# Patient Record
Sex: FEMALE | Race: ASIAN | NOT HISPANIC OR LATINO | Employment: UNEMPLOYED | URBAN - METROPOLITAN AREA
[De-identification: names, ages, dates, MRNs, and addresses within clinical notes are randomized per-mention and may not be internally consistent; named-entity substitution may affect disease eponyms.]

---

## 2017-02-08 ENCOUNTER — COMMUNICATION - HEALTHEAST (OUTPATIENT)
Dept: FAMILY MEDICINE | Facility: CLINIC | Age: 52
End: 2017-02-08

## 2017-02-08 DIAGNOSIS — R10.9 ABDOMINAL PAIN: ICD-10-CM

## 2017-02-08 DIAGNOSIS — J30.9 ALLERGIC RHINITIS: ICD-10-CM

## 2017-03-06 ENCOUNTER — COMMUNICATION - HEALTHEAST (OUTPATIENT)
Dept: FAMILY MEDICINE | Facility: CLINIC | Age: 52
End: 2017-03-06

## 2017-03-06 DIAGNOSIS — J30.9 ALLERGIC RHINITIS: ICD-10-CM

## 2017-04-17 ENCOUNTER — OFFICE VISIT - HEALTHEAST (OUTPATIENT)
Dept: FAMILY MEDICINE | Facility: CLINIC | Age: 52
End: 2017-04-17

## 2017-04-17 DIAGNOSIS — R10.9 ABDOMINAL PAIN: ICD-10-CM

## 2017-04-17 DIAGNOSIS — R10.13 EPIGASTRIC ABDOMINAL PAIN: ICD-10-CM

## 2017-04-17 DIAGNOSIS — J30.9 ALLERGIC RHINITIS: ICD-10-CM

## 2017-04-17 DIAGNOSIS — J45.909 ASTHMA: ICD-10-CM

## 2017-04-17 DIAGNOSIS — R21 MALAR RASH: ICD-10-CM

## 2017-04-17 DIAGNOSIS — L30.9 DERMATITIS: ICD-10-CM

## 2017-07-06 ENCOUNTER — AMBULATORY - HEALTHEAST (OUTPATIENT)
Dept: FAMILY MEDICINE | Facility: CLINIC | Age: 52
End: 2017-07-06

## 2017-07-06 DIAGNOSIS — M19.90 OSTEOARTHRITIS: ICD-10-CM

## 2017-07-06 DIAGNOSIS — M77.10 TENNIS ELBOW: ICD-10-CM

## 2017-07-21 ENCOUNTER — OFFICE VISIT - HEALTHEAST (OUTPATIENT)
Dept: FAMILY MEDICINE | Facility: CLINIC | Age: 52
End: 2017-07-21

## 2017-07-21 DIAGNOSIS — E66.9 OBESITY, UNSPECIFIED: ICD-10-CM

## 2017-07-21 DIAGNOSIS — M65.311 TRIGGER FINGER OF RIGHT THUMB: ICD-10-CM

## 2017-07-21 DIAGNOSIS — K29.70 HELICOBACTER PYLORI GASTRITIS: ICD-10-CM

## 2017-07-21 DIAGNOSIS — B96.81 HELICOBACTER PYLORI GASTRITIS: ICD-10-CM

## 2017-07-21 DIAGNOSIS — E78.00 PURE HYPERCHOLESTEROLEMIA: ICD-10-CM

## 2017-07-21 DIAGNOSIS — Z91.09 ENVIRONMENTAL ALLERGIES: ICD-10-CM

## 2017-08-14 ENCOUNTER — OFFICE VISIT - HEALTHEAST (OUTPATIENT)
Dept: FAMILY MEDICINE | Facility: CLINIC | Age: 52
End: 2017-08-14

## 2017-08-14 DIAGNOSIS — M79.672 FOOT PAIN, BILATERAL: ICD-10-CM

## 2017-08-14 DIAGNOSIS — Z91.013 SHRIMP ALLERGY: ICD-10-CM

## 2017-08-14 DIAGNOSIS — M54.41 CHRONIC BILATERAL LOW BACK PAIN WITH BILATERAL SCIATICA: ICD-10-CM

## 2017-08-14 DIAGNOSIS — K59.09 CHRONIC CONSTIPATION: ICD-10-CM

## 2017-08-14 DIAGNOSIS — E78.00 PURE HYPERCHOLESTEROLEMIA: ICD-10-CM

## 2017-08-14 DIAGNOSIS — E66.811 OBESITY (BMI 30.0-34.9): ICD-10-CM

## 2017-08-14 DIAGNOSIS — N92.0 EXCESSIVE OR FREQUENT MENSTRUATION: ICD-10-CM

## 2017-08-14 DIAGNOSIS — R10.13 EPIGASTRIC ABDOMINAL PAIN: ICD-10-CM

## 2017-08-14 DIAGNOSIS — K13.0 ANGULAR CHEILITIS: ICD-10-CM

## 2017-08-14 DIAGNOSIS — G89.29 CHRONIC BILATERAL LOW BACK PAIN WITH BILATERAL SCIATICA: ICD-10-CM

## 2017-08-14 DIAGNOSIS — L85.3 DRY SKIN DERMATITIS: ICD-10-CM

## 2017-08-14 DIAGNOSIS — R87.610 ATYPICAL SQUAMOUS CELLS OF UNDETERMINED SIGNIFICANCE ON CYTOLOGIC SMEAR OF CERVIX (ASC-US): ICD-10-CM

## 2017-08-14 DIAGNOSIS — M79.671 FOOT PAIN, BILATERAL: ICD-10-CM

## 2017-08-14 DIAGNOSIS — K76.0 FATTY LIVER: ICD-10-CM

## 2017-08-14 DIAGNOSIS — R73.09 OTHER ABNORMAL GLUCOSE: ICD-10-CM

## 2017-08-14 DIAGNOSIS — J30.9 ALLERGIC RHINITIS: ICD-10-CM

## 2017-08-14 DIAGNOSIS — J45.20 MILD INTERMITTENT ASTHMA WITHOUT COMPLICATION: ICD-10-CM

## 2017-08-14 DIAGNOSIS — R17 HIGH BILIRUBIN: ICD-10-CM

## 2017-08-14 DIAGNOSIS — L30.9 DERMATITIS: ICD-10-CM

## 2017-08-14 DIAGNOSIS — B96.81 HELICOBACTER PYLORI GASTRITIS: ICD-10-CM

## 2017-08-14 DIAGNOSIS — M65.311 TRIGGER FINGER OF RIGHT THUMB: ICD-10-CM

## 2017-08-14 DIAGNOSIS — Z31.9 INFERTILITY MANAGEMENT: ICD-10-CM

## 2017-08-14 DIAGNOSIS — Z00.00 ROUTINE GENERAL MEDICAL EXAMINATION AT A HEALTH CARE FACILITY: ICD-10-CM

## 2017-08-14 DIAGNOSIS — Z12.31 VISIT FOR SCREENING MAMMOGRAM: ICD-10-CM

## 2017-08-14 DIAGNOSIS — M54.42 CHRONIC BILATERAL LOW BACK PAIN WITH BILATERAL SCIATICA: ICD-10-CM

## 2017-08-14 DIAGNOSIS — F41.9 ANXIETY: ICD-10-CM

## 2017-08-14 DIAGNOSIS — Z91.09 ENVIRONMENTAL ALLERGIES: ICD-10-CM

## 2017-08-14 DIAGNOSIS — J45.909 ASTHMA: ICD-10-CM

## 2017-08-14 DIAGNOSIS — R73.03 PREDIABETES: ICD-10-CM

## 2017-08-14 DIAGNOSIS — K29.70 HELICOBACTER PYLORI GASTRITIS: ICD-10-CM

## 2017-08-14 DIAGNOSIS — M54.17 LUMBOSACRAL RADICULOPATHY AT S1: ICD-10-CM

## 2017-08-14 DIAGNOSIS — E66.9 OBESITY, UNSPECIFIED: ICD-10-CM

## 2017-08-14 LAB
CHOLEST SERPL-MCNC: 196 MG/DL
FASTING STATUS PATIENT QL REPORTED: YES
HBA1C MFR BLD: 6.1 % (ref 3.5–6)
HDLC SERPL-MCNC: 37 MG/DL
LDLC SERPL CALC-MCNC: 127 MG/DL
TRIGL SERPL-MCNC: 158 MG/DL

## 2017-08-14 ASSESSMENT — MIFFLIN-ST. JEOR: SCORE: 1131.34

## 2017-08-17 ENCOUNTER — AMBULATORY - HEALTHEAST (OUTPATIENT)
Dept: LAB | Facility: CLINIC | Age: 52
End: 2017-08-17

## 2017-08-17 ENCOUNTER — COMMUNICATION - HEALTHEAST (OUTPATIENT)
Dept: FAMILY MEDICINE | Facility: CLINIC | Age: 52
End: 2017-08-17

## 2017-08-17 DIAGNOSIS — Z00.00 ROUTINE GENERAL MEDICAL EXAMINATION AT HEALTH CARE FACILITY: ICD-10-CM

## 2017-08-17 DIAGNOSIS — E55.9 VITAMIN D DEFICIENCY: ICD-10-CM

## 2017-08-18 ENCOUNTER — COMMUNICATION - HEALTHEAST (OUTPATIENT)
Dept: FAMILY MEDICINE | Facility: CLINIC | Age: 52
End: 2017-08-18

## 2017-08-18 ENCOUNTER — AMBULATORY - HEALTHEAST (OUTPATIENT)
Dept: LAB | Facility: CLINIC | Age: 52
End: 2017-08-18

## 2017-08-18 DIAGNOSIS — R10.13 EPIGASTRIC ABDOMINAL PAIN: ICD-10-CM

## 2017-08-25 ENCOUNTER — OFFICE VISIT - HEALTHEAST (OUTPATIENT)
Dept: FAMILY MEDICINE | Facility: CLINIC | Age: 52
End: 2017-08-25

## 2017-08-25 DIAGNOSIS — Z12.31 ENCOUNTER FOR MAMMOGRAM TO ESTABLISH BASELINE MAMMOGRAM: ICD-10-CM

## 2017-08-25 DIAGNOSIS — R10.13 EPIGASTRIC ABDOMINAL PAIN: ICD-10-CM

## 2017-08-25 DIAGNOSIS — M65.311 TRIGGER FINGER OF RIGHT THUMB: ICD-10-CM

## 2017-08-25 ASSESSMENT — MIFFLIN-ST. JEOR: SCORE: 1135.87

## 2018-04-13 ENCOUNTER — COMMUNICATION - HEALTHEAST (OUTPATIENT)
Dept: FAMILY MEDICINE | Facility: CLINIC | Age: 53
End: 2018-04-13

## 2018-04-13 ENCOUNTER — OFFICE VISIT - HEALTHEAST (OUTPATIENT)
Dept: FAMILY MEDICINE | Facility: CLINIC | Age: 53
End: 2018-04-13

## 2018-04-13 DIAGNOSIS — Z71.84 TRAVEL ADVICE ENCOUNTER: ICD-10-CM

## 2018-04-13 DIAGNOSIS — M54.42 ACUTE BILATERAL LOW BACK PAIN WITH BILATERAL SCIATICA: ICD-10-CM

## 2018-04-13 DIAGNOSIS — M79.604 BILATERAL LEG PAIN: ICD-10-CM

## 2018-04-13 DIAGNOSIS — L30.9 DERMATITIS: ICD-10-CM

## 2018-04-13 DIAGNOSIS — M79.672 FOOT PAIN, BILATERAL: ICD-10-CM

## 2018-04-13 DIAGNOSIS — M54.41 ACUTE BILATERAL LOW BACK PAIN WITH BILATERAL SCIATICA: ICD-10-CM

## 2018-04-13 DIAGNOSIS — M79.671 FOOT PAIN, BILATERAL: ICD-10-CM

## 2018-04-13 DIAGNOSIS — M79.605 BILATERAL LEG PAIN: ICD-10-CM

## 2018-04-13 DIAGNOSIS — M54.16 LUMBAR RADICULAR PAIN: ICD-10-CM

## 2018-04-17 ENCOUNTER — OFFICE VISIT - HEALTHEAST (OUTPATIENT)
Dept: FAMILY MEDICINE | Facility: CLINIC | Age: 53
End: 2018-04-17

## 2018-04-17 DIAGNOSIS — Z91.09 ENVIRONMENTAL ALLERGIES: ICD-10-CM

## 2018-04-17 DIAGNOSIS — M54.17 LUMBOSACRAL RADICULOPATHY AT S1: ICD-10-CM

## 2018-04-17 DIAGNOSIS — R21 RASH: ICD-10-CM

## 2018-05-18 ENCOUNTER — COMMUNICATION - HEALTHEAST (OUTPATIENT)
Dept: FAMILY MEDICINE | Facility: CLINIC | Age: 53
End: 2018-05-18

## 2018-08-31 ENCOUNTER — COMMUNICATION - HEALTHEAST (OUTPATIENT)
Dept: FAMILY MEDICINE | Facility: CLINIC | Age: 53
End: 2018-08-31

## 2018-08-31 DIAGNOSIS — M54.17 LUMBOSACRAL RADICULOPATHY AT S1: ICD-10-CM

## 2018-08-31 DIAGNOSIS — R10.13 EPIGASTRIC ABDOMINAL PAIN: ICD-10-CM

## 2018-08-31 DIAGNOSIS — Z91.09 ENVIRONMENTAL ALLERGIES: ICD-10-CM

## 2018-09-26 ENCOUNTER — OFFICE VISIT - HEALTHEAST (OUTPATIENT)
Dept: FAMILY MEDICINE | Facility: CLINIC | Age: 53
End: 2018-09-26

## 2018-09-26 DIAGNOSIS — M79.621 PAIN OF RIGHT UPPER ARM: ICD-10-CM

## 2018-09-26 DIAGNOSIS — K29.70 HELICOBACTER PYLORI GASTRITIS: ICD-10-CM

## 2018-09-26 DIAGNOSIS — B96.81 HELICOBACTER PYLORI GASTRITIS: ICD-10-CM

## 2018-09-26 DIAGNOSIS — I10 HIGH BLOOD PRESSURE: ICD-10-CM

## 2018-09-26 DIAGNOSIS — R13.10 DYSPHAGIA: ICD-10-CM

## 2018-12-11 ENCOUNTER — RECORDS - HEALTHEAST (OUTPATIENT)
Dept: GENERAL RADIOLOGY | Facility: CLINIC | Age: 53
End: 2018-12-11

## 2018-12-11 ENCOUNTER — OFFICE VISIT - HEALTHEAST (OUTPATIENT)
Dept: FAMILY MEDICINE | Facility: CLINIC | Age: 53
End: 2018-12-11

## 2018-12-11 DIAGNOSIS — R07.81 RIB PAIN ON RIGHT SIDE: ICD-10-CM

## 2018-12-11 DIAGNOSIS — R07.81 PLEURODYNIA: ICD-10-CM

## 2018-12-11 ASSESSMENT — MIFFLIN-ST. JEOR: SCORE: 1126.8

## 2018-12-15 ENCOUNTER — COMMUNICATION - HEALTHEAST (OUTPATIENT)
Dept: FAMILY MEDICINE | Facility: CLINIC | Age: 53
End: 2018-12-15

## 2018-12-15 DIAGNOSIS — R10.13 EPIGASTRIC ABDOMINAL PAIN: ICD-10-CM

## 2019-01-17 ENCOUNTER — COMMUNICATION - HEALTHEAST (OUTPATIENT)
Dept: FAMILY MEDICINE | Facility: CLINIC | Age: 54
End: 2019-01-17

## 2019-02-26 ENCOUNTER — COMMUNICATION - HEALTHEAST (OUTPATIENT)
Dept: FAMILY MEDICINE | Facility: CLINIC | Age: 54
End: 2019-02-26

## 2019-02-26 ENCOUNTER — OFFICE VISIT - HEALTHEAST (OUTPATIENT)
Dept: FAMILY MEDICINE | Facility: CLINIC | Age: 54
End: 2019-02-26

## 2019-02-26 DIAGNOSIS — Z12.31 VISIT FOR SCREENING MAMMOGRAM: ICD-10-CM

## 2019-02-26 DIAGNOSIS — M54.17 LUMBOSACRAL RADICULOPATHY AT S1: ICD-10-CM

## 2019-02-26 DIAGNOSIS — R21 MALAR RASH: ICD-10-CM

## 2019-02-26 DIAGNOSIS — J45.20 MILD INTERMITTENT ASTHMA WITHOUT COMPLICATION: ICD-10-CM

## 2019-02-26 DIAGNOSIS — Z00.00 ROUTINE GENERAL MEDICAL EXAMINATION AT A HEALTH CARE FACILITY: ICD-10-CM

## 2019-02-26 DIAGNOSIS — K76.0 FATTY LIVER: ICD-10-CM

## 2019-02-26 DIAGNOSIS — N95.1 VAGINAL DRYNESS, MENOPAUSAL: ICD-10-CM

## 2019-02-26 DIAGNOSIS — E66.811 OBESITY (BMI 30.0-34.9): ICD-10-CM

## 2019-02-26 DIAGNOSIS — K59.09 CHRONIC CONSTIPATION: ICD-10-CM

## 2019-02-26 DIAGNOSIS — E55.9 VITAMIN D DEFICIENCY: ICD-10-CM

## 2019-02-26 DIAGNOSIS — R73.03 PREDIABETES: ICD-10-CM

## 2019-02-26 DIAGNOSIS — Z91.09 ENVIRONMENTAL ALLERGIES: ICD-10-CM

## 2019-02-26 DIAGNOSIS — G89.29 CHRONIC BILATERAL LOW BACK PAIN WITH BILATERAL SCIATICA: ICD-10-CM

## 2019-02-26 DIAGNOSIS — R87.610 ATYPICAL SQUAMOUS CELLS OF UNDETERMINED SIGNIFICANCE ON CYTOLOGIC SMEAR OF CERVIX (ASC-US): ICD-10-CM

## 2019-02-26 DIAGNOSIS — Z12.4 CERVICAL CANCER SCREENING: ICD-10-CM

## 2019-02-26 DIAGNOSIS — B96.81 HELICOBACTER PYLORI GASTRITIS: ICD-10-CM

## 2019-02-26 DIAGNOSIS — E78.00 PURE HYPERCHOLESTEROLEMIA: ICD-10-CM

## 2019-02-26 DIAGNOSIS — R10.13 EPIGASTRIC ABDOMINAL PAIN: ICD-10-CM

## 2019-02-26 DIAGNOSIS — M79.672 FOOT PAIN, BILATERAL: ICD-10-CM

## 2019-02-26 DIAGNOSIS — M54.42 CHRONIC BILATERAL LOW BACK PAIN WITH BILATERAL SCIATICA: ICD-10-CM

## 2019-02-26 DIAGNOSIS — H04.123 DRY EYES: ICD-10-CM

## 2019-02-26 DIAGNOSIS — M79.671 FOOT PAIN, BILATERAL: ICD-10-CM

## 2019-02-26 DIAGNOSIS — R21 RASH: ICD-10-CM

## 2019-02-26 DIAGNOSIS — Z12.11 SPECIAL SCREENING FOR MALIGNANT NEOPLASMS, COLON: ICD-10-CM

## 2019-02-26 DIAGNOSIS — K29.70 HELICOBACTER PYLORI GASTRITIS: ICD-10-CM

## 2019-02-26 DIAGNOSIS — R17 ELEVATED BILIRUBIN: ICD-10-CM

## 2019-02-26 DIAGNOSIS — M54.41 CHRONIC BILATERAL LOW BACK PAIN WITH BILATERAL SCIATICA: ICD-10-CM

## 2019-02-26 DIAGNOSIS — E11.9 TYPE 2 DIABETES MELLITUS WITHOUT COMPLICATION, WITHOUT LONG-TERM CURRENT USE OF INSULIN (H): ICD-10-CM

## 2019-02-26 LAB
ALBUMIN SERPL-MCNC: 3.6 G/DL (ref 3.5–5)
ALP SERPL-CCNC: 85 U/L (ref 45–120)
ALT SERPL W P-5'-P-CCNC: 30 U/L (ref 0–45)
ANION GAP SERPL CALCULATED.3IONS-SCNC: 13 MMOL/L (ref 5–18)
AST SERPL W P-5'-P-CCNC: 26 U/L (ref 0–40)
BILIRUB SERPL-MCNC: 0.5 MG/DL (ref 0–1)
BUN SERPL-MCNC: 13 MG/DL (ref 8–22)
CALCIUM SERPL-MCNC: 9.3 MG/DL (ref 8.5–10.5)
CHLORIDE BLD-SCNC: 101 MMOL/L (ref 98–107)
CHOLEST SERPL-MCNC: 266 MG/DL
CO2 SERPL-SCNC: 29 MMOL/L (ref 22–31)
CREAT SERPL-MCNC: 0.78 MG/DL (ref 0.6–1.1)
FASTING STATUS PATIENT QL REPORTED: YES
GFR SERPL CREATININE-BSD FRML MDRD: >60 ML/MIN/1.73M2
GLUCOSE BLD-MCNC: 84 MG/DL (ref 70–125)
HBA1C MFR BLD: 6.8 % (ref 3.5–6)
HDLC SERPL-MCNC: 61 MG/DL
LDLC SERPL CALC-MCNC: 175 MG/DL
POTASSIUM BLD-SCNC: 3.4 MMOL/L (ref 3.5–5)
PROT SERPL-MCNC: 7 G/DL (ref 6–8)
SODIUM SERPL-SCNC: 143 MMOL/L (ref 136–145)
TRIGL SERPL-MCNC: 152 MG/DL
TSH SERPL DL<=0.005 MIU/L-ACNC: 3.04 UIU/ML (ref 0.3–5)

## 2019-02-26 ASSESSMENT — MIFFLIN-ST. JEOR: SCORE: 1140.41

## 2019-02-27 LAB
25(OH)D3 SERPL-MCNC: 16.6 NG/ML (ref 30–80)
HPV SOURCE: NORMAL
HUMAN PAPILLOMA VIRUS 16 DNA: NEGATIVE
HUMAN PAPILLOMA VIRUS 18 DNA: NEGATIVE
HUMAN PAPILLOMA VIRUS FINAL DIAGNOSIS: NORMAL
HUMAN PAPILLOMA VIRUS OTHER HR: NEGATIVE
SPECIMEN DESCRIPTION: NORMAL

## 2019-03-04 ENCOUNTER — COMMUNICATION - HEALTHEAST (OUTPATIENT)
Dept: FAMILY MEDICINE | Facility: CLINIC | Age: 54
End: 2019-03-04

## 2019-03-04 DIAGNOSIS — E78.2 MIXED HYPERLIPIDEMIA: ICD-10-CM

## 2019-03-04 DIAGNOSIS — E11.9 TYPE 2 DIABETES MELLITUS WITHOUT COMPLICATION, WITHOUT LONG-TERM CURRENT USE OF INSULIN (H): ICD-10-CM

## 2019-03-07 ENCOUNTER — COMMUNICATION - HEALTHEAST (OUTPATIENT)
Dept: FAMILY MEDICINE | Facility: CLINIC | Age: 54
End: 2019-03-07

## 2019-03-07 ENCOUNTER — AMBULATORY - HEALTHEAST (OUTPATIENT)
Dept: LAB | Facility: CLINIC | Age: 54
End: 2019-03-07

## 2019-03-07 DIAGNOSIS — Z12.11 SPECIAL SCREENING FOR MALIGNANT NEOPLASMS, COLON: ICD-10-CM

## 2019-03-07 LAB
HEMOCCULT SP1 STL QL: NEGATIVE
HEMOCCULT SP2 STL QL: NEGATIVE
HEMOCCULT SP3 STL QL: NEGATIVE

## 2019-03-19 ENCOUNTER — COMMUNICATION - HEALTHEAST (OUTPATIENT)
Dept: FAMILY MEDICINE | Facility: CLINIC | Age: 54
End: 2019-03-19

## 2019-03-25 ENCOUNTER — OFFICE VISIT - HEALTHEAST (OUTPATIENT)
Dept: EDUCATION SERVICES | Facility: CLINIC | Age: 54
End: 2019-03-25

## 2019-03-25 ENCOUNTER — COMMUNICATION - HEALTHEAST (OUTPATIENT)
Dept: FAMILY MEDICINE | Facility: CLINIC | Age: 54
End: 2019-03-25

## 2019-03-25 DIAGNOSIS — M79.671 FOOT PAIN, BILATERAL: ICD-10-CM

## 2019-03-25 DIAGNOSIS — E11.9 DIABETES MELLITUS, TYPE 2 (H): ICD-10-CM

## 2019-03-25 DIAGNOSIS — G89.29 CHRONIC BILATERAL LOW BACK PAIN WITH BILATERAL SCIATICA: ICD-10-CM

## 2019-03-25 DIAGNOSIS — M54.41 CHRONIC BILATERAL LOW BACK PAIN WITH BILATERAL SCIATICA: ICD-10-CM

## 2019-03-25 DIAGNOSIS — M79.672 FOOT PAIN, BILATERAL: ICD-10-CM

## 2019-03-25 DIAGNOSIS — M54.42 CHRONIC BILATERAL LOW BACK PAIN WITH BILATERAL SCIATICA: ICD-10-CM

## 2019-03-26 ENCOUNTER — OFFICE VISIT - HEALTHEAST (OUTPATIENT)
Dept: FAMILY MEDICINE | Facility: CLINIC | Age: 54
End: 2019-03-26

## 2019-03-26 ENCOUNTER — OFFICE VISIT - HEALTHEAST (OUTPATIENT)
Dept: PHARMACY | Facility: CLINIC | Age: 54
End: 2019-03-26

## 2019-03-26 DIAGNOSIS — I10 ESSENTIAL HYPERTENSION: ICD-10-CM

## 2019-03-26 DIAGNOSIS — E66.811 OBESITY (BMI 30.0-34.9): ICD-10-CM

## 2019-03-26 DIAGNOSIS — Z91.09 ENVIRONMENTAL ALLERGIES: ICD-10-CM

## 2019-03-26 DIAGNOSIS — E11.9 TYPE 2 DIABETES MELLITUS WITHOUT COMPLICATION, WITHOUT LONG-TERM CURRENT USE OF INSULIN (H): ICD-10-CM

## 2019-03-26 DIAGNOSIS — J45.20 MILD INTERMITTENT ASTHMA WITHOUT COMPLICATION: ICD-10-CM

## 2019-03-26 DIAGNOSIS — G89.29 CHRONIC BILATERAL LOW BACK PAIN WITH BILATERAL SCIATICA: ICD-10-CM

## 2019-03-26 DIAGNOSIS — G47.00 INSOMNIA, UNSPECIFIED TYPE: ICD-10-CM

## 2019-03-26 DIAGNOSIS — R87.610 ATYPICAL SQUAMOUS CELLS OF UNDETERMINED SIGNIFICANCE ON CYTOLOGIC SMEAR OF CERVIX (ASC-US): ICD-10-CM

## 2019-03-26 DIAGNOSIS — E78.00 PURE HYPERCHOLESTEROLEMIA: ICD-10-CM

## 2019-03-26 DIAGNOSIS — L85.3 DRY SKIN DERMATITIS: ICD-10-CM

## 2019-03-26 DIAGNOSIS — E55.9 VITAMIN D DEFICIENCY: ICD-10-CM

## 2019-03-26 DIAGNOSIS — R10.13 EPIGASTRIC PAIN: ICD-10-CM

## 2019-03-26 DIAGNOSIS — M54.42 CHRONIC BILATERAL LOW BACK PAIN WITH BILATERAL SCIATICA: ICD-10-CM

## 2019-03-26 DIAGNOSIS — Z12.31 VISIT FOR SCREENING MAMMOGRAM: ICD-10-CM

## 2019-03-26 DIAGNOSIS — M54.41 CHRONIC BILATERAL LOW BACK PAIN WITH BILATERAL SCIATICA: ICD-10-CM

## 2019-05-09 ENCOUNTER — COMMUNICATION - HEALTHEAST (OUTPATIENT)
Dept: FAMILY MEDICINE | Facility: CLINIC | Age: 54
End: 2019-05-09

## 2019-05-10 ENCOUNTER — COMMUNICATION - HEALTHEAST (OUTPATIENT)
Dept: FAMILY MEDICINE | Facility: CLINIC | Age: 54
End: 2019-05-10

## 2019-06-03 ENCOUNTER — COMMUNICATION - HEALTHEAST (OUTPATIENT)
Dept: FAMILY MEDICINE | Facility: CLINIC | Age: 54
End: 2019-06-03

## 2019-06-03 DIAGNOSIS — J45.20 MILD INTERMITTENT ASTHMA WITHOUT COMPLICATION: ICD-10-CM

## 2019-06-07 ENCOUNTER — COMMUNICATION - HEALTHEAST (OUTPATIENT)
Dept: FAMILY MEDICINE | Facility: CLINIC | Age: 54
End: 2019-06-07

## 2019-07-19 ENCOUNTER — COMMUNICATION - HEALTHEAST (OUTPATIENT)
Dept: FAMILY MEDICINE | Facility: CLINIC | Age: 54
End: 2019-07-19

## 2019-07-19 DIAGNOSIS — E11.9 TYPE 2 DIABETES MELLITUS WITHOUT COMPLICATION, WITHOUT LONG-TERM CURRENT USE OF INSULIN (H): ICD-10-CM

## 2019-08-02 ENCOUNTER — COMMUNICATION - HEALTHEAST (OUTPATIENT)
Dept: OBGYN | Facility: CLINIC | Age: 54
End: 2019-08-02

## 2019-08-12 ENCOUNTER — COMMUNICATION - HEALTHEAST (OUTPATIENT)
Dept: FAMILY MEDICINE | Facility: CLINIC | Age: 54
End: 2019-08-12

## 2019-08-12 DIAGNOSIS — R10.13 EPIGASTRIC PAIN: ICD-10-CM

## 2019-08-12 DIAGNOSIS — Z91.09 ENVIRONMENTAL ALLERGIES: ICD-10-CM

## 2019-08-12 DIAGNOSIS — J45.20 MILD INTERMITTENT ASTHMA WITHOUT COMPLICATION: ICD-10-CM

## 2019-08-26 ENCOUNTER — AMBULATORY - HEALTHEAST (OUTPATIENT)
Dept: FAMILY MEDICINE | Facility: CLINIC | Age: 54
End: 2019-08-26

## 2019-08-26 DIAGNOSIS — E11.9 TYPE 2 DIABETES MELLITUS WITHOUT COMPLICATION, WITHOUT LONG-TERM CURRENT USE OF INSULIN (H): ICD-10-CM

## 2019-09-12 ENCOUNTER — COMMUNICATION - HEALTHEAST (OUTPATIENT)
Dept: FAMILY MEDICINE | Facility: CLINIC | Age: 54
End: 2019-09-12

## 2019-09-12 DIAGNOSIS — J45.20 MILD INTERMITTENT ASTHMA WITHOUT COMPLICATION: ICD-10-CM

## 2019-09-12 DIAGNOSIS — Z91.09 ENVIRONMENTAL ALLERGIES: ICD-10-CM

## 2019-09-14 ENCOUNTER — COMMUNICATION - HEALTHEAST (OUTPATIENT)
Dept: FAMILY MEDICINE | Facility: CLINIC | Age: 54
End: 2019-09-14

## 2019-09-14 DIAGNOSIS — M54.41 CHRONIC BILATERAL LOW BACK PAIN WITH BILATERAL SCIATICA: ICD-10-CM

## 2019-09-14 DIAGNOSIS — G89.29 CHRONIC BILATERAL LOW BACK PAIN WITH BILATERAL SCIATICA: ICD-10-CM

## 2019-09-14 DIAGNOSIS — M54.42 CHRONIC BILATERAL LOW BACK PAIN WITH BILATERAL SCIATICA: ICD-10-CM

## 2019-09-16 ENCOUNTER — COMMUNICATION - HEALTHEAST (OUTPATIENT)
Dept: FAMILY MEDICINE | Facility: CLINIC | Age: 54
End: 2019-09-16

## 2019-09-16 DIAGNOSIS — G89.29 CHRONIC BILATERAL LOW BACK PAIN WITH BILATERAL SCIATICA: ICD-10-CM

## 2019-09-16 DIAGNOSIS — M54.41 CHRONIC BILATERAL LOW BACK PAIN WITH BILATERAL SCIATICA: ICD-10-CM

## 2019-09-16 DIAGNOSIS — M54.42 CHRONIC BILATERAL LOW BACK PAIN WITH BILATERAL SCIATICA: ICD-10-CM

## 2019-09-17 ENCOUNTER — COMMUNICATION - HEALTHEAST (OUTPATIENT)
Dept: FAMILY MEDICINE | Facility: CLINIC | Age: 54
End: 2019-09-17

## 2019-09-18 ENCOUNTER — OFFICE VISIT - HEALTHEAST (OUTPATIENT)
Dept: FAMILY MEDICINE | Facility: CLINIC | Age: 54
End: 2019-09-18

## 2019-09-18 DIAGNOSIS — I10 ESSENTIAL HYPERTENSION: ICD-10-CM

## 2019-09-18 DIAGNOSIS — E11.9 TYPE 2 DIABETES MELLITUS WITHOUT COMPLICATION, WITHOUT LONG-TERM CURRENT USE OF INSULIN (H): ICD-10-CM

## 2019-09-18 DIAGNOSIS — J45.20 MILD INTERMITTENT ASTHMA WITHOUT COMPLICATION: ICD-10-CM

## 2019-09-18 DIAGNOSIS — M79.621 PAIN OF RIGHT UPPER ARM: ICD-10-CM

## 2019-09-18 DIAGNOSIS — Z00.00 HEALTH CARE MAINTENANCE: ICD-10-CM

## 2019-09-18 LAB
ANION GAP SERPL CALCULATED.3IONS-SCNC: 12 MMOL/L (ref 5–18)
BUN SERPL-MCNC: 15 MG/DL (ref 8–22)
CALCIUM SERPL-MCNC: 9.4 MG/DL (ref 8.5–10.5)
CHLORIDE BLD-SCNC: 103 MMOL/L (ref 98–107)
CO2 SERPL-SCNC: 25 MMOL/L (ref 22–31)
CREAT SERPL-MCNC: 0.73 MG/DL (ref 0.6–1.1)
CREAT UR-MCNC: 39.2 MG/DL
GFR SERPL CREATININE-BSD FRML MDRD: >60 ML/MIN/1.73M2
GLUCOSE BLD-MCNC: 144 MG/DL (ref 70–125)
HBA1C MFR BLD: 6.1 % (ref 3.5–6)
HIV 1+2 AB+HIV1 P24 AG SERPL QL IA: NEGATIVE
MICROALBUMIN UR-MCNC: <0.5 MG/DL (ref 0–1.99)
MICROALBUMIN/CREAT UR: NORMAL MG/G{CREAT}
POTASSIUM BLD-SCNC: 3.7 MMOL/L (ref 3.5–5)
SODIUM SERPL-SCNC: 140 MMOL/L (ref 136–145)

## 2019-09-19 ENCOUNTER — COMMUNICATION - HEALTHEAST (OUTPATIENT)
Dept: FAMILY MEDICINE | Facility: CLINIC | Age: 54
End: 2019-09-19

## 2019-09-19 LAB — HCV AB SERPL QL IA: NEGATIVE

## 2019-09-23 ENCOUNTER — COMMUNICATION - HEALTHEAST (OUTPATIENT)
Dept: FAMILY MEDICINE | Facility: CLINIC | Age: 54
End: 2019-09-23

## 2020-02-06 ENCOUNTER — HOSPITAL ENCOUNTER (INPATIENT)
Facility: CLINIC | Age: 55
LOS: 11 days | Discharge: HOME | End: 2020-02-17
Attending: PHYSICAL MEDICINE & REHABILITATION | Admitting: PHYSICAL MEDICINE & REHABILITATION
Payer: COMMERCIAL

## 2020-02-06 DIAGNOSIS — K21.00 GASTROESOPHAGEAL REFLUX DISEASE WITH ESOPHAGITIS: ICD-10-CM

## 2020-02-06 DIAGNOSIS — I63.89 CEREBROVASCULAR ACCIDENT (CVA) DUE TO OTHER MECHANISM (H): Primary | ICD-10-CM

## 2020-02-06 DIAGNOSIS — E55.9 VITAMIN D DEFICIENCY: ICD-10-CM

## 2020-02-06 DIAGNOSIS — K59.00 CONSTIPATION, UNSPECIFIED CONSTIPATION TYPE: ICD-10-CM

## 2020-02-06 DIAGNOSIS — J30.89 ALLERGIC RHINITIS DUE TO OTHER ALLERGIC TRIGGER, UNSPECIFIED SEASONALITY: ICD-10-CM

## 2020-02-06 DIAGNOSIS — F32.A DEPRESSION, UNSPECIFIED DEPRESSION TYPE: ICD-10-CM

## 2020-02-06 PROBLEM — I63.9 STROKE (H): Status: ACTIVE | Noted: 2020-02-06

## 2020-02-06 LAB
GLUCOSE BLDC GLUCOMTR-MCNC: 107 MG/DL (ref 70–99)
GLUCOSE BLDC GLUCOMTR-MCNC: 110 MG/DL (ref 70–99)
HBA1C MFR BLD: 6.1 % (ref 0–5.6)

## 2020-02-06 PROCEDURE — 83036 HEMOGLOBIN GLYCOSYLATED A1C: CPT | Performed by: NURSE PRACTITIONER

## 2020-02-06 PROCEDURE — 25000132 ZZH RX MED GY IP 250 OP 250 PS 637: Performed by: NURSE PRACTITIONER

## 2020-02-06 PROCEDURE — 97165 OT EVAL LOW COMPLEX 30 MIN: CPT | Mod: GO | Performed by: OCCUPATIONAL THERAPIST

## 2020-02-06 PROCEDURE — 00000146 ZZHCL STATISTIC GLUCOSE BY METER IP

## 2020-02-06 PROCEDURE — 36415 COLL VENOUS BLD VENIPUNCTURE: CPT | Performed by: NURSE PRACTITIONER

## 2020-02-06 PROCEDURE — 12800006 ZZH R&B REHAB

## 2020-02-06 RX ORDER — VENLAFAXINE HYDROCHLORIDE 75 MG/1
75 TABLET, EXTENDED RELEASE ORAL
Status: DISCONTINUED | OUTPATIENT
Start: 2020-02-07 | End: 2020-02-17 | Stop reason: HOSPADM

## 2020-02-06 RX ORDER — FAMOTIDINE 10 MG
20 TABLET ORAL DAILY
Status: DISCONTINUED | OUTPATIENT
Start: 2020-02-07 | End: 2020-02-17 | Stop reason: HOSPADM

## 2020-02-06 RX ORDER — ALBUTEROL SULFATE 90 UG/1
2 AEROSOL, METERED RESPIRATORY (INHALATION) EVERY 4 HOURS PRN
Status: DISCONTINUED | OUTPATIENT
Start: 2020-02-06 | End: 2020-02-17 | Stop reason: HOSPADM

## 2020-02-06 RX ORDER — NICOTINE POLACRILEX 4 MG
15-30 LOZENGE BUCCAL
Status: DISCONTINUED | OUTPATIENT
Start: 2020-02-06 | End: 2020-02-17 | Stop reason: HOSPADM

## 2020-02-06 RX ORDER — ASPIRIN 81 MG/1
81 TABLET, CHEWABLE ORAL DAILY
Status: DISCONTINUED | OUTPATIENT
Start: 2020-02-07 | End: 2020-02-17 | Stop reason: HOSPADM

## 2020-02-06 RX ORDER — POLYETHYLENE GLYCOL 3350 17 G/17G
17 POWDER, FOR SOLUTION ORAL DAILY
Status: DISCONTINUED | OUTPATIENT
Start: 2020-02-07 | End: 2020-02-17 | Stop reason: HOSPADM

## 2020-02-06 RX ORDER — MAGNESIUM OXIDE 400 MG/1
400 TABLET ORAL 3 TIMES DAILY
Status: COMPLETED | OUTPATIENT
Start: 2020-02-06 | End: 2020-02-06

## 2020-02-06 RX ORDER — MONTELUKAST SODIUM 10 MG/1
10 TABLET ORAL AT BEDTIME
Status: DISCONTINUED | OUTPATIENT
Start: 2020-02-06 | End: 2020-02-17 | Stop reason: HOSPADM

## 2020-02-06 RX ORDER — CLOPIDOGREL BISULFATE 75 MG/1
75 TABLET ORAL DAILY
Status: DISCONTINUED | OUTPATIENT
Start: 2020-02-07 | End: 2020-02-17 | Stop reason: HOSPADM

## 2020-02-06 RX ORDER — AMOXICILLIN 250 MG
1 CAPSULE ORAL 2 TIMES DAILY
Status: DISCONTINUED | OUTPATIENT
Start: 2020-02-06 | End: 2020-02-17 | Stop reason: HOSPADM

## 2020-02-06 RX ORDER — ATORVASTATIN CALCIUM 40 MG/1
40 TABLET, FILM COATED ORAL DAILY
Status: DISCONTINUED | OUTPATIENT
Start: 2020-02-07 | End: 2020-02-17 | Stop reason: HOSPADM

## 2020-02-06 RX ORDER — DEXTROSE MONOHYDRATE 25 G/50ML
25-50 INJECTION, SOLUTION INTRAVENOUS
Status: DISCONTINUED | OUTPATIENT
Start: 2020-02-06 | End: 2020-02-17 | Stop reason: HOSPADM

## 2020-02-06 RX ORDER — VITAMIN B COMPLEX
1000 TABLET ORAL DAILY
Status: DISCONTINUED | OUTPATIENT
Start: 2020-02-07 | End: 2020-02-10

## 2020-02-06 RX ORDER — VENLAFAXINE HYDROCHLORIDE 37.5 MG/1
37.5 TABLET, EXTENDED RELEASE ORAL
Status: DISCONTINUED | OUTPATIENT
Start: 2020-02-07 | End: 2020-02-06

## 2020-02-06 RX ORDER — LISINOPRIL 10 MG/1
10 TABLET ORAL DAILY
Status: DISCONTINUED | OUTPATIENT
Start: 2020-02-07 | End: 2020-02-07

## 2020-02-06 RX ADMIN — Medication 400 MG: at 16:13

## 2020-02-06 RX ADMIN — Medication 400 MG: at 21:53

## 2020-02-06 RX ADMIN — SENNOSIDES AND DOCUSATE SODIUM 1 TABLET: 8.6; 5 TABLET ORAL at 21:53

## 2020-02-06 RX ADMIN — MONTELUKAST 10 MG: 10 TABLET, FILM COATED ORAL at 21:53

## 2020-02-06 ASSESSMENT — ACTIVITIES OF DAILY LIVING (ADL): PREVIOUS_RESPONSIBILITIES: MEAL PREP;HOUSEKEEPING;LAUNDRY;SHOPPING;MEDICATION MANAGEMENT;FINANCES

## 2020-02-06 ASSESSMENT — MIFFLIN-ST. JEOR: SCORE: 1094.37

## 2020-02-06 NOTE — H&P
[facility]   Acute Rehabilitation Unit  Admission History and Physical    CHIEF COMPLAINT   Left sided-weakness     HISTORY OF PRESENT ILLNESS  Kevin Thrasher is a 54 year old, Hmong speaking,  right hand dominant female who presented to [facility] ED  for L-sided numbness/weakness 1-2 days duraton.  CTA of the head and neck was negative for acute infarct, vessel occlusion, significant stenosis, aneurysm or high flow vascular malformation on 2/1/20, discharged home but her symptoms worsened overnight so Pt return to the ED for further treatment then was transferred to [facility]  at which ,  MRI showed an acute stroke of the right lazara. not TPA candidatebecause of time of onset contraindication. Dual antiplatelet therapy was initiated with plan to continue indefinitely. LDL was 155, increased Lipitor with goal below 70. Echo showed normal EF, no cardiac mass, thrombus or right to left shunt.    PMHx; Angular cheilitis (9/19/2016), Depression, Head Injury, Helicobacter pylori ,gastritis, HTN (hypertension) (3/26/2019), Hyperlipidemia, Infertility, Menorrhagia, Obesity (BMI 30.0-34.9), Pterygium, Shingles (2/9/2015), and Type 2 diabetes mellitus without complication, without long-term current use of insulin (H) (2/26/2019).     During his acute hospitalization, patient was seen and evaluated by  PT,  OT, and SLP  service.  All specialties collectively recommended that patient would benefit from ongoing therapies in the acute inpatient rehabilitation setting.      In review of the therapy notes  Per PT,  Pt  Ambulating 60 ft with min A using FWW.  Mod. verbal cues, unsteady/wobbly and decreased foot clearance.    Per OT: SBA and CGA for Sitting;Cues for technique;Cues for safety;Increased effort;Increased time to complete;Decreased hand coordination. ADL training;Functional transfer training;Neuromuscular re-ed recommended.    Currently, the patient is medically  appropriate and is assessed to have needs and will benefit from an inpatient acute rehabilitation comprehensive program working with PT, OT, and will also benefit from supervision and management of Rehab Nursing and Rehab MD.       PAST MEDICAL HISTORY    Angular cheilitis 9/19/2016     Depression     Head Injury     Helicobacter pylori gastritis     HTN (hypertension) 3/26/2019     Hyperlipidemia     Infertility, female     Menorrhagia     Obesity (BMI 30.0-34.9)     Pterygium     Shingles 2/9/2015     Type 2 diabetes mellitus without complication, without long-term current use of insulin (H) 2/26/2019    Please see care everywhere  for more detail    SURGICAL HISTORY  Pt has a past surgical history that includes Combined hysteroscopy diagnostic / D&C (07/15/2011).   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  Tobacco use: denied  Alcohol use: denied  Illicit drug use: denied  Social History     Socioeconomic History     Marital status: Not on file     Spouse name: Not on file     Number of children: Not on file     Years of education: Not on file     Highest education level: Not on file   Occupational History     Not on file   Social Needs     Financial resource strain: Not on file     Food insecurity:     Worry: Not on file     Inability: Not on file     Transportation needs:     Medical: Not on file     Non-medical: Not on file   Tobacco Use     Smoking status: Not on file   Substance and Sexual Activity     Alcohol use: Not on file     Drug use: Not on file     Sexual activity: Not on file   Lifestyle     Physical activity:     Days per week: Not on file     Minutes per session: Not on file     Stress: Not on file   Relationships      "Social connections:     Talks on phone: Not on file     Gets together: Not on file     Attends Hindu service: Not on file     Active member of club or organization: Not on file     Attends meetings of clubs or organizations: Not on file     Relationship status: Not on file     Intimate partner violence:     Fear of current or ex partner: Not on file     Emotionally abused: Not on file     Physically abused: Not on file     Forced sexual activity: Not on file   Other Topics Concern     Not on file   Social History Narrative     Not on file       FAMILY HISTORY  No family history on file.      PRIOR FUNCTIONAL HISTORY   Pt was independent with all ADLs/IADLs, transfers, mobility and gait.      MEDICATIONS  No medications prior to admission.       ALLERGIES   Allergies not on file   Hx of Environmental allergies and took Claritin 10 mg PRN      REVIEW OF SYSTEMS  A 10 point ROS was performed and negative unless otherwise noted in HPI.     Constitutional: denies any fevers, chills.   Eyes: denies changes in visual acuity   Ears, Nose, Throat: denies any difficulty swallowing   Cardiovascular: denies any exertional chest pain or palpitation   Respiratory: denies dyspnea   Gastrointestinal: denies any nausea, vomiting, abdominal pain, diarrhea or constipation   Genitourinary: denies any dysuria, hematuria, frequency or urgency   Musculoskeletal: Neck pain which started one day ago but relief with hot pack. denies any muscle pain, joint pain,  or back pain   Neurologic: denies any headache, changes in motor or sensory function, no loss of balance or vertigo   Psychiatric: denies mood changes; sleeps OK     PHYSICAL EXAM  VITAL SIGNS:  /70 (BP Location: Left arm)   Pulse 61   Temp 98  F (36.7  C) (Oral)   Resp 16   Ht 1.473 m (4' 10\")   Wt 61.5 kg (135 lb 8 oz)   SpO2 93%   BMI 28.32 kg/m    BMI:  There is no height or weight on file to calculate BMI.     General: NAD, pleasant and cooperative   HEENT: " ATNC, oropharynx clear with MMM, EOMI, PERRL    Pulmonary: clear breath sounds b/l, no rales/wheezing    Cardiovascular: RRR, no murmur   Abdominal: soft, non-tender, non-distended   Extremities: warm, well perfused, no edema in bilateral lower extremities, no tenderness in calves   MSK/neuro:   Mental Status:  alert and oriented x3    Cranial Nerves: grossly normal   1. 2nd CN: Pupils equal, round, reactive to light and accomodation. and visual fields intact to confrontation.   2. 3rd,4th,6th CN:  EOMI, appropriate pupillary responses  3. 5th CN: facial sensation intact   4. 7th CN: face symmetrical   5. 8th CN: functional hearing bilaterally  6. 9th, 10th CN: palate elevates symmetrically   7. 11th CN: sternocleidomastoids and trapezii strong   8. 12th CN: tongue midline and without fasciculations     Sensory: decrease to light touch in upper and lower right extremities    Strength:    SF  EF  EE  WE  G  I  HF  KE  DF  EHL  PF   R  5 5 5 5 5 5 5 5 5 5 5   L  4 4 4 4 4 4 4+ 4+ 4+ 4+ 4+    Reflexes: Present and symmetrical    Méndez's test: negative bilaterally    Babinski reflex: downgoing bilaterally    Abnormal movements: None    Coordination: No dysmetria on finger to nose   Speech: Hmong,  needed   Cognition: appears intact, used Hmong    Gait: therapy to eveluate    Skin:  At both AC hyperpigment small round half pea size  Appears to be a blood bruno site.      LABS  Please refer to care everywhere    Imaging    HEAD CT:  1.  No CT finding of a mass, hemorrhage or focal area suggestive of an acute infarct.     HEAD CTA:   1.  No discrete vessel occlusion, significant stenosis, aneurysm or high flow vascular malformation involving arteries of the arteries of the [place].     NECK CTA:  1.  Normal configuration of the great vessels off the aortic arch with no significant stenosis of their origins.  2.  No significant stenosis or irregularity involving the arteries of the neck by  NASCET criteria.  3.  No radiographic evidence of dissection.     MRI  HEAD MRI:   1.  15 x 10 mm in cross-sectional diameter acute/early subacute infarction right lazara.  2.  No associated hemorrhage or significant mass effect.     HEAD MRA:   1.  Short segment marked narrowing one of the right P3 segments in its distal aspect.  2.  Mild areas of narrowings proximal M2 segments bilaterally.  3.  Intracranial circulation appears otherwise normal.    Echocardiogram    Left Ventricle: Normal left ventricular size and systolic function.The calculated left ventricular ejection fraction is 62%. This represents a normal ejection fraction. Mild concentric hypertrophy noted. E/e' 8 to 15, which is equivocal for estimating LV filling pressures.Left ventricular diastolic function is indeterminate.    The left ventricular wall motion is normal.    Right Ventricle: Normal right ventricular size and systolic function. TAPSE is normal, which is consistent with normal right ventricular systolic function.    Left Atrium: Left atrial volume is moderately increased. No shunts as documented by negative color flow doppler and saline contrast injection. No thrombus present. No mass present. No spontaneous echo contrast.    Tricuspid Valve: Normal tricuspid valve structure. There is no evidence of tricuspid stenosis. Trace tricuspid valve regurgitation. The estimated systolic pulmonary artery pressure is 25+ right atrial pressure mmhg.    Thoracic Aorta: The ascending aorta is mildly dilated.    No intracardiac mass or thrombus noted. Echo bubble study did not show evidence of right to left shunting.    ASSESSMENT/PLAN:  Kevin Thrasher is a 54 year old right hand dominant female who presented with L-sided numbness/weakness 1-2 days duraton.  CTA of the head and neck was negative for acute infarct, vessel occlusion, significant stenosis, aneurysm or high flow vascular malformation on 2/1/20, discharged home but symptoms worsened overnight so Pt  return to the ED 2/2/20.  MRI showed an acute stroke of the right lazara. not TPA candidate because of time of onset contraindication. Dual antiplatelet therapy was initiated with plan to continue indefinitely.. Echo showed normal EF, no cardiac mass, thrombus or right to left shunt.  PMHx; Angular cheilitis, Depression, Head Injury, H. Pylori, gastritis, HTN, HLD, Infertility,  Menorrhagia, Obesity, Pterygium, Shingles , and DM II without complication, without long-term current use of insulin.       Admitted to acute inpatient rehab 2/6/2020.    Impairment group code: 01.1 Left sided weakness 2/2 to right lazara stroke      1. PT, OT and SLP 60 minutes of each on a daily basis, in addition to rehab nursing and close management of physiatrist.      2. Impairment of ADL's:  OT for 60 minutes daily to work on ADL re-training such as grooming, self cares and bathing.      3. Impairment of mobility:  PT for 60 minutes daily to work on neuromuscular re-education focusing on strength, balance, coordination, and endurance.      4. Impairment of cognition/language/swallow:  SLP for 60 minutes daily to work on cognitive and linguistic deficits.    5. Rehab RN for med administration, VS monitoring, bowel regimen, glucose monitoring and education.      6. 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 Dual antiplatelet therapy with aspirin and Plavix indefinitely. Patient has severe intracranial atherosclerotic disease  -continues to have left-sided weakness.   -Continue Plavix 75 mg daily and aspirin 81 mg daily, Lipitor 40 mg daily.  -OP/OT    #Hyperlipidemia, LDL-155 on 2/4, not well controlled LDL goal < 70. PTA on lipitor 10 mg at   Increase Lipitor to 40 mg daily    #TARIK, resolved  -Pt had slightly elevated creatinine 1.19. Resolved with IV hydration    #Hypertension, essential: goal <130.   -Resume PTA lisinopril 10 mg daily.    #Type 2 diabetes mellitus  without complication without long term insulin use, Hemoglobin A1c goal <7. A1c - 6.2 on 2/4  -Diabetic diet  -Glucose check AC/HS and 0200  -SSI    #Hypokalemia, last potassium-3.4 on 2/4  -monitor lab tomorrow    #Depression and  Anxiety 01/04/2016   -cont PTA venlafaxine (XR) 75 MG daily  -psychology consult to be considered  -PTA trazodone 50 MG  PRN    # H/O Vitamin D deficiency 08/18/2017,  - cont.  PTA on Vit D3 1,000 units /day   -Lab tomorrow    #H/O Mild intermittent asthma without complication,  -cont. PTA albuterol (PROAIR HFA;PROVENTIL HFA;VENTOLIN HFA)   90 mcg/actuation inhaler Inhale 2 puffs every 4 (four) hours as needed for wheezing or shortness of breath    #gastritis for over 5 years, mostly after eating  -cont famotidine 20 mg daily      #H/O Acute nonintractable headache, unspecified headache type 02/02/2020   #H/OLumbosacral radiculopathy at S1 09/19/2016   #H/O Fatty liver   # H/O Abnormal Pap smear of cervix    7. Adjustment to disability:  Clinical psychology to eval and treat  8. FEN: Diabetic with thin liquid  9. Bowel: miralax daily & senokot BID  10. Bladder: check PVR  11. DVT Prophylaxis: enoxaparin 40 mg  GI Prophylaxis: omeprazole (PRILOSEC) 20 MG  12. Code: full  13. Disposition: home  14. ELOS:  7-10 days.  15. Rehab prognosis:  good  Follow up Appointments on Discharge:   -neurology in 4-6 weeks  -PCP in 7 days        Wendy Busch, AMOL  Physical Medicine & Rehabilitation

## 2020-02-06 NOTE — PROGRESS NOTES
Patient is a 54 year old female  admitted to room 512 via wheelchair.  Patient is alert and oriented X 3. See Epic for VS and assessment.  Patient is able to transfer A1 using walker. Patient was settled into their room, shown call light, tv, mealtimes etc. Oriented to unit. Will continue monitoring pain level and VS. Notifying MD with any concerns.  Follow MD orders for cares and medications.    Level of Schooling:  Ethnicity: and   Marital Status:  Dentures: No  Hearing Aid: No  Smoker:  No  Glasses: Yes  Occupation:   Falls 0-1 mo: 0 2-6 mo: 0  Stairs prior function: Independent  Prior device use: Other none   Advanced Care Directive Referral to Social Work?No

## 2020-02-06 NOTE — PLAN OF CARE
OT: Evaluation initiated during session.  Pt with coordination deficits and slight decrease in strength noted in L UE, particularly in hand.  Pt ambulated ~20 ft with SBA to min A with 2WW.  Pt requiring min to mod A for bed mobility to return from supine to EOB.  Will continue with evaluation tomorrow.

## 2020-02-07 ENCOUNTER — APPOINTMENT (OUTPATIENT)
Dept: PHYSICAL THERAPY | Facility: CLINIC | Age: 55
End: 2020-02-07
Payer: COMMERCIAL

## 2020-02-07 ENCOUNTER — APPOINTMENT (OUTPATIENT)
Dept: OCCUPATIONAL THERAPY | Facility: CLINIC | Age: 55
End: 2020-02-07
Payer: COMMERCIAL

## 2020-02-07 LAB
ANION GAP SERPL CALCULATED.3IONS-SCNC: 9 MMOL/L (ref 3–14)
BUN SERPL-MCNC: 23 MG/DL (ref 7–30)
CALCIUM SERPL-MCNC: 8.7 MG/DL (ref 8.5–10.1)
CHLORIDE SERPL-SCNC: 97 MMOL/L (ref 94–109)
CO2 SERPL-SCNC: 26 MMOL/L (ref 20–32)
CREAT SERPL-MCNC: 0.67 MG/DL (ref 0.52–1.04)
DEPRECATED CALCIDIOL+CALCIFEROL SERPL-MC: 17 UG/L (ref 20–75)
ERYTHROCYTE [DISTWIDTH] IN BLOOD BY AUTOMATED COUNT: 12.4 % (ref 10–15)
GFR SERPL CREATININE-BSD FRML MDRD: >90 ML/MIN/{1.73_M2}
GLUCOSE BLDC GLUCOMTR-MCNC: 105 MG/DL (ref 70–99)
GLUCOSE BLDC GLUCOMTR-MCNC: 117 MG/DL (ref 70–99)
GLUCOSE BLDC GLUCOMTR-MCNC: 120 MG/DL (ref 70–99)
GLUCOSE BLDC GLUCOMTR-MCNC: 179 MG/DL (ref 70–99)
GLUCOSE BLDC GLUCOMTR-MCNC: 95 MG/DL (ref 70–99)
GLUCOSE SERPL-MCNC: 108 MG/DL (ref 70–99)
HCT VFR BLD AUTO: 44 % (ref 35–47)
HGB BLD-MCNC: 15.3 G/DL (ref 11.7–15.7)
MAGNESIUM SERPL-MCNC: 2.2 MG/DL (ref 1.6–2.3)
MCH RBC QN AUTO: 29.8 PG (ref 26.5–33)
MCHC RBC AUTO-ENTMCNC: 34.8 G/DL (ref 31.5–36.5)
MCV RBC AUTO: 86 FL (ref 78–100)
PLATELET # BLD AUTO: 219 10E9/L (ref 150–450)
POTASSIUM SERPL-SCNC: 3.6 MMOL/L (ref 3.4–5.3)
RBC # BLD AUTO: 5.13 10E12/L (ref 3.8–5.2)
SODIUM SERPL-SCNC: 132 MMOL/L (ref 133–144)
WBC # BLD AUTO: 11.9 10E9/L (ref 4–11)

## 2020-02-07 PROCEDURE — 85027 COMPLETE CBC AUTOMATED: CPT | Performed by: NURSE PRACTITIONER

## 2020-02-07 PROCEDURE — 12800006 ZZH R&B REHAB

## 2020-02-07 PROCEDURE — 97530 THERAPEUTIC ACTIVITIES: CPT | Mod: GP

## 2020-02-07 PROCEDURE — 25000132 ZZH RX MED GY IP 250 OP 250 PS 637: Performed by: NURSE PRACTITIONER

## 2020-02-07 PROCEDURE — 97163 PT EVAL HIGH COMPLEX 45 MIN: CPT | Mod: GP

## 2020-02-07 PROCEDURE — T1013 SIGN LANG/ORAL INTERPRETER: HCPCS | Mod: U3

## 2020-02-07 PROCEDURE — 25000128 H RX IP 250 OP 636: Performed by: NURSE PRACTITIONER

## 2020-02-07 PROCEDURE — 36415 COLL VENOUS BLD VENIPUNCTURE: CPT | Performed by: NURSE PRACTITIONER

## 2020-02-07 PROCEDURE — 97535 SELF CARE MNGMENT TRAINING: CPT | Mod: GO | Performed by: OCCUPATIONAL THERAPIST

## 2020-02-07 PROCEDURE — 82306 VITAMIN D 25 HYDROXY: CPT | Performed by: NURSE PRACTITIONER

## 2020-02-07 PROCEDURE — 83735 ASSAY OF MAGNESIUM: CPT | Performed by: NURSE PRACTITIONER

## 2020-02-07 PROCEDURE — 97166 OT EVAL MOD COMPLEX 45 MIN: CPT | Mod: GO | Performed by: OCCUPATIONAL THERAPIST

## 2020-02-07 PROCEDURE — 00000146 ZZHCL STATISTIC GLUCOSE BY METER IP

## 2020-02-07 PROCEDURE — 80048 BASIC METABOLIC PNL TOTAL CA: CPT | Performed by: NURSE PRACTITIONER

## 2020-02-07 PROCEDURE — 97112 NEUROMUSCULAR REEDUCATION: CPT | Mod: GP

## 2020-02-07 PROCEDURE — 25000132 ZZH RX MED GY IP 250 OP 250 PS 637: Performed by: PHYSICAL MEDICINE & REHABILITATION

## 2020-02-07 PROCEDURE — 97116 GAIT TRAINING THERAPY: CPT | Mod: GP

## 2020-02-07 PROCEDURE — 97112 NEUROMUSCULAR REEDUCATION: CPT | Mod: GO | Performed by: OCCUPATIONAL THERAPIST

## 2020-02-07 RX ORDER — ALBUTEROL SULFATE 90 UG/1
2 AEROSOL, METERED RESPIRATORY (INHALATION) EVERY 4 HOURS PRN
Status: ON HOLD | COMMUNITY
End: 2020-02-17

## 2020-02-07 RX ORDER — CLOPIDOGREL BISULFATE 75 MG/1
75 TABLET ORAL DAILY
Status: ON HOLD | COMMUNITY
End: 2020-02-17

## 2020-02-07 RX ORDER — TRAZODONE HYDROCHLORIDE 50 MG/1
50 TABLET, FILM COATED ORAL AT BEDTIME
Status: ON HOLD | COMMUNITY
End: 2020-02-17

## 2020-02-07 RX ORDER — METRONIDAZOLE 7.5 MG/G
GEL TOPICAL 2 TIMES DAILY
Status: ON HOLD | COMMUNITY
End: 2020-02-17

## 2020-02-07 RX ORDER — FLUTICASONE PROPIONATE 110 UG/1
2 AEROSOL, METERED RESPIRATORY (INHALATION) DAILY
Status: ON HOLD | COMMUNITY
End: 2020-02-17

## 2020-02-07 RX ORDER — MONTELUKAST SODIUM 10 MG/1
10 TABLET ORAL AT BEDTIME
Status: ON HOLD | COMMUNITY
End: 2020-02-17

## 2020-02-07 RX ORDER — AMOXICILLIN 250 MG
1 CAPSULE ORAL 2 TIMES DAILY
Status: ON HOLD | COMMUNITY
End: 2020-02-17

## 2020-02-07 RX ORDER — LISINOPRIL 10 MG/1
10 TABLET ORAL DAILY
Status: ON HOLD | COMMUNITY
End: 2020-02-17

## 2020-02-07 RX ORDER — VENLAFAXINE HYDROCHLORIDE 75 MG/1
75 CAPSULE, EXTENDED RELEASE ORAL
Status: ON HOLD | COMMUNITY
End: 2020-02-17

## 2020-02-07 RX ORDER — ASPIRIN 81 MG/1
81 TABLET, CHEWABLE ORAL DAILY
Status: ON HOLD | COMMUNITY
End: 2020-02-17

## 2020-02-07 RX ORDER — POLYETHYLENE GLYCOL 3350 17 G/17G
1 POWDER, FOR SOLUTION ORAL DAILY PRN
Status: ON HOLD | COMMUNITY
End: 2020-02-17

## 2020-02-07 RX ORDER — ATORVASTATIN CALCIUM 40 MG/1
40 TABLET, FILM COATED ORAL DAILY
Status: ON HOLD | COMMUNITY
End: 2020-02-17

## 2020-02-07 RX ORDER — ACETAMINOPHEN 500 MG
500 TABLET ORAL EVERY 6 HOURS PRN
COMMUNITY
End: 2021-09-03

## 2020-02-07 RX ADMIN — ATORVASTATIN CALCIUM 40 MG: 40 TABLET, FILM COATED ORAL at 07:56

## 2020-02-07 RX ADMIN — LISINOPRIL 10 MG: 10 TABLET ORAL at 07:55

## 2020-02-07 RX ADMIN — VENLAFAXINE HYDROCHLORIDE 75 MG: 75 TABLET, EXTENDED RELEASE ORAL at 07:55

## 2020-02-07 RX ADMIN — CLOPIDOGREL BISULFATE 75 MG: 75 TABLET, FILM COATED ORAL at 07:55

## 2020-02-07 RX ADMIN — ENOXAPARIN SODIUM 40 MG: 40 INJECTION SUBCUTANEOUS at 07:56

## 2020-02-07 RX ADMIN — OMEPRAZOLE 20 MG: 20 CAPSULE, DELAYED RELEASE ORAL at 08:10

## 2020-02-07 RX ADMIN — MONTELUKAST 10 MG: 10 TABLET, FILM COATED ORAL at 21:56

## 2020-02-07 RX ADMIN — ASPIRIN 81 MG CHEWABLE TABLET 81 MG: 81 TABLET CHEWABLE at 07:54

## 2020-02-07 RX ADMIN — SENNOSIDES AND DOCUSATE SODIUM 1 TABLET: 8.6; 5 TABLET ORAL at 21:56

## 2020-02-07 RX ADMIN — FAMOTIDINE 20 MG: 10 TABLET, FILM COATED ORAL at 07:54

## 2020-02-07 RX ADMIN — MELATONIN 1000 UNITS: at 07:57

## 2020-02-07 RX ADMIN — SENNOSIDES AND DOCUSATE SODIUM 1 TABLET: 8.6; 5 TABLET ORAL at 09:40

## 2020-02-07 RX ADMIN — POLYETHYLENE GLYCOL 3350 17 G: 17 POWDER, FOR SOLUTION ORAL at 07:56

## 2020-02-07 NOTE — PROGRESS NOTES
"   02/07/20 1000   Quick Adds   Type of Visit Initial PT Evaluation       Present yes   Language Hmong   Living Environment   Lives With grandchild(valeri)  (grandson)   Living Arrangements house   Home Accessibility stairs to enter home   Number of Stairs, Main Entrance 3   Stair Railings, Main Entrance railing on right side (ascending)   Transportation Anticipated family or friend will provide   Living Environment Comment PT: lives w/ grandson, tub shower, standard toilet, standard couch and chairs in family room, sedan and SUV for cars   Self-Care   Usual Activity Tolerance good   Current Activity Tolerance fair   Regular Exercise Yes   Activity/Exercise Type biking;other (see comments)  ([facility])   Exercise Amount/Frequency other (see comments)  (2-3 x per week)   Equipment Currently Used at Home none   Activity/Exercise/Self-Care Comment pt independent and tries to stay active w/ going to [employer] and biking   Functional Level Prior   Ambulation 0-->independent   Transferring 0-->independent   Toileting 0-->independent   Bathing 0-->independent   Communication 0-->understands/communicates without difficulty   Swallowing 0-->swallows foods/liquids without difficulty   Cognition 0 - no cognition issues reported   Fall history within last six months no   Which of the above functional risks had a recent onset or change? ambulation;transferring;toileting;bathing;dressing   Prior Functional Level Comment pt independent w/ mobility and ADLs   General Information   Onset of Illness/Injury or Date of Surgery - Date 02/01/20   Referring Physician Dr. Lott   Patient/Family Goals Statement Would like to get back to home living w/ grandson   Pertinent History of Current Problem (include personal factors and/or comorbidities that impact the POC) Per Dr. Lott's note, \"Kevin Thrasher is a 54 year old, Hmong speaking,  right hand dominant female who presented to [facility] ED  for L-sided numbness/weakness 1-2 " "days duraton.  CTA of the head and neck was negative for acute infarct, vessel occlusion, significant stenosis, aneurysm or high flow vascular malformation on 2/1/20, discharged home but her symptoms worsened overnight so Pt return to the ED for further treatment then was transferred to [facility]  at which ,  MRI showed an acute stroke of the right lazara. not TPA candidatebecause of time of onset contraindication. Dual antiplatelet therapy was initiated with plan to continue indefinitely. LDL was 155, increased Lipitor with goal below 70. Echo showed normal EF, no cardiac mass, thrombus or right to left shunt.\"   Heart Disease Risk Factors High blood pressure;Diabetes;Dislipidemia   General Observations Pt displaying gross L sided weakness and sensory deficits   General Info Comments Pt is 4'10\" and needing 4\" step to help get back into bed, ordered for a hi/low bed to help w/ getting in and out of bed   Cognitive Status Examination   Orientation orientation to person, place and time   Level of Consciousness alert   Follows Commands and Answers Questions 100% of the time;able to follow single-step instructions   Personal Safety and Judgment at risk behaviors demonstrated   Memory intact   Cognitive Comment Pt appears to be cognitively intact   Pain Assessment   Patient Currently in Pain No   Posture    Posture Forward head position;Protracted shoulders   Range of Motion (ROM)   ROM Comment pt L ankle mild equinovarus   Strength   Strength Comments pt displaying gross L sided weakness; L UE>L LE   MMT: Shoulder   Shoulder Flexion - Left Side (3-/5) fair minus, left   Shoulder ABduction - Left Side (3-/5) fair minus, left   Shoulder Flexion - Right Side (5/5) normal,right   Shoulder ABduction - Right Side (5/5) normal,right   MMT: Elbow/Forearm, Rehab Eval   Elbow Flexion - Left Side (3+/5) fair plus, left   Elbow Extension - Left Side (3+/5) fair plus, left   Elbow Flexion - Right Side (5/5) normal,right "   Elbow Extension - Right Side (5/5) normal,right   MMT: Hand   Hand Muscle Testing Results R : 5/5, L  3/5   MMT: Wrist   Wrist Muscle Testing Results L: 2+ Flex/ext, R 5/5 flex /ext   MMT: Hip, Rehab Eval   Hip Flexion - Left Side (3-/5) fair minus, left   Hip ABduction - Left Side (4/5) good, left   Hip ADduction - Left Side (4/5) good, left   Hip Flexion - Right Side (5/5) normal,right   Hip ABduction - Right Side (5/5) normal,right   Hip ADduction - Right Side (5/5) normal,right   MMT: Knee, Rehab Eval   Knee Flexion - Left Side (4-/5) good minus, left   Knee Extension - Left Side (4/5) good, left   Knee Flexion - Right Side (5/5) normal,right   Knee Extension - Right Side (5/5) normal,right   MMT: Ankle, Rehab Eval   Ankle Dorsiflexion - Left Side (4/5) good, left  (difficult to test 2/2 equinovarus)   Ankle Plantarflexion - Left Side (4/5) good, left  (difficult to test 2/2 equinovarus)   Ankle Dorsiflexion - Right Side 5/5) normal,left   Ankle Plantarflexion - Right Side 5/5) normal,left   ARC Assessment Only   Acute Rehab Functional Assessment See IP Rehab Daily Documentation Flowsheet for Functional Mobility/ADL Assessment   Balance   Sitting Balance: Static good balance  (no lateral sway when sitting EOB)   Sitting Balance: Dynamic good balance  (good trunk/pelvis dissociation when reaching for FWW)   Sit-to-Stand Balance fair balance  (uses FWW to help stand)   Standing Balance: Static fair balance  (uses FWW for support)   Standing Balance: Dynamic fair balance  (needs FWW to help w/ balance during ambulation)   Systems Impairment Contributing to Balance Disturbance somatosensory;neuromuscular;musculoskeletal   Identified Impairments Contributing to Balance Disturbance impaired motor control;abnormal muscle tone;impaired postural control;decreased sensation;impaired sensory feedback;decreased strength;impaired coordination   Balance Comments pt displaying good sitting balance and fair overall  balance w/ mobility using FWW   Sensory Examination   Sensory Perception Comments Pt c/o of gross numbness/tingling on L side; no nystagmus noted w/ smooth pursuit   Sensation Light Touch   LUE mild impairment  (10/10; pt describes numbness/tingling)   LLE mild impairment  (10/10; pt describes numbness and tingling)   RUE w/i normal limits   RLE w/i normal limits   Proprioception    LUE w/i normal limits  (10/10 thumb)   LLE w/i normal limits  (10/10 big toe)   RUE w/i normal limits   RLE w/i normal limits   Coordination   Coordination other (see comments)   Coordination Comments w/i normal limits for R finger to nose and R LE PATRICIA; pt unable to perform L UE/L LE coordination tests 2/2 muscle strength   Muscle Tone   Muscle Tone other (describe)   Muscle Tone Comments Mild spasticity in L gastroc    Modality Interventions   Planned Modality Interventions TENS;Microcurrent Electrical Stimulation   General Therapy Interventions   Planned Therapy Interventions balance training;bed mobility training;gait training;groups;motor coordination training;neuromuscular re-education;orthotic fitting/training;strengthening;stretching;transfer training;wheelchair management/propulsion training;risk factor education;home program guidelines;progressive activity/exercise   Clinical Impression   Criteria for Skilled Therapeutic Intervention yes, treatment indicated   PT Diagnosis generalized L sided weakness and sensory deficits 2/2 acute R lazara stroke   Influenced by the following impairments strength, light touch sensation, coordination, balance    Functional limitations due to impairments stairs, transfers, bed mobility, gait   Clinical Presentation Evolving/Changing   Clinical Presentation Rationale HLD, DM II, HTN, hypomagnesemia, gastritis, depression, anxiety, TARIK, hypokalemia   Clinical Decision Making (Complexity) High complexity   Therapy Frequency Daily   Predicted Duration of Therapy Intervention (days/wks) 10 days    Anticipated Equipment Needs at Discharge wheelchair;quad cane;front wheeled walker   Anticipated Discharge Disposition Home with Home Therapy   Risk & Benefits of therapy have been explained Yes   Patient, Family & other staff in agreement with plan of care Yes   Clinical Impression Comments Pt is able to move w/ good stability using FWW, needing CGA for most mobility,  during session today explained that pt was stronger on L side 2 days ago, so we will monitor strength over next few days.   Total Evaluation Time   Total Evaluation Time (Minutes) 40

## 2020-02-07 NOTE — PLAN OF CARE
FOCUS/GOAL  Bowel management, Bladder management, Pain management, and Medical management    ASSESSMENT, INTERVENTIONS AND CONTINUING PLAN FOR GOAL:  A & Ox3, disoriented to time. Hmong speaking, able to speak some English, and able to make needs known via call light. Ax 1 with walker and GB to transfer. Family in room for translate and supportive for care. Good appetite and BG monitored which on sliding scale. Carb counting.  Continent of bladder in toilet, no bowel movement this shift, LBM 2/6 before coming to ARC.   Denied pain.   Low bed ordered this shift.  Call light within reach and bed alarm on for safety.   Per PT's report, pt c/o more weakness on BLE. Please keep assess on it.

## 2020-02-07 NOTE — PROGRESS NOTES
"  [facility]   Acute Rehabilitation Unit  Daily progress note    INTERVAL HISTORY  Kevin Thrasher was seen and examined at bedside, with [facility]  [place]. Stated continue to have numbness in both upper and lower extremities since stroke which has not changed but wishes to not have any numbness. Discussed event of stroke and recovering is a process, which takes time.     Functionally, Pt is CGA SPT and mobility using fww. Pt requiring Min A UB dressing, Mod A LB dressing, CGA toileting, and SBA standing g/h.        MEDICATIONS  Scheduled meds    aspirin  81 mg Oral Daily     atorvastatin  40 mg Oral Daily     clopidogrel  75 mg Oral Daily     enoxaparin ANTICOAGULANT  40 mg Subcutaneous Daily     famotidine  20 mg Oral Daily     [START ON 2/8/2020] influenza vaccine adult (product based on age)  0.5 mL Intramuscular Prior to discharge     insulin aspart  1-7 Units Subcutaneous TID AC     insulin aspart  1-5 Units Subcutaneous At Bedtime     lisinopril  10 mg Oral Daily     montelukast  10 mg Oral At Bedtime     omeprazole  20 mg Oral QAM AC     polyethylene glycol  17 g Oral Daily     senna-docusate  1 tablet Oral BID     venlafaxine  75 mg Oral Daily with breakfast     cholecalciferol  1,000 Units Oral Daily       PRN meds:  albuterol, glucose **OR** dextrose **OR** glucagon, - MEDICATION INSTRUCTIONS -      PHYSICAL EXAM  /68 (BP Location: Left arm)   Pulse 58   Temp 97.9  F (36.6  C) (Oral)   Resp 20   Ht 1.473 m (4' 10\")   Wt 61.5 kg (135 lb 8 oz)   SpO2 95%   BMI 28.32 kg/m    Gen: NAD, cooperative  HEENT: NC/AT  Cardio: RRR, S1 & S2 present  Pulm: clear breath sounds in all lobes, on room air,   Abd: soft, NT, ND, BS x4 active  Ext: no edema or calf tenderness B/I  Neuro/MSK: Alert, following commands  Skin:  At both AC hyperpigment small round half pea size    LABS  Recent Labs   Lab 02/07/20  1158 02/07/20  0752 02/07/20  0609 02/07/20  0210 02/06/20 2053 " 02/06/20  1711   GLC  --   --  108*  --   --   --    BGM 95 117*  --  179* 107* 110*         ASSESSMENT AND PLAN    Kevin Thrasher is a 54 year old right hand dominant female who presented with L-sided numbness/weakness 1-2 days duraton.  CTA of the head and neck was negative for acute infarct, vessel occlusion, significant stenosis, aneurysm or high flow vascular malformation on 2/1/20, discharged home but symptoms worsened overnight so Pt return to the ED 2/2/20.  MRI showed an acute stroke of the right lazara. not TPA candidate because of time of onset contraindication. Dual antiplatelet therapy was initiated with plan to continue indefinitely.. Echo showed normal EF, no cardiac mass, thrombus or right to left shunt.  PMHx; Angular cheilitis, Depression, Head Injury, H. Pylori, gastritis, HTN, HLD, Infertility,  Menorrhagia, Obesity, Pterygium, Shingles , and DM II without complication, without long-term current use of insulin.         Admitted to acute inpatient rehab 2/6/2020.    Impairment group code: 01.1 Left sided weakness 2/2 to right lazara stroke        1. PT, OT and SLP 60 minutes of each on a daily basis, in addition to rehab nursing and close management of physiatrist.       2. Impairment of ADL's:  OT for 60 minutes daily to work on ADL re-training such as grooming, self cares and bathing.       3. Impairment of mobility:  PT for 60 minutes daily to work on neuromuscular re-education focusing on strength, balance, coordination, and endurance.       4. Impairment of cognition/language/swallow:  SLP for 60 minutes daily to work on cognitive and linguistic deficits.     5. Rehab RN for med administration, VS monitoring, bowel regimen, glucose monitoring and education.        6. 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 Dual antiplatelet therapy with aspirin and Plavix indefinitely due to significant intracranial atherosclerotic  disease.  -continues to have left-sided weakness.   -Continue Plavix 75 mg daily and aspirin 81 mg daily, Lipitor 40 mg daily.  -OP/OT     #Hyperlipidemia, LDL-155 on 2/4, not well controlled LDL goal < 70. PTA on lipitor 10 mg at HS  Increase Lipitor to 40 mg daily     #TARIK, resolved  -Pt had slightly elevated creatinine 1.19. Resolved with IV hydration    #Hypertension, essential: goal <130.   - lisinopril 10 mg daily.    #Type 2 diabetes mellitus without complication without long term insulin use, Hemoglobin A1c goal <7. A1c - 6.2 on 2/4  -Diabetic diet  -Glucose check AC/HS and 0200  -SSI    #Hypokalemia, last potassium-3.4 on 2/4  -monitor      #Depression and  Anxiety 01/04/2016   -cont PTA venlafaxine (XR) 75 MG daily  -psychology consult to be considered  -PTA trazodone 50 MG  PRN     # H/O Vitamin D deficiency 08/18/2017,  - cont.  PTA on Vit D3 1,000 units /day   -Lab tomorrow     #H/O Mild intermittent asthma without complication,  -cont. PTA albuterol (PROAIR HFA;PROVENTIL HFA;VENTOLIN HFA)   90 mcg/actuation inhaler Inhale 2 puffs every 4 (four) hours as needed for wheezing or shortness of breath     #gastritis for over 5 years, mostly after eating  -cont famotidine 20 mg daily      #H/O Acute nonintractable headache, unspecified headache type 02/02/2020   #H/OLumbosacral radiculopathy at S1 09/19/2016   #H/O Fatty liver   # H/O Abnormal Pap smear of cervix     7. Adjustment to disability:  Clinical psychology to eval and treat  8. FEN: Regular with thin liquid  9. Bowel: miralax daily & senokot BID  10. Bladder:  PVR -accepted, continent  11. DVT Prophylaxis: enoxaparin 40 mg  GI Prophylaxis: omeprazole (PRILOSEC) 20 MG  12. Code: full  13. Disposition: home  14. ELOS:  7-10 days.  15. Rehab prognosis:  good  Follow up Appointments on Discharge:   -neurology in 4-6 weeks  -PCP in 7 days        Wendy Busch, CNP  Physical Medicine & Rehabilitation

## 2020-02-07 NOTE — H&P
Post Admission Physician Evaluation:    I have compared Kevin Thrasher's condition on admission to acute rehabilitation to that outlined in the preadmission screen. History and physical exam performed by Wendy Busch, AMOL, and myself.  No significant differences are identified and the patient remains appropriate for an inpatient rehabilitation facility level of care to manage medical issues and address functional impairments due to CVA.    Comorbid medical conditions being managed:   HLD, Pre-diabetes, HTN, hypomagnesemia, gastritis, depression, anxiety, TARIK, hypokalemia    Prior functional level:   Independent with ambulation, mobility, and ADLs    Present function:   PT:  Pt  Ambulating 60 ft with min A using FWW.  Mod. verbal cues, unsteady/wobbly and decreased foot clearance.     OT: SBA and CGA for Sitting;Cues for technique;Cues for safety;Increased effort;Increased time to complete;Decreased hand coordination. ADL training;Functional transfer training;Neuromuscular re-ed recommended.    Anticipated rehabilitation course:   Anticipate discharge home at a modified independent level for ambulation, mobility, and ADLs    Will benefit from intensive rehabilitation includin minutes each of PT and OT  Rehabilitation nursing  Close management by physiatry    Prognosis: Good    Estimated length of stay: 7-10 days    Ranjit Lott MD  Department of Rehabilitation Medicine  Pager: [phone]

## 2020-02-07 NOTE — CONSULTS
Home address, phone number, PCP and emergency contacts corrected on face sheet by Kellen.      [phone]   Living Arrangements   Lives With grandchild(valeri)   Living Arrangements house   Able to Return to Prior Arrangements yes   Living Arrangement Comments 0 PROSPER and 0 STI (one-level)   Home Safety   Patient Feels Safe Living in Home? yes   Discharge Planning   Expected Discharge Date 20   Patient/Family Anticipates Transition to home with family;home with help/services   Disposition Comments HHC vs OP pending progress, family able to assist    Concerns to be Addressed no discharge needs identified   Discharge Needs Assessment   Transportation Anticipated family or friend will provide     Social Work: Initial Assessment with Discharge Plan    Patient Name: Kevin Thrasher  : 1965  Age: 54 year old  MRN: [id number]  Completed assessment with: Pt, pt daughter and two other family members (one adult and one child)  Admitted to ARU: 2020    Presenting Information   Date of SW assessment: 2020  Health Care Directive: Provided education and Health Care Directive Agent (if patient not able to make decisions)  Primary Health Care Agent: Self  Secondary Health Care Agent: Pt ex- (not legally  and still supportive) NOK   Living Situation: Pt lives with 30 y/o grandson in a one-level home and no PROSPER. Grandson works full-time but has a flexible schedule and able to assist as needed.   Previous Functional Status: Dtr assisted with bathing (shower), cooking, cleaning and laundry. Pt was able to manage her own finances, medications and drive PTA. Pt denied falls. No community or hired in help.   DME available: None. Pt dtr inquired about walker or cane at discharge, notified that therapy will follow up.   Patient and family understanding of hospitalization: Appropriate. Family translate for pt. Family denied concerns with cognition.   Cultural/Language/Spiritual Considerations:  Hmong-speaking.       Physical Health  Reason for admission: Stroke     Provider Information   Primary Care Physician:No primary care provider on file.--Dr. Peace Rutledge at  [facility] in [facility]   : Clinic RN CC and Kellen.     Mental Health/Chemical Dependency:   Diagnosis: Reported depression since stroke. Pt reported feeling tearful. Reported difficulty with sleeping PTA and since admission. Has medication at home for sleep and encouraged to discuss any concerns with MD. Spiritual care consult following. Pt denied any immediate concerns and reported good support from her family. Denied resources.   Alcohol/Tobacco/Narcotis: Denied   Support/Services in Place: No community support PTA, denied resources   Services Needed/Recommended: Spiritual care following.   Sexuality/Intimacy: Not discussed     Support System  Marital Status:  but . Did not go into detail but pt stated that her  is an emergency contact and that he is supportive. Do not live together.   Family support: 2 dtrs. One about 15 minutes away (at bedside during assessment). Dtr works full-time M-F but is able to assist on her time off and supportive. Other dtr in [place]. 8 grandchildren who are all supportive. No siblings in the [place].   Other support available: See above.     Community Resources  Current in home services: None   Previous services: Denied     Financial/Employment/Education  Employment Status: Disability, not working.   Income Source: SSDI   Education: Not discussed   Financial Concerns:  Denied   Insurance: [employer] LEAL       Discharge Plan   Patient and family discharge goal: Home with family support and assistance and HHC vs OP, pending progress   Provided Education on discharge plan: YES  Patient agreeable to discharge plan:  YES  Provided education and attained signature for Medicare IM and IRF Patient Rights and Privacy Information provided to patient : N/A  Provided patient with [facility]  Resources: YES  Barriers to discharge: None identified at this time     Discharge Recommendations   Disposition: See above   Transportation Needs: Family can assist   Name of Transportation Company and Phone: N/A     Additional comments   Assessment completed at bedside with pt and pt family. Pt dtr interpreting. Pt appeared A&O and pleasant. Pt stated that she hopes to 'feel better' when ready for discharge. Pt reported and dtr confirmed good support and assist at home when D/C'd. Pt denied and immediate needs or concerns.     Pt family asked about PCA services and waiver. Educated on [facility] Assessment. Denied SW offer to contact [facility] to coordinate. SW offered to put contact information in pt AVS so family can follow up, pt family expressed appreciation.     SW will continue to follow.     Please invite to Care Conference:  Pt dtr and any other family caregivers      NARESH Almaraz, [facility] Acute Rehab Unit   Phone: [phone]  I   Pager: [phone]

## 2020-02-07 NOTE — PLAN OF CARE
FOCUS/GOAL  Medication management, Mobility, Skin integrity, and Safety management    ASSESSMENT, INTERVENTIONS AND CONTINUING PLAN FOR GOAL:  Orientation: Alert and oriented x4.  Bowel: continent of bowel, LBM 2/6 per report  Bladder: Continent of bladder,   Pain: Denies pain  Ambulation/Transfers: Assist of 1/walker  Blood sugars: BG before meals 110 and 107 at HS, no sliding scale given.  Diet/ Liquids: Regular/thin, takes pills whole  Tubes/ Lines/ Drains: No lines  Vitals were reviewed  Temp: 98.3  F (36.8  C) Temp src: Oral BP: 103/69 Pulse: 63   Resp: 16 SpO2: 95 % O2 Device: None (Room air)    Skin: intact  Bed alarm on for safety, call light within reach, Ok to continue with care plan.

## 2020-02-07 NOTE — PROGRESS NOTES
02/06/20 1600   Quick Adds   Type of Visit Initial Occupational Therapy Evaluation   Living Environment   Lives With grandchild(valeri)   Living Arrangements house   Home Accessibility stairs to enter home   Number of Stairs, Main Entrance 3   Stair Railings, Main Entrance railing on right side (ascending)   Transportation Anticipated family or friend will provide   Living Environment Comment OT: Pt lives with grandson. Bathroom set up: tubshower, standard toilet. Bedroom set up: standard bed, no rails. Family very supportive and able to assist at discharge.   Self-Care   Usual Activity Tolerance good   Current Activity Tolerance fair   Regular Exercise Yes   Activity/Exercise Type biking   Exercise Amount/Frequency other (see comments)   Equipment Currently Used at Home none   Activity/Exercise/Self-Care Comment OT: Pt active with IADL. Pt biking and attending [employer] on regular basis.   Functional Level   Ambulation 0-->independent   Transferring 0-->independent   Toileting 0-->independent   Bathing 0-->independent   Dressing 0-->independent   Eating 0-->independent   Communication 0-->understands/communicates without difficulty   Swallowing 0-->swallows foods/liquids without difficulty   Cognition 0 - no cognition issues reported   Fall history within last six months no   Which of the above functional risks had a recent onset or change? ambulation;transferring;toileting;bathing;dressing   Prior Functional Level Comment OT: Pt IND ADLs/IADLs and funcitonal mobility.       Present yes   Language Hmong   General Information   Onset of Illness/Injury or Date of Surgery - Date 02/02/20   Referring Physician Michelle Lott MD   Patient/Family Goals Statement Pt goal to improve strength in LUE and improve walking   Additional Occupational Profile Info/Pertinent History of Current Problem Kevin Thrasher is a 54 year old right hand dominant female who presented with L-sided numbness/weakness 1-2 days  cr.  CTA of the head and neck was negative for acute infarct, vessel occlusion, significant stenosis, aneurysm or high flow vascular malformation on 2/1/20, discharged home but symptoms worsened overnight so Pt return to the ED 2/2/20.  MRI showed an acute stroke of the right lazara. not TPA candidate because of time of onset contraindication. Dual antiplatelet therapy was initiated with plan to continue indefinitely.. Echo showed normal EF, no cardiac mass, thrombus or right to left shunt.  PMHx; Angular cheilitis, Depression, Head Injury, H. Pylori, gastritis, HTN, HLD, Infertility,  Menorrhagia, Obesity, Pterygium, Shingles , and DM II without complication, without long-term current use of insulin.    Precautions/Limitations fall precautions   Weight-Bearing Status - LUE full weight-bearing   Weight-Bearing Status - RUE full weight-bearing   Weight-Bearing Status - LLE full weight-bearing   Weight-Bearing Status - RLE full weight-bearing   Heart Disease Risk Factors Medical history   Cognitive Status Examination   Orientation orientation to person, place and time   Level of Consciousness alert   Follows Commands (Cognition) follows two step commands;repetition of directions required   Attention No deficits were identified   Cognitive Comment  present, pt able to follow directions with ADLs and mobility. Plan to continue to assess cognition from functional standpoint.   Visual Perception   Visual Perception Comments Pt able to visually track in both directions   Sensation Light Touch, Rehab Eval   LUE mild impairment   RUE within normal limits   Sensation Sharp Dull Discrimination, Rehab Eval   LUE mild impairment   RUE within normal limits   Proprioception, OT Eval   LUE mild impairment   RUE within normal limits   Pain Assessment   Patient Currently in Pain No   Integumentary/Edema   Integumentary/Edema no deficits were identifed   Posture   Posture not impaired   Range of Motion (ROM)   ROM  Comment LUE hemiparesis   Strength   Strength Comments L hemiparesis L elbow flex/ext 2/5. R elbow flex/ext 5/5   MMT: Shoulder   Left Flexion (2/5) poor, left   Left Extension (2/5) poor, left   Left ABduction (2/5) poor, left   Left ADduction (2/5) poor, left   Right Flexion (5/5) normal, right   Right Extension (5/5) normal, right   Right ABduction (5/5) normal, right   Right ADduction (5/5) normal, right   MMT: Wrist   Left Flexion (2-/5) poor minus, left   Right Flexion (5/5) normal, right   Left Extension (2-/5) poor minus, left   Right Extension (5/5) normal, right   Hand Strength   Left hand  (pounds) 4 pounds   Right hand  (pounds) 55 pounds   Muscle Tone Assessment   Muscle Tone Quick Adds LUE;LLE   Muscle Tone Assessment - LUE moderately decreased tone   Muscle Tone Assessment - LLE moderately decreased tone   Coordination   Upper Extremity Coordination Left UE impaired   Gross Motor Coordination L UE impaired   Coordination Comments Unable to oppose digits on L hand   ARC Assessment Only   Acute Rehab Functional Assessment See IP Rehab Daily Documentation Flowsheet for Functional Mobility/ADL Assessment   Transfer Skill: Sit to Stand   Level of [place]: Sit/Stand minimum assist (75% patients effort)   Physical Assist/Nonphysical Assist: Sit/Stand supervision;1 person assist   Transfer Skill: Sit to Stand full weight-bearing   Assistive Device for Transfer: Sit/Stand standard walker   Instrumental Activities of Daily Living (IADL)   Previous Responsibilities meal prep;housekeeping;laundry;shopping;medication management;finances   Activities of Daily Living Analysis   Impairments Contributing to Impaired Activities of Daily Living balance impaired;coordination impaired;muscle tone abnormal;motor control impaired;postural control impaired;sensation decreased;sensory feedback impaired;strength decreased;ROM decreased   General Therapy Interventions   Planned Therapy Interventions ADL  retraining;IADL retraining;balance training;bed mobility training;fine motor coordination training;groups;motor coordination training;neuromuscular re-education;ROM;strengthening;stretching;transfer training;risk factor education;home program guidelines;progressive activity/exercise   Clinical Impression   Criteria for Skilled Therapeutic Interventions Met yes, treatment indicated   OT Diagnosis Decreased IND ADLs/IADLs   Influenced by the following impairments L hemiparesis, impaired balance, impaired sensation, decreased ROM   Assessment of Occupational Performance 5 or more Performance Deficits   Identified Performance Deficits Performance deficits include mobility, transfers, dressing, toileting, bathing, meal prep, household management, community transportation.   Clinical Decision Making (Complexity) Moderate complexity   Therapy Frequency Daily   Predicted Duration of Therapy Intervention (days/wks) 10 days   Anticipated Equipment Needs at Discharge bathing equipment;dressing equipment;other (see comments);shower chair  (fww, grab bar)   Anticipated Discharge Disposition Home with Outpatient Therapy;Home with Home Therapy   Risks and Benefits of Treatment have been explained. Yes   Patient, Family & other staff in agreement with plan of care Yes   Clinical Impression Comments The pt will benefit from skilled OT intervention to address goals in POC and maximize functional IND and safety in d/c environment.   Total Evaluation Time   Total Evaluation Time (Minutes) 30

## 2020-02-07 NOTE — PLAN OF CARE
Discharge Planner OT   Patient plan for discharge: Home with HC vs OP pending progress  Current status: CGA SPT and mobility using fww. Pt requiring Min A UB dressing, Mod A LB dressing, CGA toileting, and SBA standing g/h. Performance impacted by L hemiparesis.  Barriers to return to prior living situation: Current level of assistance  Recommendations for discharge: Anticipate 10 days to address goals in POC.       Entered by: Debora Cruz 02/07/2020 3:40 PM

## 2020-02-07 NOTE — PLAN OF CARE
FOCUS/GOAL  Bowel management, Bladder management, and Pain management    ASSESSMENT, INTERVENTIONS AND CONTINUING PLAN FOR GOAL:  Pt alert and oriented except for time. VSS. Ax 1 with walker and GB to transfer. Continent of bladder in toilet. LBM 2/6 before coming to ARC. Denied pain.    at 0200.   Hmong speaking,  needed.   Family sleeping bed side and supportive for care.   Pt sleeping well with PCD on overnight and using call light appropriately.

## 2020-02-07 NOTE — PROGRESS NOTES
"   02/07/20 1000   Quick Adds   Type of Visit Initial PT Evaluation       Present yes   Language Hmong   Living Environment   Lives With grandchild(valeri)  (grandson)   Living Arrangements house   Home Accessibility stairs to enter home   Number of Stairs, Main Entrance 3   Stair Railings, Main Entrance railing on right side (ascending)   Transportation Anticipated family or friend will provide   Living Environment Comment PT: lives w/ grandson, tub shower, standard toilet, standard couch and chairs in family room, sedan and SUV for cars   Self-Care   Usual Activity Tolerance good   Current Activity Tolerance fair   Regular Exercise Yes   Activity/Exercise Type biking;other (see comments)  ([facility])   Exercise Amount/Frequency other (see comments)  (2-3 x per week)   Equipment Currently Used at Home none   Activity/Exercise/Self-Care Comment pt independent and tries to stay active w/ going to [employer] and biking   Functional Level Prior   Ambulation 0-->independent   Transferring 0-->independent   Toileting 0-->independent   Bathing 0-->independent   Communication 0-->understands/communicates without difficulty   Swallowing 0-->swallows foods/liquids without difficulty   Cognition 0 - no cognition issues reported   Fall history within last six months no   Which of the above functional risks had a recent onset or change? ambulation;transferring;toileting;bathing;dressing   Prior Functional Level Comment pt independent w/ mobility and ADLs   General Information   Onset of Illness/Injury or Date of Surgery - Date 02/01/20   Referring Physician Dr. Lott   Patient/Family Goals Statement Would like to get back to home living w/ grandson   Pertinent History of Current Problem (include personal factors and/or comorbidities that impact the POC) Per Dr. Lott's note, \"Kevin Thrasher is a 54 year old, Hmong speaking,  right hand dominant female who presented to [facility] ED  for L-sided numbness/weakness 1-2 " "days duraton.  CTA of the head and neck was negative for acute infarct, vessel occlusion, significant stenosis, aneurysm or high flow vascular malformation on 2/1/20, discharged home but her symptoms worsened overnight so Pt return to the ED for further treatment then was transferred to [facility]  at which ,  MRI showed an acute stroke of the right lazara. not TPA candidatebecause of time of onset contraindication. Dual antiplatelet therapy was initiated with plan to continue indefinitely. LDL was 155, increased Lipitor with goal below 70. Echo showed normal EF, no cardiac mass, thrombus or right to left shunt.\"   Heart Disease Risk Factors High blood pressure;Diabetes;Dislipidemia   General Observations Pt displaying gross L sided weakness and sensory deficits   General Info Comments Pt is 4'10\" and needing 4\" step to help get back into bed, ordered for a hi/low bed to help w/ getting in and out of bed   Cognitive Status Examination   Orientation orientation to person, place and time   Level of Consciousness alert   Follows Commands and Answers Questions 100% of the time;able to follow single-step instructions   Personal Safety and Judgment at risk behaviors demonstrated   Memory intact   Cognitive Comment Pt appears to be cognitively intact   Pain Assessment   Patient Currently in Pain No   Posture    Posture Forward head position;Protracted shoulders   Range of Motion (ROM)   ROM Comment pt L ankle mild equinovarus   Strength   Strength Comments pt displaying gross L sided weakness; L UE>L LE   MMT: Shoulder   Shoulder Flexion - Left Side (3-/5) fair minus, left   Shoulder ABduction - Left Side (3-/5) fair minus, left   Shoulder Flexion - Right Side (5/5) normal,right   Shoulder ABduction - Right Side (5/5) normal,right   MMT: Elbow/Forearm, Rehab Eval   Elbow Flexion - Left Side (3+/5) fair plus, left   Elbow Extension - Left Side (3+/5) fair plus, left   Elbow Flexion - Right Side (5/5) normal,right "   Elbow Extension - Right Side (5/5) normal,right   MMT: Hand   Hand Muscle Testing Results R : 5/5, L  3/5   MMT: Wrist   Wrist Muscle Testing Results L: 2+ Flex/ext, R 5/5 flex /ext   MMT: Hip, Rehab Eval   Hip Flexion - Left Side (3-/5) fair minus, left   Hip ABduction - Left Side (4/5) good, left   Hip ADduction - Left Side (4/5) good, left   Hip Flexion - Right Side (5/5) normal,right   Hip ABduction - Right Side (5/5) normal,right   Hip ADduction - Right Side (5/5) normal,right   MMT: Knee, Rehab Eval   Knee Flexion - Left Side (4-/5) good minus, left   Knee Extension - Left Side (4/5) good, left   Knee Flexion - Right Side (5/5) normal,right   Knee Extension - Right Side (5/5) normal,right   MMT: Ankle, Rehab Eval   Ankle Dorsiflexion - Left Side (4/5) good, left  (difficult to test 2/2 equinovarus)   Ankle Plantarflexion - Left Side (4/5) good, left  (difficult to test 2/2 equinovarus)   Ankle Dorsiflexion - Right Side 5/5) normal,left   Ankle Plantarflexion - Right Side 5/5) normal,left   ARC Assessment Only   Acute Rehab Functional Assessment See IP Rehab Daily Documentation Flowsheet for Functional Mobility/ADL Assessment   Balance   Sitting Balance: Static good balance  (no lateral sway when sitting EOB)   Sitting Balance: Dynamic good balance  (good trunk/pelvis dissociation when reaching for FWW)   Sit-to-Stand Balance fair balance  (uses FWW to help stand)   Standing Balance: Static fair balance  (uses FWW for support)   Standing Balance: Dynamic fair balance  (needs FWW to help w/ balance during ambulation)   Systems Impairment Contributing to Balance Disturbance somatosensory;neuromuscular;musculoskeletal   Identified Impairments Contributing to Balance Disturbance impaired motor control;abnormal muscle tone;impaired postural control;decreased sensation;impaired sensory feedback;decreased strength;impaired coordination   Balance Comments pt displaying good sitting balance and fair overall  balance w/ mobility using FWW   Sensory Examination   Sensory Perception Comments Pt c/o of gross numbness/tingling on L side; no nystagmus noted w/ smooth pursuit   Sensation Light Touch   LUE mild impairment  (10/10; pt describes numbness/tingling)   LLE mild impairment  (10/10; pt describes numbness and tingling)   RUE w/i normal limits   RLE w/i normal limits   Proprioception    LUE w/i normal limits  (10/10 thumb)   LLE w/i normal limits  (10/10 big toe)   RUE w/i normal limits   RLE w/i normal limits   Coordination   Coordination other (see comments)   Coordination Comments w/i normal limits for R finger to nose and R LE PATRICIA; pt unable to perform L UE/L LE coordination tests 2/2 muscle strength   Muscle Tone   Muscle Tone other (describe)   Muscle Tone Comments Mild spasticity in L gastroc    Modality Interventions   Planned Modality Interventions TENS;Microcurrent Electrical Stimulation   General Therapy Interventions   Planned Therapy Interventions balance training;bed mobility training;gait training;groups;motor coordination training;neuromuscular re-education;orthotic fitting/training;strengthening;stretching;transfer training;wheelchair management/propulsion training;risk factor education;home program guidelines;progressive activity/exercise   Clinical Impression   Criteria for Skilled Therapeutic Intervention yes, treatment indicated   PT Diagnosis generalized L sided weakness and sensory deficits 2/2 acute R lazara stroke   Influenced by the following impairments strength, light touch, coordination,    Total Evaluation Time   Total Evaluation Time (Minutes) 40   Eval completed by: Joe Goodwin, PT    Note created for purposes of utilization review.

## 2020-02-07 NOTE — PROGRESS NOTES
02/07/20 1051   Visit Information   Visit Made By Staff    Type of Visit Initial;Staff consultation/triage   Visited Patient;Family   Interventions   Plan of Care Review   With patient/family/proxy;With interdisciplinary team   Basic Spiritual Interventions    introduction/orientation to Spiritual Health Services   SPIRITUAL HEALTH SERVICES  [facility] [place] 5R       REFERRAL SOURCE: Hospital  request.     Kevin was beginning her PT assessment.  Briefly introduced  and checked in with  about his schedule.     PLAN: Will see Kevin later today on ARU.       Michelle Garcia  Staff   Spiritual Health Services  Pgr: [phone]    * [facility] remains available 24/7 for emergent requests/referrals, either by having the switchboard page the on-call  or by entering an ASAP/STAT consult in Epic (this will also page the on-call ).*

## 2020-02-07 NOTE — PROGRESS NOTES
"CLINICAL NUTRITION SERVICES - ASSESSMENT NOTE     Nutrition Prescription    RECOMMENDATIONS FOR MDs/PROVIDERS TO ORDER:  None today - pt visit/edcuation will be planned for Monday(2/10) morning     Malnutrition Status:    Unable to determine at present time     Recommendations already ordered by Registered Dietitian (RD):  None today    Future/Additional Recommendations:  Follow up for pt visit and education with  services on Monday(2/10)     REASON FOR ASSESSMENT  Kevin Thrasher is a/an 54 year old female assessed by the dietitian for Provider Order - high BMI, Pt needs educaton on healthy diet    NUTRITION/MEDICAL HISTORY  Per chart review: Pt admits to ARU for rehabilitation in the setting of right lazara stroke. Past medical history of Angular cheilitis, Depression, Head Injury, HTN, Hyperlipidemia, Infertility, Menorrhagia, Obesity (BMI 30.0-34.9), Pterygium, Shingles, and Type 2 diabetes mellitus noted.     RD spoke with therapy scheduling department, RD will plan to to visit/education on Monday(2/7) between 8 AM and 9 AM    CURRENT NUTRITION ORDERS  Diet: Regular  Intake/Tolerance: 50% thus far per flow sheets     LABS  Reviewed     MEDICATIONS  Mag-ox, Correction scale insulin, Lipitor, Lisinopril, Miralax, Senokot, Pepcid, Prilosec, Vit D    ANTHROPOMETRICS  Height: 147.3 cm (4' 10\")  Most Recent Weight: 61.5 kg (135 lb 8 oz)    IBW: 45.4 kg  BMI: Overweight BMI 25-29.9    Dosing Weight: 61.5 kg    ASSESSED NUTRITION NEEDS  Estimated Energy Needs: 8197-8166 kcals/day (25 - 30 kcals/kg)  Justification: Maintenance  Estimated Protein Needs: 50-60 grams protein/day (0.8 - 1 grams of pro/kg)  Justification: Maintenance  Estimated Fluid Needs: 3820-9605 mL/day (25 - 30 mL/kg)   Justification: Maintenance    PHYSICAL FINDINGS  See malnutrition section below.    MALNUTRITION  % Intake: Unable to assess  % Weight Loss: Unable to assess  Subcutaneous Fat Loss: Unable to assess  Muscle Loss: Unable to assess  Fluid " Accumulation/Edema: None noted  Malnutrition Diagnosis: Unable to determine due to visit deferred to Monday(2/10)    NUTRITION DIAGNOSIS  Predicted food- and nutrition-related knowledge deficit related to therapeutic diet recommendations as evidenced by Provider orders to educate on healthy diet       INTERVENTIONS  Implementation  Nutrition Education planned for next week     Goals  Pt will verbalize understanding of diet recommendations.      Monitoring/Evaluation  Progress toward goals will be monitored and evaluated per protocol.

## 2020-02-07 NOTE — PLAN OF CARE
"Mao will need 10 days in order to get back home with 24/7 support and HT vs OP PT.    Mobility Status: Pt currently amb up to 60' w/ FWW and CGA w/ w/c follow. Pt needing help with left hand placement on walker. Pt amb 5-4\" steps w/ Jodie using R rail and step-to gait pattern. Pt CGA w/ FWW for SPT transfers.    Primary Barriers: L sided weakness and activity tolerance    DME Needs: W/c and FWW     "

## 2020-02-08 ENCOUNTER — APPOINTMENT (OUTPATIENT)
Dept: PHYSICAL THERAPY | Facility: CLINIC | Age: 55
End: 2020-02-08
Payer: COMMERCIAL

## 2020-02-08 ENCOUNTER — APPOINTMENT (OUTPATIENT)
Dept: OCCUPATIONAL THERAPY | Facility: CLINIC | Age: 55
End: 2020-02-08
Payer: COMMERCIAL

## 2020-02-08 LAB
ANION GAP SERPL CALCULATED.3IONS-SCNC: 6 MMOL/L (ref 3–14)
BASOPHILS # BLD AUTO: 0 10E9/L (ref 0–0.2)
BASOPHILS NFR BLD AUTO: 0.2 %
BUN SERPL-MCNC: 20 MG/DL (ref 7–30)
CALCIUM SERPL-MCNC: 9.1 MG/DL (ref 8.5–10.1)
CHLORIDE SERPL-SCNC: 97 MMOL/L (ref 94–109)
CO2 SERPL-SCNC: 30 MMOL/L (ref 20–32)
CREAT SERPL-MCNC: 0.82 MG/DL (ref 0.52–1.04)
DIFFERENTIAL METHOD BLD: ABNORMAL
EOSINOPHIL # BLD AUTO: 0.2 10E9/L (ref 0–0.7)
EOSINOPHIL NFR BLD AUTO: 2 %
ERYTHROCYTE [DISTWIDTH] IN BLOOD BY AUTOMATED COUNT: 12.5 % (ref 10–15)
GFR SERPL CREATININE-BSD FRML MDRD: 80 ML/MIN/{1.73_M2}
GLUCOSE BLDC GLUCOMTR-MCNC: 116 MG/DL (ref 70–99)
GLUCOSE BLDC GLUCOMTR-MCNC: 126 MG/DL (ref 70–99)
GLUCOSE BLDC GLUCOMTR-MCNC: 128 MG/DL (ref 70–99)
GLUCOSE BLDC GLUCOMTR-MCNC: 137 MG/DL (ref 70–99)
GLUCOSE BLDC GLUCOMTR-MCNC: 80 MG/DL (ref 70–99)
GLUCOSE SERPL-MCNC: 108 MG/DL (ref 70–99)
HCT VFR BLD AUTO: 45 % (ref 35–47)
HGB BLD-MCNC: 15.4 G/DL (ref 11.7–15.7)
IMM GRANULOCYTES # BLD: 0 10E9/L (ref 0–0.4)
IMM GRANULOCYTES NFR BLD: 0.2 %
LYMPHOCYTES # BLD AUTO: 3.2 10E9/L (ref 0.8–5.3)
LYMPHOCYTES NFR BLD AUTO: 31.9 %
MCH RBC QN AUTO: 28.7 PG (ref 26.5–33)
MCHC RBC AUTO-ENTMCNC: 34.2 G/DL (ref 31.5–36.5)
MCV RBC AUTO: 84 FL (ref 78–100)
MONOCYTES # BLD AUTO: 1 10E9/L (ref 0–1.3)
MONOCYTES NFR BLD AUTO: 9.7 %
NEUTROPHILS # BLD AUTO: 5.7 10E9/L (ref 1.6–8.3)
NEUTROPHILS NFR BLD AUTO: 56 %
NRBC # BLD AUTO: 0 10*3/UL
NRBC BLD AUTO-RTO: 0 /100
PLATELET # BLD AUTO: 241 10E9/L (ref 150–450)
POTASSIUM SERPL-SCNC: 4.2 MMOL/L (ref 3.4–5.3)
RBC # BLD AUTO: 5.36 10E12/L (ref 3.8–5.2)
SODIUM SERPL-SCNC: 133 MMOL/L (ref 133–144)
WBC # BLD AUTO: 10.2 10E9/L (ref 4–11)

## 2020-02-08 PROCEDURE — 00000146 ZZHCL STATISTIC GLUCOSE BY METER IP

## 2020-02-08 PROCEDURE — 97112 NEUROMUSCULAR REEDUCATION: CPT | Mod: GP | Performed by: PHYSICAL THERAPIST

## 2020-02-08 PROCEDURE — 12800006 ZZH R&B REHAB

## 2020-02-08 PROCEDURE — 25000132 ZZH RX MED GY IP 250 OP 250 PS 637: Performed by: NURSE PRACTITIONER

## 2020-02-08 PROCEDURE — 25000128 H RX IP 250 OP 636: Performed by: NURSE PRACTITIONER

## 2020-02-08 PROCEDURE — 85025 COMPLETE CBC W/AUTO DIFF WBC: CPT | Performed by: PHYSICAL MEDICINE & REHABILITATION

## 2020-02-08 PROCEDURE — 97112 NEUROMUSCULAR REEDUCATION: CPT | Mod: GO

## 2020-02-08 PROCEDURE — 97535 SELF CARE MNGMENT TRAINING: CPT | Mod: GO

## 2020-02-08 PROCEDURE — 36415 COLL VENOUS BLD VENIPUNCTURE: CPT | Performed by: PHYSICAL MEDICINE & REHABILITATION

## 2020-02-08 PROCEDURE — 80048 BASIC METABOLIC PNL TOTAL CA: CPT | Performed by: PHYSICAL MEDICINE & REHABILITATION

## 2020-02-08 PROCEDURE — 97116 GAIT TRAINING THERAPY: CPT | Mod: GP | Performed by: PHYSICAL THERAPIST

## 2020-02-08 PROCEDURE — 97530 THERAPEUTIC ACTIVITIES: CPT | Mod: GP | Performed by: PHYSICAL THERAPIST

## 2020-02-08 PROCEDURE — 25000132 ZZH RX MED GY IP 250 OP 250 PS 637: Performed by: PHYSICAL MEDICINE & REHABILITATION

## 2020-02-08 RX ADMIN — POLYETHYLENE GLYCOL 3350 17 G: 17 POWDER, FOR SOLUTION ORAL at 08:14

## 2020-02-08 RX ADMIN — ATORVASTATIN CALCIUM 40 MG: 40 TABLET, FILM COATED ORAL at 08:14

## 2020-02-08 RX ADMIN — CLOPIDOGREL BISULFATE 75 MG: 75 TABLET, FILM COATED ORAL at 08:14

## 2020-02-08 RX ADMIN — OMEPRAZOLE 20 MG: 20 CAPSULE, DELAYED RELEASE ORAL at 06:48

## 2020-02-08 RX ADMIN — SENNOSIDES AND DOCUSATE SODIUM 1 TABLET: 8.6; 5 TABLET ORAL at 21:19

## 2020-02-08 RX ADMIN — ENOXAPARIN SODIUM 40 MG: 40 INJECTION SUBCUTANEOUS at 08:14

## 2020-02-08 RX ADMIN — VENLAFAXINE HYDROCHLORIDE 75 MG: 75 TABLET, EXTENDED RELEASE ORAL at 08:13

## 2020-02-08 RX ADMIN — MONTELUKAST 10 MG: 10 TABLET, FILM COATED ORAL at 21:19

## 2020-02-08 RX ADMIN — MELATONIN 1000 UNITS: at 08:14

## 2020-02-08 RX ADMIN — FAMOTIDINE 20 MG: 10 TABLET, FILM COATED ORAL at 08:14

## 2020-02-08 RX ADMIN — ASPIRIN 81 MG CHEWABLE TABLET 81 MG: 81 TABLET CHEWABLE at 08:14

## 2020-02-08 NOTE — PLAN OF CARE
Pt AxOx3, unable to state date/time, knew correct month. Able to make needs known with  at bedside translating. Denies SOB, pain, new N/T, nausea. Reports no change in N/T and weakness to LUE (increased on previous shift), moonlighter and PCP assessed pt at bedside on previous shift. .  Continent of bladder this shift, need PVRs x2. Assist x1 walker gait belt. Continue POC.

## 2020-02-08 NOTE — PROGRESS NOTES
"PM&R PROGRESS NOTE     Patient Active Problem List   Diagnosis     Stroke (H)       ANNEMARIE Thrasher is a 54 year old admitted to the ARU on 2/6/2020  She is accompanied by her daughter today as well as grandchild.  She is a 54-year-old female left-handed who presents with a stroke of the right lazara resulting in left-sided weakness.  Last evening I was called by nursing for worsening left-sided weakness.  On further discussion today patient states that her left-sided weakness has been improving.  It is \"not as bad as before'.     She has a history of      From the functional perspective,     Medically  Secondary prevention of stroke with aspirin and Plavix  Continue Lovenox for DVT prophylaxis  Hypertension; her blood pressures have been somewhat soft and lisinopril was discontinued yesterday.  Could have contributed towards worsening weakness that the patient had noticed last evening.  Systolic blood pressures have been between 101 115, and diastolic between 58 and 70.  We will continue to monitor  Sodium; 133 today, mild increase from 132.  White count is within normal limits and at 10.2.  We will continue to monitor    Orders Placed This Encounter      Regular Diet Adult        Review Of Systems  Total of ten systems reviewed, pertinent positives and negatives as follows  Denies any new weakness  Feels that she is not constipated  Denies any pain  Slept well  Remainder of the review of the systems was negative.        /70 (BP Location: Left arm)   Pulse 69   Temp 99.3  F (37.4  C) (Oral)   Resp 20   Ht 1.473 m (4' 10\")   Wt 61.5 kg (135 lb 8 oz)   SpO2 96%   BMI 28.32 kg/m    Physical exam    Patient is lying in bed comfortable in no acute distress, accompanied by her daughter who granddaughter  HEENT NC AT PRTL EOM good   Neck supple  Heart S1S2  Lungs CTA  Abdomen  benign BS positive NT NR   LE no edema     She has left-sided weakness, finger flexion is 2 out of 5 extension is 0 out of 5 wrist flexion " and extension is 2 out of 5  Biceps and triceps are 3- out of 5  Shoulder abduction is 3 out of 5  Left lower extremity is antigravity except for the dorsiflexion which is 1 out of 5    Current Facility-Administered Medications   Medication     albuterol (PROAIR HFA/PROVENTIL HFA/VENTOLIN HFA) 108 (90 Base) MCG/ACT inhaler 2 puff     aspirin (ASA) chewable tablet 81 mg     atorvastatin (LIPITOR) tablet 40 mg     clopidogrel (PLAVIX) tablet 75 mg     glucose gel 15-30 g    Or     dextrose 50 % injection 25-50 mL    Or     glucagon injection 1 mg     enoxaparin ANTICOAGULANT (LOVENOX) injection 40 mg     famotidine (PEPCID) tablet 20 mg     influenza recomb quadrivalent PF (FLUBLOK) injection 0.5 mL     insulin aspart (NovoLOG) inj (RAPID ACTING)     insulin aspart (NovoLOG) inj (RAPID ACTING)     montelukast (SINGULAIR) tablet 10 mg     omeprazole (priLOSEC) CR capsule 20 mg     Patient is already receiving anticoagulation with heparin, enoxaparin (LOVENOX), warfarin (COUMADIN)  or other anticoagulant medication     polyethylene glycol (MIRALAX/GLYCOLAX) Packet 17 g     senna-docusate (SENOKOT-S/PERICOLACE) 8.6-50 MG per tablet 1 tablet     venlafaxine (EFFEXOR-ER) 24 hr tablet 75 mg     Vitamin D3 (CHOLECALCIFEROL) 25 mcg (1000 units) tablet 1,000 Units        Labs:  Lab Results   Component Value Date    WBC 10.2 02/08/2020    HGB 15.4 02/08/2020    HCT 45.0 02/08/2020     02/08/2020     02/08/2020    POTASSIUM 4.2 02/08/2020    CHLORIDE 97 02/08/2020    CO2 30 02/08/2020    BUN 20 02/08/2020    CR 0.82 02/08/2020     (H) 02/08/2020       Attestation:  This patient has been seen and evaluated by me, Whitney Kan MD.    Total time: 35 Minutes more than 50% in Care coordination / counseling time on neuro plasticity, importance of rehabitation, motivation by the patient in participating in the therapies.  Whitney Kan MD

## 2020-02-08 NOTE — PLAN OF CARE
Orientation    Pt is alert and oriented X3 and able to make needs known, call light within pt's reach.  at bedside     VS VSS.  and 120 mg/dL.  Pt Denies chills and fever  No shortness of breath and chest pain reported    Pain Denies pain     intake  Output Good appetite, drinking well  Voiding without difficulties in bathroom     Activity and      Equipment Assist of one     Gait belt and walker used      CMS and Neuro s Reports of increased left side numbness and weakness this shift. Dr Lott, Ronan and Dr Silva notified. Dr Lott and Dr Silva assessed pt. See Dr Silva's note.     Skin Intact      Plan    Continue to monitor

## 2020-02-08 NOTE — PLAN OF CARE
FOCUS/GOAL  Mobility    ASSESSMENT, INTERVENTIONS AND CONTINUING PLAN FOR GOAL:  CGA of one with transfer belt et walker for transfers et ambulation. Continent of bowel et bladder et uses toilet in bathroom.Taking food et fluids well. Denies pain. Family at bedside.

## 2020-02-08 NOTE — PROGRESS NOTES
Called by RN for questionable increase of numbness and weakness of left upper extremity  Dr. Michelle Lott (primary team) at bedside. Evaluated together  Patient has ongoing weakness and numbness in left upper extremity. History slightly vague, as patient at times reported that her weakness/numbness is persistent since surgery, but other time she mentioned worse since yesterday. She denies any vision changes, nausea, vomiting,chest pain, shortness of breath, lightheadedness, dizziness, fever, chills, burning urination,loose stools.   On exam, vital signs are stable  S1, S2 normal  B/l CTA  Strength 4/5 on left EF and EF, and 3/5 on hand (not different from yesterday)  Strength 4+/5 on the left leg.   No tongue deviation, facial droop    May have fluctuation/evolution of stroke but no significant change in the signs/symptoms  Plan was made to continue current antiplatelet therapy  Continue to monitor closely  CBC, BMP in AM    [facility]  Internal Medicine  Hospitalist  Pager no: [phone]

## 2020-02-08 NOTE — PLAN OF CARE
PT: PASS completed with score of 30/36. Pt able to tolerate 200 ft ambulation with FWW and CGA and minimal complaints of fatigue. Plan to progress standing LE strength/balance ex with UE support as needed. Continue gait training with FWW.

## 2020-02-08 NOTE — PLAN OF CARE
OT- completed bathing with mod A, UB dressing min A, LB dressing max A, toileting mod A, grooming mod  A for hand washing and hair grooming. Level of I limited by LUE weakness. Pt ambulating in room using FWW with CGA-min A. Occasional min LOB when turning or fatigued while using FWW.

## 2020-02-08 NOTE — PHARMACY-MEDICATION REGIMEN REVIEW
Pharmacy Medication Regimen Review  Kevin Thrasher is a 54 year old female who is currently in the Acute Rehab Unit.    Assessment: Upon review of the medications and patient chart the following irregularities were found: Medications that require additional monitoring include: enoxaparin - platelets ordered    Plan:   -continue current plan  Attending provider will be sent this note for review.  If there are any emergent issues noted above, pharmacist will contact provider directly by phone.      Pharmacy will periodically review the resident's medication regimen for any PRN medications not administered in > 72 hours and discontinue them. The pharmacist will discuss gradual dose reductions of psychopharmacologic medications with interdisciplinary team on a regular basis.    Please contact pharmacy if the above does not answer specific medication questions/concerns.    Background:  A pharmacist has reviewed all medications and pertinent medical history today.  Medications were reviewed for appropriate use and any irregularities found are listed with recommendations.      Current Facility-Administered Medications:      albuterol (PROAIR HFA/PROVENTIL HFA/VENTOLIN HFA) 108 (90 Base) MCG/ACT inhaler 2 puff, 2 puff, Inhalation, Q4H PRN, Gbarbea, Demenia B, CNP     aspirin (ASA) chewable tablet 81 mg, 81 mg, Oral, Daily, Gbarbea, Demenia B, CNP, 81 mg at 02/08/20 0814     atorvastatin (LIPITOR) tablet 40 mg, 40 mg, Oral, Daily, Gbarbea, Demenia B, CNP, 40 mg at 02/08/20 0814     clopidogrel (PLAVIX) tablet 75 mg, 75 mg, Oral, Daily, Gbarbea, Demenia B, CNP, 75 mg at 02/08/20 0814     glucose gel 15-30 g, 15-30 g, Oral, Q15 Min PRN **OR** dextrose 50 % injection 25-50 mL, 25-50 mL, Intravenous, Q15 Min PRN **OR** glucagon injection 1 mg, 1 mg, Subcutaneous, Q15 Min PRN, Gbarbea, Demenia B, CNP     enoxaparin ANTICOAGULANT (LOVENOX) injection 40 mg, 40 mg, Subcutaneous, Daily, Gbarbea, Demenia B, CNP, 40 mg at 02/08/20 0814      famotidine (PEPCID) tablet 20 mg, 20 mg, Oral, Daily, Gbarbea, Demenia B, CNP, 20 mg at 02/08/20 0814     influenza recomb quadrivalent PF (FLUBLOK) injection 0.5 mL, 0.5 mL, Intramuscular, Prior to discharge, Michelle Lott MD     insulin aspart (NovoLOG) inj (RAPID ACTING), 1-7 Units, Subcutaneous, TID AC, Gbarbea, Demenia B, CNP     insulin aspart (NovoLOG) inj (RAPID ACTING), 1-5 Units, Subcutaneous, At Bedtime, Gbarbea, Demenia B, CNP     montelukast (SINGULAIR) tablet 10 mg, 10 mg, Oral, At Bedtime, Gbarbea, Demenia B, CNP, 10 mg at 02/07/20 2156     omeprazole (priLOSEC) CR capsule 20 mg, 20 mg, Oral, QAM AC, Gbarbea, Demenia B, CNP, 20 mg at 02/08/20 0648     Patient is already receiving anticoagulation with heparin, enoxaparin (LOVENOX), warfarin (COUMADIN)  or other anticoagulant medication, , Does not apply, Continuous PRN, Gbarbea, Demenia B, CNP     polyethylene glycol (MIRALAX/GLYCOLAX) Packet 17 g, 17 g, Oral, Daily, Gbarbea, Demenia B, CNP, 17 g at 02/08/20 0814     senna-docusate (SENOKOT-S/PERICOLACE) 8.6-50 MG per tablet 1 tablet, 1 tablet, Oral, BID, Gbarbea, Demenia B, CNP, 1 tablet at 02/07/20 2156     venlafaxine (EFFEXOR-ER) 24 hr tablet 75 mg, 75 mg, Oral, Daily with breakfast, Michelle Lott MD, 75 mg at 02/08/20 0813     Vitamin D3 (CHOLECALCIFEROL) 25 mcg (1000 units) tablet 1,000 Units, 1,000 Units, Oral, Daily, Gbarbea, Demenia B, CNP, 1,000 Units at 02/08/20 0814  No current outpatient prescriptions on file.   PMH: Angular cheilitis, Depression, Head Injury, H. Pylori, gastritis, HTN, HLD, Infertility,  Menorrhagia, Obesity, Pterygium, Shingles , and DM II

## 2020-02-09 ENCOUNTER — APPOINTMENT (OUTPATIENT)
Dept: PHYSICAL THERAPY | Facility: CLINIC | Age: 55
End: 2020-02-09
Payer: COMMERCIAL

## 2020-02-09 ENCOUNTER — APPOINTMENT (OUTPATIENT)
Dept: OCCUPATIONAL THERAPY | Facility: CLINIC | Age: 55
End: 2020-02-09
Payer: COMMERCIAL

## 2020-02-09 LAB
GLUCOSE BLDC GLUCOMTR-MCNC: 107 MG/DL (ref 70–99)
GLUCOSE BLDC GLUCOMTR-MCNC: 111 MG/DL (ref 70–99)
GLUCOSE BLDC GLUCOMTR-MCNC: 119 MG/DL (ref 70–99)
GLUCOSE BLDC GLUCOMTR-MCNC: 124 MG/DL (ref 70–99)
GLUCOSE BLDC GLUCOMTR-MCNC: 126 MG/DL (ref 70–99)

## 2020-02-09 PROCEDURE — 12800006 ZZH R&B REHAB

## 2020-02-09 PROCEDURE — 97112 NEUROMUSCULAR REEDUCATION: CPT | Mod: GO

## 2020-02-09 PROCEDURE — 25000132 ZZH RX MED GY IP 250 OP 250 PS 637: Performed by: PHYSICAL MEDICINE & REHABILITATION

## 2020-02-09 PROCEDURE — 97535 SELF CARE MNGMENT TRAINING: CPT | Mod: GO

## 2020-02-09 PROCEDURE — 25000128 H RX IP 250 OP 636: Performed by: NURSE PRACTITIONER

## 2020-02-09 PROCEDURE — 97530 THERAPEUTIC ACTIVITIES: CPT | Mod: GO

## 2020-02-09 PROCEDURE — 00000146 ZZHCL STATISTIC GLUCOSE BY METER IP

## 2020-02-09 PROCEDURE — 97112 NEUROMUSCULAR REEDUCATION: CPT | Mod: GP

## 2020-02-09 PROCEDURE — 97116 GAIT TRAINING THERAPY: CPT | Mod: GP

## 2020-02-09 PROCEDURE — 97116 GAIT TRAINING THERAPY: CPT | Mod: GP | Performed by: PHYSICAL THERAPIST

## 2020-02-09 PROCEDURE — 25000132 ZZH RX MED GY IP 250 OP 250 PS 637: Performed by: NURSE PRACTITIONER

## 2020-02-09 PROCEDURE — 97112 NEUROMUSCULAR REEDUCATION: CPT | Mod: GP | Performed by: PHYSICAL THERAPIST

## 2020-02-09 PROCEDURE — 97110 THERAPEUTIC EXERCISES: CPT | Mod: GP

## 2020-02-09 RX ADMIN — ATORVASTATIN CALCIUM 40 MG: 40 TABLET, FILM COATED ORAL at 09:19

## 2020-02-09 RX ADMIN — MELATONIN 1000 UNITS: at 09:19

## 2020-02-09 RX ADMIN — MONTELUKAST 10 MG: 10 TABLET, FILM COATED ORAL at 20:22

## 2020-02-09 RX ADMIN — SENNOSIDES AND DOCUSATE SODIUM 1 TABLET: 8.6; 5 TABLET ORAL at 20:22

## 2020-02-09 RX ADMIN — OMEPRAZOLE 20 MG: 20 CAPSULE, DELAYED RELEASE ORAL at 06:24

## 2020-02-09 RX ADMIN — CLOPIDOGREL BISULFATE 75 MG: 75 TABLET, FILM COATED ORAL at 09:18

## 2020-02-09 RX ADMIN — VENLAFAXINE HYDROCHLORIDE 75 MG: 75 TABLET, EXTENDED RELEASE ORAL at 09:19

## 2020-02-09 RX ADMIN — FAMOTIDINE 20 MG: 10 TABLET, FILM COATED ORAL at 09:18

## 2020-02-09 RX ADMIN — ASPIRIN 81 MG CHEWABLE TABLET 81 MG: 81 TABLET CHEWABLE at 09:18

## 2020-02-09 RX ADMIN — ENOXAPARIN SODIUM 40 MG: 40 INJECTION SUBCUTANEOUS at 09:27

## 2020-02-09 NOTE — PLAN OF CARE
OT: Required min A for ADLs using maryann techniques, CGA-SBA for mobility and transfers. Focused on LUE neuro re-ed to facilitate neutral movement patterns, required min-mod A to isolate motions and decrease mm substitution. Pt had many questions about rehab potential and anxious regarding loss of function in LUE, will continue to educate pt and family in stroke recovery to ease anxiety

## 2020-02-09 NOTE — PLAN OF CARE
FOCUS/GOAL  Bladder management and Mobility    ASSESSMENT, INTERVENTIONS AND CONTINUING PLAN FOR GOAL:  Left sided weakness continues. Taking food et fluids well. Transfers with one staff with walker et gait belt. Voiding with out difficulty.

## 2020-02-09 NOTE — PLAN OF CARE
FOCUS/GOAL  Bladder management, Mobility, and Safety management    ASSESSMENT, INTERVENTIONS AND CONTINUING PLAN FOR GOAL:  Pt alert and oriented x4, VSS, denies pain or SOB. Using call light appropriately. Transfers to bathroom with CGA and walker. Cont of bladder.

## 2020-02-09 NOTE — PLAN OF CARE
FOCUS/GOAL  Bladder management, Pain management, Mobility, Cognition/Memory/Judgment/Problem solving, and Safety management    ASSESSMENT, INTERVENTIONS AND CONTINUING PLAN FOR GOAL:  Pt is alert, disoriented to time. Continent of bladder, voiding without difficulty using toilet. PVR was 0. Denied pain or discomfort overnight. Up with CGA with walker to the bathroom. Independent with bed mobility.  at the bedside. Appeared to be sleeping during rounds. Bed alarm on for safety. Will continue with POC.

## 2020-02-09 NOTE — PROGRESS NOTES
"PM&R PROGRESS NOTE     Patient Active Problem List   Diagnosis     Stroke (H)       ANNEMARIE Thrasher is a 54 year old female admitted to the ARU on 2/6/2020    She is a 54-year-old female left-handed who presents with a stroke of the right lazara resulting in left-sided weakness.     She has a history of hypertension, hyperlipidemia.    From the functional perspective, she is participating in therapies.  She is improving in her endurance as she participates in PT.  She was able to ambulate 200 feet with a walker requiring contact-guard assist.  She continues to require min to mod assist for upper body dressing and max assist for lower body dressing.    Medically  Secondary prevention of stroke with aspirin and Plavix; denies any worsening of her weakness.  Examination is consistent with no change.  Continue Lovenox for DVT prophylaxis  Hypertension; blood pressures continue to be soft, asymptomatic.  Lisinopril was discontinued.  At present she is without any antihypertensives.  Sodium; 133 on 2/8/20, mild increase from 132.  Will order for BMP tomorrow.  White count is within normal limits.  No signs or symptoms of infection.  We will continue to monitor    Orders Placed This Encounter      Regular Diet Adult        Review Of Systems  Total of ten systems reviewed, pertinent positives and negatives as follows  Denies any new weakness  Continent of bowel and bladder  Voiding without any difficulty  Fatigue  Denies any worsening of the weakness  Denies any cough fever or chills  Denies any chest pain  Denies any pain  Slept well  Remainder of the review of the systems was negative.        /72 (BP Location: Left arm)   Pulse 97   Temp 98  F (36.7  C) (Oral)   Resp 20   Ht 1.473 m (4' 10\")   Wt 61.5 kg (135 lb 8 oz)   SpO2 95%   BMI 28.32 kg/m    Physical exam    Patient is sitting in a chair   Comfortable in no acute distress  HEENT NC AT PRTL EOM good   Neck supple  Heart S1S2  Lungs CTA  Abdomen  benign BS " positive NT NR   LE no edema     She has left-sided weakness, finger flexion is 2 out of 5 extension is 0 out of 5 wrist flexion and extension is 2 out of 5  Biceps and triceps are 3- out of 5  Shoulder abduction is 3 out of 5  There is no change in her overall motor strength today compared to yesterday    Current Facility-Administered Medications   Medication     albuterol (PROAIR HFA/PROVENTIL HFA/VENTOLIN HFA) 108 (90 Base) MCG/ACT inhaler 2 puff     aspirin (ASA) chewable tablet 81 mg     atorvastatin (LIPITOR) tablet 40 mg     clopidogrel (PLAVIX) tablet 75 mg     glucose gel 15-30 g    Or     dextrose 50 % injection 25-50 mL    Or     glucagon injection 1 mg     enoxaparin ANTICOAGULANT (LOVENOX) injection 40 mg     famotidine (PEPCID) tablet 20 mg     influenza recomb quadrivalent PF (FLUBLOK) injection 0.5 mL     insulin aspart (NovoLOG) inj (RAPID ACTING)     insulin aspart (NovoLOG) inj (RAPID ACTING)     montelukast (SINGULAIR) tablet 10 mg     omeprazole (priLOSEC) CR capsule 20 mg     Patient is already receiving anticoagulation with heparin, enoxaparin (LOVENOX), warfarin (COUMADIN)  or other anticoagulant medication     polyethylene glycol (MIRALAX/GLYCOLAX) Packet 17 g     senna-docusate (SENOKOT-S/PERICOLACE) 8.6-50 MG per tablet 1 tablet     venlafaxine (EFFEXOR-ER) 24 hr tablet 75 mg     Vitamin D3 (CHOLECALCIFEROL) 25 mcg (1000 units) tablet 1,000 Units        Labs:  Lab Results   Component Value Date    WBC 10.2 02/08/2020    HGB 15.4 02/08/2020    HCT 45.0 02/08/2020     02/08/2020     02/08/2020    POTASSIUM 4.2 02/08/2020    CHLORIDE 97 02/08/2020    CO2 30 02/08/2020    BUN 20 02/08/2020    CR 0.82 02/08/2020     (H) 02/08/2020       Attestation:  This patient has been seen and evaluated by me, Whitney Kan MD.      Whitney Kan MD

## 2020-02-10 ENCOUNTER — OFFICE VISIT (OUTPATIENT)
Dept: INTERPRETER SERVICES | Facility: CLINIC | Age: 55
End: 2020-02-10
Payer: COMMERCIAL

## 2020-02-10 LAB
ANION GAP SERPL CALCULATED.3IONS-SCNC: 6 MMOL/L (ref 3–14)
BUN SERPL-MCNC: 32 MG/DL (ref 7–30)
CALCIUM SERPL-MCNC: 8.7 MG/DL (ref 8.5–10.1)
CHLORIDE SERPL-SCNC: 102 MMOL/L (ref 94–109)
CO2 SERPL-SCNC: 29 MMOL/L (ref 20–32)
CREAT SERPL-MCNC: 0.76 MG/DL (ref 0.52–1.04)
GFR SERPL CREATININE-BSD FRML MDRD: 87 ML/MIN/{1.73_M2}
GLUCOSE BLDC GLUCOMTR-MCNC: 100 MG/DL (ref 70–99)
GLUCOSE BLDC GLUCOMTR-MCNC: 116 MG/DL (ref 70–99)
GLUCOSE BLDC GLUCOMTR-MCNC: 118 MG/DL (ref 70–99)
GLUCOSE BLDC GLUCOMTR-MCNC: 88 MG/DL (ref 70–99)
GLUCOSE BLDC GLUCOMTR-MCNC: 99 MG/DL (ref 70–99)
GLUCOSE BLDC GLUCOMTR-MCNC: >600 MG/DL (ref 70–99)
GLUCOSE SERPL-MCNC: 128 MG/DL (ref 70–99)
PLATELET # BLD AUTO: 211 10E9/L (ref 150–450)
POTASSIUM SERPL-SCNC: 3.6 MMOL/L (ref 3.4–5.3)
SODIUM SERPL-SCNC: 137 MMOL/L (ref 133–144)

## 2020-02-10 PROCEDURE — 25000128 H RX IP 250 OP 636: Performed by: NURSE PRACTITIONER

## 2020-02-10 PROCEDURE — 12800006 ZZH R&B REHAB

## 2020-02-10 PROCEDURE — 25000132 ZZH RX MED GY IP 250 OP 250 PS 637: Performed by: NURSE PRACTITIONER

## 2020-02-10 PROCEDURE — 97116 GAIT TRAINING THERAPY: CPT | Mod: GP

## 2020-02-10 PROCEDURE — 97112 NEUROMUSCULAR REEDUCATION: CPT | Mod: GO

## 2020-02-10 PROCEDURE — 85049 AUTOMATED PLATELET COUNT: CPT | Performed by: PHYSICAL MEDICINE & REHABILITATION

## 2020-02-10 PROCEDURE — 80048 BASIC METABOLIC PNL TOTAL CA: CPT | Performed by: PHYSICAL MEDICINE & REHABILITATION

## 2020-02-10 PROCEDURE — 25000132 ZZH RX MED GY IP 250 OP 250 PS 637: Performed by: PHYSICAL MEDICINE & REHABILITATION

## 2020-02-10 PROCEDURE — 36415 COLL VENOUS BLD VENIPUNCTURE: CPT | Performed by: PHYSICAL MEDICINE & REHABILITATION

## 2020-02-10 PROCEDURE — T1013 SIGN LANG/ORAL INTERPRETER: HCPCS | Mod: U3

## 2020-02-10 PROCEDURE — 00000146 ZZHCL STATISTIC GLUCOSE BY METER IP

## 2020-02-10 PROCEDURE — 97535 SELF CARE MNGMENT TRAINING: CPT | Mod: GO

## 2020-02-10 PROCEDURE — 97530 THERAPEUTIC ACTIVITIES: CPT | Mod: GP | Performed by: REHABILITATION PRACTITIONER

## 2020-02-10 PROCEDURE — 97116 GAIT TRAINING THERAPY: CPT | Mod: GP | Performed by: REHABILITATION PRACTITIONER

## 2020-02-10 PROCEDURE — 97110 THERAPEUTIC EXERCISES: CPT | Mod: GP | Performed by: REHABILITATION PRACTITIONER

## 2020-02-10 RX ORDER — CHOLECALCIFEROL (VITAMIN D3) 1250 MCG
50000 CAPSULE ORAL
Status: DISCONTINUED | OUTPATIENT
Start: 2020-02-11 | End: 2020-02-17 | Stop reason: HOSPADM

## 2020-02-10 RX ADMIN — VENLAFAXINE HYDROCHLORIDE 75 MG: 75 TABLET, EXTENDED RELEASE ORAL at 08:51

## 2020-02-10 RX ADMIN — CLOPIDOGREL BISULFATE 75 MG: 75 TABLET, FILM COATED ORAL at 08:51

## 2020-02-10 RX ADMIN — MELATONIN 1000 UNITS: at 08:51

## 2020-02-10 RX ADMIN — ASPIRIN 81 MG CHEWABLE TABLET 81 MG: 81 TABLET CHEWABLE at 08:50

## 2020-02-10 RX ADMIN — FAMOTIDINE 20 MG: 10 TABLET, FILM COATED ORAL at 08:52

## 2020-02-10 RX ADMIN — ATORVASTATIN CALCIUM 40 MG: 40 TABLET, FILM COATED ORAL at 08:51

## 2020-02-10 RX ADMIN — MONTELUKAST 10 MG: 10 TABLET, FILM COATED ORAL at 21:33

## 2020-02-10 RX ADMIN — ENOXAPARIN SODIUM 40 MG: 40 INJECTION SUBCUTANEOUS at 08:52

## 2020-02-10 RX ADMIN — POLYETHYLENE GLYCOL 3350 17 G: 17 POWDER, FOR SOLUTION ORAL at 08:51

## 2020-02-10 RX ADMIN — SENNOSIDES AND DOCUSATE SODIUM 1 TABLET: 8.6; 5 TABLET ORAL at 21:32

## 2020-02-10 RX ADMIN — OMEPRAZOLE 20 MG: 20 CAPSULE, DELAYED RELEASE ORAL at 06:18

## 2020-02-10 NOTE — PROGRESS NOTES
"  [facility]   Acute Rehabilitation Unit  Daily progress note    INTERVAL HISTORY  Weekend notes reviewed.  No acute events and no further complaints of worsening L sided weakness, in fact today says she feels the arm is getting stronger.  Still with numbness of the left hand.  Has no other complaints and denies chest pain, shortness of breath, lightheadedness or dizziness.  BPs remain soft and she says she is drinking at least 2L of water per day.  Functionally she scored 30/36 on the PASS, ambulating 175 ft with CGA to Min A with a SEC, and Min A for ADLs.  sal interpretor present for visit.      MEDICATIONS  Scheduled meds    aspirin  81 mg Oral Daily     atorvastatin  40 mg Oral Daily     clopidogrel  75 mg Oral Daily     enoxaparin ANTICOAGULANT  40 mg Subcutaneous Daily     famotidine  20 mg Oral Daily     influenza vaccine adult (product based on age)  0.5 mL Intramuscular Prior to discharge     insulin aspart  1-7 Units Subcutaneous TID AC     insulin aspart  1-5 Units Subcutaneous At Bedtime     montelukast  10 mg Oral At Bedtime     omeprazole  20 mg Oral QAM AC     polyethylene glycol  17 g Oral Daily     senna-docusate  1 tablet Oral BID     venlafaxine  75 mg Oral Daily with breakfast     cholecalciferol  1,000 Units Oral Daily       PRN meds:  albuterol, glucose **OR** dextrose **OR** glucagon, - MEDICATION INSTRUCTIONS -      PHYSICAL EXAM  /70 (BP Location: Right arm)   Pulse 61   Temp 96.4  F (35.8  C) (Oral)   Resp 20   Ht 1.473 m (4' 10\")   Wt 61.5 kg (135 lb 8 oz)   SpO2 97%   BMI 28.32 kg/m    Gen: NAD, cooperative  HEENT: NC/AT  Cardio: RRR, S1 & S2, no m/r/g  Pulm:  CTA b/l, no w/r/r  Abd: soft, NT, ND, BS+  Ext: no edema or calf tenderness B/I  Neuro/MSK: Alert, following commands.  No change to strength exam (4/5 to LUE, 4- to L )    LABS  CBC RESULTS:   Recent Labs   Lab Test 02/10/20  0613 02/08/20  0643   WBC  --  10.2   RBC  --  " 5.36*   HGB  --  15.4   HCT  --  45.0   MCV  --  84   MCH  --  28.7   MCHC  --  34.2   RDW  --  12.5    241     Last Comprehensive Metabolic Panel:  Sodium   Date Value Ref Range Status   02/10/2020 137 133 - 144 mmol/L Final     Potassium   Date Value Ref Range Status   02/10/2020 3.6 3.4 - 5.3 mmol/L Final     Chloride   Date Value Ref Range Status   02/10/2020 102 94 - 109 mmol/L Final     Carbon Dioxide   Date Value Ref Range Status   02/10/2020 29 20 - 32 mmol/L Final     Anion Gap   Date Value Ref Range Status   02/10/2020 6 3 - 14 mmol/L Final     Glucose   Date Value Ref Range Status   02/10/2020 128 (H) 70 - 99 mg/dL Final     Urea Nitrogen   Date Value Ref Range Status   02/10/2020 32 (H) 7 - 30 mg/dL Final     Creatinine   Date Value Ref Range Status   02/10/2020 0.76 0.52 - 1.04 mg/dL Final     GFR Estimate   Date Value Ref Range Status   02/10/2020 87 >60 mL/min/[1.73_m2] Final     Comment:     Non  GFR Calc  Starting 12/18/2018, serum creatinine based estimated GFR (eGFR) will be   calculated using the Chronic Kidney Disease Epidemiology Collaboration   (CKD-EPI) equation.       Calcium   Date Value Ref Range Status   02/10/2020 8.7 8.5 - 10.1 mg/dL Final         Recent Labs   Lab 02/10/20  1146 02/10/20  0817 02/10/20  0613 02/10/20  0153 02/09/20  2100 02/09/20  1700 02/09/20  1127  02/08/20  0643  02/07/20  0609   GLC  --   --  128*  --   --   --   --   --  108*  --  108*   BGM 88 116*  --  99 107* 126* 111*   < >  --    < >  --     < > = values in this interval not displayed.         ASSESSMENT AND PLAN    Kevin Thrasher is a 54 year old right hand dominant female who presented with L-sided numbness/weakness 1-2 days duraton.  CTA of the head and neck was negative for acute infarct, vessel occlusion, significant stenosis, aneurysm or high flow vascular malformation on 2/1/20, discharged home but symptoms worsened overnight so Pt return to the ED 2/2/20.  MRI showed an acute stroke  of the right lazara. not TPA candidate because of time of onset contraindication. Dual antiplatelet therapy was initiated with plan to continue indefinitely.. Echo showed normal EF, no cardiac mass, thrombus or right to left shunt.  PMHx; Angular cheilitis, Depression, Head Injury, H. Pylori, gastritis, HTN, HLD, Infertility,  Menorrhagia, Obesity, Pterygium, Shingles , and DM II without complication, without long-term current use of insulin.         Admitted to acute inpatient rehab 2/6/2020.    Impairment group code: 01.1 Left sided weakness 2/2 to right lazara stroke        1. PT, OT and SLP 60 minutes of each on a daily basis, in addition to rehab nursing and close management of physiatrist.       2. Impairment of ADL's:  OT for 60 minutes daily to work on ADL re-training such as grooming, self cares and bathing.       3. Impairment of mobility:  PT for 60 minutes daily to work on neuromuscular re-education focusing on strength, balance, coordination, and endurance.       4. Impairment of cognition/language/swallow:  SLP for 60 minutes daily to work on cognitive and linguistic deficits.     5. Rehab RN for med administration, VS monitoring, bowel regimen, glucose monitoring and education.        6. 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 Dual antiplatelet therapy with aspirin and Plavix indefinitely due to significant intracranial atherosclerotic disease.  -continues to have left-sided weakness.   -Continue Plavix 75 mg daily and aspirin 81 mg daily, Lipitor 40 mg daily.  -OP/OT     #Hyperlipidemia, LDL-155 on 2/4, not well controlled LDL goal < 70. PTA on lipitor 10 mg at HS  Lipitor 40 mg daily     #TARIK, resolved  -Pt had slightly elevated creatinine 1.19. Resolved with IV hydration  -Cr 0.76 on last check 2/10    #Hypertension, essential: goal <130.   -Discontinued Lisinopril due to soft BPs    #Type 2 diabetes mellitus without  complication without long term insulin use, Hemoglobin A1c goal <7. A1c - 6.2 on 2/4  -Diabetic diet  -Glucose check AC/HS and 0200  -SSI    #Hypokalemia, last potassium-3.6 on 2/10  -monitor      #Depression and  Anxiety   -cont PTA venlafaxine (XR) 75 MG daily  -psychology consult to be considered  -PTA trazodone 50 MG  PRN     # H/O Vitamin D deficiency   -Vit. D level low at 17.  Will load with 50k units weekly x8 weeks     #H/O Mild intermittent asthma without complication,  -cont. PTA albuterol (PROAIR HFA;PROVENTIL HFA;VENTOLIN HFA)   90 mcg/actuation inhaler Inhale 2 puffs every 4 (four) hours as needed for wheezing or shortness of breath     #gastritis for over 5 years, mostly after eating  -cont famotidine 20 mg daily      #H/O Acute nonintractable headache, unspecified headache type 02/02/2020   #H/OLumbosacral radiculopathy at S1 09/19/2016   #H/O Fatty liver   # H/O Abnormal Pap smear of cervix     7. Adjustment to disability:  Clinical psychology to eval and treat as needed  8. FEN: Regular with thin liquid  9. Bowel: miralax daily & senokot BID  10. Bladder: Continent  11. DVT Prophylaxis: Discontinue Lovenox today given ambulation distances  GI Prophylaxis: omeprazole (PRILOSEC) 20 MG and Pepcid 20 mg daily per home regimen, has Hx of gastritis  12. Code: full  13. Disposition: home  14. ELOS:  Tentative discharge date 2/17  15. Rehab prognosis:  good  Follow up Appointments on Discharge:   -neurology in 4-6 weeks  -PCP in 7 days      Ranjit Lott MD  Department of Rehabilitation Medicine  Pager: [phone]    Time Spent on this Encounter   I, Ranjit Lott, spent a total of 25 minutes face-to-face or managing the care of Kevin Thrasher. Over 50% of my time on the unit was spent counseling the patient and coordinating care. See note for details.

## 2020-02-10 NOTE — PLAN OF CARE
PT: pt needing CGA to slight min A for all bed mobility. Pt needing CGA to min A for sit to stand with WW. Pt demo amb up to 200'x 2 with WW with mild knee buckling but no LOB. Pt demo supine and side lying there x program. Pt demo up to 10 min on floor bike. Pt back in bed with alarm on at end of session.

## 2020-02-10 NOTE — PLAN OF CARE
FOCUS/GOAL  Bladder management, Medical management, and Mobility    ASSESSMENT, INTERVENTIONS AND CONTINUING PLAN FOR GOAL:  Pt alert and oriented x4, VSS, denies pain or SOB. Pt asking RN about the numbness in her left had and what can be done for it. Writer educated pt on the progression of sensation with stroke and how numbness/tingling sensation can wax and wane. Pt satisfied with answer. Transfers CGA and walker, cont of bladder using toilet.

## 2020-02-10 NOTE — PLAN OF CARE
Discharge Planner OT   Patient plan for discharge: Return home with spouse and adult children, family can assist with IADLs and self cares   Current status: Min A with ADLs d/t hemiparesis, CGA with FWW mobility and transfers. Proximal strength LUE improving, unable to functionally grasp with L hand.   Barriers to return to prior living situation: L hemiparesis, decreased balance  Recommendations for discharge: OP OT for LUE neuro re-ed, FWW, shower chair       Entered by: Antonella Humphreys 02/10/2020 5:52 PM

## 2020-02-10 NOTE — PROGRESS NOTES
"Nutrition Services Progress Note    Pt seen per provider consult:  \"high BMI, Pt needs educaton on healthy diet.\"    Per pt diagnosed with DM about 4-5  months ago.  She states did receive diet education at that time.  She reports trying to follow a healthy diet at home, eating mostly chicken, brown rice and vegetables with fruit for snacks.  Occasional chips or donuts.   She has been drinking water instead of sweetened tea.  Per pt does not add salt at the table, but does use some high sodium items such as soy sauce.  She reports intentionally lost 15 lbs after DM dx.      Reviewed with  assistance heart healthy, DM diet guidelines. Provided with DM nutrition therapy handout in [facility].  Discussed healthy meal choices and recommended following a low fat, low sodium diet.     Pt verbalized understanding.  Anticipate compliance will be good.      Available to answer any diet related questions before discharge.  Pt may also request an outpatient dietitian appointment if further education desired.    Will continue to follow per nutrition plan of care.    Salima Sheets RD, LD      "

## 2020-02-10 NOTE — PLAN OF CARE
FOCUS/GOAL  Bladder management, Pain management, Mobility, Cognition/Memory/Judgment/Problem solving, and Safety management    ASSESSMENT, INTERVENTIONS AND CONTINUING PLAN FOR GOAL:  Pt is alert and oriented. Continent of bladder, voiding without difficulty using toilet. No reports of pain or discomfort overnight. Blood glucose at 0200 was 99. Using call light appropriately, able to make needs known.  at the bedside until 0400. Bed alarm on for safety. Will continue with POC.

## 2020-02-10 NOTE — PROGRESS NOTES
Patient is A&O x4. Denied CP, lightheadedness, dizziness and SOB. Interpretor utilized for assessment. Report of numbness on LUE and LLE per baseline after stroke. Complain of left shoulder pain but refused intervention. Pt is AS1 with walker and gait belt. Continent of bowel and bladder, LBM 2/10. Pt is able to use call light appropriately and make needs known, will continue to monitor patient as POC.

## 2020-02-11 ENCOUNTER — OFFICE VISIT (OUTPATIENT)
Dept: INTERPRETER SERVICES | Facility: CLINIC | Age: 55
End: 2020-02-11
Payer: COMMERCIAL

## 2020-02-11 LAB
GLUCOSE BLDC GLUCOMTR-MCNC: 100 MG/DL (ref 70–99)
GLUCOSE BLDC GLUCOMTR-MCNC: 107 MG/DL (ref 70–99)
GLUCOSE BLDC GLUCOMTR-MCNC: 126 MG/DL (ref 70–99)
GLUCOSE BLDC GLUCOMTR-MCNC: 143 MG/DL (ref 70–99)
GLUCOSE BLDC GLUCOMTR-MCNC: 89 MG/DL (ref 70–99)

## 2020-02-11 PROCEDURE — 25000132 ZZH RX MED GY IP 250 OP 250 PS 637: Performed by: NURSE PRACTITIONER

## 2020-02-11 PROCEDURE — 97112 NEUROMUSCULAR REEDUCATION: CPT | Mod: GP

## 2020-02-11 PROCEDURE — 12800006 ZZH R&B REHAB

## 2020-02-11 PROCEDURE — 97116 GAIT TRAINING THERAPY: CPT | Mod: GP

## 2020-02-11 PROCEDURE — 97110 THERAPEUTIC EXERCISES: CPT | Mod: GO

## 2020-02-11 PROCEDURE — 97535 SELF CARE MNGMENT TRAINING: CPT | Mod: GO

## 2020-02-11 PROCEDURE — T1013 SIGN LANG/ORAL INTERPRETER: HCPCS | Mod: U3

## 2020-02-11 PROCEDURE — 25000132 ZZH RX MED GY IP 250 OP 250 PS 637: Performed by: PHYSICAL MEDICINE & REHABILITATION

## 2020-02-11 PROCEDURE — 00000146 ZZHCL STATISTIC GLUCOSE BY METER IP

## 2020-02-11 PROCEDURE — 97530 THERAPEUTIC ACTIVITIES: CPT | Mod: GO

## 2020-02-11 RX ADMIN — MONTELUKAST 10 MG: 10 TABLET, FILM COATED ORAL at 21:39

## 2020-02-11 RX ADMIN — OMEPRAZOLE 20 MG: 20 CAPSULE, DELAYED RELEASE ORAL at 06:26

## 2020-02-11 RX ADMIN — ATORVASTATIN CALCIUM 40 MG: 40 TABLET, FILM COATED ORAL at 08:34

## 2020-02-11 RX ADMIN — CHOLECALCIFEROL CAP 1.25 MG (50000 UNIT) 50000 UNITS: 1.25 CAP at 08:38

## 2020-02-11 RX ADMIN — SENNOSIDES AND DOCUSATE SODIUM 1 TABLET: 8.6; 5 TABLET ORAL at 08:34

## 2020-02-11 RX ADMIN — POLYETHYLENE GLYCOL 3350 17 G: 17 POWDER, FOR SOLUTION ORAL at 08:39

## 2020-02-11 RX ADMIN — CLOPIDOGREL BISULFATE 75 MG: 75 TABLET, FILM COATED ORAL at 08:34

## 2020-02-11 RX ADMIN — VENLAFAXINE HYDROCHLORIDE 75 MG: 75 TABLET, EXTENDED RELEASE ORAL at 08:34

## 2020-02-11 RX ADMIN — FAMOTIDINE 20 MG: 10 TABLET, FILM COATED ORAL at 08:34

## 2020-02-11 RX ADMIN — ASPIRIN 81 MG CHEWABLE TABLET 81 MG: 81 TABLET CHEWABLE at 08:34

## 2020-02-11 RX ADMIN — SENNOSIDES AND DOCUSATE SODIUM 1 TABLET: 8.6; 5 TABLET ORAL at 21:39

## 2020-02-11 NOTE — CONSULTS
02/11/20 1612 Sherron Bingham, [place]       Stroke Education Note     The following information has been reviewed with the patient and family:     1. Warning signs of stroke     2. Calling 911 if having warning signs of stroke     3. All modifiable risk factors: hypertension, CAD, atrial fib, diabetes, hypercholesterolemia, smoking, substance abuse, diet, physical inactivity, obesity, sleep apnea.     4. Patient's risk factors for stroke which include: HTN, HLD, DM2, Obesity,      5. Follow-up plan for after discharge     6. Discharge medications which include: HTN, HLD, ASA, PLAVIX     In addition, the [facility] Stroke Class Handout has been given to the patient and family.     Learner's response to risk factors / lifestyle modification education: Desire to change Ability to change Reasons to change Need to change Committment to change Pt. States is would like to speak to a dietician and she stopped taking her meds. She states she now understands the importance of taking meds, diet and exercise to help reduce he chances of having another stroke     Sherron Bingham, RN, RN      No education note entered.

## 2020-02-11 NOTE — PROGRESS NOTES
"  [facility]   Acute Rehabilitation Unit  Daily progress note    INTERVAL HISTORY  Nurses notes reviewed. No acute events overnight. She reports doing good. She is working with therapies. Report tolerating therapies well. She continues to have numbness at the left side. She is walking to therapies with FWW. She denies any chest pain, shortness of breath, or acute pain. Tolerating oral intake well.         MEDICATIONS  Scheduled meds    aspirin  81 mg Oral Daily     atorvastatin  40 mg Oral Daily     cholecalciferol  50,000 Units Oral Q7 Days     clopidogrel  75 mg Oral Daily     famotidine  20 mg Oral Daily     influenza vaccine adult (product based on age)  0.5 mL Intramuscular Prior to discharge     insulin aspart  1-7 Units Subcutaneous TID AC     insulin aspart  1-5 Units Subcutaneous At Bedtime     montelukast  10 mg Oral At Bedtime     omeprazole  20 mg Oral QAM AC     polyethylene glycol  17 g Oral Daily     senna-docusate  1 tablet Oral BID     venlafaxine  75 mg Oral Daily with breakfast       PRN meds:  albuterol, glucose **OR** dextrose **OR** glucagon, - MEDICATION INSTRUCTIONS -      PHYSICAL EXAM  /71 (BP Location: Right arm)   Pulse 54   Temp 98  F (36.7  C) (Oral)   Resp 16   Ht 1.473 m (4' 10\")   Wt 61.5 kg (135 lb 8 oz)   SpO2 95%   BMI 28.32 kg/m       Gen: sitting upright in wheelchair with no acute distress   Lung: clear to auscultation bilaterally   CV: regular rate rhythm   Abd: soft, non tender, non distended, normal BS   Neuro: alert and awake.  Continues to have left hemiplegia and hemiparesis.     LABS  No labs today         ASSESSMENT AND PLAN    Kevin Thrasher is a 54 year old right hand dominant female who presented with L-sided numbness/weakness 1-2 days duraton.  CTA of the head and neck was negative for acute infarct, vessel occlusion, significant stenosis, aneurysm or high flow vascular malformation on 2/1/20, discharged home but symptoms " worsened overnight so Pt return to the ED 2/2/20.  MRI showed an acute stroke of the right lazara. not TPA candidate because of time of onset contraindication. Dual antiplatelet therapy was initiated with plan to continue indefinitely.. Echo showed normal EF, no cardiac mass, thrombus or right to left shunt.  PMHx; Angular cheilitis, Depression, Head Injury, H. Pylori, gastritis, HTN, HLD, Infertility,  Menorrhagia, Obesity, Pterygium, Shingles , and DM II without complication, without long-term current use of insulin.         Admitted to acute inpatient rehab 2/6/2020.    Impairment group code: 01.1 Left sided weakness 2/2 to right lazara stroke        1. PT, OT and SLP 60 minutes of each on a daily basis, in addition to rehab nursing and close management of physiatrist.       2. Impairment of ADL's:  OT for 60 minutes daily to work on ADL re-training such as grooming, self cares and bathing.       3. Impairment of mobility:  PT for 60 minutes daily to work on neuromuscular re-education focusing on strength, balance, coordination, and endurance.       4. Impairment of cognition/language/swallow:  SLP for 60 minutes daily to work on cognitive and linguistic deficits.     5. Rehab RN for med administration, VS monitoring, bowel regimen, glucose monitoring and education.        6. 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 Dual antiplatelet therapy with aspirin and Plavix indefinitely due to significant intracranial atherosclerotic disease.  - 2/11- continues to make gains with therapies    -continues to have left-sided weakness.   -Continue Plavix 75 mg daily and aspirin 81 mg daily, Lipitor 40 mg daily.  -OP/OT     #Hyperlipidemia, LDL-155 on 2/4, not well controlled LDL goal < 70. PTA on lipitor 10 mg at HS  Lipitor 40 mg daily     #TARIK, resolved  -Pt had slightly elevated creatinine 1.19. Resolved with IV hydration  -Cr 0.76 on last check  2/10    #Hypertension, essential: goal <130.   -Discontinued Lisinopril due to soft BPs  - 2/11-BP well controlled with no medication at his time    #Type 2 diabetes mellitus without complication without long term insulin use, Hemoglobin A1c goal <7. A1c - 6.2 on 2/4  -Diabetic diet  -Glucose check AC/HS and 0200  -SSI    #Hypokalemia, last potassium-3.6 on 2/10  -monitor      #Depression and  Anxiety   -cont PTA venlafaxine (XR) 75 MG daily  -psychology consult to be considered  -PTA trazodone 50 MG  PRN     # H/O Vitamin D deficiency   -Vit. D level low at 17.  Will load with 50k units weekly x8 weeks     #H/O Mild intermittent asthma without complication,  -cont. PTA albuterol (PROAIR HFA;PROVENTIL HFA;VENTOLIN HFA)   90 mcg/actuation inhaler Inhale 2 puffs every 4 (four) hours as needed for wheezing or shortness of breath     #gastritis for over 5 years, mostly after eating  -cont famotidine 20 mg daily      #H/O Acute nonintractable headache, unspecified headache type 02/02/2020   #H/OLumbosacral radiculopathy at S1 09/19/2016   #H/O Fatty liver   # H/O Abnormal Pap smear of cervix     7. Adjustment to disability:  Clinical psychology to eval and treat as needed  8. FEN: Regular with thin liquid  9. Bowel: miralax daily & senokot BID  10. Bladder: Continent  11. DVT Prophylaxis: Discontinue Lovenox today given ambulation distances  GI Prophylaxis: omeprazole (PRILOSEC) 20 MG and Pepcid 20 mg daily per home regimen, has Hx of gastritis  12. Code: full  13. Disposition: home  14. ELOS:  Tentative discharge date 2/17  15. Rehab prognosis:  good  Follow up Appointments on Discharge:   -neurology in 4-6 weeks  -PCP in 7 days    Macy Calero  02/11/2020  8:16 AM

## 2020-02-11 NOTE — PLAN OF CARE
"FOCUS/GOAL  Pain management, Mobility, and Skin integrity    ASSESSMENT, INTERVENTIONS AND CONTINUING PLAN FOR GOAL:  Alert and oriented x4, denies SOB. Numbness in right hand and nowhere else noted. Pt had a shower this shift. Transfers with CGA and walker. Pt requested and given hot pack at HS for discomfort in left shoulder/neck, pt thinks she might have just been laying on this area wrong yesterday, declined further intervention. Obtained what appeared to be an erroneous BG reading of \"HI >600\", recheck with other glucometer was 118     "

## 2020-02-11 NOTE — PLAN OF CARE
"PT: Focus today on higher intensity functional NMR to promote strength and balance without R UE support. L UE incorporated into activities for active support. No c/o pain in L UE; pt does mention that her L arm starts to feel warm with functional use, \"like it has increased blood flow.\"  "

## 2020-02-11 NOTE — PLAN OF CARE
& 89.  Declined flu shot today but would like on day of discharged. Rescheduled to 2/15 to reassess. Denies pain.

## 2020-02-12 ENCOUNTER — OFFICE VISIT (OUTPATIENT)
Dept: INTERPRETER SERVICES | Facility: CLINIC | Age: 55
End: 2020-02-12
Payer: COMMERCIAL

## 2020-02-12 LAB
GLUCOSE BLDC GLUCOMTR-MCNC: 104 MG/DL (ref 70–99)
GLUCOSE BLDC GLUCOMTR-MCNC: 107 MG/DL (ref 70–99)
GLUCOSE BLDC GLUCOMTR-MCNC: 98 MG/DL (ref 70–99)

## 2020-02-12 PROCEDURE — 25000132 ZZH RX MED GY IP 250 OP 250 PS 637: Performed by: NURSE PRACTITIONER

## 2020-02-12 PROCEDURE — 97116 GAIT TRAINING THERAPY: CPT | Mod: GP

## 2020-02-12 PROCEDURE — 97750 PHYSICAL PERFORMANCE TEST: CPT | Mod: GP

## 2020-02-12 PROCEDURE — 97535 SELF CARE MNGMENT TRAINING: CPT | Mod: GO

## 2020-02-12 PROCEDURE — 97112 NEUROMUSCULAR REEDUCATION: CPT | Mod: GP

## 2020-02-12 PROCEDURE — 00000146 ZZHCL STATISTIC GLUCOSE BY METER IP

## 2020-02-12 PROCEDURE — 97110 THERAPEUTIC EXERCISES: CPT | Mod: GP

## 2020-02-12 PROCEDURE — 97530 THERAPEUTIC ACTIVITIES: CPT | Mod: GP

## 2020-02-12 PROCEDURE — 12800006 ZZH R&B REHAB

## 2020-02-12 PROCEDURE — T1013 SIGN LANG/ORAL INTERPRETER: HCPCS | Mod: U3

## 2020-02-12 PROCEDURE — 97112 NEUROMUSCULAR REEDUCATION: CPT | Mod: GO

## 2020-02-12 PROCEDURE — 25000132 ZZH RX MED GY IP 250 OP 250 PS 637: Performed by: PHYSICAL MEDICINE & REHABILITATION

## 2020-02-12 PROCEDURE — 97530 THERAPEUTIC ACTIVITIES: CPT | Mod: GO

## 2020-02-12 RX ADMIN — SENNOSIDES AND DOCUSATE SODIUM 1 TABLET: 8.6; 5 TABLET ORAL at 20:44

## 2020-02-12 RX ADMIN — MONTELUKAST 10 MG: 10 TABLET, FILM COATED ORAL at 20:44

## 2020-02-12 RX ADMIN — OMEPRAZOLE 20 MG: 20 CAPSULE, DELAYED RELEASE ORAL at 06:22

## 2020-02-12 RX ADMIN — VENLAFAXINE HYDROCHLORIDE 75 MG: 75 TABLET, EXTENDED RELEASE ORAL at 07:57

## 2020-02-12 RX ADMIN — FAMOTIDINE 20 MG: 10 TABLET, FILM COATED ORAL at 07:57

## 2020-02-12 RX ADMIN — SENNOSIDES AND DOCUSATE SODIUM 1 TABLET: 8.6; 5 TABLET ORAL at 08:00

## 2020-02-12 RX ADMIN — ATORVASTATIN CALCIUM 40 MG: 40 TABLET, FILM COATED ORAL at 07:57

## 2020-02-12 RX ADMIN — POLYETHYLENE GLYCOL 3350 17 G: 17 POWDER, FOR SOLUTION ORAL at 07:58

## 2020-02-12 RX ADMIN — ASPIRIN 81 MG CHEWABLE TABLET 81 MG: 81 TABLET CHEWABLE at 07:57

## 2020-02-12 RX ADMIN — CLOPIDOGREL BISULFATE 75 MG: 75 TABLET, FILM COATED ORAL at 07:57

## 2020-02-12 NOTE — PLAN OF CARE
PT: Pt made mod-I bed<>chair with FWW. Okay for spouse to assist with mobility and bathroom. Progress well with SEC, balance and LLE strength.

## 2020-02-12 NOTE — PROGRESS NOTES
02/12/20 1400   Signing Clinician's Name / Credentials   Signing clinician's name / credentials Luís Gaviria DPT   Brown Balance Scale (PRINCE DIA, NAVARRO S, MARIANA BAE, FRANK LOPEZ: MEASURING BALANCE IN THE ELDERLY: VALIDATION OF AN INSTRUMENT. CAN. J. PUB. HEALTH, JULY/AUGUST SUPPLEMENT 2:S7-11, 1992.)   Sit To Stand 4   Standing Unsupported 3   Sitting Unsupported 4   Stand to Sit 3   Transfers 3   Standing with Eyes Closed 3   Standing Unsupported, Feet Together 3   Reach Forward With Outstretched Arm 4   Retrieve Object From Floor 3   Turning to Look Behind 4   Turn 360 Degrees 1   Placing Alternate Foot on Stool (4-6 inches) 3   Unsupported Tandem Stand (Demonstrate to Subject) 1   One Leg Stand 1   Total Score (A score of 45 or less has been correlated with an increased risk of falls)   Total Score (out of 56) 40         Brown Balance Scale (BBS) Cutoff Scores: A score of < 45/56 indicates an increased risk for falls.   Patient Score: 40/56    The BBS is a measure of static and dynamic standing balance that has been validated in community dwelling elderly individuals and individuals who have Parkinson's Disease, MS, and those who are s/p CVA and TBI. The test is administered without an assistive device. Scores from the Brown are used to determine the probability of falling based on the patient's previous history of falls and their test performance.     Minimal Detectable Change = 6.5   & Minimal Detectable Change (Parkinson's Disease) = 5 according to Edvin & Nettie 2008

## 2020-02-12 NOTE — PLAN OF CARE
FOCUS/GOAL  Bowel management, Bladder management, Pain management, Mobility, and Cognition/Memory/Judgment/Problem solving    ASSESSMENT, INTERVENTIONS AND CONTINUING PLAN FOR GOAL:    A&Ox4, Ax1 WW. LBM 2/11/20. Dietician consult placed per patient request. Denies pain, aqua k pad on left shoulder.  at bed time.

## 2020-02-12 NOTE — PROGRESS NOTES
CLINICAL NUTRITION SERVICES - REASSESSMENT NOTE     Nutrition Prescription    RECOMMENDATIONS FOR MDs/PROVIDERS TO ORDER:  -None today    Malnutrition Status:    -Patient does not meet two of the established criteria necessary for diagnosing malnutrition    Recommendations already ordered by Registered Dietitian (RD):  -None today    Future/Additional Recommendations:  -Continue to monitor po intakes and weight trends.  If po declines, consider addition of scheduled snacks or supplements.  -Continue to reinforce diet education until discharge.     EVALUATION OF THE PROGRESS TOWARD GOALS   Diet: Regular  Room Service with Assist    Intake: PO intakes have been % of recorded meals.  [facility] have been ordering meals based on food preferences.  Discussed with pt's daughter today that meals can be ordered from home also.       NEW FINDINGS   Patient/Family request consult:  Patient is concerned about DM II and stroke diet. Pt seen and diet education reinforced and questions answered with  assistance earlier today.   Also met with pt on 2/10 and heart healthy, DM diet education was provided along with handout in Hmong.  Please see note for details.    Weekly weight check pending 2/13.  Wt Readings from Last 10 Encounters:   02/06/20 61.5 kg (135 lb 8 oz)   Per Care Everywhere:  (2/2) 61.2 kg/135 lbs  (3/26/19): 148 lbs (9/18/19): 63.2 kg/139 lbs  Per pt report intentionally losing weight since DM dx.      Select Labs:  (2/11):  BG   (2/10) BUN 32 (H)  (2/4)  Hgb A1C:  6.2  Chol 232 (H)  (H)  HDL 49 (L)  Per progress note:  Vitamin D 17 (L)     Select Medications:  Vitamin D3  50,000 Units every 7 days.  Novolog Sliding Scale now discontinued.    MALNUTRITION  % Intake: decreased intake does note meet criteria.  % Weight Loss:  Unable to assess (previous weight loss does not meet criteria)  Subcutaneous Fat Loss: None observed  Muscle Loss: None observed  Fluid Accumulation/Edema:  None noted  Malnutrition Diagnosis: Patient does not meet two of the established criteria necessary for diagnosing malnutrition    Previous Goals   Pt will verbalize understanding of diet recommendations  Evaluation: Partially met.      Previous Nutrition Diagnosis  Predicted food- and nutrition-related knowledge deficit related to therapeutic diet recommendations as evidenced by Provider orders to educate on healthy diet     Evaluation: Improving/updated below.    CURRENT NUTRITION DIAGNOSIS  Predicted food- and nutrition-related knowledge deficit related to therapeutic diet recommendations as evidenced by pt with further questions about a heart healthy diet.       INTERVENTIONS  Implementation  Nutrition Education:  Reinforced heart healthy DM diet today.  Education and handout previously provided on 2/10.      Goals  Pt will verbalize understanding of diet recommendations.    Monitoring/Evaluation  Progress toward goals will be monitored and evaluated per protocol.    Salima Sheets RD, LD

## 2020-02-12 NOTE — PLAN OF CARE
OT: Pt safe to be mod I for bed<>wc transfers with FWW and is safe to transfer with spouse to toilet. Has progressed to SBA-min A with all self cares. Continue to focus on LUE neuro re-ed, pt reports her LUE is feeling stronger with improved distal movement

## 2020-02-12 NOTE — PLAN OF CARE
FOCUS/GOAL  Mobility    ASSESSMENT, INTERVENTIONS AND CONTINUING PLAN FOR GOAL:  CGA with walker for transfers, continent using toilet in bathroom. Uses call light appropriately and able to verbalize her needs. New orders noted for nutrition services today, as well as discontinuation of BGs, carb count, and sliding scale. Pt aware. Will continue with POC.

## 2020-02-13 ENCOUNTER — APPOINTMENT (OUTPATIENT)
Dept: OCCUPATIONAL THERAPY | Facility: CLINIC | Age: 55
End: 2020-02-13
Payer: COMMERCIAL

## 2020-02-13 ENCOUNTER — APPOINTMENT (OUTPATIENT)
Dept: PHYSICAL THERAPY | Facility: CLINIC | Age: 55
End: 2020-02-13
Payer: COMMERCIAL

## 2020-02-13 PROCEDURE — 12800006 ZZH R&B REHAB

## 2020-02-13 PROCEDURE — 97110 THERAPEUTIC EXERCISES: CPT | Mod: GP

## 2020-02-13 PROCEDURE — 40000183 ZZH STATISTIC PT MED CONFERENCE < 30 MIN

## 2020-02-13 PROCEDURE — 25000132 ZZH RX MED GY IP 250 OP 250 PS 637: Performed by: NURSE PRACTITIONER

## 2020-02-13 PROCEDURE — 25000132 ZZH RX MED GY IP 250 OP 250 PS 637: Performed by: PHYSICAL MEDICINE & REHABILITATION

## 2020-02-13 PROCEDURE — 40000187 ZZH STATISTIC PATIENT MED CONFERENCE < 30 MIN

## 2020-02-13 PROCEDURE — 97112 NEUROMUSCULAR REEDUCATION: CPT | Mod: GO

## 2020-02-13 PROCEDURE — 97530 THERAPEUTIC ACTIVITIES: CPT | Mod: GO

## 2020-02-13 PROCEDURE — 97116 GAIT TRAINING THERAPY: CPT | Mod: GP

## 2020-02-13 PROCEDURE — 97535 SELF CARE MNGMENT TRAINING: CPT | Mod: GO

## 2020-02-13 PROCEDURE — 97112 NEUROMUSCULAR REEDUCATION: CPT | Mod: GP

## 2020-02-13 RX ADMIN — CLOPIDOGREL BISULFATE 75 MG: 75 TABLET, FILM COATED ORAL at 09:14

## 2020-02-13 RX ADMIN — SENNOSIDES AND DOCUSATE SODIUM 1 TABLET: 8.6; 5 TABLET ORAL at 09:14

## 2020-02-13 RX ADMIN — OMEPRAZOLE 20 MG: 20 CAPSULE, DELAYED RELEASE ORAL at 06:20

## 2020-02-13 RX ADMIN — ASPIRIN 81 MG CHEWABLE TABLET 81 MG: 81 TABLET CHEWABLE at 09:14

## 2020-02-13 RX ADMIN — VENLAFAXINE HYDROCHLORIDE 75 MG: 75 TABLET, EXTENDED RELEASE ORAL at 09:14

## 2020-02-13 RX ADMIN — FAMOTIDINE 20 MG: 10 TABLET, FILM COATED ORAL at 09:14

## 2020-02-13 RX ADMIN — MONTELUKAST 10 MG: 10 TABLET, FILM COATED ORAL at 20:02

## 2020-02-13 RX ADMIN — ATORVASTATIN CALCIUM 40 MG: 40 TABLET, FILM COATED ORAL at 09:14

## 2020-02-13 RX ADMIN — POLYETHYLENE GLYCOL 3350 17 G: 17 POWDER, FOR SOLUTION ORAL at 09:14

## 2020-02-13 RX ADMIN — SENNOSIDES AND DOCUSATE SODIUM 1 TABLET: 8.6; 5 TABLET ORAL at 20:02

## 2020-02-13 NOTE — PLAN OF CARE
PT: Pt improving with use of SEC for ambulation. Increased stability and good tolerance, inconsistent LLE clearance.

## 2020-02-13 NOTE — PLAN OF CARE
FOCUS/GOAL  Bladder management, Pain management, Mobility, Cognition/Memory/Judgment/Problem solving, and Safety management    ASSESSMENT, INTERVENTIONS AND CONTINUING PLAN FOR GOAL:  Pt is alert and oriented. Continent of bladder, voiding without difficulty using toilet. Pt up CGA with walker,  assisting. No reports of pain or discomfort overnight. Pt slept well between cares. Call light within reach. Will continue with POC.

## 2020-02-13 NOTE — CARE CONFERENCE
"Acute Rehab Care Conference/Team Rounds      Type: Team Rounds    Present:  Dr. Michelle Lott, Amelia RN, Amelia De Oliveira LICSW, Antonella Humphreys OT, Memo Barreto PT, Frances Hernandez SLP, Usha Vaughan , Michelle Ruiz, Lorna Sheets Dietician, Kevin Thrasher Patient      Discharge Barriers/Treatment/Education    Rehab Diagnosis: CVA    Active Medical Co-morbidities/Prognosis: HLD, Pre-diabetes, HTN, hypomagnesemia, gastritis, depression, anxiety, TARIK, hypokalemia    Safety: Alert and oriented, Hmong speaking. Uses call light appropriately. No alarms    Pain: Denies pain.    Medications, Skin, Tubes/Lines: Medications taken whole with water. Skin intact. No tubes or lines.     Swallowing/Nutrition:    Bowel/Bladder: Continent of bladder, voiding without difficulty using toilet. Continent of bowel, last BM 2/12.    Psychosocial: Pt ex- (not legally  and still supportive) NOK. Pt lives with 30 y/o grandson in a one-level home and no PROSPER. Grandson works full-time but has a flexible schedule and able to assist as needed. Dtr assisted with bathing (shower), cooking, cleaning and laundry. Pt was able to manage her own finances, medications and drive PTA. Pt denied falls. No community or hired in help. Reported depression since stroke. On SSDI and denied financial concerns.     ADLs/IADLs: Progressing well with ADLs using maryann techniques for L hemiparesis now requiring only SBA. Very motivated, LUE proximal strength improving well, L hand is only able to do simple grasp tasks. Is mod I with bed mobility and bed<>wc transfers using FWW, plan to progress to mod I with toileting and dressing in next few days. Will continue progressing LUE strength, balance, and overall strength to progress to mod I and SEC if possible. Recommend OP OT    Mobility: Pt progressing well with mobility, Brown 40/56 yesterday. Demo sup<>sit mod I, STS and SPT FWW mod I, amb 200 ft SEC SBA, and navigating 4x6\" steps CGA. Need " to improve independence with stairs prior to discharge home. Need to figure out if pt will need FWW or SEC for mobility at home. Recommend OP PT.    Cognition/Language:    Community Re-Entry: amb short community distances 200-300 ft SBA; w/c based for longer distances    Transportation: requires assist from friends/family.    Plan of Care and goals reviewed and updated.    Discharge Plan/Recommendations    Fall Precautions: continue    Overall plan for the patient:   Medically stable.  Making progress with strength and mobility.  Mod I for ADLs with adapted techniques.  Hopeful for Mod I in the room soon, and will need to further work on stairs.  On track for discharge 2/17 with outpatient PT and OT.       Utilization Review and Continued Stay Justification    Medical Necessity Criteria:    For any criteria that is not met, please document reason and plan for discharge, transfer, or modification of plan of care to address.    Requires intensive rehabilitation program to treat functional deficits?: Yes    Requires 3x per week or greater involvement of rehabilitation physician to oversee rehabilitation program?: Yes    Requires rehabilitation nursing interventions?: Yes    Patient is making functional progress?: Yes    There is a potential for additional functional progress? Yes    Patient is participating in therapy 3 hours per day a minimum of 5 days per week or 15 hours per week in 7 day period?:Yes    Has discharge needs that require coordinated discharge planning approach?:Yes      Barriers/Concerns related to meeting medical necessity criteria:  None    Team Plan to Address Concern:  N/A      Final Physician Sign off    Statement of Approval: I have reviewed and agree with the recommendations and documentation in this care conference note.       Patient Goals  Social Work Goals:Confirm discharge recommendations with therapy, coordinate safe discharge plan and remain available to support and assist as  needed.      OT Frequency:  min daily  OT goal: hygiene/grooming: independent  OT goal: upper body dressing: Independent  OT goal: lower body dressing: Modified independent  OT goal: upper body bathing: Modified independent  OT goal: lower body bathing: Supervision/stand-by assist  OT goal: toilet transfer/toileting: Modified independent  OT goal: meal preparation: Supervision/stand-by assist  OT goal: home management: Supervision/stand-by assist  OT goal 1: Pt will demo SBA shower transfer using DME/AE prn  OT goal 2: Pt will demo IND with LUE HEP with focus on strengthening and sensory retraining       PT Frequency: 90 min daily  PT goal: bed mobility: (met)  PT goal: transfers: Modified independent, Bed to/from chair, Sit to/from stand, Assistive device(SEC)  PT goal: gait: Modified independent, 100 feet, Assistive device(SEC)  PT goal: stairs: Modified independent, Assistive device, 3 stairs, Rail on right  PT goal: perform aerobic activity with stable cardiovascular response: intermittent activity, 15 minutes, NuStep  PT goal 1: independence in HEP - LE strengthening and balance ex  PT Goal 2: attend falls prevention class and/or stroke support group  PT Goal 3: SBA w/ car transfer  PT Goal 4: (goal discontinued)

## 2020-02-13 NOTE — PROGRESS NOTES
"  [facility]   Acute Rehabilitation Unit  Daily progress note    INTERVAL HISTORY  No acute events overnight.  Today Ms. Thrasher has no new concerns or complaints, and feels her strength and control are improving.  Denies chest pain or shortness of breath.  Functionally she is Mod I with bed to chair transfers, and cleared for spouse to help with mobility to bathroom.  She continues to work on progressing with SEC.     MEDICATIONS  Scheduled meds    aspirin  81 mg Oral Daily     atorvastatin  40 mg Oral Daily     cholecalciferol  50,000 Units Oral Q7 Days     clopidogrel  75 mg Oral Daily     famotidine  20 mg Oral Daily     influenza vaccine adult (product based on age)  0.5 mL Intramuscular Prior to discharge     montelukast  10 mg Oral At Bedtime     omeprazole  20 mg Oral QAM AC     polyethylene glycol  17 g Oral Daily     senna-docusate  1 tablet Oral BID     venlafaxine  75 mg Oral Daily with breakfast       PRN meds:  albuterol, glucose **OR** dextrose **OR** glucagon, - MEDICATION INSTRUCTIONS -      PHYSICAL EXAM  /67 (BP Location: Right arm)   Pulse 63   Temp 98.1  F (36.7  C) (Oral)   Resp 16   Ht 1.473 m (4' 10\")   Wt 61.5 kg (135 lb 8 oz)   SpO2 93%   BMI 28.32 kg/m       Gen: NAD, participating in OT   Lung: clear to auscultation bilaterally   CV: regular rate rhythm   Abd: soft, non tender, non distended, normal BS   Ext: No LE edema or calf tenderness  Neuro: alert and awake, answers appropriately (with ong interpretor)    LABS  CBC RESULTS:   Recent Labs   Lab Test 02/10/20  0613 02/08/20  0643   WBC  --  10.2   RBC  --  5.36*   HGB  --  15.4   HCT  --  45.0   MCV  --  84   MCH  --  28.7   MCHC  --  34.2   RDW  --  12.5    241     Last Comprehensive Metabolic Panel:  Sodium   Date Value Ref Range Status   02/10/2020 137 133 - 144 mmol/L Final     Potassium   Date Value Ref Range Status   02/10/2020 3.6 3.4 - 5.3 mmol/L Final     Chloride   Date " Value Ref Range Status   02/10/2020 102 94 - 109 mmol/L Final     Carbon Dioxide   Date Value Ref Range Status   02/10/2020 29 20 - 32 mmol/L Final     Anion Gap   Date Value Ref Range Status   02/10/2020 6 3 - 14 mmol/L Final     Glucose   Date Value Ref Range Status   02/10/2020 128 (H) 70 - 99 mg/dL Final     Urea Nitrogen   Date Value Ref Range Status   02/10/2020 32 (H) 7 - 30 mg/dL Final     Creatinine   Date Value Ref Range Status   02/10/2020 0.76 0.52 - 1.04 mg/dL Final     GFR Estimate   Date Value Ref Range Status   02/10/2020 87 >60 mL/min/[1.73_m2] Final     Comment:     Non  GFR Calc  Starting 12/18/2018, serum creatinine based estimated GFR (eGFR) will be   calculated using the Chronic Kidney Disease Epidemiology Collaboration   (CKD-EPI) equation.       Calcium   Date Value Ref Range Status   02/10/2020 8.7 8.5 - 10.1 mg/dL Final             ASSESSMENT AND PLAN    Kevin Thrasher is a 54 year old right hand dominant female who presented with L-sided numbness/weakness 1-2 days duraton.  CTA of the head and neck was negative for acute infarct, vessel occlusion, significant stenosis, aneurysm or high flow vascular malformation on 2/1/20, discharged home but symptoms worsened overnight so Pt return to the ED 2/2/20.  MRI showed an acute stroke of the right lazara. not TPA candidate because of time of onset contraindication. Dual antiplatelet therapy was initiated with plan to continue indefinitely.. Echo showed normal EF, no cardiac mass, thrombus or right to left shunt.  PMHx; Angular cheilitis, Depression, Head Injury, H. Pylori, gastritis, HTN, HLD, Infertility,  Menorrhagia, Obesity, Pterygium, Shingles , and DM II without complication, without long-term current use of insulin.         Admitted to acute inpatient rehab 2/6/2020.    Impairment group code: 01.1 Left sided weakness 2/2 to right lazara stroke        1. PT, OT and SLP 60 minutes of each on a daily basis, in addition to rehab nursing  and close management of physiatrist.       2. Impairment of ADL's:  OT for 60 minutes daily to work on ADL re-training such as grooming, self cares and bathing.       3. Impairment of mobility:  PT for 60 minutes daily to work on neuromuscular re-education focusing on strength, balance, coordination, and endurance.       4. Impairment of cognition/language/swallow:  SLP for 60 minutes daily to work on cognitive and linguistic deficits.     5. Rehab RN for med administration, VS monitoring, bowel regimen, glucose monitoring and education.        6. 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 Dual antiplatelet therapy with aspirin and Plavix indefinitely due to significant intracranial atherosclerotic disease.  - 2/11- continues to make gains with therapies    -continues to have left-sided weakness.   -Continue Plavix 75 mg daily and aspirin 81 mg daily, Lipitor 40 mg daily.  -OP/OT     #Hyperlipidemia, LDL-155 on 2/4, not well controlled LDL goal < 70. PTA on lipitor 10 mg at HS  Lipitor 40 mg daily     #TARIK, resolved  -Pt had slightly elevated creatinine 1.19. Resolved with IV hydration  -Cr 0.76 on last check 2/10    #Hypertension, essential: goal <130.   -Discontinued Lisinopril due to soft BPs  - 2/11-BP well controlled with no medication at his time    #Type 2 diabetes mellitus without complication without long term insulin use, Hemoglobin A1c goal <7. A1c - 6.2 on 2/4  -Glucose checks have been overall well controlled without the need for sliding scale insulin.  Will discontinue checks, continue to encourage diet and lifestyle modifications and follow up with PCP.     #Hypokalemia, last potassium-3.6 on 2/10  -monitor      #Depression and  Anxiety   -cont PTA venlafaxine (XR) 75 MG daily  -psychology consult to be considered  -PTA trazodone 50 MG  PRN     # H/O Vitamin D deficiency   -Vit. D level low at 17.  Will load with 50k units weekly  x8 weeks     #H/O Mild intermittent asthma without complication,  -cont. PTA albuterol (PROAIR HFA;PROVENTIL HFA;VENTOLIN HFA)   90 mcg/actuation inhaler Inhale 2 puffs every 4 (four) hours as needed for wheezing or shortness of breath     #gastritis for over 5 years, mostly after eating  -cont famotidine 20 mg daily      #H/O Acute nonintractable headache, unspecified headache type 02/02/2020   #H/OLumbosacral radiculopathy at S1 09/19/2016   #H/O Fatty liver   # H/O Abnormal Pap smear of cervix     7. Adjustment to disability:  Clinical psychology to eval and treat as needed  8. FEN: Regular with thin liquid  9. Bowel: miralax daily & senokot BID  10. Bladder: Continent  11. DVT Prophylaxis: Discontinue Lovenox today given ambulation distances  GI Prophylaxis: omeprazole (PRILOSEC) 20 MG and Pepcid 20 mg daily per home regimen, has Hx of gastritis  12. Code: full  13. Disposition: home  14. ELOS:  Tentative discharge date 2/17  15. Rehab prognosis:  good  Follow up Appointments on Discharge:   -neurology in 4-6 weeks  -PCP in 7 days    Ranjit Lott MD  Department of Rehabilitation Medicine  Pager: [phone]    Time Spent on this Encounter   I, Ranjit Lott, spent a total of 25 minutes face-to-face or managing the care of Kevin Thrasher. Over 50% of my time on the unit was spent counseling the patient and coordinating care. See note for details.

## 2020-02-13 NOTE — PLAN OF CARE
Patient is alert and oriented and denies pain this shift. CGA with walker for ambulation. Mod I for transfers. Continent of BB using toilet in bathroom. Since family is ok to help with toileting, patient reports LBM was this evening. Tolerating diet well with good appetite, ate 100% of meal. Family at bedside. Call light is within reach. Ok to continue with care plan.

## 2020-02-13 NOTE — PLAN OF CARE
"Blood pressure 131/69, pulse 56, temperature 97.6  F (36.4  C), temperature source Oral, resp. rate 16, height 1.473 m (4' 10\"), weight 61.5 kg (135 lb 8 oz), SpO2 96 %.  Pt is A&O x3, no C/O pain I. Family at bedside. LBM 2/13 Transfers with CGA with a walker to  but MOD I in room.Continent of B/B. Will continue to monitor  "

## 2020-02-13 NOTE — PROGRESS NOTES
"  [facility]   Acute Rehabilitation Unit  Daily progress note    INTERVAL HISTORY  Pt seen in team rounds today.  No acute events overnight and this morning has no concerns or complaints.  Feels ongoing improvements in the LUE and LLE, and denies chest pain or shortness of breath.  Progressing towards Mod I in the room, and will be working on stairs today and in the upcoming days.  She remains on track for discharge on Monday.    MEDICATIONS  Scheduled meds    aspirin  81 mg Oral Daily     atorvastatin  40 mg Oral Daily     cholecalciferol  50,000 Units Oral Q7 Days     clopidogrel  75 mg Oral Daily     famotidine  20 mg Oral Daily     influenza vaccine adult (product based on age)  0.5 mL Intramuscular Prior to discharge     montelukast  10 mg Oral At Bedtime     omeprazole  20 mg Oral QAM AC     polyethylene glycol  17 g Oral Daily     senna-docusate  1 tablet Oral BID     venlafaxine  75 mg Oral Daily with breakfast       PRN meds:  albuterol, glucose **OR** dextrose **OR** glucagon, - MEDICATION INSTRUCTIONS -      PHYSICAL EXAM  /60 (BP Location: Right arm)   Pulse 65   Temp 99.4  F (37.4  C) (Oral)   Resp 20   Ht 1.473 m (4' 10\")   Wt 61.5 kg (135 lb 8 oz)   SpO2 98%   BMI 28.32 kg/m       Gen: NAD  Lung: Non-labored breathing  Abd: Non-distended  Ext: No LE edema  Neuro: alert and awake, answers appropriately (with Must See [place] interpretor)    LABS  CBC RESULTS:   Recent Labs   Lab Test 02/10/20  0613 02/08/20  0643   WBC  --  10.2   RBC  --  5.36*   HGB  --  15.4   HCT  --  45.0   MCV  --  84   MCH  --  28.7   MCHC  --  34.2   RDW  --  12.5    241       Last Comprehensive Metabolic Panel:  Sodium   Date Value Ref Range Status   02/10/2020 137 133 - 144 mmol/L Final     Potassium   Date Value Ref Range Status   02/10/2020 3.6 3.4 - 5.3 mmol/L Final     Chloride   Date Value Ref Range Status   02/10/2020 102 94 - 109 mmol/L Final     Carbon Dioxide   Date Value " Ref Range Status   02/10/2020 29 20 - 32 mmol/L Final     Anion Gap   Date Value Ref Range Status   02/10/2020 6 3 - 14 mmol/L Final     Glucose   Date Value Ref Range Status   02/10/2020 128 (H) 70 - 99 mg/dL Final     Urea Nitrogen   Date Value Ref Range Status   02/10/2020 32 (H) 7 - 30 mg/dL Final     Creatinine   Date Value Ref Range Status   02/10/2020 0.76 0.52 - 1.04 mg/dL Final     GFR Estimate   Date Value Ref Range Status   02/10/2020 87 >60 mL/min/[1.73_m2] Final     Comment:     Non  GFR Calc  Starting 12/18/2018, serum creatinine based estimated GFR (eGFR) will be   calculated using the Chronic Kidney Disease Epidemiology Collaboration   (CKD-EPI) equation.       Calcium   Date Value Ref Range Status   02/10/2020 8.7 8.5 - 10.1 mg/dL Final             ASSESSMENT AND PLAN    Kevin Thrasher is a 54 year old right hand dominant female who presented with L-sided numbness/weakness 1-2 days duraton.  CTA of the head and neck was negative for acute infarct, vessel occlusion, significant stenosis, aneurysm or high flow vascular malformation on 2/1/20, discharged home but symptoms worsened overnight so Pt return to the ED 2/2/20.  MRI showed an acute stroke of the right lazara. not TPA candidate because of time of onset contraindication. Dual antiplatelet therapy was initiated with plan to continue indefinitely.. Echo showed normal EF, no cardiac mass, thrombus or right to left shunt.  PMHx; Angular cheilitis, Depression, Head Injury, H. Pylori, gastritis, HTN, HLD, Infertility,  Menorrhagia, Obesity, Pterygium, Shingles , and DM II without complication, without long-term current use of insulin.         Admitted to acute inpatient rehab 2/6/2020.    Impairment group code: 01.1 Left sided weakness 2/2 to right lazara stroke        1. PT, OT and SLP 60 minutes of each on a daily basis, in addition to rehab nursing and close management of physiatrist.       2. Impairment of ADL's:  OT for 60 minutes daily to  work on ADL re-training such as grooming, self cares and bathing.       3. Impairment of mobility:  PT for 60 minutes daily to work on neuromuscular re-education focusing on strength, balance, coordination, and endurance.       4. Impairment of cognition/language/swallow:  SLP for 60 minutes daily to work on cognitive and linguistic deficits.     5. Rehab RN for med administration, VS monitoring, bowel regimen, glucose monitoring and education.        6. 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 Dual antiplatelet therapy with aspirin and Plavix indefinitely due to significant intracranial atherosclerotic disease.  - 2/11- continues to make gains with therapies    -continues to have left-sided weakness.   -Continue Plavix 75 mg daily and aspirin 81 mg daily, Lipitor 40 mg daily.  -OP/OT     #Hyperlipidemia, LDL-155 on 2/4, not well controlled LDL goal < 70. PTA on lipitor 10 mg at HS  Lipitor 40 mg daily     #TARIK, resolved  -Pt had slightly elevated creatinine 1.19. Resolved with IV hydration  -Cr 0.76 on last check 2/10    #Hypertension, essential: goal <130.   -Discontinued Lisinopril due to soft BPs  - 2/11-BP well controlled with no medication at his time    #Type 2 diabetes mellitus without complication without long term insulin use, Hemoglobin A1c goal <7. A1c - 6.2 on 2/4  -Glucose checks have been overall well controlled without the need for sliding scale insulin.  Have discontinued checks, continue to encourage diet and lifestyle modifications and follow up with PCP.     #Hypokalemia, last potassium-3.6 on 2/10  -monitor.  Re-check tomorrow.      #Depression and  Anxiety   -cont PTA venlafaxine (XR) 75 MG daily  -psychology consult to be considered  -PTA trazodone 50 MG  PRN     # H/O Vitamin D deficiency   -Vit. D level low at 17.  Will load with 50k units weekly x8 weeks     #H/O Mild intermittent asthma without complication,  -cont.  PTA albuterol (PROAIR HFA;PROVENTIL HFA;VENTOLIN HFA)   90 mcg/actuation inhaler Inhale 2 puffs every 4 (four) hours as needed for wheezing or shortness of breath     #gastritis for over 5 years, mostly after eating  -cont famotidine 20 mg daily      #H/O Acute nonintractable headache, unspecified headache type 02/02/2020   #H/OLumbosacral radiculopathy at S1 09/19/2016   #H/O Fatty liver   # H/O Abnormal Pap smear of cervix     7. Adjustment to disability:  Clinical psychology to eval and treat as needed  8. FEN: Regular with thin liquid  9. Bowel: miralax daily & senokot BID  10. Bladder: Continent  11. DVT Prophylaxis: Discontinued Lovenox given ambulation distances  GI Prophylaxis: omeprazole (PRILOSEC) 20 MG and Pepcid 20 mg daily per home regimen, has Hx of gastritis  12. Code: full  13. Disposition: home  14. ELOS:  Tentative discharge date 2/17  15. Rehab prognosis:  good  Follow up Appointments on Discharge:   -neurology in 4-6 weeks  -PCP in 7 days    Ranjit Lott MD  Department of Rehabilitation Medicine  Pager: [phone]    Time Spent on this Encounter   I, Ranjit Lott, spent a total of 25 minutes face-to-face or managing the care of Kevin Thrasher. Over 50% of my time on the unit was spent counseling the patient and coordinating care. See note for details.

## 2020-02-14 ENCOUNTER — APPOINTMENT (OUTPATIENT)
Dept: OCCUPATIONAL THERAPY | Facility: CLINIC | Age: 55
End: 2020-02-14
Payer: COMMERCIAL

## 2020-02-14 ENCOUNTER — APPOINTMENT (OUTPATIENT)
Dept: PHYSICAL THERAPY | Facility: CLINIC | Age: 55
End: 2020-02-14
Payer: COMMERCIAL

## 2020-02-14 ENCOUNTER — APPOINTMENT (OUTPATIENT)
Dept: INTERPRETER SERVICES | Facility: CLINIC | Age: 55
End: 2020-02-14
Payer: COMMERCIAL

## 2020-02-14 LAB
ANION GAP SERPL CALCULATED.3IONS-SCNC: 4 MMOL/L (ref 3–14)
BUN SERPL-MCNC: 17 MG/DL (ref 7–30)
CALCIUM SERPL-MCNC: 8.7 MG/DL (ref 8.5–10.1)
CHLORIDE SERPL-SCNC: 105 MMOL/L (ref 94–109)
CO2 SERPL-SCNC: 31 MMOL/L (ref 20–32)
CREAT SERPL-MCNC: 0.66 MG/DL (ref 0.52–1.04)
ERYTHROCYTE [DISTWIDTH] IN BLOOD BY AUTOMATED COUNT: 12.3 % (ref 10–15)
GFR SERPL CREATININE-BSD FRML MDRD: >90 ML/MIN/{1.73_M2}
GLUCOSE SERPL-MCNC: 102 MG/DL (ref 70–99)
HCT VFR BLD AUTO: 38.3 % (ref 35–47)
HGB BLD-MCNC: 13.1 G/DL (ref 11.7–15.7)
MCH RBC QN AUTO: 29.2 PG (ref 26.5–33)
MCHC RBC AUTO-ENTMCNC: 34.2 G/DL (ref 31.5–36.5)
MCV RBC AUTO: 85 FL (ref 78–100)
PLATELET # BLD AUTO: 213 10E9/L (ref 150–450)
POTASSIUM SERPL-SCNC: 3.5 MMOL/L (ref 3.4–5.3)
RBC # BLD AUTO: 4.49 10E12/L (ref 3.8–5.2)
SODIUM SERPL-SCNC: 140 MMOL/L (ref 133–144)
WBC # BLD AUTO: 9.1 10E9/L (ref 4–11)

## 2020-02-14 PROCEDURE — 12800006 ZZH R&B REHAB

## 2020-02-14 PROCEDURE — 36415 COLL VENOUS BLD VENIPUNCTURE: CPT | Performed by: PHYSICAL MEDICINE & REHABILITATION

## 2020-02-14 PROCEDURE — 80048 BASIC METABOLIC PNL TOTAL CA: CPT | Performed by: PHYSICAL MEDICINE & REHABILITATION

## 2020-02-14 PROCEDURE — 25000132 ZZH RX MED GY IP 250 OP 250 PS 637: Performed by: PHYSICAL MEDICINE & REHABILITATION

## 2020-02-14 PROCEDURE — 25000132 ZZH RX MED GY IP 250 OP 250 PS 637: Performed by: NURSE PRACTITIONER

## 2020-02-14 PROCEDURE — 97110 THERAPEUTIC EXERCISES: CPT | Mod: GO

## 2020-02-14 PROCEDURE — 97530 THERAPEUTIC ACTIVITIES: CPT | Mod: GP

## 2020-02-14 PROCEDURE — 97112 NEUROMUSCULAR REEDUCATION: CPT | Mod: GP

## 2020-02-14 PROCEDURE — 85027 COMPLETE CBC AUTOMATED: CPT | Performed by: PHYSICAL MEDICINE & REHABILITATION

## 2020-02-14 PROCEDURE — 97530 THERAPEUTIC ACTIVITIES: CPT | Mod: GO

## 2020-02-14 PROCEDURE — 97116 GAIT TRAINING THERAPY: CPT | Mod: GP

## 2020-02-14 PROCEDURE — 97112 NEUROMUSCULAR REEDUCATION: CPT | Mod: GO

## 2020-02-14 PROCEDURE — 97535 SELF CARE MNGMENT TRAINING: CPT | Mod: GO

## 2020-02-14 PROCEDURE — T1013 SIGN LANG/ORAL INTERPRETER: HCPCS | Mod: U3

## 2020-02-14 RX ADMIN — OMEPRAZOLE 20 MG: 20 CAPSULE, DELAYED RELEASE ORAL at 06:40

## 2020-02-14 RX ADMIN — FAMOTIDINE 20 MG: 10 TABLET, FILM COATED ORAL at 09:22

## 2020-02-14 RX ADMIN — POLYETHYLENE GLYCOL 3350 17 G: 17 POWDER, FOR SOLUTION ORAL at 09:25

## 2020-02-14 RX ADMIN — VENLAFAXINE HYDROCHLORIDE 75 MG: 75 TABLET, EXTENDED RELEASE ORAL at 09:22

## 2020-02-14 RX ADMIN — CLOPIDOGREL BISULFATE 75 MG: 75 TABLET, FILM COATED ORAL at 09:23

## 2020-02-14 RX ADMIN — SENNOSIDES AND DOCUSATE SODIUM 1 TABLET: 8.6; 5 TABLET ORAL at 09:22

## 2020-02-14 RX ADMIN — ATORVASTATIN CALCIUM 40 MG: 40 TABLET, FILM COATED ORAL at 09:23

## 2020-02-14 RX ADMIN — ASPIRIN 81 MG CHEWABLE TABLET 81 MG: 81 TABLET CHEWABLE at 09:22

## 2020-02-14 RX ADMIN — SENNOSIDES AND DOCUSATE SODIUM 1 TABLET: 8.6; 5 TABLET ORAL at 20:55

## 2020-02-14 RX ADMIN — MONTELUKAST 10 MG: 10 TABLET, FILM COATED ORAL at 20:55

## 2020-02-14 NOTE — PLAN OF CARE
FOCUS/GOAL  Bladder management, Pain management, Mobility, Cognition/Memory/Judgment/Problem solving, and Safety management    ASSESSMENT, INTERVENTIONS AND CONTINUING PLAN FOR GOAL:  Pt is alert and oriented. Continent of bladder, voiding without difficulty using toilet. No report of pain or discomfort this shift. Pt up CGA with walker to the bathroom. Pt slept between cares. Call light within reach, able to make needs known. Will continue with POC.

## 2020-02-14 NOTE — PROGRESS NOTES
"  [facility]   Acute Rehabilitation Unit  Daily progress note    INTERVAL HISTORY  Patient was evaluated at the bedside today. Nurses notes reviewed. No acute events overnight. She reports doing well. She feels she is getting stronger on the left arm and leg. She is tolerating therapies with no concerns. She denies any chest pain or shortness of breath, or calf pain.  was present for the encounter.       MEDICATIONS  Scheduled meds    aspirin  81 mg Oral Daily     atorvastatin  40 mg Oral Daily     cholecalciferol  50,000 Units Oral Q7 Days     clopidogrel  75 mg Oral Daily     famotidine  20 mg Oral Daily     influenza vaccine adult (product based on age)  0.5 mL Intramuscular Prior to discharge     montelukast  10 mg Oral At Bedtime     omeprazole  20 mg Oral QAM AC     polyethylene glycol  17 g Oral Daily     senna-docusate  1 tablet Oral BID     venlafaxine  75 mg Oral Daily with breakfast       PRN meds:  albuterol, glucose **OR** dextrose **OR** glucagon, - MEDICATION INSTRUCTIONS -      PHYSICAL EXAM  /63 (BP Location: Right arm)   Pulse 58   Temp 97.4  F (36.3  C) (Oral)   Resp 20   Ht 1.473 m (4' 10\")   Wt 61.5 kg (135 lb 8 oz)   SpO2 97%   BMI 28.32 kg/m         GEN: NAD, pleasant and cooperative  HEENT: NC/AT  CVS: Regular rate and rhythm, normal S1-S2  PULM: Clear to auscultation bilaterally, no wheezing or crackles appreciated  ABD: Soft, nontender, nondistended, bowel sounds are present  EXT:  Free of edema or tenderness   Neuro: Answers appropriately, follows commands. Continues to have left hemiplegia- improving       LABS  CBC RESULTS:   Recent Labs   Lab Test 02/10/20  0613 02/08/20  0643   WBC  --  10.2   RBC  --  5.36*   HGB  --  15.4   HCT  --  45.0   MCV  --  84   MCH  --  28.7   MCHC  --  34.2   RDW  --  12.5    241       Last Comprehensive Metabolic Panel:  Sodium   Date Value Ref Range Status   02/14/2020 140 133 - 144 " mmol/L Final     Potassium   Date Value Ref Range Status   02/14/2020 3.5 3.4 - 5.3 mmol/L Final     Chloride   Date Value Ref Range Status   02/14/2020 105 94 - 109 mmol/L Final     Carbon Dioxide   Date Value Ref Range Status   02/14/2020 31 20 - 32 mmol/L Final     Anion Gap   Date Value Ref Range Status   02/14/2020 4 3 - 14 mmol/L Final     Glucose   Date Value Ref Range Status   02/14/2020 102 (H) 70 - 99 mg/dL Final     Urea Nitrogen   Date Value Ref Range Status   02/14/2020 17 7 - 30 mg/dL Final     Creatinine   Date Value Ref Range Status   02/14/2020 0.66 0.52 - 1.04 mg/dL Final     GFR Estimate   Date Value Ref Range Status   02/14/2020 >90 >60 mL/min/[1.73_m2] Final     Comment:     Non  GFR Calc  Starting 12/18/2018, serum creatinine based estimated GFR (eGFR) will be   calculated using the Chronic Kidney Disease Epidemiology Collaboration   (CKD-EPI) equation.       Calcium   Date Value Ref Range Status   02/14/2020 8.7 8.5 - 10.1 mg/dL Final             ASSESSMENT AND PLAN    Kevin Thrasher is a 54 year old right hand dominant female who presented with L-sided numbness/weakness 1-2 days duraton.  CTA of the head and neck was negative for acute infarct, vessel occlusion, significant stenosis, aneurysm or high flow vascular malformation on 2/1/20, discharged home but symptoms worsened overnight so Pt return to the ED 2/2/20.  MRI showed an acute stroke of the right lazara. not TPA candidate because of time of onset contraindication. Dual antiplatelet therapy was initiated with plan to continue indefinitely.. Echo showed normal EF, no cardiac mass, thrombus or right to left shunt.  PMHx; Angular cheilitis, Depression, Head Injury, H. Pylori, gastritis, HTN, HLD, Infertility,  Menorrhagia, Obesity, Pterygium, Shingles , and DM II without complication, without long-term current use of insulin.         Admitted to acute inpatient rehab 2/6/2020.    Impairment group code: 01.1 Left sided weakness 2/2  to right lazara stroke        1. PT, OT and SLP 60 minutes of each on a daily basis, in addition to rehab nursing and close management of physiatrist.       2. Impairment of ADL's:  OT for 60 minutes daily to work on ADL re-training such as grooming, self cares and bathing.       3. Impairment of mobility:  PT for 60 minutes daily to work on neuromuscular re-education focusing on strength, balance, coordination, and endurance.       4. Impairment of cognition/language/swallow:  SLP for 60 minutes daily to work on cognitive and linguistic deficits.     5. Rehab RN for med administration, VS monitoring, bowel regimen, glucose monitoring and education.        6. 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 Dual antiplatelet therapy with aspirin and Plavix indefinitely due to significant intracranial atherosclerotic disease.  - 2/14-continues to have left-sided weakness. but she is making nice gains   -Continue Plavix 75 mg daily and aspirin 81 mg daily, Lipitor 40 mg daily.  -OP/OT     #Hyperlipidemia, LDL-155 on 2/4, not well controlled LDL goal < 70. PTA on lipitor 10 mg at HS  Lipitor 40 mg daily     #TARIK, resolved  -Pt had slightly elevated creatinine 1.19. Resolved with IV hydration  -Cr 0.76 on last check 2/10    #Hypertension, essential: goal <130.   -Discontinued Lisinopril due to soft BPs  - 2/14- blood pressure continues to well controlled with no medications     #Type 2 diabetes mellitus without complication without long term insulin use, Hemoglobin A1c goal <7. A1c - 6.2 on 2/4  -Glucose checks have been overall well controlled without the need for sliding scale insulin.  Have discontinued checks, continue to encourage diet and lifestyle modifications and follow up with PCP.     #Hypokalemia, last potassium-3.6 on 2/10  -monitor.  Re-check tomorrow.      #Depression and  Anxiety   -cont PTA venlafaxine (XR) 75 MG daily  -psychology consult  to be considered  -PTA trazodone 50 MG  PRN     # H/O Vitamin D deficiency   -Vit. D level low at 17.  Will load with 50k units weekly x8 weeks     #H/O Mild intermittent asthma without complication,  -cont. PTA albuterol (PROAIR HFA;PROVENTIL HFA;VENTOLIN HFA)   90 mcg/actuation inhaler Inhale 2 puffs every 4 (four) hours as needed for wheezing or shortness of breath     #gastritis for over 5 years, mostly after eating  -cont famotidine 20 mg daily      #H/O Acute nonintractable headache, unspecified headache type 02/02/2020   #H/OLumbosacral radiculopathy at S1 09/19/2016   #H/O Fatty liver   # H/O Abnormal Pap smear of cervix     7. Adjustment to disability:  Clinical psychology to eval and treat as needed  8. FEN: Regular with thin liquid  9. Bowel: miralax daily & senokot BID  10. Bladder: Continent  11. DVT Prophylaxis: Discontinued Lovenox given ambulation distances  GI Prophylaxis: omeprazole (PRILOSEC) 20 MG and Pepcid 20 mg daily per home regimen, has Hx of gastritis  12. Code: full  13. Disposition: home  14. ELOS:  Tentative discharge date 2/17  15. Rehab prognosis:  good  Follow up Appointments on Discharge:   -neurology in 4-6 weeks  -PCP in 7 days    Patient was staffed with Dr. Oren Calero  PM&R PGY-2   02/14/2020 4:54 PM

## 2020-02-14 NOTE — PLAN OF CARE
Patient is alert and oriented and denies pain this shift. CGA with walker for ambulation. Mod I for transfers. Family is ok to transfer. Continent of BB using toilet in bathroom. Patient reports LBM was this evening. Tolerating diet well with good appetite, ate 100% of meal. Family at bedside. Call light is within reach. Ok to continue with care plan.

## 2020-02-14 NOTE — PLAN OF CARE
OT: Progressed to mod I in room with FWW, pt is able to manage maryann techniques and walker splint independently. Continues to be limited by LUE weakness especially distally but improving with neuro re-ed. Plan for weekend to continue CVA education with pt and spouse, LUE strengthening, ordering shower chair and grab bar, and incorporate IADL retraining if possible.

## 2020-02-14 NOTE — PLAN OF CARE
"Blood pressure 105/65, pulse 68, temperature 100  F (37.8  C), temperature source Oral, resp. rate 16, height 1.473 m (4' 10\"), weight 61.5 kg (135 lb 8 oz), SpO2 96 %.  Patient is A&O x 3, denies pain. Able to make needs known, calls appropriately. Transfers with CGA with a walker to  bur MOD I in room.Regular diet, takes med's whole.   LBM 2/14.Will continue to monitor.  FYI :Family request. Pt like hot water, please don't give her water from the BR sink, she prefers HOT water from the kitchen.  "

## 2020-02-15 ENCOUNTER — APPOINTMENT (OUTPATIENT)
Dept: OCCUPATIONAL THERAPY | Facility: CLINIC | Age: 55
End: 2020-02-15
Payer: COMMERCIAL

## 2020-02-15 ENCOUNTER — APPOINTMENT (OUTPATIENT)
Dept: PHYSICAL THERAPY | Facility: CLINIC | Age: 55
End: 2020-02-15
Payer: COMMERCIAL

## 2020-02-15 PROCEDURE — 97110 THERAPEUTIC EXERCISES: CPT | Mod: GP

## 2020-02-15 PROCEDURE — 97116 GAIT TRAINING THERAPY: CPT | Mod: GP

## 2020-02-15 PROCEDURE — 97535 SELF CARE MNGMENT TRAINING: CPT | Mod: GO | Performed by: OCCUPATIONAL THERAPIST

## 2020-02-15 PROCEDURE — 25000132 ZZH RX MED GY IP 250 OP 250 PS 637: Performed by: NURSE PRACTITIONER

## 2020-02-15 PROCEDURE — 25000132 ZZH RX MED GY IP 250 OP 250 PS 637: Performed by: PHYSICAL MEDICINE & REHABILITATION

## 2020-02-15 PROCEDURE — 97112 NEUROMUSCULAR REEDUCATION: CPT | Mod: GP

## 2020-02-15 PROCEDURE — 25000128 H RX IP 250 OP 636: Performed by: PHYSICAL MEDICINE & REHABILITATION

## 2020-02-15 PROCEDURE — 12800006 ZZH R&B REHAB

## 2020-02-15 PROCEDURE — 90682 RIV4 VACC RECOMBINANT DNA IM: CPT | Performed by: PHYSICAL MEDICINE & REHABILITATION

## 2020-02-15 PROCEDURE — 97530 THERAPEUTIC ACTIVITIES: CPT | Mod: GP

## 2020-02-15 RX ADMIN — OMEPRAZOLE 20 MG: 20 CAPSULE, DELAYED RELEASE ORAL at 06:25

## 2020-02-15 RX ADMIN — SENNOSIDES AND DOCUSATE SODIUM 1 TABLET: 8.6; 5 TABLET ORAL at 09:59

## 2020-02-15 RX ADMIN — ATORVASTATIN CALCIUM 40 MG: 40 TABLET, FILM COATED ORAL at 10:00

## 2020-02-15 RX ADMIN — ASPIRIN 81 MG CHEWABLE TABLET 81 MG: 81 TABLET CHEWABLE at 09:59

## 2020-02-15 RX ADMIN — MONTELUKAST 10 MG: 10 TABLET, FILM COATED ORAL at 22:26

## 2020-02-15 RX ADMIN — SENNOSIDES AND DOCUSATE SODIUM 1 TABLET: 8.6; 5 TABLET ORAL at 22:26

## 2020-02-15 RX ADMIN — VENLAFAXINE HYDROCHLORIDE 75 MG: 75 TABLET, EXTENDED RELEASE ORAL at 09:59

## 2020-02-15 RX ADMIN — POLYETHYLENE GLYCOL 3350 17 G: 17 POWDER, FOR SOLUTION ORAL at 09:59

## 2020-02-15 RX ADMIN — FAMOTIDINE 20 MG: 10 TABLET, FILM COATED ORAL at 09:59

## 2020-02-15 RX ADMIN — INFLUENZA A VIRUS A/BRISBANE/02/2018 (H1N1) RECOMBINANT HEMAGGLUTININ ANTIGEN, INFLUENZA A VIRUS A/KANSAS/14/2017 (H3N2) RECOMBINANT HEMAGGLUTININ ANTIGEN, INFLUENZA B VIRUS B/PHUKET/3073/2013 RECOMBINANT HEMAGGLUTININ ANTIGEN, AND INFLUENZA B VIRUS B/MARYLAND/15/2016 RECOMBINANT HEMAGGLUTININ ANTIGEN 0.5 ML: 45; 45; 45; 45 INJECTION INTRAMUSCULAR at 11:26

## 2020-02-15 RX ADMIN — CLOPIDOGREL BISULFATE 75 MG: 75 TABLET, FILM COATED ORAL at 09:59

## 2020-02-15 NOTE — PLAN OF CARE
"OT: Pt participated in the established \"Transitions Group\" for stroke pts, family also present. Completed 1:1 with patient with consideration of  services. Highlighting topics such as return to neuro plasticity, follow up therapies, meaningful activities, health/wellness, fatigue, depression/anxiety, bowel/bladder considerations w/ community re-integration and caregiver wellness/support. Pt and family very receptive to class. Pt reports personal goals after discharge are to return to driving.    Provided resources for purchasing shower chair. Pt family opting to not purchase through [facility] and purchase out of pocket in the community ([employer], [employer], [employer] etc).  "

## 2020-02-15 NOTE — PLAN OF CARE
Discharge Planner PT   Patient plan for discharge: home with intermittent family assist, HH PT.  Current status: mod I in room with FWW  Barriers to return to prior living situation: independence with stairs (SEC + R rail)  Recommendations for discharge: continue to progress LLE strength, standing dynamic balance, and postural control.  Rationale for recommendations: to improve safety amb with SEC and improve balance correction strategies.       Entered by: Michael Barreto 02/14/2020 10:45 PM

## 2020-02-15 NOTE — PLAN OF CARE
Patient alert and oriented x 4.  in the room and stays overnight, very involve with patient's care. No complaint of pain and discomfort  all shift. Placed meal order to dietary for today and tomorrow per patient's request. Mod I in the room. Patient on schedule to discharge on Monday. Stroke PLC class need to be reschedule before discharge. No respiratory distress noted. Will continue with current plan of care.

## 2020-02-15 NOTE — PLAN OF CARE
FOCUS/GOAL  Medication management, Mobility, and Safety management    ASSESSMENT, INTERVENTIONS AND CONTINUING PLAN FOR GOAL:  Pt is A&Ox4, VSS and denies any pain. Pt is a Hmong speaker but can express her basic needs in English. Pt has L side weakness but is MOD I w/ FWW and continent using toilet. Pt is prepared to discharge on Monday, stroke class was rescheduled to Mon at 1330 and Pt is aware.

## 2020-02-15 NOTE — PROGRESS NOTES
FOCUS/GOAL  Discharge planning and Mobility    ASSESSMENT, INTERVENTIONS AND CONTINUING PLAN FOR GOAL:  MOD I with transfer and mobility in the room , ambulated in the hallway with    Denies pain   Continent of bowel and bladder in the toilet and was able to take care all aspects of toleting task ( clothing management ) and pericare after elimination she said her LBM was this AM   Plan for discharge to home is on MON 2/17   Stroke class with PLC at bedside has to be done before discharge date PLC scheduled pt for PLC class on 2/18 will have to contact PLC on 2/15/20 to reschedule the class .  Will communicate on the report .  Continue to promote independence with functional mobility ..

## 2020-02-16 ENCOUNTER — APPOINTMENT (OUTPATIENT)
Dept: PHYSICAL THERAPY | Facility: CLINIC | Age: 55
End: 2020-02-16
Payer: COMMERCIAL

## 2020-02-16 ENCOUNTER — APPOINTMENT (OUTPATIENT)
Dept: OCCUPATIONAL THERAPY | Facility: CLINIC | Age: 55
End: 2020-02-16
Payer: COMMERCIAL

## 2020-02-16 PROCEDURE — 12800006 ZZH R&B REHAB

## 2020-02-16 PROCEDURE — 25000132 ZZH RX MED GY IP 250 OP 250 PS 637: Performed by: NURSE PRACTITIONER

## 2020-02-16 PROCEDURE — 97112 NEUROMUSCULAR REEDUCATION: CPT | Mod: GP

## 2020-02-16 PROCEDURE — 97116 GAIT TRAINING THERAPY: CPT | Mod: GP

## 2020-02-16 PROCEDURE — 97110 THERAPEUTIC EXERCISES: CPT | Mod: GP

## 2020-02-16 PROCEDURE — 97530 THERAPEUTIC ACTIVITIES: CPT | Mod: GP

## 2020-02-16 PROCEDURE — 97110 THERAPEUTIC EXERCISES: CPT | Mod: GO | Performed by: OCCUPATIONAL THERAPIST

## 2020-02-16 PROCEDURE — 25000132 ZZH RX MED GY IP 250 OP 250 PS 637: Performed by: PHYSICAL MEDICINE & REHABILITATION

## 2020-02-16 PROCEDURE — 97535 SELF CARE MNGMENT TRAINING: CPT | Mod: GO | Performed by: OCCUPATIONAL THERAPIST

## 2020-02-16 RX ADMIN — ATORVASTATIN CALCIUM 40 MG: 40 TABLET, FILM COATED ORAL at 09:04

## 2020-02-16 RX ADMIN — OMEPRAZOLE 20 MG: 20 CAPSULE, DELAYED RELEASE ORAL at 06:40

## 2020-02-16 RX ADMIN — CLOPIDOGREL BISULFATE 75 MG: 75 TABLET, FILM COATED ORAL at 09:04

## 2020-02-16 RX ADMIN — POLYETHYLENE GLYCOL 3350 17 G: 17 POWDER, FOR SOLUTION ORAL at 09:04

## 2020-02-16 RX ADMIN — ASPIRIN 81 MG CHEWABLE TABLET 81 MG: 81 TABLET CHEWABLE at 09:04

## 2020-02-16 RX ADMIN — FAMOTIDINE 20 MG: 10 TABLET, FILM COATED ORAL at 09:04

## 2020-02-16 RX ADMIN — MONTELUKAST 10 MG: 10 TABLET, FILM COATED ORAL at 21:38

## 2020-02-16 RX ADMIN — VENLAFAXINE HYDROCHLORIDE 75 MG: 75 TABLET, EXTENDED RELEASE ORAL at 09:04

## 2020-02-16 NOTE — PLAN OF CARE
FOCUS/GOAL  Mobility and Safety management    ASSESSMENT, INTERVENTIONS AND CONTINUING PLAN FOR GOAL:  Pt is A&Ox4, VSS and denied any pain tis shift. Pt is MOD I in room d/t L side weakness. Pt is continent using toilet, BM today. Pt reports L sided weakness and numbness that is slowly improving. Pt speaks Hmong but has minimal ability to express self in English.

## 2020-02-16 NOTE — PLAN OF CARE
Pt alert and oriented x4. VSS. No numbness or tingling. No complaints of shortness of breath or chest pain. Denies pain. Mod I in room with walker.  Hmong speaking but understands and can speak some basic english. Family at bedside. Regular diet, thin liquids. Takes pills whole. Continent of bowel and bladder. LBM: 2/14. Stroke class on Monday for pt and family, before discharge. Uses call light appropriately. Will continue with plan of care.

## 2020-02-16 NOTE — PLAN OF CARE
PT- pt to discharge home.  RW issued and fitted to pt.  Discussed with pt's  where to locate walker splint for L hand  on RW.  Also discussed alternatives like using a carpal tunnel splint to help with wrist stability.

## 2020-02-16 NOTE — PLAN OF CARE
Occupational Therapy Discharge Summary    Reason for therapy discharge:    All goals and outcomes met, no further needs identified.    Progress towards therapy goal(s). See goals on Care Plan in [facility] electronic health record for goal details.  Pt. CGA/SBA with BADLs and functional mobility, ambulating with FWW. Pt. has all OT equipment needs met for home. Family ordering shower chair.    Therapy recommendation(s):    Continue home exercise program.  Continue with OP OT for cont. LUE neuro re-ed, to max. Safety/indep. With ADLs/mobility in home/community setting.

## 2020-02-16 NOTE — PLAN OF CARE
Patient sleeping throughout this shift, family at bedside and is very involve in patient cares. Denies pain, eating meal at 06:20 this morning. Continue to monitor

## 2020-02-17 ENCOUNTER — OFFICE VISIT (OUTPATIENT)
Dept: INTERPRETER SERVICES | Facility: CLINIC | Age: 55
End: 2020-02-17
Payer: COMMERCIAL

## 2020-02-17 ENCOUNTER — AMBULATORY - HEALTHEAST (OUTPATIENT)
Dept: ADMINISTRATIVE | Facility: REHABILITATION | Age: 55
End: 2020-02-17

## 2020-02-17 ENCOUNTER — COMMUNICATION - HEALTHEAST (OUTPATIENT)
Dept: FAMILY MEDICINE | Facility: CLINIC | Age: 55
End: 2020-02-17

## 2020-02-17 VITALS
BODY MASS INDEX: 28.44 KG/M2 | DIASTOLIC BLOOD PRESSURE: 50 MMHG | RESPIRATION RATE: 20 BRPM | TEMPERATURE: 97.9 F | WEIGHT: 135.5 LBS | SYSTOLIC BLOOD PRESSURE: 103 MMHG | HEART RATE: 60 BPM | OXYGEN SATURATION: 97 % | HEIGHT: 58 IN

## 2020-02-17 DIAGNOSIS — Z12.31 VISIT FOR SCREENING MAMMOGRAM: ICD-10-CM

## 2020-02-17 DIAGNOSIS — I63.89 CEREBROVASCULAR ACCIDENT (CVA) DUE TO OTHER MECHANISM (H): ICD-10-CM

## 2020-02-17 PROCEDURE — 25000132 ZZH RX MED GY IP 250 OP 250 PS 637: Performed by: NURSE PRACTITIONER

## 2020-02-17 PROCEDURE — T1013 SIGN LANG/ORAL INTERPRETER: HCPCS | Mod: U3

## 2020-02-17 PROCEDURE — 25000132 ZZH RX MED GY IP 250 OP 250 PS 637: Performed by: PHYSICAL MEDICINE & REHABILITATION

## 2020-02-17 RX ORDER — VENLAFAXINE HYDROCHLORIDE 75 MG/1
75 CAPSULE, EXTENDED RELEASE ORAL
Qty: 30 CAPSULE | Refills: 0 | Status: SHIPPED | OUTPATIENT
Start: 2020-02-17 | End: 2020-02-17

## 2020-02-17 RX ORDER — POLYETHYLENE GLYCOL 3350 17 G/17G
1 POWDER, FOR SOLUTION ORAL DAILY PRN
Qty: 30 PACKET | Refills: 0 | Status: SHIPPED | OUTPATIENT
Start: 2020-02-17 | End: 2020-03-18

## 2020-02-17 RX ORDER — FLUTICASONE PROPIONATE 110 UG/1
2 AEROSOL, METERED RESPIRATORY (INHALATION) DAILY
Qty: 1 INHALER | Refills: 0 | Status: SHIPPED | OUTPATIENT
Start: 2020-02-17 | End: 2022-01-24

## 2020-02-17 RX ORDER — VENLAFAXINE HYDROCHLORIDE 75 MG/1
75 TABLET, EXTENDED RELEASE ORAL
Qty: 30 TABLET | Refills: 0 | Status: SHIPPED | OUTPATIENT
Start: 2020-02-18 | End: 2022-01-24

## 2020-02-17 RX ORDER — ALBUTEROL SULFATE 90 UG/1
2 AEROSOL, METERED RESPIRATORY (INHALATION) EVERY 4 HOURS PRN
Qty: 1 INHALER | Refills: 0 | Status: SHIPPED | OUTPATIENT
Start: 2020-02-17 | End: 2022-01-24

## 2020-02-17 RX ORDER — CLOPIDOGREL BISULFATE 75 MG/1
75 TABLET ORAL DAILY
Qty: 30 TABLET | Refills: 0 | Status: SHIPPED | OUTPATIENT
Start: 2020-02-17 | End: 2021-09-03

## 2020-02-17 RX ORDER — ATORVASTATIN CALCIUM 40 MG/1
40 TABLET, FILM COATED ORAL DAILY
Qty: 30 TABLET | Refills: 0 | Status: SHIPPED | OUTPATIENT
Start: 2020-02-17 | End: 2021-09-03 | Stop reason: ALTCHOICE

## 2020-02-17 RX ORDER — MONTELUKAST SODIUM 10 MG/1
10 TABLET ORAL AT BEDTIME
Qty: 30 TABLET | Refills: 0 | Status: SHIPPED | OUTPATIENT
Start: 2020-02-17 | End: 2022-01-24

## 2020-02-17 RX ORDER — AMOXICILLIN 250 MG
1 CAPSULE ORAL 2 TIMES DAILY
Qty: 60 TABLET | Refills: 0 | Status: SHIPPED | OUTPATIENT
Start: 2020-02-17 | End: 2020-03-18

## 2020-02-17 RX ORDER — ASPIRIN 81 MG/1
81 TABLET, CHEWABLE ORAL DAILY
Qty: 30 TABLET | Refills: 0 | Status: SHIPPED | OUTPATIENT
Start: 2020-02-17 | End: 2020-03-18

## 2020-02-17 RX ADMIN — VENLAFAXINE HYDROCHLORIDE 75 MG: 75 TABLET, EXTENDED RELEASE ORAL at 08:21

## 2020-02-17 RX ADMIN — FAMOTIDINE 20 MG: 10 TABLET, FILM COATED ORAL at 08:21

## 2020-02-17 RX ADMIN — ATORVASTATIN CALCIUM 40 MG: 40 TABLET, FILM COATED ORAL at 08:21

## 2020-02-17 RX ADMIN — POLYETHYLENE GLYCOL 3350 17 G: 17 POWDER, FOR SOLUTION ORAL at 08:21

## 2020-02-17 RX ADMIN — ASPIRIN 81 MG CHEWABLE TABLET 81 MG: 81 TABLET CHEWABLE at 08:21

## 2020-02-17 RX ADMIN — CLOPIDOGREL BISULFATE 75 MG: 75 TABLET, FILM COATED ORAL at 08:21

## 2020-02-17 RX ADMIN — OMEPRAZOLE 20 MG: 20 CAPSULE, DELAYED RELEASE ORAL at 06:04

## 2020-02-17 NOTE — DISCHARGE INSTRUCTIONS
Follow Up Appointments:     -- PCP 1-2 weeks (Ema Gentile)  The clinic will contact you to schedule this appointment. If you don't hear from them in 2 days, call [phone].    Address  [facility]                          [address] [place] [address]  Phone   [phone]    -- Neurology 4-6 weeks  You are scheduled to see Dr. Gamboa on Thursday, March 19th at 12:45 PM.    Address  [facility]                          [address], [address]                          [place] [address]  Phone   [phone]    -- PM&R as needed (Dr. Lott)    Address  [facility]                          [facility], Floor [address]                          [address] [place] [address]  Phone   [phone]      [facility] services contact information:  [phone]    To reduce the risk of subsequent stroke there are several important factors including optimal management of anticoagulants, blood pressure, cholesterol, diabetes and smoking abstinence.    Anticoagulation:  You are on Aspirin and Clopidogrel    Blood Pressure:  Keeping your blood pressures less than 130/80 has been shown to reduce risk of recurrent stroke. Recording your blood pressure and heart rate once daily in a log book can help you and your providers make decisions on optimal management. You are encouraged to bring your log book with you to your primary physician and/or cardiology doctor visits.    You are currently not on any medications to help control your blood pressure, as your blood pressures have been low.  Several lifestyle modifications have been associated with blood pressure reduction and are an important part of a comprehensive plan. These include: weight loss (if over-weight); a diet low in salt and cholesterol and rich in fruits and  "vegetables; regular aerobic physical activity and limited alcohol consumption.    Diabetes:  Your recent Hgb A1C was 6.1 which falls in the \"pre-diabetes\" range.  This should be followed up with your primary provider and/or endocrinologist.    Diet:  Diet and exercise are very important for diabetes regardless of being on medication or not. Be aware of and moderate your carbohydrate intake as instructed by doctor, dietitian or nurse.  Regular physical activity may be more difficult since your stroke though doing what you can on a daily basis is helpful.     Cholesterol:  Traditional target levels for LDL cholesterol or \"bad cholesterol\" is less than 130 however once you have had a stroke, your target LDL level is now less than 70. Additional recommendations such as increasing your HDL or \"good\" cholesterol and lowering your triglyceride level can also be important.    Your most recent lipid panel was   No results found for: CHOL  No results found for: HDL  No results found for: LDL  No results found for: TRIG  No results found for: CHOLHDLRATIO    This should be followed up in 2-3 months with your primary provider.    Smoking:  Finally one of the most important modifiable risk factors is to not smoke. This includes cigarettes, pipes, cigars, chewing tobacco and second hand smoke. Support through counseling, nicotine replacement, and oral smoking-cessation medications may all be helpful. Often people have been able to quit during their hospitalization but once returning to their familiar environment, the urges can be stronger. If this is the case, we encourage you to get support. There are numerous options, start by talking with your doctor.    "

## 2020-02-17 NOTE — DISCHARGE SUMMARY
"    [facility]   Acute Rehabilitation Unit  Discharge summary     Date of Admission: 2/6/2020  Date of Discharge: 2/17/2020  Disposition: home   Primary Care Physician: No primary care provider on file.  Attending physician: Michelle Lott MD  Other significant physician provider(s):       DISCHARGE DIAGNOSIS      01.1 Left sided weakness 2/2 to right lazara stroke    Left hemiplegia/ hemiparesis   1. Impaired functional mobility  2. Impaired ADLs  3. Impaired cognition       Hyperlipidemia     TARIK- resolved     Hypertension      DMII     Hypokalemia- resolved    Adjustment to disability      BRIEF SUMMARY  Obtained from note on 2/6/2020\" Kevin Thrasher is a 54 year old, Hmong speaking,  right hand dominant female who presented to [facility] ED  for L-sided numbness/weakness 1-2 days duraton.  CTA of the head and neck was negative for acute infarct, vessel occlusion, significant stenosis, aneurysm or high flow vascular malformation on 2/1/20, discharged home but her symptoms worsened overnight so Pt return to the ED for further treatment then was transferred to [facility]  at which ,  MRI showed an acute stroke of the right lazara. not TPA candidatebecause of time of onset contraindication. Dual antiplatelet therapy was initiated with plan to continue indefinitely. LDL was 155, increased Lipitor with goal below 70. Echo showed normal EF, no cardiac mass, thrombus or right to left shunt.\"      REHABILITAITON COURSE  Patient was making a nice progression with therapies.  Patient was modified independence with forward wheel walker.  Also she was able to independently perform stairs with right rail.  She was progressing in terms of her left lower extremity strength, balance, and mobility.    MEDICAL COURSE    #Acute right lazara infarction on 2/3:  Echocardiogram negative for cardiac source of an emboli. Dual antiplatelet therapy with aspirin and Plavix indefinitely due " to significant intracranial atherosclerotic disease.  -Continue Plavix 75 mg daily and aspirin 81 mg daily, Lipitor 40 mg daily.  -outpatient OT/PT      #Hyperlipidemia, LDL-155 on 2/4, not well controlled LDL goal < 70. PTA on lipitor 10 mg at HS  --Lipitor 40 mg daily    #Hypertension, essential: goal <130.   -Discontinued Lisinopril due to soft BPs.   -Follow-up with primary care provider    #Type 2 diabetes mellitus without complication without long term insulin use, Hemoglobin A1c goal <7. A1c - 6.2 on 2/4  -Glucose checks have been overall well controlled without the need for sliding scale insulin.  Have discontinued checks, continue to encourage diet and lifestyle modifications and follow up with PCP.       #Depression and  Anxiety   -cont PTA venlafaxine (XR) 75 MG daily  -PTA trazodone 50 MG  PRN        DISCHARGE MEDICATIONS  Current Discharge Medication List      CONTINUE these medications which have CHANGED    Details   atorvastatin (LIPITOR) 40 MG tablet Take 1 tablet (40 mg) by mouth daily  Qty: 30 tablet, Refills: 0    Associated Diagnoses: Cerebrovascular accident (CVA) due to other mechanism (H)      clopidogrel (PLAVIX) 75 MG tablet Take 1 tablet (75 mg) by mouth daily  Qty: 30 tablet, Refills: 0    Associated Diagnoses: Cerebrovascular accident (CVA) due to other mechanism (H)      venlafaxine (EFFEXOR-XR) 75 MG 24 hr capsule Take 1 capsule (75 mg) by mouth daily (with breakfast)  Qty: 30 capsule, Refills: 0    Associated Diagnoses: Cerebrovascular accident (CVA) due to other mechanism (H); Depression, unspecified depression type         CONTINUE these medications which have NOT CHANGED    Details   acetaminophen (TYLENOL) 500 MG tablet Take 500 mg by mouth every 6 hours as needed for mild pain      aspirin (ASA) 81 MG chewable tablet Take 81 mg by mouth daily      montelukast (SINGULAIR) 10 MG tablet Take 10 mg by mouth At Bedtime      omeprazole (PRILOSEC) 20 MG DR capsule Take 20 mg by mouth  daily before breakfast      senna-docusate (SENOKOT-S/PERICOLACE) 8.6-50 MG tablet Take 1 tablet by mouth 2 times daily      albuterol (PROAIR HFA/PROVENTIL HFA/VENTOLIN HFA) 108 (90 Base) MCG/ACT inhaler Inhale 2 puffs into the lungs every 4 hours as needed for shortness of breath / dyspnea or wheezing    Comments: Pharmacy may dispense brand covered by insurance (Proair, or proventil or ventolin or generic albuterol inhaler)      cholecalciferol (VITAMIN D3) 1000 units (25 mcg) capsule Take 1 capsule by mouth daily      fluticasone (FLOVENT HFA) 110 MCG/ACT inhaler Inhale 2 puffs into the lungs daily      ketotifen (ZADITOR/REFRESH ANTI-ITCH) 0.025 % ophthalmic solution Place 1 drop into both eyes 2 times daily as needed for itching      polyethylene glycol (MIRALAX/GLYCOLAX) packet Take 1 packet by mouth daily as needed for constipation      ranitidine (ZANTAC) 150 MG tablet Take 150 mg by mouth 2 times daily         STOP taking these medications       lisinopril (PRINIVIL/ZESTRIL) 10 MG tablet Comments:   Reason for Stopping:         metroNIDAZOLE (METROGEL) 0.75 % external gel Comments:   Reason for Stopping:         traZODone (DESYREL) 50 MG tablet Comments:   Reason for Stopping:                 DISCHARGE INSTRUCTIONS AND FOLLOW UP  Discharge Procedure Orders   Physical Therapy Referral   Standing Status: Future   Referral Priority: Routine Referral Type: Rehab Therapy Physical Therapy   Number of Visits Requested: 1     Occupational Therapy Referral   Standing Status: Future   Referral Priority: Routine Referral Type: Occupational Therapy   Number of Visits Requested: 1     Reason for your hospital stay   Order Comments: Ischemic stroke     Follow Up and recommended labs and tests   Order Comments: Follow up with primary care provider, No primary care provider on file., within 7 days for hospital follow- up.  No follow up labs or test are needed.    Follow up with Dr. Lott, within 4-6 weeks . for hospital  follow- up.  No follow up labs or test are needed.  Follow up with neurology , at [facility] , within 4-6 weeks  for hospital follow- up. No follow up labs or test are needed.     Activity   Order Comments: Your activity upon discharge: activity as tolerated and no driving for today     Order Specific Question Answer Comments   Is discharge order? Yes      Diet   Order Comments: Follow this diet upon discharge:       Regular Diet Adult     Order Specific Question Answer Comments   Is discharge order? Yes           PHYSICAL EXAMINATION    Most recent Vital Signs:   Vitals:    02/16/20 0859 02/16/20 0916 02/16/20 1542 02/17/20 0718   BP: 105/61 101/65 116/57 103/50   BP Location:   Right arm Right arm   Pulse: 52 58 55 60   Resp:  20 16 20   Temp: 97.9  F (36.6  C) 95.4  F (35.2  C) 97.7  F (36.5  C) 97.9  F (36.6  C)   TempSrc: Oral Oral Oral Oral   SpO2: 96% 97% 96% 97%   Weight:       Height:           GEN: NAD, pleasant and cooperative  HEENT: NC/AT  CVS: Regular rate and rhythm, normal S1-S2  PULM: Clear to auscultation bilaterally, no wheezing or crackles appreciated  ABD: Soft, nontender, nondistended, bowel sounds are present  EXT:  Free of edema   Neuro: Answers appropriately, follows commands. Left hemiplegia- improving       Discharge summary was forwarded to No primary care provider on file. (PCP) at the time of discharge, so as to bridge from hospital to outpatient care.     It was our pleasure to care for Kevin Thrasher during this hospitalization. Please do not hesitate to contact me should there be questions regarding the hospital course or discharge plan.      Patient was staffed with Dr. Oren Calero  PM&R PGY-2   02/17/2020 8:42 AM

## 2020-02-17 NOTE — PLAN OF CARE
FOCUS/GOAL  Bowel management, Bladder management, and Pain management    ASSESSMENT, INTERVENTIONS AND CONTINUING PLAN FOR GOAL:    Pt is alert and oriented x4, Mod I in the room with walker. No c/o pain or SOB reported during this shift. Slept well , pt spouse at bedside. Pt to be discharge home today after PLC stroke at 1:30 pm. Continue with plan of care.

## 2020-02-17 NOTE — CONSULTS
Stroke Education Note    The following information has been reviewed with the patient and family:    1. Warning signs of stroke    2. Calling 911 if having warning signs of stroke    3. All modifiable risk factors: hypertension, CAD, atrial fib, diabetes, hypercholesterolemia, smoking, substance abuse, diet, physical inactivity, obesity, sleep apnea.    4. Patient's risk factors for stroke which include: HTN, HLD, DM, obesity, unhealthy diet, Physical inactivity    5. Follow-up plan for after discharge    6. Discharge medications which include: Lipitor, ASA, Plavix    In addition, the PLC Stroke Class Handout has been given to the patient and family.    Learner's response to risk factors / lifestyle modification education: Committment to change. Pt. Expressed concern about another stroke and appreciated the information given for prevention. We talked about setting up a pill box for her medications so that she can remember to take them daily as she expressed the fact that she was not taking them as prescribed.      Kala Pastrana RN

## 2020-02-17 NOTE — PLAN OF CARE
FOCUS/GOAL  Bowel management, Bladder management, Mobility and Skin integrity    ASSESSMENT, INTERVENTIONS AND CONTINUING PLAN FOR GOAL:  Pt is alert and oriented x4. VSS. denied pain, denied sob, denied difficulty breathing, denied chest pain. No neuro changes noted or reported. Good appetite and oral intake this shift.  in room at all times. Urinating without difficulty and had BMx1 this shift per pt report. Mod I with walker. skin is intact. Using call light to communicate needs. Plan to discharge home tomorrow after post stroke class.[id number]. Call light with in reach. Continue with monitoring and POC.

## 2020-02-17 NOTE — PLAN OF CARE
Problem: Goal/Outcome  Goal: Goal Outcome Summary  Outcome: No Change  Note:   FOCUS/GOAL  Medical management, Discharge planning, and Mobility    ASSESSMENT, INTERVENTIONS AND CONTINUING PLAN FOR GOAL:  Pt's vitals stable. Pt able to use call light and ask for assist. Mod I in room with walker. Managed toileting and dressing independently. Pt's  also here for discharge. Reviewed discharge instructions with pt and pt's  with . Pt and  discharged from unit at 1458 per wheelchair with assistance from NA after PLC Stroke Class and discharge instructions given.

## 2020-02-19 ENCOUNTER — COMMUNICATION - HEALTHEAST (OUTPATIENT)
Dept: FAMILY MEDICINE | Facility: CLINIC | Age: 55
End: 2020-02-19

## 2020-02-20 ENCOUNTER — COMMUNICATION - HEALTHEAST (OUTPATIENT)
Dept: FAMILY MEDICINE | Facility: CLINIC | Age: 55
End: 2020-02-20

## 2020-02-24 ENCOUNTER — OFFICE VISIT - HEALTHEAST (OUTPATIENT)
Dept: FAMILY MEDICINE | Facility: CLINIC | Age: 55
End: 2020-02-24

## 2020-02-24 DIAGNOSIS — K21.9 GASTROESOPHAGEAL REFLUX DISEASE WITHOUT ESOPHAGITIS: ICD-10-CM

## 2020-02-24 DIAGNOSIS — J45.20 MILD INTERMITTENT ASTHMA WITHOUT COMPLICATION: ICD-10-CM

## 2020-02-24 DIAGNOSIS — I63.211 ACUTE ISCHEMIC VBA BRAINSTEM STROKE, RIGHT (H): ICD-10-CM

## 2020-02-24 DIAGNOSIS — I63.9 WEAKNESS DUE TO ACUTE STROKE (H): ICD-10-CM

## 2020-02-24 DIAGNOSIS — K59.09 CHRONIC CONSTIPATION: ICD-10-CM

## 2020-02-24 DIAGNOSIS — M54.42 CHRONIC BILATERAL LOW BACK PAIN WITH BILATERAL SCIATICA: ICD-10-CM

## 2020-02-24 DIAGNOSIS — E78.00 PURE HYPERCHOLESTEROLEMIA: ICD-10-CM

## 2020-02-24 DIAGNOSIS — E66.811 OBESITY (BMI 30.0-34.9): ICD-10-CM

## 2020-02-24 DIAGNOSIS — M54.41 CHRONIC BILATERAL LOW BACK PAIN WITH BILATERAL SCIATICA: ICD-10-CM

## 2020-02-24 DIAGNOSIS — Z12.11 COLON CANCER SCREENING: ICD-10-CM

## 2020-02-24 DIAGNOSIS — E11.59 TYPE 2 DIABETES MELLITUS WITH OTHER CIRCULATORY COMPLICATION, WITHOUT LONG-TERM CURRENT USE OF INSULIN (H): ICD-10-CM

## 2020-02-24 DIAGNOSIS — R53.1 WEAKNESS DUE TO ACUTE STROKE (H): ICD-10-CM

## 2020-02-24 DIAGNOSIS — Z00.00 ROUTINE GENERAL MEDICAL EXAMINATION AT A HEALTH CARE FACILITY: ICD-10-CM

## 2020-02-24 DIAGNOSIS — R21 MALAR RASH: ICD-10-CM

## 2020-02-24 DIAGNOSIS — G89.29 CHRONIC BILATERAL LOW BACK PAIN WITH BILATERAL SCIATICA: ICD-10-CM

## 2020-02-24 DIAGNOSIS — F41.9 ANXIETY: ICD-10-CM

## 2020-02-24 DIAGNOSIS — E11.9 TYPE 2 DIABETES MELLITUS WITHOUT COMPLICATION, WITHOUT LONG-TERM CURRENT USE OF INSULIN (H): ICD-10-CM

## 2020-02-24 DIAGNOSIS — H04.123 DRY EYES: ICD-10-CM

## 2020-02-24 DIAGNOSIS — Z91.09 ENVIRONMENTAL ALLERGIES: ICD-10-CM

## 2020-02-24 DIAGNOSIS — Z12.31 VISIT FOR SCREENING MAMMOGRAM: ICD-10-CM

## 2020-02-24 DIAGNOSIS — Z23 IMMUNIZATION DUE: ICD-10-CM

## 2020-02-24 DIAGNOSIS — I10 ESSENTIAL HYPERTENSION: ICD-10-CM

## 2020-02-24 DIAGNOSIS — I63.22 ACUTE ISCHEMIC VBA BRAINSTEM STROKE, RIGHT (H): ICD-10-CM

## 2020-02-24 LAB
ALBUMIN SERPL-MCNC: 3.7 G/DL (ref 3.5–5)
ALP SERPL-CCNC: 80 U/L (ref 45–120)
ALT SERPL W P-5'-P-CCNC: 39 U/L (ref 0–45)
ANION GAP SERPL CALCULATED.3IONS-SCNC: 9 MMOL/L (ref 5–18)
AST SERPL W P-5'-P-CCNC: 26 U/L (ref 0–40)
BILIRUB SERPL-MCNC: 0.8 MG/DL (ref 0–1)
BUN SERPL-MCNC: 19 MG/DL (ref 8–22)
CALCIUM SERPL-MCNC: 9.4 MG/DL (ref 8.5–10.5)
CHLORIDE BLD-SCNC: 102 MMOL/L (ref 98–107)
CHOLEST SERPL-MCNC: 153 MG/DL
CO2 SERPL-SCNC: 30 MMOL/L (ref 22–31)
CREAT SERPL-MCNC: 0.68 MG/DL (ref 0.6–1.1)
CREAT UR-MCNC: 28.5 MG/DL
FASTING STATUS PATIENT QL REPORTED: NO
GFR SERPL CREATININE-BSD FRML MDRD: >60 ML/MIN/1.73M2
GLUCOSE BLD-MCNC: 88 MG/DL (ref 70–125)
HDLC SERPL-MCNC: 45 MG/DL
LDLC SERPL CALC-MCNC: 87 MG/DL
MICROALBUMIN UR-MCNC: <0.5 MG/DL (ref 0–1.99)
MICROALBUMIN/CREAT UR: NORMAL MG/G{CREAT}
POTASSIUM BLD-SCNC: 4.1 MMOL/L (ref 3.5–5)
PROT SERPL-MCNC: 6.8 G/DL (ref 6–8)
SODIUM SERPL-SCNC: 141 MMOL/L (ref 136–145)
TRIGL SERPL-MCNC: 106 MG/DL

## 2020-02-25 ENCOUNTER — COMMUNICATION - HEALTHEAST (OUTPATIENT)
Dept: FAMILY MEDICINE | Facility: CLINIC | Age: 55
End: 2020-02-25

## 2020-02-27 DIAGNOSIS — I63.50 RIGHT PONTINE CVA (H): Primary | ICD-10-CM

## 2020-02-28 ENCOUNTER — RECORDS - HEALTHEAST (OUTPATIENT)
Dept: ADMINISTRATIVE | Facility: OTHER | Age: 55
End: 2020-02-28

## 2020-02-28 ENCOUNTER — APPOINTMENT (OUTPATIENT)
Dept: INTERPRETER SERVICES | Facility: CLINIC | Age: 55
End: 2020-02-28
Payer: COMMERCIAL

## 2020-02-28 LAB — COLOGUARD-ABSTRACT: NEGATIVE

## 2020-03-05 ENCOUNTER — RECORDS - HEALTHEAST (OUTPATIENT)
Dept: ADMINISTRATIVE | Facility: OTHER | Age: 55
End: 2020-03-05

## 2020-03-05 LAB — RETINOPATHY: NEGATIVE

## 2020-03-09 ENCOUNTER — OFFICE VISIT - HEALTHEAST (OUTPATIENT)
Dept: PHYSICAL THERAPY | Facility: REHABILITATION | Age: 55
End: 2020-03-09

## 2020-03-09 ENCOUNTER — RECORDS - HEALTHEAST (OUTPATIENT)
Dept: HEALTH INFORMATION MANAGEMENT | Facility: CLINIC | Age: 55
End: 2020-03-09

## 2020-03-09 DIAGNOSIS — R26.81 GAIT INSTABILITY: ICD-10-CM

## 2020-03-09 DIAGNOSIS — M62.81 MUSCLE WEAKNESS OF LEFT UPPER EXTREMITY: ICD-10-CM

## 2020-03-09 DIAGNOSIS — I63.9 WEAKNESS DUE TO ACUTE STROKE (H): ICD-10-CM

## 2020-03-09 DIAGNOSIS — R53.1 WEAKNESS DUE TO ACUTE STROKE (H): ICD-10-CM

## 2020-03-13 ENCOUNTER — RECORDS - HEALTHEAST (OUTPATIENT)
Dept: HEALTH INFORMATION MANAGEMENT | Facility: CLINIC | Age: 55
End: 2020-03-13

## 2020-03-17 ENCOUNTER — OFFICE VISIT - HEALTHEAST (OUTPATIENT)
Dept: PHYSICAL THERAPY | Facility: REHABILITATION | Age: 55
End: 2020-03-17

## 2020-03-17 DIAGNOSIS — M62.81 MUSCLE WEAKNESS OF LEFT UPPER EXTREMITY: ICD-10-CM

## 2020-03-17 DIAGNOSIS — I63.9 WEAKNESS DUE TO ACUTE STROKE (H): ICD-10-CM

## 2020-03-17 DIAGNOSIS — R53.1 WEAKNESS DUE TO ACUTE STROKE (H): ICD-10-CM

## 2020-03-17 DIAGNOSIS — R26.81 GAIT INSTABILITY: ICD-10-CM

## 2020-04-14 ENCOUNTER — COMMUNICATION - HEALTHEAST (OUTPATIENT)
Dept: FAMILY MEDICINE | Facility: CLINIC | Age: 55
End: 2020-04-14

## 2020-04-14 DIAGNOSIS — J45.20 MILD INTERMITTENT ASTHMA WITHOUT COMPLICATION: ICD-10-CM

## 2020-05-14 ENCOUNTER — COMMUNICATION - HEALTHEAST (OUTPATIENT)
Dept: FAMILY MEDICINE | Facility: CLINIC | Age: 55
End: 2020-05-14

## 2020-05-14 DIAGNOSIS — Z91.09 ENVIRONMENTAL ALLERGIES: ICD-10-CM

## 2020-05-27 ENCOUNTER — COMMUNICATION - HEALTHEAST (OUTPATIENT)
Dept: FAMILY MEDICINE | Facility: CLINIC | Age: 55
End: 2020-05-27

## 2020-05-27 DIAGNOSIS — Z12.31 VISIT FOR SCREENING MAMMOGRAM: ICD-10-CM

## 2020-08-05 ENCOUNTER — OFFICE VISIT - HEALTHEAST (OUTPATIENT)
Dept: FAMILY MEDICINE | Facility: CLINIC | Age: 55
End: 2020-08-05

## 2020-08-05 DIAGNOSIS — I63.22 ACUTE ISCHEMIC VBA BRAINSTEM STROKE, RIGHT (H): ICD-10-CM

## 2020-08-05 DIAGNOSIS — I10 ESSENTIAL HYPERTENSION: ICD-10-CM

## 2020-08-05 DIAGNOSIS — I63.211 ACUTE ISCHEMIC VBA BRAINSTEM STROKE, RIGHT (H): ICD-10-CM

## 2020-08-05 DIAGNOSIS — E78.00 PURE HYPERCHOLESTEROLEMIA: ICD-10-CM

## 2020-08-05 DIAGNOSIS — E11.9 TYPE 2 DIABETES MELLITUS WITHOUT COMPLICATION, WITHOUT LONG-TERM CURRENT USE OF INSULIN (H): ICD-10-CM

## 2020-08-05 LAB
ALT SERPL W P-5'-P-CCNC: 38 U/L (ref 0–45)
CHOLEST SERPL-MCNC: 187 MG/DL
FASTING STATUS PATIENT QL REPORTED: YES
HBA1C MFR BLD: 6 %
HDLC SERPL-MCNC: 60 MG/DL
LDLC SERPL CALC-MCNC: 112 MG/DL
TRIGL SERPL-MCNC: 73 MG/DL

## 2020-08-05 ASSESSMENT — MIFFLIN-ST. JEOR: SCORE: 1078.04

## 2020-08-07 ENCOUNTER — COMMUNICATION - HEALTHEAST (OUTPATIENT)
Dept: FAMILY MEDICINE | Facility: CLINIC | Age: 55
End: 2020-08-07

## 2020-09-16 ENCOUNTER — COMMUNICATION - HEALTHEAST (OUTPATIENT)
Dept: FAMILY MEDICINE | Facility: CLINIC | Age: 55
End: 2020-09-16

## 2020-09-16 DIAGNOSIS — M54.17 LUMBOSACRAL RADICULOPATHY AT S1: ICD-10-CM

## 2020-10-20 ENCOUNTER — COMMUNICATION - HEALTHEAST (OUTPATIENT)
Dept: FAMILY MEDICINE | Facility: CLINIC | Age: 55
End: 2020-10-20

## 2020-10-20 DIAGNOSIS — M54.42 CHRONIC BILATERAL LOW BACK PAIN WITH BILATERAL SCIATICA: ICD-10-CM

## 2020-10-20 DIAGNOSIS — M54.41 CHRONIC BILATERAL LOW BACK PAIN WITH BILATERAL SCIATICA: ICD-10-CM

## 2020-10-20 DIAGNOSIS — G89.29 CHRONIC BILATERAL LOW BACK PAIN WITH BILATERAL SCIATICA: ICD-10-CM

## 2020-11-06 ENCOUNTER — OFFICE VISIT - HEALTHEAST (OUTPATIENT)
Dept: FAMILY MEDICINE | Facility: CLINIC | Age: 55
End: 2020-11-06

## 2020-11-06 DIAGNOSIS — M54.17 LUMBOSACRAL RADICULOPATHY AT S1: ICD-10-CM

## 2020-11-06 DIAGNOSIS — R42 LIGHTHEADEDNESS: ICD-10-CM

## 2020-11-06 DIAGNOSIS — K76.0 FATTY LIVER: ICD-10-CM

## 2020-11-06 DIAGNOSIS — R87.610 ATYPICAL SQUAMOUS CELLS OF UNDETERMINED SIGNIFICANCE ON CYTOLOGIC SMEAR OF CERVIX (ASC-US): ICD-10-CM

## 2020-11-06 DIAGNOSIS — J45.20 MILD INTERMITTENT ASTHMA WITHOUT COMPLICATION: ICD-10-CM

## 2020-11-06 DIAGNOSIS — R53.1 WEAKNESS DUE TO ACUTE STROKE (H): ICD-10-CM

## 2020-11-06 DIAGNOSIS — I63.9 WEAKNESS DUE TO ACUTE STROKE (H): ICD-10-CM

## 2020-11-06 DIAGNOSIS — E55.9 VITAMIN D DEFICIENCY: ICD-10-CM

## 2020-11-06 DIAGNOSIS — Z00.00 ROUTINE GENERAL MEDICAL EXAMINATION AT A HEALTH CARE FACILITY: ICD-10-CM

## 2020-11-06 DIAGNOSIS — E11.59 TYPE 2 DIABETES MELLITUS WITH OTHER CIRCULATORY COMPLICATION, WITHOUT LONG-TERM CURRENT USE OF INSULIN (H): ICD-10-CM

## 2020-11-06 DIAGNOSIS — I10 ESSENTIAL HYPERTENSION: ICD-10-CM

## 2020-11-06 DIAGNOSIS — E78.00 PURE HYPERCHOLESTEROLEMIA: ICD-10-CM

## 2020-11-06 LAB
ALBUMIN SERPL-MCNC: 3.7 G/DL (ref 3.5–5)
ALP SERPL-CCNC: 79 U/L (ref 45–120)
ALT SERPL W P-5'-P-CCNC: 33 U/L (ref 0–45)
ANION GAP SERPL CALCULATED.3IONS-SCNC: 11 MMOL/L (ref 5–18)
AST SERPL W P-5'-P-CCNC: 18 U/L (ref 0–40)
BASOPHILS # BLD AUTO: 0.1 THOU/UL (ref 0–0.2)
BASOPHILS NFR BLD AUTO: 1 % (ref 0–2)
BILIRUB SERPL-MCNC: 0.7 MG/DL (ref 0–1)
BUN SERPL-MCNC: 15 MG/DL (ref 8–22)
CALCIUM SERPL-MCNC: 9.2 MG/DL (ref 8.5–10.5)
CHLORIDE BLD-SCNC: 102 MMOL/L (ref 98–107)
CHOLEST SERPL-MCNC: 185 MG/DL
CO2 SERPL-SCNC: 30 MMOL/L (ref 22–31)
CREAT SERPL-MCNC: 0.74 MG/DL (ref 0.6–1.1)
EOSINOPHIL # BLD AUTO: 0.1 THOU/UL (ref 0–0.4)
EOSINOPHIL NFR BLD AUTO: 1 % (ref 0–6)
ERYTHROCYTE [DISTWIDTH] IN BLOOD BY AUTOMATED COUNT: 11.1 % (ref 11–14.5)
FASTING STATUS PATIENT QL REPORTED: YES
GFR SERPL CREATININE-BSD FRML MDRD: >60 ML/MIN/1.73M2
GLUCOSE BLD-MCNC: 87 MG/DL (ref 70–125)
HBA1C MFR BLD: 6.1 %
HCT VFR BLD AUTO: 45.8 % (ref 35–47)
HDLC SERPL-MCNC: 65 MG/DL
HGB BLD-MCNC: 15.2 G/DL (ref 12–16)
LDLC SERPL CALC-MCNC: 102 MG/DL
LYMPHOCYTES # BLD AUTO: 3.1 THOU/UL (ref 0.8–4.4)
LYMPHOCYTES NFR BLD AUTO: 37 % (ref 20–40)
MCH RBC QN AUTO: 29.7 PG (ref 27–34)
MCHC RBC AUTO-ENTMCNC: 33.2 G/DL (ref 32–36)
MCV RBC AUTO: 89 FL (ref 80–100)
MONOCYTES # BLD AUTO: 0.5 THOU/UL (ref 0–0.9)
MONOCYTES NFR BLD AUTO: 6 % (ref 2–10)
NEUTROPHILS # BLD AUTO: 4.5 THOU/UL (ref 2–7.7)
NEUTROPHILS NFR BLD AUTO: 55 % (ref 50–70)
PLATELET # BLD AUTO: 198 THOU/UL (ref 140–440)
PMV BLD AUTO: 7.7 FL (ref 7–10)
POTASSIUM BLD-SCNC: 4.1 MMOL/L (ref 3.5–5)
PROT SERPL-MCNC: 6.7 G/DL (ref 6–8)
RBC # BLD AUTO: 5.12 MILL/UL (ref 3.8–5.4)
SODIUM SERPL-SCNC: 143 MMOL/L (ref 136–145)
TRIGL SERPL-MCNC: 90 MG/DL
WBC: 8.2 THOU/UL (ref 4–11)

## 2020-11-06 ASSESSMENT — MIFFLIN-ST. JEOR: SCORE: 1080.31

## 2020-11-09 LAB — 25(OH)D3 SERPL-MCNC: 27 NG/ML (ref 30–80)

## 2020-12-04 ENCOUNTER — RECORDS - HEALTHEAST (OUTPATIENT)
Dept: GENERAL RADIOLOGY | Facility: CLINIC | Age: 55
End: 2020-12-04

## 2020-12-04 ENCOUNTER — OFFICE VISIT - HEALTHEAST (OUTPATIENT)
Dept: FAMILY MEDICINE | Facility: CLINIC | Age: 55
End: 2020-12-04

## 2020-12-04 DIAGNOSIS — R21 MALAR RASH: ICD-10-CM

## 2020-12-04 DIAGNOSIS — I63.211 ACUTE ISCHEMIC VBA BRAINSTEM STROKE, RIGHT (H): ICD-10-CM

## 2020-12-04 DIAGNOSIS — Z91.09 ENVIRONMENTAL ALLERGIES: ICD-10-CM

## 2020-12-04 DIAGNOSIS — E78.00 PURE HYPERCHOLESTEROLEMIA: ICD-10-CM

## 2020-12-04 DIAGNOSIS — K59.09 CHRONIC CONSTIPATION: ICD-10-CM

## 2020-12-04 DIAGNOSIS — M25.511 CHRONIC RIGHT SHOULDER PAIN: ICD-10-CM

## 2020-12-04 DIAGNOSIS — K21.9 GASTROESOPHAGEAL REFLUX DISEASE WITHOUT ESOPHAGITIS: ICD-10-CM

## 2020-12-04 DIAGNOSIS — F41.9 ANXIETY: ICD-10-CM

## 2020-12-04 DIAGNOSIS — Z12.31 VISIT FOR SCREENING MAMMOGRAM: ICD-10-CM

## 2020-12-04 DIAGNOSIS — Z23 IMMUNIZATION DUE: ICD-10-CM

## 2020-12-04 DIAGNOSIS — I63.22 ACUTE ISCHEMIC VBA BRAINSTEM STROKE, RIGHT (H): ICD-10-CM

## 2020-12-04 DIAGNOSIS — M54.41 CHRONIC BILATERAL LOW BACK PAIN WITH BILATERAL SCIATICA: ICD-10-CM

## 2020-12-04 DIAGNOSIS — M77.11 LATERAL EPICONDYLITIS OF RIGHT ELBOW: ICD-10-CM

## 2020-12-04 DIAGNOSIS — G89.29 CHRONIC BILATERAL LOW BACK PAIN WITH BILATERAL SCIATICA: ICD-10-CM

## 2020-12-04 DIAGNOSIS — G89.29 OTHER CHRONIC PAIN: ICD-10-CM

## 2020-12-04 DIAGNOSIS — J45.20 MILD INTERMITTENT ASTHMA WITHOUT COMPLICATION: ICD-10-CM

## 2020-12-04 DIAGNOSIS — G89.29 CHRONIC RIGHT SHOULDER PAIN: ICD-10-CM

## 2020-12-04 DIAGNOSIS — M54.42 CHRONIC BILATERAL LOW BACK PAIN WITH BILATERAL SCIATICA: ICD-10-CM

## 2020-12-04 DIAGNOSIS — H04.123 DRY EYES: ICD-10-CM

## 2020-12-04 DIAGNOSIS — E11.59 TYPE 2 DIABETES MELLITUS WITH OTHER CIRCULATORY COMPLICATION, WITHOUT LONG-TERM CURRENT USE OF INSULIN (H): ICD-10-CM

## 2020-12-04 DIAGNOSIS — M54.17 LUMBOSACRAL RADICULOPATHY AT S1: ICD-10-CM

## 2020-12-04 DIAGNOSIS — R87.610 ATYPICAL SQUAMOUS CELLS OF UNDETERMINED SIGNIFICANCE ON CYTOLOGIC SMEAR OF CERVIX (ASC-US): ICD-10-CM

## 2020-12-04 DIAGNOSIS — E11.21 TYPE 2 DIABETES MELLITUS WITH DIABETIC NEPHROPATHY, WITHOUT LONG-TERM CURRENT USE OF INSULIN (H): ICD-10-CM

## 2020-12-04 DIAGNOSIS — I10 ESSENTIAL HYPERTENSION: ICD-10-CM

## 2020-12-04 DIAGNOSIS — M25.511 PAIN IN RIGHT SHOULDER: ICD-10-CM

## 2020-12-04 LAB
CREAT UR-MCNC: 112.3 MG/DL
MICROALBUMIN UR-MCNC: 3.87 MG/DL (ref 0–1.99)
MICROALBUMIN/CREAT UR: 34.5 MG/G

## 2020-12-07 ENCOUNTER — COMMUNICATION - HEALTHEAST (OUTPATIENT)
Dept: FAMILY MEDICINE | Facility: CLINIC | Age: 55
End: 2020-12-07

## 2020-12-11 ENCOUNTER — COMMUNICATION - HEALTHEAST (OUTPATIENT)
Dept: FAMILY MEDICINE | Facility: CLINIC | Age: 55
End: 2020-12-11

## 2021-02-08 ENCOUNTER — OFFICE VISIT - HEALTHEAST (OUTPATIENT)
Dept: FAMILY MEDICINE | Facility: CLINIC | Age: 56
End: 2021-02-08

## 2021-02-08 DIAGNOSIS — I63.22 ACUTE ISCHEMIC VBA BRAINSTEM STROKE, RIGHT (H): ICD-10-CM

## 2021-02-08 DIAGNOSIS — I63.211 ACUTE ISCHEMIC VBA BRAINSTEM STROKE, RIGHT (H): ICD-10-CM

## 2021-02-08 DIAGNOSIS — I10 ESSENTIAL HYPERTENSION: ICD-10-CM

## 2021-02-08 DIAGNOSIS — E11.21 TYPE 2 DIABETES MELLITUS WITH DIABETIC NEPHROPATHY, WITHOUT LONG-TERM CURRENT USE OF INSULIN (H): ICD-10-CM

## 2021-02-08 DIAGNOSIS — M25.511 RIGHT SHOULDER PAIN, UNSPECIFIED CHRONICITY: ICD-10-CM

## 2021-02-08 DIAGNOSIS — R80.9 PROTEINURIA, UNSPECIFIED TYPE: ICD-10-CM

## 2021-02-08 LAB
CREAT UR-MCNC: 18.9 MG/DL
MICROALBUMIN UR-MCNC: <0.5 MG/DL (ref 0–1.99)
MICROALBUMIN/CREAT UR: NORMAL MG/G{CREAT}

## 2021-02-26 ENCOUNTER — COMMUNICATION - HEALTHEAST (OUTPATIENT)
Dept: SCHEDULING | Facility: CLINIC | Age: 56
End: 2021-02-26

## 2021-03-01 ENCOUNTER — COMMUNICATION - HEALTHEAST (OUTPATIENT)
Dept: FAMILY MEDICINE | Facility: CLINIC | Age: 56
End: 2021-03-01

## 2021-03-01 DIAGNOSIS — I63.22 ACUTE ISCHEMIC VBA BRAINSTEM STROKE, RIGHT (H): ICD-10-CM

## 2021-03-01 DIAGNOSIS — I63.211 ACUTE ISCHEMIC VBA BRAINSTEM STROKE, RIGHT (H): ICD-10-CM

## 2021-03-01 DIAGNOSIS — E11.9 TYPE 2 DIABETES MELLITUS WITHOUT COMPLICATION, WITHOUT LONG-TERM CURRENT USE OF INSULIN (H): ICD-10-CM

## 2021-03-01 DIAGNOSIS — E78.00 PURE HYPERCHOLESTEROLEMIA: ICD-10-CM

## 2021-04-04 ENCOUNTER — COMMUNICATION - HEALTHEAST (OUTPATIENT)
Dept: FAMILY MEDICINE | Facility: CLINIC | Age: 56
End: 2021-04-04

## 2021-04-04 DIAGNOSIS — F41.9 ANXIETY: ICD-10-CM

## 2021-04-04 DIAGNOSIS — Z91.09 ENVIRONMENTAL ALLERGIES: ICD-10-CM

## 2021-04-04 DIAGNOSIS — K21.9 GASTROESOPHAGEAL REFLUX DISEASE WITHOUT ESOPHAGITIS: ICD-10-CM

## 2021-04-04 DIAGNOSIS — E78.00 PURE HYPERCHOLESTEROLEMIA: ICD-10-CM

## 2021-04-04 DIAGNOSIS — J45.20 MILD INTERMITTENT ASTHMA WITHOUT COMPLICATION: ICD-10-CM

## 2021-04-27 ENCOUNTER — OFFICE VISIT - HEALTHEAST (OUTPATIENT)
Dept: FAMILY MEDICINE | Facility: CLINIC | Age: 56
End: 2021-04-27

## 2021-04-27 DIAGNOSIS — E11.21 TYPE 2 DIABETES MELLITUS WITH DIABETIC NEPHROPATHY, WITHOUT LONG-TERM CURRENT USE OF INSULIN (H): ICD-10-CM

## 2021-04-27 DIAGNOSIS — M25.511 RIGHT SHOULDER PAIN, UNSPECIFIED CHRONICITY: ICD-10-CM

## 2021-04-27 DIAGNOSIS — M54.2 NECK PAIN: ICD-10-CM

## 2021-04-27 ASSESSMENT — MIFFLIN-ST. JEOR: SCORE: 1073.85

## 2021-05-26 ENCOUNTER — RECORDS - HEALTHEAST (OUTPATIENT)
Dept: ADMINISTRATIVE | Facility: CLINIC | Age: 56
End: 2021-05-26

## 2021-05-27 ENCOUNTER — OFFICE VISIT - HEALTHEAST (OUTPATIENT)
Dept: FAMILY MEDICINE | Facility: CLINIC | Age: 56
End: 2021-05-27

## 2021-05-27 DIAGNOSIS — E78.00 PURE HYPERCHOLESTEROLEMIA: ICD-10-CM

## 2021-05-27 DIAGNOSIS — I63.211 ACUTE ISCHEMIC VBA BRAINSTEM STROKE, RIGHT (H): ICD-10-CM

## 2021-05-27 DIAGNOSIS — I63.22 ACUTE ISCHEMIC VBA BRAINSTEM STROKE, RIGHT (H): ICD-10-CM

## 2021-05-27 NOTE — PATIENT INSTRUCTIONS - HE
High Blood Pressure = Hypertension  Blood pressure goal is less than 140/90  BP Readings from Last 6 Encounters:   03/26/19 140/90   02/26/19 (!) 140/100   12/11/18 118/60   09/26/18 156/78   04/17/18 120/80   04/13/18 138/74      To lower blood pressure:  1. Eat 5 servings of fruit + veggies every day.  2. Eat low salt diet. Salt is called sodium.  3. Exercise 30 minutes, most days. Goal 150 minutes per week.  4. Lose 5-10% of your weight. Goal weight: less than 130 pounds.  5. Blood pressure cuff prescription sent.    High Cholesterol  Eat less animal fat (meat, egg yolk, butter, cream)  Good plant fats: olive oil, canola oil, avocado oil, grapeseed oil    Recheck one month.

## 2021-05-27 NOTE — PROGRESS NOTES
MTM Initial Encounter  Assessment & Plan                                                     Medication Adherence/Access:  No concerns noted      Mild Intermittent Asthma/Allergies: Partially improved - pt reports asthma and allergy symptoms are well controlled, but not using maintenance inhaler daily. Given that most of pt's concerns are related to allergies, may benefit from switching from Flovent to Montelukast.   -Stop Flovent and Switch to Montelukast 10 mg daily     Diabetes:  Stable -A1C at goal <7%. Just started checking SMBG yesterday. Fasting sugar today was at goal of .   -Continue with lifestyle measures for BG control     Vaginal Dryness/Menopause: Needs improvement - pt continues to have symptoms but wasn't using medication due to concerns about side effects. Reviewed safety of topical estrogen for daily use.   -Reviewed medication safety. Pt encouraged to use daily PRN     Pain: Partially improved - pain well controlled. Pt would like refills of Acetaminophen as she finds this more effective. Likely a safer option than ibuprofen given epigastric pain.   -Refilled Acetaminophen     Rash: Improved - continue current therapy.      Epigastric Pain: Given that patient is not using PPI daily, may benefit from stepdown to H2RA.   -Stop Omeprazole   -Switch to Ranitidine 150 mg two times a day PRN     Constipation: Stable - continue current therapy     Vitamin D Deficiency: Stable - Continue current therapy       Mood/Sleep: Partially improved - pt reports mood is improved but sleep is still poor. May benefit from trial of duloxetine for mood and sleep benefit instead of venlafaxine.   -Pt to discuss with psych provider (noted in AVS for pt to bring to visit).       Follow Up  No Follow-up on file.      Subjective & Objective                                                     Kevin Thrasher is a 53 y.o. female coming in for an initial visit for Medication Therapy Management. She was referred to me from Krupa  Ema LEPE MD    Chief Complaint: Medication Management     Medication Adherence/Access: Does not have all medications at visit. Children give her medications right from the bottle. Would be interested in a medication set up box. Taking meds two times a day.      Mild Intermittent Asthma/Allergies: Using Flovent 110 mcg 2 puffs daily, Albuterol HFA 90 mcg inhaler 2 puffs Q4H PRN, Claritin 10 mg daily (feels brand works better than generic), ketotifin 0.025% drops -1 drop both eyes two times a day PRN. Denies shortness of breath, wheezing. Just allergies. Feels like with medication they're okay right now. Denies runny/stuff nose. Does have itchy eyes - uses eye drops two times a day if itching, otherwise just once daily. This is working well for her.     Does not have inhalers at visit. Using both inhalers just as needed. Needing albuterol once per week or less.        Diabetes:  Pt currently taking no medications.  SMBG: once daily    Ranges (patient reported) : 106 yesterday at clinic. Today was 98 before breakfast.   Patient is not experiencing hypoglycemia   Recent symptoms of high blood sugar? Denies   ACEi/ARB:  Not indicated   Statin: Atorvastatin 10 mg daily   Aspirin: Not taking due to risk score <10%   Diet/Exercise: Working out at [employer]  - currently 1-2 times per week.     Lab Results   Component Value Date    HGBA1C 6.8 (H) 02/26/2019    HGBA1C 6.1 (H) 08/14/2017    HGBA1C 5.9 02/28/2013     Lab Results   Component Value Date    LDLCALC 175 (H) 02/26/2019    CREATININE 0.78 02/26/2019     The 10-year ASCVD risk score (Kaveh SMILEY JrJonatan, et al., 2013) is: 5.5%    Values used to calculate the score:      Age: 53 years      Sex: Female      Is Non- : No      Diabetic: Yes      Tobacco smoker: No      Systolic Blood Pressure: 140 mmHg      Is BP treated: No      HDL Cholesterol: 61 mg/dL      Total Cholesterol: 266 mg/dL      Vaginal Dryness/Menopause: Using Premarin vaginal cream 0.5 gm  daily PRN. Doesn't use often, unable to tell if it's making a difference given infrequent use. Not using due to concerns about long-term side effects or medication causing cancer.      Pain: Using Acetaminophen 500 mg Q6H PRN, ibuprofen 600 mg Q8H PRN - using up to two times a day, Icy Hot ointment two times a day PRN.  Not using Acetaminophen because she doesn't have any, but prefers that. Requesting refills.     Rash: Using Metronidazole 0.75% gel on face two times a day. Used a different cream prior to this which she finds more effective. Will bring to next follow-up to review if she can have refills.     Epigastric Pain: Using omeprazole 20 mg daily- using as needed when she eats food that doesn't agree with her and causes burning. Using 1-4 times per week.      Constipation: Currently using Miralax 17 gm daily two times a day. Stool pattern has been 1 formed stool(s) per day. Defecation has been easy      Vitamin D Deficiency: Using Vit D3 1000 units daily. Unsure if using, but thinks she is.     Vitamin D, Total (25-Hydroxy)   Date Value Ref Range Status   02/26/2019 16.6 (L) 30.0 - 80.0 ng/mL Final       Mood/Sleep: Trazodone 50 mg daily and Venlafaxine ER 75 mg daily.  Seeing a psychiatrist in [place] - every 3 months- overdue for follow-up. Her regular  is working on rescheduling. Feels like medications are helping with mood. Medications help with 3-4 hours of sleep. Tosses and turns in beds. Difficulty falling asleep.    PHQ-9 Total Score: 6 (2/26/2019 11:00 AM)      PMH: reviewed in EPIC   Allergies/ADRs: reviewed in EPIC   Alcohol: reviewed in EPIC   Tobacco:   Social History     Tobacco Use   Smoking Status Never Smoker   Smokeless Tobacco Never Used   Tobacco Comment    no second hand smoke exposure     Today's Vitals: There were no vitals filed for this visit.  ----------------    Much or all of the text in this note was generated through the use of Dragon Dictate voice-to-text  software. Errors in spelling or words which seem out of context are unintentional. Sound alike errors, in particular, may have escaped editing.    The patient was given a summary of these recommendations as an after visit summary    I spent 60 minutes with this patient today.   All changes were made via collaborative practice agreement with Eam Gentile MD. A copy of the visit note was provided to the patient's provider.     Kenton Jose, LupeD  Medication Therapy Management (MTM) Pharmacist  [facility]          Current Outpatient Medications   Medication Sig Dispense Refill     acetaminophen (PAIN AND FEVER) 500 MG tablet Take 1 tablet (500 mg total) by mouth every 6 (six) hours as needed for pain. 100 tablet 4     albuterol (PROAIR HFA;PROVENTIL HFA;VENTOLIN HFA) 90 mcg/actuation inhaler Inhale 2 puffs every 4 (four) hours as needed for wheezing or shortness of breath. 8.5 g 1     atorvastatin (LIPITOR) 10 MG tablet Take 1 tablet (10 mg total) by mouth at bedtime. 90 tablet 4     atovaquone-proguanil (MALARONE) 250-100 mg Tab Take 1 tablet by mouth daily. Start 2 days before you leave, take every day while you are on your trip, and for 7 days after you are home. 40 tablet 0     blood glucose test strips Use 1 each As Directed once daily. 100 strip 2     cholecalciferol, vitamin D3, (VITAMIN D3) 1,000 unit capsule Take 1 capsule (1,000 Units total) by mouth daily. 100 capsule 3     CLARITIN 10 mg tablet Take 1 tablet (10 mg total) by mouth daily. 90 tablet 3     conjugated estrogens (PREMARIN) vaginal cream Insert 0.5 g into the vagina daily as needed. 30 g 5     diclofenac sodium (VOLTAREN) 1 % Gel Apply to affected areas 1-3 times a day, as needed. 1 Tube 0     fluticasone (FLOVENT HFA) 110 mcg/actuation inhaler Inhale 2 puffs daily. 1 Inhaler 2     ibuprofen (ADVIL,MOTRIN) 600 MG tablet Take 1 tablet (600 mg total) by mouth every 8 (eight) hours as needed for pain (take with food). 50  tablet 0     inhalational spacing device Spcr Chamber style spacer. Use with ihalers 1 each 0     ketotifen (ZADITOR/ZYRTEC ITCHY EYES) 0.025 % (0.035 %) ophthalmic solution Administer 1 drop to both eyes 2 (two) times a day as needed. 10 mL 1     lancets (ONETOUCH DELICA LANCETS) 33 gauge Misc 1 each by In Vitro route once daily. 100 each 2     methyl salicylate-menthol 29-7.6 % Oint Apply to affected areas twice daily, as needed for pain. 99.2 g 3     metroNIDAZOLE (METROGEL) 0.75 % gel Apply to face and rub in twice daily. 45 g 0     omeprazole (PRILOSEC) 20 MG capsule Take 1 capsule (20 mg total) by mouth daily before breakfast. 90 capsule 3     polyethylene glycol 3350,bulk, Powd Take 17 g by mouth daily as needed. MIX 17 GRAMS IN 8 OUNCES OF WATER AND DRINK ONCE DAILY 500 mL 3     polyvinyl alcohol (ARTIFICIAL TEARS, POLYVIN ALC,) 1.4 % ophthalmic solution Administer 1 drop to both eyes as needed for dry eyes. Instill 1-2 drops each eye 15 mL 2     prednisoLONE acetate (PRED-FORTE) 1 % ophthalmic suspension 1 DROP INTO BOTH EYES TWICE A DAY  0     traZODone (DESYREL) 50 MG tablet Take 50 mg by mouth at bedtime.  5     venlafaxine (EFFEXOR-XR) 75 MG 24 hr capsule Take 75 mg by mouth daily.       No current facility-administered medications for this visit.

## 2021-05-27 NOTE — PROGRESS NOTES
Office Visit  [facility]  Date of Service: 03/26/2019      Subjective   Kevin Thrasher is a 53 y.o. female who presents for Follow-up    Newly diagnosed diabetes  A1c 6.8% 2/26/19.  Previously prediabetic  Saw diabetes educator for initial visit.  Has checked blood sugar twice now. Normal range.  Motivated to modify diet and lose weight.    High cholesterol  Recently started atorvastatin.    LDL was 175.  Diabetic.  Lots of questions today about dietary modification.    Low vitamin D  Has started supplements    Reviewed occult blood test which was negative.  Reviewed Pap smear which was normal.  She is okay with scheduling a mammogram and would like for me to call her to get that scheduled.    High blood pressure  Wants prescription for lood pressure cuff  Wants to know how to lower blood pressure with diet and exercise.      Objective   /82 (Patient Site: Right Arm, Patient Position: Sitting, Cuff Size: Adult Regular)   Pulse (!) 54   Resp 16   Wt 148 lb (67.1 kg)   BMI 32.60 kg/m      reports that  has never smoked. she has never used smokeless tobacco.  BP Readings from Last 6 Encounters:   03/26/19 136/82   02/26/19 (!) 140/100   12/11/18 118/60   09/26/18 156/78   04/17/18 120/80   04/13/18 138/74       Gen: Alert, no apparent distress.  Heart: Regular rate and rhythm, no murmurs.  Lungs: Clear to auscultation bilaterally, no increased work of breathing.  Abdomen: Soft, non-tender, non-distended, bowel sounds normal.  Extremities: No clubbing, cyanosis, edema.    Results for orders placed or performed in visit on 03/07/19   Occult Blood(ICT)   Result Value Ref Range    Fecal Occult Bld (ICT) 1 Negative Negative    Fecal Occult Blood (ICT) 2 Negative Negative    Fecal Occult Blood (ICT) 3 Negative Negative     No results found.    Assessment & Plan     1. Type 2 diabetes, diet controlled, well controlled.  New diagnosis.  Referral made for ophthalmology.  Will need  microalbumin to creatinine ratio at next visit.  Already saw diabetes educator and monitoring blood sugars.  2. Hyperlipidemia.  Discussed dietary modification, weight loss.  Continue atorvastatin.  3. Low vitamin D.  Continue supplement.  4. Health maintenance.  Okay with scheduling mammogram.  Would like a call.  5. Hypertension, uncontrolled.  Blood pressure still little bit high today, improving.  Discussed diet and lifestyle modification.  Prescription sent in for blood pressure cuff.    Order Summary                                                      1. Type 2 diabetes mellitus without complication, without long-term current use of insulin (H)  Ambulatory referral to Ophthalmology    Microalbumin, Random Urine   2. Visit for screening mammogram  Mammo Screening Bilateral   3. Essential hypertension  miscellaneous medical supply (BLOOD PRESSURE CUFF) Misc   4. Pure hypercholesterolemia     5. Obesity (BMI 30.0-34.9)     6. Atypical squamous cells of undetermined significance on cytologic smear of cervix (ASC-US)     7. Vitamin D deficiency        No future appointments.    Completed by: Ema Gentile M.D., [facility]. 3/26/2019 4:55 PM.  This transcription uses voice recognition software, which may contain typographical errors.

## 2021-05-27 NOTE — PROGRESS NOTES
Assessment: pt seen today for DM education.  is present. Pt is newly diagnosed. She does not know of any family members that have DM and notes most of them are back in [place]. Discussed DM diagnosis, risks of high BG, complications etc. Reviewed BG and A1c goals. Pt was given a one touch meter and was able to check BG w/o difficulty. BG was 106 mg/dl in clinic, 2 hours after lunch.   Pt typically eats 3 meals/day. Meals usually consist of rice, non starchy vegetables and a meat. She does eat potatoes and corn as well. Reviewed DM meal plan, cho foods and portions. Pt seemed to understand well. She usually only drinks water and some juice occasionally.   Discussed the importance of exercise. Pt states she typically goes to the [facility] about 1x/week however since diagnosis she has been going 2x/week. Encouraged pt to continue to work on this and increase as she can.     Plan: check BG 1x/day rotating before and 2 hours after meals. DM meal plan, keep rice or noodles to 1 cup. Fruit for snacks in between meals rather than right after meals. Work on increasing daily activity.     Subjective and Objective:      Kevin Thrasher is referred by Dr. Gentile for Diabetes Education.     Lab Results   Component Value Date    HGBA1C 6.8 (H) 02/26/2019       Current diabetes medications:  none      Follow up:   CDE (certified diabetic educator) pt states she would like to f/u PRN. Discussed calling if she has any concerns or if her BG are out of range more often than not.       Education:     Monitoring   Meter (per above goals): Assessed, Discussed, Literature provided and Patient returned demonstration  Monitoring: Assessed, Discussed and Literature provided  BG goals: Discussed and Literature provided    Nutrition Management  Nutrition Management: Assessed, Discussed and Literature provided  Weight: Discussed  Portions/Balance: Assessed, Discussed and Literature provided  Carb ID/Count: Assessed, Discussed and Literature  provided  Label Reading: Assessed and Discussed  Heart Healthy Fats: Assessed, Discussed and Literature provided  Menu Planning: Assessed and Discussed  Dining Out: Assessed and Discussed  Physical Activity: Assessed and Discussed  Medications: Discussed      Diabetes Disease Process: Discussed    Acute Complications: Prevent, Detect, Treat:  Hypoglycemia: Discussed  Hyperglycemia: Assessed and Discussed  Sick Days: Discussed  Driving: Not addressed    Chronic Complications  Foot Care:Discussed  Skin Care: Not addressed  Eye: Needs instruction/review at follow-up  ABC: Assessed and Discussed  Teeth:Discussed  Goal Setting and Problem Solving: Assessed and Discussed  Barriers: Assessed and Discussed  Psychosocial Adjustments: Assessed and Discussed      Time spent with the patient: 60 minutes for diabetes education and counseling.   Previous Education: no  Visit Type:JC Barlow  3/25/2019

## 2021-05-27 NOTE — TELEPHONE ENCOUNTER
RN cannot approve Refill Request    RN can NOT refill this medication med is not covered by policy/route to provider     . Last office visit: 9/19/2016 Ema Gentile MD Last Physical: 2/26/2019 Last MTM visit: Visit date not found Last visit same specialty: 12/11/2018 Dianna Casey PA-C.  Next visit within 3 mo: Visit date not found  Next physical within 3 mo: Visit date not found      Laverne Deluna, [facility] Triage/Med Refill 3/26/2019    Requested Prescriptions   Pending Prescriptions Disp Refills     ibuprofen (ADVIL,MOTRIN) 600 MG tablet [Pharmacy Med Name: IBUPROFEN 600 MG TABLET] 100 tablet 3     Sig: TAKE 1 TABLET BY MOUTH EVERY 8 HOURS AS NEEDED FOR PAIN **TAKE WITH FOOD**    There is no refill protocol information for this order        diclofenac sodium (VOLTAREN) 1 % Gel [Pharmacy Med Name: DICLOFENAC SODIUM 1% GEL] 100 g 0     Sig: APPLY TOPCAILLY TO THE AFFECTED AREA(S) 1-3 TIMES A DAY, AS NEEDED.    There is no refill protocol information for this order

## 2021-05-27 NOTE — PATIENT INSTRUCTIONS - HE
Recommendations from today's MTM visit:                                                    MTM (medication therapy management) is a service provided by a clinical pharmacist designed to help you get the most of out of your medicines. and Today we reviewed what your medicines are for, how to know if they are working, that your medicines are safe and how to make your medicine regimen as easy as possible.     1. Stop Omeprazole. Use Ranitidine 150 mg up to twice daily as needed     2. Stop Flovent Inhaler and use Montelukast 10 mg daily for asthma and allergies     3. I sent refills of Acetaminophen     4. Ask your psychiatrist if Duloxetine might be an appropriate alternative to Venlafaxine to help with mood and sleep.     Next MTM visit: as needed     To schedule another MTM appointment, please call the clinic directly or you may call the MTM scheduling line at [phone] or toll-free at [phone].     My Clinical Pharmacist's contact information:                                                      It was a pleasure seeing you today!  Please feel free to contact me with any questions or concerns you have.      Kenton Jose, PharmD  Medication Therapy Management (MTM) Pharmacist  [facility]       You may receive a survey about the MTM services you received by email and/or US Mail.  I would appreciate your feedback to help me serve you better in the future. Your comments will be anonymous.

## 2021-05-27 NOTE — PROGRESS NOTES
CALLED WITH THE ll SHE NEEDS TO TALK TO HER DAUGHTER PRIOR TO SCHEDULING SINCE SHE WATCHES HER KIDS

## 2021-05-28 ASSESSMENT — ASTHMA QUESTIONNAIRES
ACT_TOTALSCORE: 23
ACT_TOTALSCORE: 24
ACT_TOTALSCORE: 23

## 2021-05-28 NOTE — TELEPHONE ENCOUNTER
Unable to LM at [phone] due to no answer and vm not set up.  Postponing out 1 week and sending out letter and will try again if an appointment hasn't been made.

## 2021-05-28 NOTE — TELEPHONE ENCOUNTER
Please help Kevin make a diabetes check appt for Stephanie. Newly diagnosed diabetes, needs follow up.

## 2021-05-29 NOTE — TELEPHONE ENCOUNTER
ATTEMPTED 2ND CALL, PHONE IS UNREACHABLE. PT HAVE NOT RESPOND TO LETTER AND NO FUTURE APPT SCHEDULE.

## 2021-05-29 NOTE — TELEPHONE ENCOUNTER
RN cannot approve Refill Request    RN can NOT refill this medication med is not covered by policy/route to provider. Last office visit: 3/26/2019 Ema Gentile MD Last Physical: 2/26/2019 Last MTM visit: Visit date not found Last visit same specialty: 3/26/2019 Ema Gentile MD.  Next visit within 3 mo: Visit date not found  Next physical within 3 mo: Visit date not found      Carla Leyva, [facility] Triage/Med Refill 6/4/2019    Requested Prescriptions   Pending Prescriptions Disp Refills     predniSONE (DELTASONE) 20 MG tablet [Pharmacy Med Name: PREDNISONE 20 MG TABLET] 10 tablet 0     Sig: TAKE 2 TABLETS BY MOUTH DAILY FOR 5 DAYS       There is no refill protocol information for this order

## 2021-05-29 NOTE — TELEPHONE ENCOUNTER
Unable to LM at [phone] due to VM not set up yet . Sending letter out and will try again next week if appt has not been made.

## 2021-05-30 VITALS — BODY MASS INDEX: 35.11 KG/M2 | WEIGHT: 158 LBS

## 2021-05-30 NOTE — TELEPHONE ENCOUNTER
Medication Question or Clarification  Who is calling: Pharmacy: [facility]  What medication are you calling about? (include dose and sig)   lancets (ONETOUCH DELICA LANCETS) 33 gauge Misc 100 each 2 3/25/2019     Sig - Route: 1 each by In Vitro route once daily. - In Vitro    Sent to pharmacy as: lancets (ONETOUCH DELICA LANCETS) 33 gauge Misc    Cosign for Ordering: Accepted by Ema Gentile MD on 3/25/2019  4:18 PM    E-Prescribing Status: Receipt confirmed by pharmacy (3/25/2019  4:16 PM CDT)        Who prescribed the medication?: Ema Gentile MD    What is your question/concern?: Fax received from pharmacy, the above brand of diabetic supplies are not covered by the insurance plan.    Please send new RX for Meter, Test Strips, and Lancets for one of the covered brands    Covered Brands: Accu-chek      Pharmacy: [facility] [address]  Okay to leave a detailed message?: No  Site CMT - Please call the pharmacy to obtain any additional needed information.

## 2021-05-31 VITALS — HEIGHT: 57 IN | WEIGHT: 150 LBS | BODY MASS INDEX: 32.36 KG/M2

## 2021-05-31 VITALS — WEIGHT: 153 LBS | BODY MASS INDEX: 34 KG/M2

## 2021-05-31 VITALS — BODY MASS INDEX: 32.58 KG/M2 | WEIGHT: 151 LBS | HEIGHT: 57 IN

## 2021-05-31 NOTE — TELEPHONE ENCOUNTER
RN cannot approve Refill Request: Montelukast    RN can NOT refill this medication med is not covered by policy/route to provider. Last office visit: 3/26/2019 Ema Gentile MD Last Physical: 2/26/2019 Last MTM visit: Visit date not found Last visit same specialty: 3/26/2019 Ema Gentile MD.  Next visit within 3 mo: Visit date not found  Next physical within 3 mo: Visit date not found      Isabel RISHI Zambrano, [facility] Triage/Med Refill 8/13/2019    Requested Prescriptions   Pending Prescriptions Disp Refills     ranitidine (ZANTAC) 150 MG tablet [Pharmacy Med Name: RANITIDINE 150 MG TABLET] 60 tablet 3     Sig: TAKE 1 TABLET BY MOUTH TWICE A DAY       GI Medications Refill Protocol Passed - 8/12/2019  2:30 PM        Passed - PCP or prescribing provider visit in last 12 or next 3 months.     Last office visit with prescriber/PCP: 3/26/2019 Ema Gentile MD OR same dept: 3/26/2019 Ema Gentile MD OR same specialty: 3/26/2019 Ema Gentile MD  Last physical: 2/26/2019 Last MTM visit: Visit date not found   Next visit within 3 mo: Visit date not found  Next physical within 3 mo: Visit date not found  Prescriber OR PCP: Ema Gentile MD  Last diagnosis associated with med order: 1. Epigastric pain  - ranitidine (ZANTAC) 150 MG tablet [Pharmacy Med Name: RANITIDINE 150 MG TABLET]; TAKE 1 TABLET BY MOUTH TWICE A DAY  Dispense: 60 tablet; Refill: 3    2. Environmental allergies  - montelukast (SINGULAIR) 10 mg tablet [Pharmacy Med Name: MONTELUKAST SOD 10 MG TABLET]; TAKE 1 TABLET BY MOUTH EVERYDAY AT BEDTIME  Dispense: 30 tablet; Refill: 3    3. Mild intermittent asthma without complication  - montelukast (SINGULAIR) 10 mg tablet [Pharmacy Med Name: MONTELUKAST SOD 10 MG TABLET]; TAKE 1 TABLET BY MOUTH EVERYDAY AT BEDTIME  Dispense: 30 tablet; Refill: 3    If protocol passes may refill for 12 months if within 3 months of last provider visit (or a total of 15 months).             montelukast  (SINGULAIR) 10 mg tablet [Pharmacy Med Name: MONTELUKAST SOD 10 MG TABLET] 30 tablet 3     Sig: TAKE 1 TABLET BY MOUTH EVERYDAY AT BEDTIME       There is no refill protocol information for this order        Refill Approved: Ranitidine    Rx renewed per Medication Renewal Policy. Medication was last renewed on 3/26/2019 with 3 refills.#60 each  Last office visit: 3/26/2019 with PCP Dr RISHI Gentile.     Isabel Zambrano, [facility] Triage/Med Refill 8/13/2019     Requested Prescriptions   Pending Prescriptions Disp Refills     ranitidine (ZANTAC) 150 MG tablet [Pharmacy Med Name: RANITIDINE 150 MG TABLET] 60 tablet 3     Sig: TAKE 1 TABLET BY MOUTH TWICE A DAY       GI Medications Refill Protocol Passed - 8/12/2019  2:30 PM        Passed - PCP or prescribing provider visit in last 12 or next 3 months.     Last office visit with prescriber/PCP: 3/26/2019 Ema Gentile MD OR same dept: 3/26/2019 Ema Gentile MD OR same specialty: 3/26/2019 Ema Gentile MD  Last physical: 2/26/2019 Last MTM visit: Visit date not found   Next visit within 3 mo: Visit date not found  Next physical within 3 mo: Visit date not found  Prescriber OR PCP: Ema Gentile MD  Last diagnosis associated with med order: 1. Epigastric pain  - ranitidine (ZANTAC) 150 MG tablet [Pharmacy Med Name: RANITIDINE 150 MG TABLET]; TAKE 1 TABLET BY MOUTH TWICE A DAY  Dispense: 60 tablet; Refill: 3    2. Environmental allergies  - montelukast (SINGULAIR) 10 mg tablet [Pharmacy Med Name: MONTELUKAST SOD 10 MG TABLET]; TAKE 1 TABLET BY MOUTH EVERYDAY AT BEDTIME  Dispense: 30 tablet; Refill: 3    3. Mild intermittent asthma without complication  - montelukast (SINGULAIR) 10 mg tablet [Pharmacy Med Name: MONTELUKAST SOD 10 MG TABLET]; TAKE 1 TABLET BY MOUTH EVERYDAY AT BEDTIME  Dispense: 30 tablet; Refill: 3    If protocol passes may refill for 12 months if within 3 months of last provider visit (or a total of 15 months).             montelukast  (SINGULAIR) 10 mg tablet [Pharmacy Med Name: MONTELUKAST SOD 10 MG TABLET] 30 tablet 3     Sig: TAKE 1 TABLET BY MOUTH EVERYDAY AT BEDTIME       There is no refill protocol information for this order

## 2021-05-31 NOTE — TELEPHONE ENCOUNTER
Please schedule a physical with mammogram.  Also due for diabetes follow up.    No future appointments.  Health Maintenance Due   Topic Date Due     HEPATITIS C SCREENING  1965     PREVENTIVE CARE VISIT  1965     MAMMOGRAM  1965     DIABETES FOLLOW-UP  1965     DIABETES FOOT EXAM  06/15/1973     DIABETES OPHTHALMOLOGY EXAM  06/15/1973     DIABETES URINE MICROALBUMIN  06/15/1973     HIV SCREENING  06/15/1978     INFLUENZA VACCINE RULE BASED (1) 08/01/2019     DIABETES HEMOGLOBIN A1C  08/26/2019     BP Readings from Last 3 Encounters:   03/26/19 136/82   02/26/19 (!) 140/100   12/11/18 118/60

## 2021-06-01 VITALS — BODY MASS INDEX: 33.04 KG/M2 | WEIGHT: 150 LBS

## 2021-06-01 VITALS — BODY MASS INDEX: 33.3 KG/M2 | WEIGHT: 151.19 LBS

## 2021-06-01 NOTE — TELEPHONE ENCOUNTER
Central PA team  [phone]  Pool: HE PA MED ([id number])          PA has been initiated.       PA form completed and faxed insurance via Cover My Meds     Key:  [id number]       Medication:  diclofenac sodium (VOLTAREN) 1 % Gel    Insurance:  [employer]         Response will be received via fax and may take up to 5-10 business days depending on plan

## 2021-06-01 NOTE — TELEPHONE ENCOUNTER
RN cannot approve Refill Request    RN can NOT refill this medication med is not covered by policy/route to provider. Last office visit: 3/26/2019 Ema Gentile MD Last Physical: 2/26/2019 Last MTM visit: Visit date not found Last visit same specialty: 3/26/2019 Ema Gentile MD.  Next visit within 3 mo: Visit date not found  Next physical within 3 mo: Visit date not found      Fiona Chery, [facility] Triage/Med Refill 9/14/2019    Requested Prescriptions   Pending Prescriptions Disp Refills     diclofenac sodium (VOLTAREN) 1 % Gel 100 g 3     Sig: APPLY TOPICALLY TO THE AFFECTED AREA(S) 1-3 TIMES A DAY, AS NEEDED.       There is no refill protocol information for this order

## 2021-06-01 NOTE — PROGRESS NOTES
Subjective:    Kevin Thrasher is a 54 y.o. female who presents for evaluation of blood pressure check.    1.  Hypertension.  She is monitoring this with Dr. Gentile.  No medicines started yet.  She was requested to follow-up with us because her last blood pressure was not at goal.  She says when she checks her blood pressures at home systolic numbers usually in the 140s to 150s.  She is Postmenopausal.  BP Readings from Last 3 Encounters:   09/18/19 118/82   03/26/19 136/82   02/26/19 (!) 140/100     2.  Type 2 diabetes.  She is not taking any diabetes medicines presently.  She sometimes checks her sugars.  When she does the numbers are generally in the 80s to 100s.  She says occasionally she gets a sugar that is around the 70s and she feels tired.  We reviewed that when that happens she could have some juice or fruit and monitor her symptoms.    3.  Mild intermittent asthma.  Generally well controlled.  Using Flovent only periodically as needed when allergy season.  Has albuterol to use as needed.  The past, she has had a prescription for prednisone to use for emergencies.  She says that if she starts feeling wheezy, she usually takes half a pill of the prednisone tablet and that she feels better.  She estimates she only takes the prednisone (half a pill?)  about once every 2 to 3 months.      Patient Active Problem List   Diagnosis     Obesity (BMI 30.0-34.9)     Abnormal Pap smear of cervix     Hyperlipidemia     Dry skin dermatitis     Mild intermittent asthma without complication     Low Back Pain     Environmental allergies     Fatty liver     Shrimp allergy     Anxiety     Lumbosacral radiculopathy at S1     Infertility      Elevated bilirubin     Vitamin D deficiency     Type 2 diabetes mellitus without complication, without long-term current use of insulin (H)     HTN (hypertension)       Current Outpatient Medications:      acetaminophen (PAIN AND FEVER) 500 MG tablet, Take 1 tablet (500 mg total) by mouth every 6  (six) hours as needed for pain., Disp: 100 tablet, Rfl: 4     albuterol (PROAIR HFA;PROVENTIL HFA;VENTOLIN HFA) 90 mcg/actuation inhaler, Inhale 2 puffs every 4 (four) hours as needed for wheezing or shortness of breath., Disp: 8.5 g, Rfl: 1     atorvastatin (LIPITOR) 10 MG tablet, Take 1 tablet (10 mg total) by mouth at bedtime., Disp: 90 tablet, Rfl: 4     blood glucose test strips, TEST ONCE DAILY. Dispense brand per patient's insurance at pharmacy discretion., Disp: 100 strip, Rfl: 5     blood-glucose meter Misc, TEST ONCE DAILY., Disp: 1 each, Rfl: 0     cholecalciferol, vitamin D3, (VITAMIN D3) 1,000 unit capsule, Take 1 capsule (1,000 Units total) by mouth daily., Disp: 100 capsule, Rfl: 3     CLARITIN 10 mg tablet, Take 1 tablet (10 mg total) by mouth daily., Disp: 90 tablet, Rfl: 3     conjugated estrogens (PREMARIN) vaginal cream, Insert 0.5 g into the vagina daily as needed., Disp: 30 g, Rfl: 5     diclofenac sodium (VOLTAREN) 1 % Gel, APPLY TOPICALLY TO THE AFFECTED AREA(S) 1-3 TIMES A DAY, AS NEEDED., Disp: 100 g, Rfl: 3     FLOVENT  mcg/actuation inhaler, INHALE 2 PUFFS DAILY., Disp: 1 Inhaler, Rfl: 2     generic lancets (FINGERSTIX LANCETS), TEST ONCE DAILY. Dispense brand per patient's insurance at pharmacy discretion., Disp: 100 each, Rfl: 5     ibuprofen (ADVIL,MOTRIN) 600 MG tablet, TAKE 1 TABLET BY MOUTH EVERY 8 HOURS AS NEEDED FOR PAIN **TAKE WITH FOOD**, Disp: 100 tablet, Rfl: 3     inhalational spacing device Spcr, Chamber style spacer. Use with ihalers, Disp: 1 each, Rfl: 0     ketotifen (ZADITOR/ZYRTEC ITCHY EYES) 0.025 % (0.035 %) ophthalmic solution, PLACE 1 DROP TO BOTH EYES 2 TIMES A DAY AS NEEDED., Disp: 5 mL, Rfl: 6     methyl salicylate-menthol 29-7.6 % Oint, Apply to affected areas twice daily, as needed for pain., Disp: 99.2 g, Rfl: 3     metroNIDAZOLE (METROGEL) 0.75 % gel, Apply to face and rub in twice daily., Disp: 45 g, Rfl: 0     montelukast (SINGULAIR) 10 mg tablet,  TAKE 1 TABLET BY MOUTH EVERYDAY AT BEDTIME, Disp: 30 tablet, Rfl: 3     polyethylene glycol 3350,bulk, Powd, Take 17 g by mouth daily as needed. MIX 17 GRAMS IN 8 OUNCES OF WATER AND DRINK ONCE DAILY, Disp: 500 mL, Rfl: 3     polyvinyl alcohol (ARTIFICIAL TEARS, POLYVIN ALC,) 1.4 % ophthalmic solution, Administer 1 drop to both eyes as needed for dry eyes. Instill 1-2 drops each eye, Disp: 15 mL, Rfl: 2     prednisoLONE acetate (PRED-FORTE) 1 % ophthalmic suspension, 1 DROP INTO BOTH EYES TWICE A DAY, Disp: , Rfl: 0     ranitidine (ZANTAC) 150 MG tablet, Take 1 tablet (150 mg total) by mouth 2 (two) times a day., Disp: 180 tablet, Rfl: 1     traZODone (DESYREL) 50 MG tablet, Take 50 mg by mouth at bedtime., Disp: , Rfl: 5     venlafaxine (EFFEXOR-XR) 75 MG 24 hr capsule, Take 75 mg by mouth daily., Disp: , Rfl:      lisinopril (PRINIVIL,ZESTRIL) 10 MG tablet, Take 1 tablet (10 mg total) by mouth daily., Disp: 90 tablet, Rfl: 3     predniSONE (DELTASONE) 20 MG tablet, Take 40 mg by mouth daily for 5 days As needed for asthma flare.., Disp: 10 tablet, Rfl: 0     Objective:   Allergies:  Other drug allergy (see comments) and Shellfish derived    Vitals:  Vitals:    09/18/19 0839   BP: 118/82   Patient Site: Left Arm   Patient Position: Sitting   Cuff Size: Adult Regular   Pulse: 62   Resp: 16   Weight: 139 lb 4 oz (63.2 kg)     Body mass index is 30.67 kg/m .    Vital signs reviewed.  General: Patient is alert and oriented x 3, in no apparent distress  Ears: TMs are non-erythematous with good light reflex bilaterally  Throat: no erythema, edema or exudate noted  Lymphatic: no anterior cervical lymph node enlargement  Cardiac: regular rate and rhythm, no murmurs  Pulmonary: lungs clear to auscultation bilaterally, no crackles, rales, rhonchi, or wheezing noted  Musculoskeletal: No lower extremity edema noted bilaterally, right upper extremity is healthy and normal, mild pain with palpation of right humerus and upper arm  muscles, normal strength in right upper extremity  Diabetic Foot Exam:  Normal Exam.  Normal pedal pulses bilaterally.  Skin appears healthy and without significant injury.  Sensation is intact to monofilament bilaterally.      Results for orders placed or performed in visit on 09/18/19   Glycosylated Hemoglobin A1c   Result Value Ref Range    Hemoglobin A1c 6.1 (H) 3.5 - 6.0 %   Labs pending.    Assessment and Plan:   1. Essential hypertension  History of elevated blood pressures.  Blood pressure today is normal.  We had discussed monitoring and recheck in 1 month versus starting a low-dose of blood pressure medicine.  Patient would like to start medicine.  I reviewed with her that this medicine will also be beneficial for her kidney function diabetes.  Previous BMP grossly normal, I will follow-up with today's labs.  - lisinopril (PRINIVIL,ZESTRIL) 10 MG tablet; Take 1 tablet (10 mg total) by mouth daily.  Dispense: 90 tablet; Refill: 3  - Basic Metabolic Panel    2. Type 2 diabetes mellitus without complication, without long-term current use of insulin (H)  A1c: 6.1%, at goal, improved from 6.8% in February 2019  Eye Exam: overdue?  Can't find record in chart  Foot Exam: Done today, normal  Smoking: No  On a statin: Yes  Blood Pressure: Well-controlled today  Microalbumin: Ordered today, results pending  On ACE inhibitor: Yes, started today  She is not currently taking any medicines for diabetes.  I will follow-up with screening labs.  Plan on having her return to see Dr. Gentile for follow-up in 3 months, sooner if concerns.  - lisinopril (PRINIVIL,ZESTRIL) 10 MG tablet; Take 1 tablet (10 mg total) by mouth daily.  Dispense: 90 tablet; Refill: 3  - Glycosylated Hemoglobin A1c  - Basic Metabolic Panel  - Microalbumin, Random Urine    3. Mild intermittent asthma without complication  Generally well controlled.  Uses Flovent primarily during allergy season.  Has albuterol inhaler to use as needed.  She requested  prescription for prednisone to have in case of worsening symptoms.  She will only use this if needed.  She has done this before with her PCP.  - predniSONE (DELTASONE) 20 MG tablet; Take 40 mg by mouth daily for 5 days As needed for asthma flare..  Dispense: 10 tablet; Refill: 0    4. Pain of right upper arm  Off-and-on mild pain of the right upper arm.  She states that Dr. Rutledge has provided her with an Ace wrap in the past, which is helped her pain a lot.  Ace wrap provided for patient today.    5. Health care maintenance  Ordered today, as recommended per screening guidelines.  I will follow-up with results.  - HIV Antigen/Antibody Screening Cascade  - Hepatitis C Antibody (Anti-HCV)    This dictation uses voice recognition software, which may contain typographical errors.

## 2021-06-01 NOTE — TELEPHONE ENCOUNTER
Refill Approved    Rx renewed per Medication Renewal Policy. Medication was last renewed on 2/26/19.    Laverne Deluna, [facility] Triage/Med Refill 9/12/2019     Requested Prescriptions   Pending Prescriptions Disp Refills     ketotifen (ZADITOR/ZYRTEC ITCHY EYES) 0.025 % (0.035 %) ophthalmic solution [Pharmacy Med Name: KETOTIFEN FUM 0.025% EYE DROPS] 5 mL 3     Sig: PLACE 1 DROP TO BOTH EYES 2 TIMES A DAY AS NEEDED.       Opthalmic Allergy Drops Refill Protocol Passed - 9/12/2019  1:34 PM        Passed - Patient has had office visit/physical in last 12 months     Last office visit with prescriber/PCP: 3/26/2019 Ema Gentile MD OR same dept: 3/26/2019 Ema Gentile MD OR same specialty: 3/26/2019 Ema Gentile MD  Last physical: 2/26/2019 Last MTM visit: Visit date not found   Next visit within 3 mo: Visit date not found  Next physical within 3 mo: Visit date not found  Prescriber OR PCP: Ema Gentile MD  Last diagnosis associated with med order: 1. Environmental allergies  - ketotifen (ZADITOR/ZYRTEC ITCHY EYES) 0.025 % (0.035 %) ophthalmic solution [Pharmacy Med Name: KETOTIFEN FUM 0.025% EYE DROPS]; PLACE 1 DROP TO BOTH EYES 2 TIMES A DAY AS NEEDED.  Dispense: 5 mL; Refill: 3    2. Mild intermittent asthma without complication  - FLOVENT  mcg/actuation inhaler [Pharmacy Med Name: FLOVENT  MCG INHALER]; INHALE 2 PUFFS DAILY.  Dispense: 12 Inhaler; Refill: 2    If protocol passes may refill for 12 months if within 3 months of last provider visit (or a total of 15 months).             FLOVENT  mcg/actuation inhaler [Pharmacy Med Name: FLOVENT  MCG INHALER] 12 Inhaler 2     Sig: INHALE 2 PUFFS DAILY.       There is no refill protocol information for this order

## 2021-06-01 NOTE — TELEPHONE ENCOUNTER
Medication Question or Clarification  Who is calling: Pharmacy[facility]  What medication are you calling about? (include dose and sig)   Disp Refills Start End     diclofenac sodium (VOLTAREN) 1 % Gel 100 g 3 9/16/2019     Sig: APPLY TOPICALLY TO THE AFFECTED AREA(S) 1-3 TIMES A DAY, AS NEEDED.    Sent to pharmacy as: diclofenac sodium (VOLTAREN) 1 % Gel    E-Prescribing Status: Receipt confirmed by pharmacy (9/16/2019 12:48 PM CDT)      Who prescribed the medication?: Ema Gentile MD   What is your question/concern?: Fax stated lucy's insurance does not cover the gel.    Alternatives: diclofenac tabs, indomethacin, ketoprofen, or meloxicam    Please advise if an alternative is appropriate or a PA should be done.    Insurance ID: [id number]  Ph[facility]: [id number]  Okay to leave a detailed message?: No  Site CMT - Please call the pharmacy to obtain any additional needed information.

## 2021-06-01 NOTE — TELEPHONE ENCOUNTER
Called pt, she stated that she is going out of town due to daughter having child. Wont be back until 4 weeks from now. Will call back and set up appt for physical.

## 2021-06-01 NOTE — TELEPHONE ENCOUNTER
"Attempt to call pt, Voice mail not set up unable to leave message.#1  \" Okay to relay message\"  "

## 2021-06-01 NOTE — TELEPHONE ENCOUNTER
Patient has a future appointment on Dec. 27, 2019 @ 4:20pm with Dr. Gentile for diabetic and BP follow up. Completing task.

## 2021-06-01 NOTE — TELEPHONE ENCOUNTER
RN cannot approve Refill Request    RN can NOT refill this medication medication not on med list.       Laverne Deluna, [facility] Triage/Med Refill 9/12/2019    Requested Prescriptions   Pending Prescriptions Disp Refills     FLOVENT  mcg/actuation inhaler [Pharmacy Med Name: FLOVENT  MCG INHALER] 12 Inhaler 2     Sig: INHALE 2 PUFFS DAILY.       There is no refill protocol information for this order      Signed Prescriptions Disp Refills    ketotifen (ZADITOR/ZYRTEC ITCHY EYES) 0.025 % (0.035 %) ophthalmic solution 5 mL 6     Sig: PLACE 1 DROP TO BOTH EYES 2 TIMES A DAY AS NEEDED.       Opthalmic Allergy Drops Refill Protocol Passed - 9/12/2019  1:34 PM        Passed - Patient has had office visit/physical in last 12 months     Last office visit with prescriber/PCP: 3/26/2019 Ema Gentile MD OR same dept: 3/26/2019 Ema Gentile MD OR same specialty: 3/26/2019 Ema Gentile MD  Last physical: 2/26/2019 Last MTM visit: Visit date not found   Next visit within 3 mo: Visit date not found  Next physical within 3 mo: Visit date not found  Prescriber OR PCP: Ema Gentile MD  Last diagnosis associated with med order: 1. Environmental allergies  - ketotifen (ZADITOR/ZYRTEC ITCHY EYES) 0.025 % (0.035 %) ophthalmic solution; PLACE 1 DROP TO BOTH EYES 2 TIMES A DAY AS NEEDED.  Dispense: 5 mL; Refill: 6    2. Mild intermittent asthma without complication  - FLOVENT  mcg/actuation inhaler [Pharmacy Med Name: FLOVENT  MCG INHALER]; INHALE 2 PUFFS DAILY.  Dispense: 12 Inhaler; Refill: 2    If protocol passes may refill for 12 months if within 3 months of last provider visit (or a total of 15 months).

## 2021-06-01 NOTE — TELEPHONE ENCOUNTER
Please contact patient and schedule follow-up visit.  I have renewed the medication for a limited time.  Health Maintenance Due   Topic Date Due     HEPATITIS C SCREENING  1965     PREVENTIVE CARE VISIT  1965     MAMMOGRAM  1965     DIABETES FOLLOW-UP  1965     DIABETES FOOT EXAM  06/15/1973     DIABETES OPHTHALMOLOGY EXAM  06/15/1973     DIABETES URINE MICROALBUMIN  06/15/1973     HIV SCREENING  06/15/1978     INFLUENZA VACCINE RULE BASED (1) 08/01/2019     DIABETES HEMOGLOBIN A1C  08/26/2019

## 2021-06-01 NOTE — TELEPHONE ENCOUNTER
----- Message from Dianna Casey PA-C sent at 9/22/2019  3:03 PM CDT -----  Her lab tests are normal.  Schedule f/u visit with Dr. Gentile in about 3 months.

## 2021-06-02 ENCOUNTER — RECORDS - HEALTHEAST (OUTPATIENT)
Dept: ADMINISTRATIVE | Facility: CLINIC | Age: 56
End: 2021-06-02

## 2021-06-02 VITALS — WEIGHT: 149 LBS | BODY MASS INDEX: 32.15 KG/M2 | HEIGHT: 57 IN

## 2021-06-02 VITALS — HEIGHT: 57 IN | BODY MASS INDEX: 32.79 KG/M2 | WEIGHT: 152 LBS

## 2021-06-02 VITALS — BODY MASS INDEX: 32.6 KG/M2 | WEIGHT: 148 LBS

## 2021-06-02 VITALS — WEIGHT: 151 LBS | BODY MASS INDEX: 33.26 KG/M2

## 2021-06-03 VITALS
HEART RATE: 62 BPM | RESPIRATION RATE: 16 BRPM | WEIGHT: 139.25 LBS | DIASTOLIC BLOOD PRESSURE: 82 MMHG | BODY MASS INDEX: 30.67 KG/M2 | SYSTOLIC BLOOD PRESSURE: 118 MMHG

## 2021-06-04 VITALS
SYSTOLIC BLOOD PRESSURE: 96 MMHG | DIASTOLIC BLOOD PRESSURE: 56 MMHG | HEART RATE: 69 BPM | BODY MASS INDEX: 28.22 KG/M2 | WEIGHT: 135 LBS | RESPIRATION RATE: 20 BRPM

## 2021-06-04 VITALS
TEMPERATURE: 97 F | HEIGHT: 57 IN | DIASTOLIC BLOOD PRESSURE: 87 MMHG | BODY MASS INDEX: 29.45 KG/M2 | WEIGHT: 136.5 LBS | SYSTOLIC BLOOD PRESSURE: 125 MMHG | RESPIRATION RATE: 18 BRPM | HEART RATE: 66 BPM

## 2021-06-05 VITALS
HEART RATE: 72 BPM | DIASTOLIC BLOOD PRESSURE: 82 MMHG | SYSTOLIC BLOOD PRESSURE: 143 MMHG | TEMPERATURE: 99.1 F | BODY MASS INDEX: 29.65 KG/M2 | WEIGHT: 137 LBS

## 2021-06-05 VITALS
OXYGEN SATURATION: 99 % | DIASTOLIC BLOOD PRESSURE: 75 MMHG | RESPIRATION RATE: 16 BRPM | HEART RATE: 76 BPM | SYSTOLIC BLOOD PRESSURE: 134 MMHG | BODY MASS INDEX: 29.65 KG/M2 | WEIGHT: 137 LBS

## 2021-06-05 VITALS
DIASTOLIC BLOOD PRESSURE: 76 MMHG | SYSTOLIC BLOOD PRESSURE: 136 MMHG | HEIGHT: 57 IN | HEART RATE: 59 BPM | WEIGHT: 137 LBS | BODY MASS INDEX: 29.56 KG/M2 | TEMPERATURE: 98.4 F

## 2021-06-05 VITALS
SYSTOLIC BLOOD PRESSURE: 114 MMHG | HEART RATE: 79 BPM | DIASTOLIC BLOOD PRESSURE: 66 MMHG | RESPIRATION RATE: 20 BRPM | WEIGHT: 135 LBS | BODY MASS INDEX: 29.12 KG/M2 | OXYGEN SATURATION: 98 % | HEIGHT: 57 IN | TEMPERATURE: 98.6 F

## 2021-06-06 NOTE — TELEPHONE ENCOUNTER
"Wrong number in chart.  Call placed to daughter Lana Avila.  Left message to call back #2.  \"okay to relay message\"  "

## 2021-06-06 NOTE — TELEPHONE ENCOUNTER
Called and spoke with Kevin Thrasher's daughter, Lana (on CTC). Message was given, Kevin Thrasher's daughter understood, no further questions. Scheduled patient for EDF on 02/24/2020.

## 2021-06-06 NOTE — TELEPHONE ENCOUNTER
New Appointment Needed  What is the reason for the visit:    Inpatient/ED Follow Up Appt Request  At what hospital or facility were you seen?: [facility] from 02/4 to 02/06 then was admitted at [facility] from 02/06/20 and discharged 02/17/20  What is the reason you were seen?: STROKE  What date were you admitted?: date: 02/4/20  What date were you discharged?: date: 02/17/20  What was the recommended timeframe for your follow up appointment?: 1 to 2 weeks  Provider Preference: PCP only  How soon do you need to be seen?: 1 to 2 weeks from 2/17/20  Waitlist offered?: No  Okay to leave a detailed message:  No Nurse from Facility Called

## 2021-06-06 NOTE — PROGRESS NOTES
[facility] Discharge Summary  Patient Name: Kevin Thrasher  Date: 6/2/2020  Referral Diagnosis: CVA  Referring provider: Micehlle Lott MD  Visit Diagnosis:   1. Weakness due to acute stroke (H)     2. Gait instability     3. Muscle weakness of left upper extremity         Goals:  Pt. will demonstrate/verbalize independence in self-management of condition in : 12 weeks  Pt. will be independent with home exercise program in : 6 weeks  Pt. will show improved balance for safer : ambulation;for household ambulation;for chores;for community ambulation;in 6 weeks  Pt. will improve posture : and demonstrate posture with minimal to no cuing;in standing;in walking;in 6 weeks  Pt. will be able to walk : 20 minutes;on even surfaces;for community mobility;with less difficulty;in 6 weeks  Patient will ascend / descend: stairs;with recipricol gait;with less difficulty;in 6 weeks  Patient will transfer: sit/stand;supine/sit;floor/stand;for in/out of bed;for in/out of chair;with less difficulty;in 6 weeks    No data recorded    Patient was seen for 2 visits from 3/9/2020 to 3/17/2020 with 0 missed appointments.  Patient did not attend any more visits due to Covid-19.  If she would like to resume therapy a referral will be obtained.    Therapy will be discontinued at this time.  The patient will need a new referral to resume.    Thank you for your referral.  Sahara Allen  6/2/2020  9:18 AM    [facility] Daily Progress     Patient Name: Kevin Thrasher  Date: 3/17/2020  Visit #: 2/12  PTA visit #:    Dates of POC:  3/9/20-5/8/20  Referral Diagnosis: CVA  Referring provider: Michelle Lott MD  Visit Diagnosis:     ICD-10-CM    1. Weakness due to acute stroke (H)  I63.9     R53.1    2. Gait instability  R26.81    3. Muscle weakness of left upper extremity  M62.81          Assessment:     Patient complained of her left leg weakness throughout session but was able to complete all exercises today.  Edu pt that  the strength may take time to return which is why she is in therapy to learn different exercises to help with this.    Patient is appropriate to continue with skilled physical therapy intervention, as indicated by initial plan of care.    Goal Status:  Pt. will demonstrate/verbalize independence in self-management of condition in : 12 weeks  Pt. will be independent with home exercise program in : 6 weeks  Pt. will show improved balance for safer : ambulation;for household ambulation;for chores;for community ambulation;in 6 weeks  Pt. will improve posture : and demonstrate posture with minimal to no cuing;in standing;in walking;in 6 weeks  Pt. will be able to walk : 20 minutes;on even surfaces;for community mobility;with less difficulty;in 6 weeks  Patient will ascend / descend: stairs;with recipricol gait;with less difficulty;in 6 weeks  Patient will transfer: sit/stand;supine/sit;floor/stand;for in/out of bed;for in/out of chair;with less difficulty;in 6 weeks    No data recorded    Plan / Patient Education:     Continue with initial plan of care.    Subjective:     Pain Rating:NA    I have been doing the exercises and feel they are getting a little easier  I did not have any problems with any of the exercises from last visit    Objective:   Functional limitations are described as occurring with:   ascending and descending stairs or curbs  balance  standing    transitional movements getting in  bed, chair, tub and car, getting out of  bed, chair, tub and car, sit to stand and sit to supine  walking        Past Medical History:   Diagnosis Date     Angular cheilitis 9/19/2016     Depression      Head Injury      Helicobacter pylori gastritis      HTN (hypertension) 3/26/2019     Hyperlipidemia     3/19: New diabetes, 10 year risk 6%. Start moderate intensity statin.      Infertility, female     eval at infertility clinic was normal.  declined evaluation. h/o ovarian hyperstimulation - did not result in  "pregnancy.      Menorrhagia      Obesity (BMI 30.0-34.9)      Pterygium      Shingles 2/9/2015     Type 2 diabetes mellitus without complication, without long-term current use of insulin (H) 2/26/2019    Dx 2019     Exercises:  Exercise #1: seated heel/toe raises  Comment #1: 10 bilateral  Exercise #2: seated LAQ  Comment #2: 10 bilateral  Exercise #3: seated marching  Comment #3: 10 bilateral  Exercise #4: feet together eyes open   **  Comment #4: 60\" x 2 , trial of eyes closed held secondary to LOB too quickly.  Exercise #5: Nu-step  seat 3, workload 3    3'  *  Comment #5: standing on airex eyes open,  stand by A, mild sway, 30 seconds  **  Exercise #6: heel and toe raises, 1 hand for support   X 20   *  Comment #6: 1 leg stance   R=13 seconds X 1,  L=4 seconds X 3   both with stand by min A    **  Exercise #7: picking up 8 cones in semi-[place] 1X with each hand,  stand by to min A  **  Comment #7: 8\" step toe tapping with stand by to min A, no hands  X 15 each  *  Exercise #8: 2 hands for support:  standing hip abd X 10  *  Comment #8: 2 hands for support: standing hip ext  X 10  *  Exercise #9: 2 hands for suppport:  standing hip flex  X 10  *  Comment #9: 2 hands for suppport:  knee flexion  X 10  *      Treatment Today     TREATMENT MINUTES COMMENTS   Evaluation     Self-care/ Home management     Manual therapy     Neuromuscular Re-education 15 **  See above or flow sheet   Therapeutic Activity     Therapeutic Exercises 15 *  See flow sheet or above   Gait training     Modality__________________                Total 30    Blank areas are intentional and mean the treatment did not include these items.       Sahara Allen, PT  3/17/2020    "

## 2021-06-06 NOTE — TELEPHONE ENCOUNTER
New Appointment Needed  What is the reason for the visit:    Inpatient/ED Follow Up Appt Request  At what hospital or facility were you seen?: [facility]  What is the reason you were seen?: stroke   What date were you admitted?: date: unknown   What date were you discharged?: date: unknown daughter says she was in 1 week and than tcu discharged on 2/17  What was the recommended timeframe for your follow up appointment?: 7 days   Provider Preference: PCP only  How soon do you need to be seen?: asap  Waitlist offered?: No  Okay to leave a detailed message:  Yes

## 2021-06-06 NOTE — TELEPHONE ENCOUNTER
Called and spoke with Kevin Thrasher's daughter, Lana (on CTC), . Scheduled patient for EDF on 02/24/2020.

## 2021-06-06 NOTE — PROGRESS NOTES
Office Visit  [facility]  Date of Service: 02/24/2020      Subjective   Kevin Thrasher is a 54 y.o. female who presents for Hospital Visit Follow Up    Kevin is here today for follow-up of hospitalization 2/3 through 2/6/2020.  She was hospitalized for an acute stroke due to infarction of the right lazara.    She is pleased to report that she has been doing well since hospital discharge. She is working with physical therapy and feels like she is getting improved strength in her hand and leg. She is now able to grab her walker in order to walk. She has persistent significant weakness in the left side.  No problems with speech.    Prior to her stroke, she reports that she was not taking her medications as prescribed.  She did not think that this would happen to her.  She was taking her blood pressure medication on an as-needed basis.  Her lisinopril was discontinued during her hospitalization due to lower blood pressures.  The patient has lost 17 pounds in the last year.  She is trying to eat better and take better care of herself.  Blood sugar was 95 this morning.    The patient would like a handicap parking permit so that she does not have to ambulate as far to get into stores.    ACT Total Score: 23    Objective   BP 96/56 (Patient Site: Left Arm, Patient Position: Sitting, Cuff Size: Adult Regular)   Pulse 69   Resp 20   Wt 135 lb (61.2 kg)   LMP 04/15/2017   BMI 28.22 kg/m     She reports that she has never smoked. She has never used smokeless tobacco.  Wt Readings from Last 12 Encounters:   02/24/20 135 lb (61.2 kg)   02/06/20 137 lb 12.8 oz (62.5 kg)   02/03/20 135 lb (61.2 kg)   02/02/20 135 lb (61.2 kg)   09/18/19 139 lb 4 oz (63.2 kg)   03/26/19 148 lb (67.1 kg)   02/26/19 152 lb (68.9 kg)   12/11/18 149 lb (67.6 kg)   09/26/18 151 lb (68.5 kg)   04/17/18 151 lb 3 oz (68.6 kg)   04/13/18 150 lb (68 kg)   08/25/17 151 lb (68.5 kg)       Gen: Alert, no apparent  distress.  Heart: Regular rate and rhythm, no murmurs.  Lungs: Clear to auscultation bilaterally, no increased work of breathing.  Abdomen: Soft, non-tender, non-distended, bowel sounds normal.  Extremities: The patient has decreased strength on the left upper extremity and left lower extremity.  Strength is 5-/5 throughout.  She walks with a walker.     No results found for this or any previous visit (from the past 24 hour(s)).     Assessment & Plan     1. Acute infarct of right lazara with residual weakness of the left side of her body. Handicap parking permit given. Patient has walker. She understands the importance of NOT stopping her medications and risk of recurrent stroke. She will remain active in her therapies. Continue lifelong ASA + plavix.  2. Type 2 diabetes, well-controlled without insulin, with complication of stroke.  Check hemoglobin A1c.  Continue current treatment.  Referral made for diabetes eye exam.  3. Hyperlipidemia.  Check lipids.  Continue atorvastatin 40 mg.  4. Hypertension.  Patient has had substantial weight loss in the last year.  She is now normotensive off of lisinopril.  She does not have nephropathy.  Discontinue lisinopril.  Patient will monitor blood pressures at home and call if systolic blood pressure greater than 130 or diastolic blood pressure greater than 80.  5. Refilled all medications.  Medications reconciled.  New medication list given.    Order Summary                                                      1. Acute infarct lazara, right (H)  aspirin 81 mg chewable tablet    clopidogreL (PLAVIX) 75 mg tablet   2. Type 2 diabetes mellitus without complication, without long-term current use of insulin (H)  Ambulatory referral to Ophthalmology    blood glucose test strips    generic lancets (FINGERSTIX LANCETS)    Glycosylated Hemoglobin A1c   3. Weakness due to acute stroke (H)     4. Visit for screening mammogram  Mammo Screening Bilateral   [facility]    6. Immunization due  Varicella Zoster, Recombinant Vaccine IM   7. Obesity (BMI 30.0-34.9)     8. Pure hypercholesterolemia  Lipid Cascade    atorvastatin (LIPITOR) 40 MG tablet   9. Essential hypertension  Comprehensive Metabolic Panel   10. Mild intermittent asthma without complication  albuterol (PROAIR HFA;PROVENTIL HFA;VENTOLIN HFA) 90 mcg/actuation inhaler    fluticasone propionate (FLOVENT HFA) 110 mcg/actuation inhaler    montelukast (SINGULAIR) 10 mg tablet    DISCONTINUED: montelukast (SINGULAIR) 10 mg tablet   11. Chronic bilateral low back pain with bilateral sciatica  acetaminophen (PAIN AND FEVER) 500 MG tablet   12. Environmental allergies  ketotifen (ZADITOR/ZYRTEC ITCHY EYES) 0.025 % (0.035 %) ophthalmic solution    montelukast (SINGULAIR) 10 mg tablet    DISCONTINUED: montelukast (SINGULAIR) 10 mg tablet   13. Malar rash  metroNIDAZOLE (METROGEL) 0.75 % gel   14. Chronic constipation  polyethylene glycol 3350,bulk, Powd   15. Dry eyes  polyvinyl alcohol (ARTIFICIAL TEARS, POLYVIN ALC,) 1.4 % ophthalmic solution   16. Gastroesophageal reflux disease without esophagitis  omeprazole (PRILOSEC) 20 MG capsule   17. Anxiety  venlafaxine (EFFEXOR-XR) 75 MG 24 hr capsule    traZODone (DESYREL) 50 MG tablet    DISCONTINUED: traZODone (DESYREL) 50 MG tablet   18. Routine general medical examination at a [facility] care facility  ergocalciferol, vitamin D2, 2,000 unit Tab      Future Appointments   Date Time Provider Department Center   3/9/2020  4:15 PM Mike Harris, PT [facility]   5/5/2020  9:15 AM [facility]       Completed by: Ema Gentile M.D., [facility]. 2/24/2020 8:57 AM.  This transcription uses voice recognition software, which may contain typographical errors.

## 2021-06-06 NOTE — TELEPHONE ENCOUNTER
Call NURSE AT FACILITY to assist with scheduling follow up (see below).  She's also due for health maintenance when she comes in.  Please schedule mammo, too.    No future appointments.  Health Maintenance Due   Topic Date Due     ASTHMA ACTION PLAN  1965     PREVENTIVE CARE VISIT  1965     MAMMOGRAM  1965     DIABETIC EYE EXAM  1965     ZOSTER VACCINES (1 of 2) 06/15/2013     ASTHMA CONTROL TEST  02/26/2020     PAP SMEAR  02/26/2020     HPV TEST  02/26/2020     FECAL OCCULT BLOOD/FIT ANNUAL COLON CANCER SCREENING  03/07/2020     BP Readings from Last 3 Encounters:   02/06/20 111/76   02/03/20 127/66   02/02/20 145/81

## 2021-06-06 NOTE — PATIENT INSTRUCTIONS - HE
"High Blood Pressure = Hypertension  Blood pressure goal is less than 130/80 - if it is more than this call Dr. Gentile [phone]    To lower blood pressure:  1. Eat 5 servings of fruit + veggies every day.  2. Eat low salt diet.  3. Exercise 30 minutes, most days.  4. Lose weight.    Shingles shot due  Check to see if \"Shingrix\" is covered by insurance.    "

## 2021-06-06 NOTE — TELEPHONE ENCOUNTER
----- Message from Ema Gentile MD sent at 2/25/2020  2:50 PM CST -----  Please tell the patient that her cholesterol looks better.  But because of her recent stroke, I think she needs a stronger cholesterol medication.  I will send in the new medication.  OLD medicine: Atorvastatin 40 mg.  NEW medicine: Rosuvastatin 40 mg.    Her kidney and liver tests are good.

## 2021-06-06 NOTE — TELEPHONE ENCOUNTER
Called and spoke with Kevin Thrasher, Message was given, Kevin Thrasher understood, no further questions.

## 2021-06-08 NOTE — TELEPHONE ENCOUNTER
Please schedule mammogram.    No future appointments.  Health Maintenance Due   Topic Date Due     ASTHMA ACTION PLAN  1965     PREVENTIVE CARE VISIT  1965     MAMMOGRAM  1965     ZOSTER VACCINES (1 of 2) 06/15/2013     PAP SMEAR  02/26/2020     HPV TEST  02/26/2020     BP Readings from Last 3 Encounters:   02/24/20 96/56   02/06/20 111/76   02/03/20 127/66

## 2021-06-10 NOTE — PROGRESS NOTES
Subjective:      Kevin Thrasher is a 51 y.o. female who presents for evaluation of follow-up facial rash.  She has had chronic intermittent facial rash for some time.  I saw her on 2/11/2016 and she had a possible malar rash.  Workup for lupus was negative.  Trial started with Metro gel.  She reports gel did not seem to help that much.  She also has a prescription for Lotrisone cream.  I'm not sure where she got that from.  I reviewed multiple previous notes, and cannot find who prescribed it or when.  She says that cream maybe worked a little better than the MetroGel.  Rash generally seems to get better on its own, then gets worse again.  She cannot think of any triggers.  She does note that keeping her face well hydrated with moisturizing lotion is helpful.  Sometimes her face feels dry and itchy.  She is taking Claritin occasionally, but it does not really seem to help the rash.  It does not sound like she is taking Claritin on a consistent basis.  Rash has always been primarily on her face, she does sometimes have a little bit of it on her upper chest and upper back.  No one else at home has developed a rash like this.    Patient Active Problem List   Diagnosis     Obesity     Abnormal Pap Smear Of Cervix     Prediabetes     Hyperlipidemia     Dry skin dermatitis     Menorrhagia     Asthma     Lower Back Pain     Environmental allergies     Fatty liver     Shrimp allergy     Anxiety     Angular cheilitis     Lumbosacral radiculopathy at S1     Helicobacter pylori gastritis       Current Outpatient Prescriptions:      acetaminophen (PAIN AND FEVER) 500 MG tablet, TAKE 1 TABLET BY MOUTH EVERY 6 HOURS AS NEEDED FOR PAIN., Disp: 100 tablet, Rfl: 0     albuterol (PROAIR HFA;PROVENTIL HFA;VENTOLIN HFA) 90 mcg/actuation inhaler, 1-2 puffs every 4-6 hours as needed., Disp: 8.5 g, Rfl: 1     bismuth subsalicylate (PEPTO BISMOL) 262 mg/15 mL suspension, Take 30 mL by mouth 4 (four) times a day. X 10 days., Disp: , Rfl:       clotrimazole-betamethasone (LOTRISONE) cream, Apply to affected areas once daily, as needed., Disp: 30 g, Rfl: 0     cyclobenzaprine (FLEXERIL) 10 MG tablet, Take 1 tablet (10 mg total) by mouth bedtime as needed for muscle spasms., Disp: 30 tablet, Rfl: 0     ibuprofen (ADVIL,MOTRIN) 600 MG tablet, Take 1 tablet (600 mg total) by mouth every 8 (eight) hours as needed for pain (take with food)., Disp: 60 tablet, Rfl: 0     loratadine (CLARITIN) 10 mg tablet, Take 1 tablet (10 mg total) by mouth daily., Disp: 30 tablet, Rfl: 5     metroNIDAZOLE (METROGEL) 0.75 % gel, Apply to face and rub in twice daily., Disp: 45 g, Rfl: 0     omeprazole (PRILOSEC) 20 MG capsule, Take 1 capsule (20 mg total) by mouth daily., Disp: 28 capsule, Rfl: 6     omeprazole (PRILOSEC) 20 MG capsule, Take 1 capsule (20 mg total) by mouth Daily before breakfast. (30 minutes before first meal of the day)., Disp: 30 capsule, Rfl: 5     polyethylene glycol 3350 Powd, Take 17 g by mouth daily. MIX 17 GRAMS IN 8 OUNCES OF WATER AND DRINK ONCE DAILY, Disp: 500 mL, Rfl: 3     polyvinyl alcohol (ARTIFICIAL TEARS, POLYVIN ALC,) 1.4 % ophthalmic solution, Instill 1-2 drops each eye, Disp: 15 mL, Rfl: 2     traZODone (DESYREL) 50 MG tablet, Take 50 mg by mouth., Disp: , Rfl:      venlafaxine (EFFEXOR-XR) 75 MG 24 hr capsule, Take 75 mg by mouth daily., Disp: , Rfl:      cetirizine (ZYRTEC) 10 MG tablet, Take 1 tablet (10 mg total) by mouth daily., Disp: 30 tablet, Rfl: 2     Objective:     Allergies:  Other drug allergy (see comments) and Shellfish derived    Vitals:  Vitals:    04/17/17 1535   BP: 129/68   Pulse: 70   Resp: 12   Temp: 97.7  F (36.5  C)     Body mass index is 35.11 kg/(m^2).    Vital signs reviewed.  General: Patient is alert and oriented x 3, in no apparent distress  Cardiac: regular rate and rhythm, no murmurs  Pulmonary: lungs clear to auscultation bilaterally, no crackles, rales, rhonchi, or wheezing noted  Skin: Mild patchy areas of  minimal erythema present on the face, no significant raised areas, does not appear like urticaria, no papules, pustules, or vesicles.    Assessment and Plan:   1.  Chronic intermittent facial rash.  I had thought she may have had rosacea in the past.  She says metronidazole gel does not seem to help very much.  She also has Lotrisone cream, which helps minimally.  No other obvious causes.  I reviewed with her that Lotrisone cream could possibly irritate the skin on her face more.  I recommended she use this minimally or not all.  Trial of Zyrtec and monitor.  If no significant improvement in 4 weeks, consider referral to dermatology.  She has not yet seen Derm for this rash.    This dictation uses voice recognition software, which may contain typographical errors.

## 2021-06-10 NOTE — PROGRESS NOTES
Assessment/ Plan  1. Acute infarct lazara, right (H)  Secondary prevention.  Aspirin, blood pressure borderline control, statin, non-smoker  - aspirin 81 mg chewable tablet; Chew 1 tablet (81 mg total) daily.  Dispense: 100 tablet; Refill: 3    2. Type 2 diabetes mellitus without complication, without long-term current use of insulin (H)  Diet-controlled, blood pressure borderline, restart lisinopril, negative microalbumin in the last year, eye up-to-date,  - Glycosylated Hemoglobin A1c  - lisinopriL (PRINIVIL,ZESTRIL) 10 MG tablet; Take 1 tablet (10 mg total) by mouth daily.  Dispense: 90 tablet; Refill: 3    3. Essential hypertension  Borderline control, some elevated, previously tolerated lisinopril, restart  - lisinopriL (PRINIVIL,ZESTRIL) 10 MG tablet; Take 1 tablet (10 mg total) by mouth daily.  Dispense: 90 tablet; Refill: 3    4. Pure hypercholesterolemia  Atorvastatin changed to Crestor at time of hospitalization, needs follow-up lipid/ALT these were ordered.  - Lipid Cascade FASTING  - ALT (SGPT)  - rosuvastatin (CRESTOR) 40 MG tablet; Take 1 tablet (40 mg total) by mouth at bedtime.  Dispense: 90 tablet; Refill: 3    Body mass index is 29.54 kg/m .    Subjective  CC:  Chief Complaint   Patient presents with     Blood Pressure Check     Diabetes     HPI:  55-year-old with history of lazara infarct, hyperlipidemia, hypertension, type 2 diabetes, obesity, abnormal Pap, anxiety prescribed medications include Plavix, aspirin, Crestor 40, albuterol, Flovent 110, Singulair, omeprazole 20, MiraLAX, polyethylene glycol, Claritin who presents for follow-up on type 2 diabetes and cholesterol check seen by me 2017 for trigger finger injection.  Last visit Dr. Gentile 2/24/2020, follow-up hospitalization which was 2/3 through 2/6 for right lazara infarction.  Lipids were checked at that time, A1c was checked, medications renewed,.  Lisinopril had been discontinued in the hospital and was not restarted.  Last A1c was 2/4/2020  and was 6.2.  Microalbumin 2/24 was negative    Patient did have change in cholesterol medicine that has not been followed up on with labs from atorvastatin 40 to Crestor 40 at the time of the stroke    In terms of healthcare maintenance, negative Cologuard was completed at the end of February and mammograms [place] was ordered but not completed.  Normal Pap smear 2/26/2019      -------------------------------------------------------    Concerned about high blood pressures taken on home monitor  sbps 120-162    Blood sugars     On crestor- not atorvastatin- clarified    utd eye- per p  Patient Active Problem List   Diagnosis     Obesity (BMI 30.0-34.9)     Abnormal Pap smear of cervix     Hyperlipidemia     Mild intermittent asthma without complication     Fatty liver     Anxiety     Lumbosacral radiculopathy at S1     Vitamin D deficiency     Type 2 diabetes mellitus (H)     HTN (hypertension)     Acute infarct lazara, right (H)     Weakness due to acute stroke (H)     Current medications reviewed as follows:  Current Outpatient Medications on File Prior to Visit   Medication Sig     acetaminophen (PAIN AND FEVER) 500 MG tablet Take 1 tablet (500 mg total) by mouth every 6 (six) hours as needed for pain.     albuterol (PROAIR HFA;PROVENTIL HFA;VENTOLIN HFA) 90 mcg/actuation inhaler Inhale 2 puffs every 4 (four) hours as needed for wheezing or shortness of breath.     blood glucose test strips TEST ONCE DAILY. Dispense brand per patient's insurance at pharmacy discretion.     blood-glucose meter Misc TEST ONCE DAILY.     cholecalciferol, vitamin D3, 50 mcg (2,000 unit) Tab Take 1 tablet by mouth daily.     clopidogreL (PLAVIX) 75 mg tablet Take 1 tablet (75 mg total) by mouth daily.     diclofenac sodium (VOLTAREN) 1 % Gel APPLY TOPICALLY TO THE AFFECTED AREA(S) 1 3 TIMES A DAY, AS NEEDED     FLOVENT  mcg/actuation inhaler INHALE 2 PUFFS BY MOUTH DAILY     generic lancets (FINGERSTIX LANCETS) TEST ONCE  DAILY. Dispense brand per patient's insurance at pharmacy discretion.     inhalational spacing device Spcr Chamber style spacer. Use with ihalers     ketotifen (ZADITOR/ZYRTEC ITCHY EYES) 0.025 % (0.035 %) ophthalmic solution Administer 1 drop to both eyes 2 (two) times a day.     loratadine (CLARITIN) 10 mg tablet TAKE 1 TABLET BY MOUTH EVERY DAY AS NEEDED FOR ALLERGY     metroNIDAZOLE (METROGEL) 0.75 % gel Apply to face and rub in twice daily as needed for rosacea     montelukast (SINGULAIR) 10 mg tablet Take 1 tablet (10 mg total) by mouth daily.     omeprazole (PRILOSEC) 20 MG capsule Take 1 capsule (20 mg total) by mouth daily before breakfast.     polyethylene glycol 3350,bulk, Powd Take 17 g by mouth daily as needed. MIX 17 GRAMS IN 8 OUNCES OF WATER AND DRINK ONCE DAILY     polyvinyl alcohol (ARTIFICIAL TEARS, POLYVIN ALC,) 1.4 % ophthalmic solution Administer 1 drop to both eyes as needed for dry eyes. Instill 1-2 drops each eye     traZODone (DESYREL) 50 MG tablet Take 1 tablet (50 mg total) by mouth at bedtime as needed for sleep.     venlafaxine (EFFEXOR-XR) 75 MG 24 hr capsule Take 1 capsule (75 mg total) by mouth daily with breakfast.     [DISCONTINUED] aspirin 81 mg chewable tablet Chew 1 tablet (81 mg total) daily.     [DISCONTINUED] atorvastatin (LIPITOR) 40 MG tablet Take 40 mg by mouth daily.     [DISCONTINUED] rosuvastatin (CRESTOR) 40 MG tablet Take 1 tablet (40 mg total) by mouth at bedtime.     ergocalciferol, vitamin D2, 2,000 unit Tab Take 2,000 Units by mouth daily.     No current facility-administered medications on file prior to visit.      Social History     Tobacco Use   Smoking Status Never Smoker   Smokeless Tobacco Never Used   Tobacco Comment    no second hand smoke exposure     Social History     Social History Narrative     Not on file     Patient Care Team:  Ema Gentile MD as PCP - General (Family Medicine)  Ema Gentile MD as Assigned PCP  Kenton Jose, PharmD as  "Pharmacist (Pharmacist)  ROS  Full 10 system review including constitutional, respiratory, cardiac, gi, urinary, rheumatologic, neurologic, reproductive, dermatologic psychiatric is  performed  and is otherwise negative         Objective  Physical Exam  Vitals:    08/05/20 0822   BP: 125/87   Patient Site: Left Arm   Patient Position: Sitting   Cuff Size: Adult Regular   Pulse: 66   Resp: 18   Temp: 97  F (36.1  C)   TempSrc: Temporal   Weight: 136 lb 8 oz (61.9 kg)   Height: 4' 9\" (1.448 m)     Gen- alert, oriented/ appropriately responsive  HEENT- normal cephalic, atraumatic.   Chest- Normal inspiration and expiration.    Clear to ascultation.    No chest wall deformity or scar.  CV- Heart regular rate and rhythm  normal tones, no murmurs   No gallops or rubs.  Ext- appear well perfused, no edema  Skin- warm and dry,   no visualized rash    Diagnostics  Pending    Please note: Voice recognition software was used in this dictation.  It may therefore contain typographical errors.    "

## 2021-06-11 NOTE — PROGRESS NOTES
Patient was fitted for right wrist brace with thumb and tennis elbow in clinic today.  Ace bandage given today.

## 2021-06-12 NOTE — PROGRESS NOTES
Right thumb a year ago fell.  Got better then worse in last month.  Has no responsibilities that are absolute    Gastritis denies dysphagia, melena, hematochezia  Hx treated pylori.  Prn acid agents    Allergies controlled with prn antihistamine    Obesity denies polyuria  Hyperlipidemia with mild efforts at diet change  Admits to worrying recently  ROS: as noted above    OBJECTIVE:   Vitals:    07/21/17 1522   BP: 152/76   Pulse:    Resp:    Temp:       Eyes: non icteric, noninflamed  Lungs: no resp distress  Heart: regular  Ankles: no edema  Muscles: nontender  Mental status: euthymic  Neuro: nonfocal  Mild triggering right thumb  ASSESSMENT/PLAN:    1. Trigger finger of right thumb     2. Environmental allergies     3. Helicobacter pylori gastritis     4. Pure hypercholesterolemia     5. Obesity       Chronic issues stable/ same treatment.   bp up related to worry and pain/ follow up with primary when pain better.  Discussed options for thumb/ will use the splint she has and follow up if not better for injection  Work on Life style modification for wt and Hyperlipidemia and follow up one month

## 2021-06-12 NOTE — PROGRESS NOTES
ASAssessment:      Healthy female exam.    1. Routine general medical examination at a health care facility     2. Pure hypercholesterolemia  Lipid [facility] FASTING    Comprehensive Metabolic Panel   3. Atypical squamous cells of undetermined significance on cytologic smear of cervix (ASC-US)     4. Mild intermittent asthma without complication     5. Fatty liver  Comprehensive Metabolic Panel   6. Vitamin D deficiency  Vitamin D, Total (25-Hydroxy)   7. Type 2 diabetes mellitus with other circulatory complication, without long-term current use of insulin (H)  Glycosylated Hemoglobin A1c   8. Essential hypertension     9. Weakness due to acute stroke (H)     10. Lightheadedness  HM1(CBC and Differential)   11. Lumbosacral radiculopathy at S1  diclofenac sodium (VOLTAREN) 1 % Gel        Plan:      This is a 56 yo female here for physical exam:  1.  Hyperlipidemia - on crestor - will check lipids  2.  ASCUS pap smear - patient declines pap smear - discussed its importance  3.  Mild intermittent asthma - no new symptoms - stable  4.  Fatty liver - recheck labs  5.  Type II DM - check A1c - sugars have been erratic  6.  Vitamin D deficiency - check  Levels.   7.  Hypertension - blood pressure is stable/controlled - ok to check labs.   8.  Weakness due to stroke - improved - doing some physical therapy (on her own at home)  9.  Lightheadedness - may be related to her medications; no sign of significant hypotension  10.  Lumbosacral radiculopathy S1 - uses Voltaren gel as needed    Health Maintenance:  Shingles vaccine - needs to check on coverage  Pap smear/HPV - declines - review of chart suggests remote abnormal - no recent pap  Hepatitis B vaccine - will review for previous  ACT Total Score: 24 (11/6/2020  8:02 AM)  AAP - done today      Subjective:      Kevin Thrasher is a 55 y.o. female who presents for an annual exam.The patient reports that there is not domestic violence in her life.     Has been having some dizziness,  lightheadedness - just last week  When gets up or turns really fast -     Taking ASA, BP med (not every day), cholesterol medication  BP 130s, sometimes 110s -   Does check Blood sugars - 100-125 fasting  Post prandial 150s    Has sense of something that makes her feel startled  Feels hot burning of urine    Wants to drive again  Had mini stroke before - has been waiting for strength to get back -   Strength - better  Needs to drive when family isn't around   Still has license - no restrictions - discussed appropriate measures around driving       ACT Total Score: 24 (11/6/2020  8:02 AM)  not using anything right now    Right shoulder pain -   X 2 months -   Sometimes down to fingers  Does take Acetaminophen -     Healthy Habits:   Regular Exercise: No  Sunscreen Use: No  Healthy Diet: No  Dental Visits Regularly: No  Seat Belt: Yes  Sexually active: No  Self Breast Exam Monthly:No  Hemoccults: No  Flex Sig: No  Colonoscopy: No and did cologuard earlier 2020        Immunization History   Administered Date(s) Administered     Hep B, historic 03/20/1995     Hepatitis A: Immune 02/28/2013     Hepatitis B: Immune 02/28/2013     INFLUENZA,RECOMBINANT,INJ,PF QUADRIVALENT 18+YRS 12/11/2018, 09/18/2019, 02/15/2020, 11/06/2020     INFLUENZA,SEASONAL QUAD, PF, =/> 6months 01/20/2016     Influenza, inj, historic,unspecified 10/30/2006, 10/19/2007, 10/07/2008     Influenza, seasonal,quad inj 6-35 mos 09/28/2011, 12/21/2012     Influenza,seasonal,quad inj =/> 6months 09/19/2016     MMR 02/17/1995     Pneumo Polysac 23-V 07/14/2011     Td, adult adsorbed, PF 08/05/2020     Td,adult,historic,unspecified 05/27/1994, 07/27/1994, 02/17/1995     Tdap 07/14/2011     Typhoid, Inj, Inactive 04/13/2018     Immunization status: reviewed.    No exam data present    Gynecologic History  Patient's last menstrual period was 04/15/2017.  Contraception: post menopausal status  Last Pap:2/2019 Results were: normal  Last mammogram:none previously  noted Results were: na      OB History    Para Term  AB Living   3 2 2   1 2   SAB TAB Ectopic Multiple Live Births   1              # Outcome Date GA Lbr Jacques/2nd Weight Sex Delivery Anes PTL Lv   3 SAB            2 Term            1 Term                Current Outpatient Medications   Medication Sig Dispense Refill     acetaminophen (TYLENOL) 500 MG tablet TAKE 1 TABLET (500 MG TOTAL) BY MOUTH EVERY 6 (SIX) HOURS AS NEEDED FOR PAIN. 100 tablet 4     albuterol (PROAIR HFA;PROVENTIL HFA;VENTOLIN HFA) 90 mcg/actuation inhaler Inhale 2 puffs every 4 (four) hours as needed for wheezing or shortness of breath. 8.5 g 1     aspirin 81 mg chewable tablet Chew 1 tablet (81 mg total) daily. 100 tablet 3     blood glucose test strips TEST ONCE DAILY. Dispense brand per patient's insurance at pharmacy discretion. 100 strip 5     blood-glucose meter Misc TEST ONCE DAILY. 1 each 0     cholecalciferol, vitamin D3, 50 mcg (2,000 unit) Tab Take 1 tablet by mouth daily.       clopidogreL (PLAVIX) 75 mg tablet Take 1 tablet (75 mg total) by mouth daily. 100 tablet 3     diclofenac sodium (VOLTAREN) 1 % Gel APPLY TOPICALLY TO THE AFFECTED AREA(S) 1-3 TIMES A DAY, AS NEEDED 100 g 3     FLOVENT  mcg/actuation inhaler INHALE 2 PUFFS BY MOUTH DAILY 1 Inhaler 11     generic lancets (FINGERSTIX LANCETS) TEST ONCE DAILY. Dispense brand per patient's insurance at pharmacy discretion. 100 each 5     inhalational spacing device Spcr Chamber style spacer. Use with ihalers 1 each 0     ketotifen (ZADITOR/ZYRTEC ITCHY EYES) 0.025 % (0.035 %) ophthalmic solution Administer 1 drop to both eyes 2 (two) times a day. 5 mL 11     lisinopriL (PRINIVIL,ZESTRIL) 10 MG tablet Take 1 tablet (10 mg total) by mouth daily. 90 tablet 3     loratadine (CLARITIN) 10 mg tablet TAKE 1 TABLET BY MOUTH EVERY DAY AS NEEDED FOR ALLERGY 30 tablet 7     metroNIDAZOLE (METROGEL) 0.75 % gel Apply to face and rub in twice daily as needed for rosacea 45 g  3     montelukast (SINGULAIR) 10 mg tablet Take 1 tablet (10 mg total) by mouth daily. 100 tablet 3     omeprazole (PRILOSEC) 20 MG capsule Take 1 capsule (20 mg total) by mouth daily before breakfast. 100 capsule 4     polyethylene glycol 3350,bulk, Powd Take 17 g by mouth daily as needed. MIX 17 GRAMS IN 8 OUNCES OF WATER AND DRINK ONCE DAILY 500 mL 3     polyvinyl alcohol (ARTIFICIAL TEARS, POLYVIN ALC,) 1.4 % ophthalmic solution Administer 1 drop to both eyes as needed for dry eyes. Instill 1-2 drops each eye 15 mL 2     rosuvastatin (CRESTOR) 40 MG tablet Take 1 tablet (40 mg total) by mouth at bedtime. 90 tablet 3     traZODone (DESYREL) 50 MG tablet Take 1 tablet (50 mg total) by mouth at bedtime as needed for sleep. 100 tablet 3     venlafaxine (EFFEXOR-XR) 75 MG 24 hr capsule Take 1 capsule (75 mg total) by mouth daily with breakfast. 100 capsule 3     No current facility-administered medications for this visit.      Past Medical History:   Diagnosis Date     Angular cheilitis 9/19/2016     Depression      Head Injury      Helicobacter pylori gastritis      HTN (hypertension) 3/26/2019     Hyperlipidemia     3/19: New diabetes, 10 year risk 6%. Start moderate intensity statin.      Infertility, female     eval at infertility clinic was normal.  declined evaluation. h/o ovarian hyperstimulation - did not result in pregnancy.      Menorrhagia      Obesity (BMI 30.0-34.9)      Pterygium      Shingles 2/9/2015     Type 2 diabetes mellitus without complication, without long-term current use of insulin (H) 2/26/2019    Dx 2019     Past Surgical History:   Procedure Laterality Date     COMBINED HYSTEROSCOPY DIAGNOSTIC / D&C  07/15/2011    Planned hysteroscopy/D&C for evaluation/treatment of menorrhagia 7/15/11.      Other drug allergy (see comments) and Shellfish derived  Family History   Family history unknown: Yes     Social History     Socioeconomic History     Marital status:      Spouse name:  "Amee Avila     Number of children: 1     Years of education: Not on file     Highest education level: Not on file   Occupational History     Not on file   Social Needs     Financial resource strain: Not on file     Food insecurity     Worry: Not on file     Inability: Not on file     Transportation needs     Medical: Not on file     Non-medical: Not on file   Tobacco Use     Smoking status: Never Smoker     Smokeless tobacco: Never Used     Tobacco comment: no second hand smoke exposure   Substance and Sexual Activity     Alcohol use: No     Drug use: No     Sexual activity: Yes     Partners: Male     Birth control/protection: None     Comment: Wants to get pregnant 2017   Lifestyle     Physical activity     Days per week: Not on file     Minutes per session: Not on file     Stress: Not on file   Relationships     Social connections     Talks on phone: Not on file     Gets together: Not on file     Attends Islam service: Not on file     Active member of club or organization: Not on file     Attends meetings of clubs or organizations: Not on file     Relationship status: Not on file     Intimate partner violence     Fear of current or ex partner: Not on file     Emotionally abused: Not on file     Physically abused: Not on file     Forced sexual activity: Not on file   Other Topics Concern     Not on file   Social History Narrative     Not on file       Review of Systems  Review of Systems     Pertinent positives as noted in HPI; otherwise 12 point ROS negative.        Objective:         Vitals:    11/06/20 0803   BP: 136/76   Pulse: (!) 59   Temp: 98.4  F (36.9  C)   TempSrc: Tympanic   Weight: 137 lb (62.1 kg)   Height: 4' 9\" (1.448 m)     Body mass index is 29.65 kg/m .    Physical  Physical Exam    EXAM:  /76   Pulse (!) 59   Temp 98.4  F (36.9  C) (Tympanic)   Ht 4' 9\" (1.448 m)   Wt 137 lb (62.1 kg)   LMP 04/15/2017   BMI 29.65 kg/m     Gen:  NAD, appears well, well-hydrated  HEENT:  [facility] nl, " oropharynx benign, nasal mucosa nl, conjunctiva clear  Neck:  Supple, no adenopathy, no thyromegaly, no carotid bruits, no JVD  Lungs:  Clear to auscultation bilaterally  Breast exam:  No breast lumps, no skin changes, no nipple discharge, no axillary adenopathyCor:  RRR no murmur  Abd:  Soft, nontender, BS+, no masses, no guarding or rebound, no HSM  Extr:  Neg.  Diabetic foot exam: normal DP and PT pulses, no trophic changes or ulcerative lesions and normal sensory exam  Neuro:  Mild hemiparesis  Skin:  Warm/dry

## 2021-06-12 NOTE — PATIENT INSTRUCTIONS - HE
Call your insurance company (number on back of your card) and ask them:  1.  Does my insurance cover the Shingrix (the new shingles shot)? , and if so, then  2.  Do I need to get that at my doctor's office or at my pharmacy - does it matter?     oral

## 2021-06-12 NOTE — PROGRESS NOTES
"SUBJECTIVE:   Kevin Thrasher is a 52 y.o. female who presents for a routine physical exam.     Current concerns include the following:      Right thumb pain  The patient has been expressing pain in her right thumb for quite some time.  Is worst over the interphalangeal joint.  She has been wearing a splint a lot, but still has pain.  The main reason that she wears the splint is because otherwise, her thumb gets stuck in a flexed position and she has to use her other hand to straighten out the thumb.  She is interested in a corticosteroid injection.    History of abnormal Pap smear  Patient declines Pap smear today.  Discussed history of ASCUS and recommendation for annual Pap smears until 3 normal Pap smears.  She states \"maybe next time\".    Primary care provider  The patient would like her primary care physician to be Dr. Gentile.  When Dr. Gentile is unavailable, she prefers female providers.    Planning pregnancy  The patient states that her actual birth date is probably between [id number].  She is the fifth child in her family and she is currently legally older than the second child in her family, so she knows that the birth date is probably off by at least a few years.  In the past, she had menorrhagia.  Now she is getting a menstrual period about once per year.  She and her  have been trying to get pregnant for about 23 years, since the birth of her last daughter.  She would really like to have a boy.  Reports that she has done some infertility evaluation in the past, but declined doing the hysterosalpingogram.  The evaluation that was done was normal.  Discussed with her that it is difficult to become pregnant at this age, even with advanced care.  Discussed low probability of success and high cost.  Regardless, she is interested in pursuing this further and requests referral to fertility clinic.    Patient Active Problem List    Diagnosis Date Noted     Helicobacter pylori gastritis 09/26/2016     Priority: Medium " "    Overview Note:     Stool antigen positive 9/16. Treated.   8/14/2017 still having symptoms - stool antigen:______       Lumbosacral radiculopathy at S1 09/19/2016     Priority: Medium     Overview Note:     Since 2006, slipped and fell on ice.       Shrimp allergy 01/04/2016     Priority: Medium     Anxiety 01/04/2016     Priority: Medium     Obesity (BMI 30.0-34.9)      Priority: Medium     Abnormal Pap smear of cervix      Priority: Medium     Overview Note:     5/8/12: ASCUS.  4/11/13: NILM.  8/14/2017: Pap smear advised and declined.  The patient stated \"maybe next time\".         Prediabetes      Priority: Medium     Hyperlipidemia      Priority: Medium     Overview Note:     diet-controlled         Mild intermittent asthma without complication      Priority: Medium     Lower Back Pain      Priority: Medium     Infertility  08/14/2017     Priority: Low     Angular cheilitis 09/19/2016     Priority: Low     Fatty liver      Priority: Low     Dry skin dermatitis      Priority: Low     Environmental allergies      Priority: Low     Past Medical History:   Diagnosis Date     Depression      Head Injury      Helicobacter pylori gastritis      Infertility, female     eval at infertility clinic was normal.  declined evaluation. h/o ovarian hyperstimulation - did not result in pregnancy.      Menorrhagia      Pterygium      Shingles 2/9/2015      Past Surgical History:   Procedure Laterality Date     COMBINED HYSTEROSCOPY DIAGNOSTIC / D&C  07/15/2011    Planned hysteroscopy/D&C for evaluation/treatment of menorrhagia 7/15/11.       Current Outpatient Prescriptions   Medication Sig Dispense Refill     acetaminophen (PAIN AND FEVER) 500 MG tablet Take 1 tablet (500 mg total) by mouth every 6 (six) hours as needed for pain. 100 tablet 4     albuterol (PROAIR HFA;PROVENTIL HFA;VENTOLIN HFA) 90 mcg/actuation inhaler Inhale 2 puffs every 4 (four) hours as needed for wheezing or shortness of breath. 8.5 g 1     " ibuprofen (ADVIL,MOTRIN) 600 MG tablet Take 1 tablet (600 mg total) by mouth every 8 (eight) hours as needed for pain (take with food). 50 tablet 1     loratadine (CLARITIN) 10 mg tablet Take 1 tablet (10 mg total) by mouth daily as needed for allergies. 30 tablet 11     metroNIDAZOLE (METROGEL) 0.75 % gel Apply to face and rub in twice daily. 45 g 0     polyethylene glycol 3350,bulk, Powd Take 17 g by mouth daily as needed. MIX 17 GRAMS IN 8 OUNCES OF WATER AND DRINK ONCE DAILY 500 mL 3     polyvinyl alcohol (ARTIFICIAL TEARS, POLYVIN ALC,) 1.4 % ophthalmic solution Instill 1-2 drops each eye 15 mL 2     venlafaxine (EFFEXOR-XR) 75 MG 24 hr capsule Take 75 mg by mouth daily.       ketotifen (ZADITOR/ZYRTEC ITCHY EYES) 0.025 % (0.035 %) ophthalmic solution Administer 1 drop to both eyes 2 (two) times a day as needed. 10 mL 1     omeprazole (PRILOSEC) 20 MG capsule Take 1 capsule (20 mg total) by mouth daily before breakfast. (30 minutes before first meal of the day). 30 capsule 5     prenatal vit-iron fum-folic ac (PRENATAL VITAMIN) 27 mg iron- 0.8 mg Tab Take 1 tablet by mouth once daily. 100 tablet 3     No current facility-administered medications for this visit.       Allergies   Allergen Reactions     Other Drug Allergy (See Comments)      Tide detergent - hives     Shellfish Derived       Social History     Social History     Marital status:      Spouse name: Amee Avila     Number of children: 1     Years of education: N/A     Occupational History     Not on file.     Social History Main Topics     Smoking status: Never Smoker     Smokeless tobacco: Never Used      Comment: no second hand smoke exposure     Alcohol use No     Drug use: No     Sexual activity: Yes     Partners: Male     Birth control/ protection: None      Comment: Wants to get pregnant 2017     Other Topics Concern     Not on file     Social History Narrative      Family History   Problem Relation Age of Onset     Family history  "unknown: Yes      REVIEW OF SYSTEMS: negative, except as listed in subjective above.    PHYSICAL EXAM:   /76 (Patient Site: Left Arm, Patient Position: Sitting, Cuff Size: Adult Regular)  Pulse 66  Temp 97.5  F (36.4  C) (Oral)   Ht 4' 8.5\" (1.435 m)  Wt 150 lb (68 kg)  LMP 04/15/2017  BMI 33.04 kg/m2   History   Smoking Status     Never Smoker   Smokeless Tobacco     Never Used     Comment: no second hand smoke exposure     GENERAL: Alert, NAD.  HEAD: NC/AT.  EARS: Normal canal, pinnae and tympanic membrane.  EYES: PERRL, normal fundi.  NOSE: Normal mucosa.  MOUTH: Adequate dentition, normal throat.  NECK: normal thyroid, midline trachea.  BREASTS: no masses, skin changes, nipple discharge or tenderness.  LUNGS: CTA bilaterally, no increased work of breathing.  HEART: RRR, no m/r/g  ABDOMEN: soft, nt/nd, BS+  : Declined  MSK: No significant deformity.  SKIN: no atypical lesion or rash.  LYMPHATICS: no lymphadenopathy.   PSYCH: normal affect.    Recent Results (from the past 24 hour(s))   Glycosylated Hemoglobin A1c   Result Value Ref Range    Hemoglobin A1c 6.1 (H) 3.5 - 6.0 %   Comprehensive Metabolic Panel   Result Value Ref Range    Sodium 142 136 - 145 mmol/L    Potassium 3.7 3.5 - 5.0 mmol/L    Chloride 103 98 - 107 mmol/L    CO2 29 22 - 31 mmol/L    Anion Gap, Calculation 10 5 - 18 mmol/L    Glucose 101 70 - 125 mg/dL    BUN 13 8 - 22 mg/dL    Creatinine 0.73 0.60 - 1.10 mg/dL    GFR MDRD Af Amer >60 >60 mL/min/1.73m2    GFR MDRD Non Af Amer >60 >60 mL/min/1.73m2    Bilirubin, Total 1.4 (H) 0.0 - 1.0 mg/dL    Calcium 8.9 8.5 - 10.5 mg/dL    Protein, Total 7.0 6.0 - 8.0 g/dL    Albumin 3.6 3.5 - 5.0 g/dL    Alkaline Phosphatase 84 45 - 120 U/L    AST 23 0 - 40 U/L    ALT 26 0 - 45 U/L   Thyroid Cascade   Result Value Ref Range    TSH 1.17 0.30 - 5.00 uIU/mL   Lipid Cascade   Result Value Ref Range    Cholesterol 196 <=199 mg/dL    Triglycerides 158 (H) <=149 mg/dL    HDL Cholesterol 37 (L) >=50 " mg/dL    LDL Calculated 127 <=129 mg/dL    Patient Fasting > 8hrs? Yes    Bilirubin, Panel   Result Value Ref Range    Bilirubin, Direct 0.3 <=0.5 mg/dL    Bilirubin, Indirect 1.2 (H) 0.0 - 1.0 mg/dL    Bilirubin, Total 1.5 (H) 0.0 - 1.0 mg/dL     No results found.    ASSESSMENT/PLAN:   1. Cancer screening: recommended pap smear but patient declines, stool cards given, mammogram scheduled.  2. Discussed Calcium, vitamin D, and weight bearing exercise.  3. Discussed maintenance of healthy body weight. The following high BMI interventions were performed this visit: encouragement to exercise and lifestyle education regarding diet.  4. Contraception: discussed. She actually wants to have another baby. Start PNV. Wants referral to fertility specialist. Discussed low success rate, IVF, high cost.   5. Advance directive: form given I have had an Advance Directives discussion with the patient.     Kevin was seen today for annual exam.    Diagnoses and all orders for this visit:    Routine general medical examination at a health care facility    Asthma    Dermatitis    Chronic bilateral low back pain with bilateral sciatica    Foot pain, bilateral  -     ibuprofen (ADVIL,MOTRIN) 600 MG tablet; Take 1 tablet (600 mg total) by mouth every 8 (eight) hours as needed for pain (take with food).    Allergic rhinitis    Epigastric abdominal pain  -     omeprazole (PRILOSEC) 20 MG capsule; Take 1 capsule (20 mg total) by mouth daily before breakfast. (30 minutes before first meal of the day).  -     H. pylori Antigen, Stool; Future    Trigger finger of right thumb    Helicobacter pylori gastritis    Mild intermittent asthma without complication  -     albuterol (PROAIR HFA;PROVENTIL HFA;VENTOLIN HFA) 90 mcg/actuation inhaler; Inhale 2 puffs every 4 (four) hours as needed for wheezing or shortness of breath.    Environmental allergies  -     loratadine (CLARITIN) 10 mg tablet; Take 1 tablet (10 mg total) by mouth daily as needed for  allergies.  -     ketotifen (ZADITOR/ZYRTEC ITCHY EYES) 0.025 % (0.035 %) ophthalmic solution; Administer 1 drop to both eyes 2 (two) times a day as needed.    Chronic constipation  -     polyethylene glycol 3350,bulk, Powd; Take 17 g by mouth daily as needed. MIX 17 GRAMS IN 8 OUNCES OF WATER AND DRINK ONCE DAILY    Atypical squamous cells of undetermined significance on cytologic smear of cervix (ASC-US)    Anxiety    Pure hypercholesterolemia    Lumbosacral radiculopathy at S1    Menorrhagia    Obesity  -     Thyroid Cascade  -     Vitamin D, Total (25-Hydroxy)  -     Lipid Cascade    Prediabetes  -     Glycosylated Hemoglobin A1c    Shrimp allergy    Angular cheilitis    Dry skin dermatitis    Fatty liver  -     Comprehensive Metabolic Panel    Infertility management  -     Ambulatory referral to Infertility  -     prenatal vit-iron fum-folic ac (PRENATAL VITAMIN) 27 mg iron- 0.8 mg Tab; Take 1 tablet by mouth once daily.    Visit for screening mammogram    High bilirubin  -     Bilirubin, Panel    Infertility   -     Ambulatory referral to Infertility  -     prenatal vit-iron fum-folic ac (PRENATAL VITAMIN) 27 mg iron- 0.8 mg Tab; Take 1 tablet by mouth once daily.    Prediabetes    Obesity (BMI 30.0-34.9)    Other orders  -     acetaminophen (PAIN AND FEVER) 500 MG tablet; Take 1 tablet (500 mg total) by mouth every 6 (six) hours as needed for pain.  -     Cancel: cetirizine (ZYRTEC) 10 MG tablet; Take 1 tablet (10 mg total) by mouth daily.  -     Cancel: clotrimazole-betamethasone (LOTRISONE) cream; Apply to affected areas once daily, as needed.  -     Cancel: bismuth subsalicylate (PEPTO BISMOL) 262 mg/15 mL suspension; Take 30 mL by mouth 4 (four) times a day. X 10 days.  -     Cancel: cyclobenzaprine (FLEXERIL) 10 MG tablet; Take 1 tablet (10 mg total) by mouth at bedtime as needed for muscle spasms.

## 2021-06-12 NOTE — PROGRESS NOTES
1. Trigger finger of right thumb  52-year-old with fairly recent onset of pain, clicking and stiffness of right thumb.  Consistent with trigger finger.  Saw Dr. Gentile for physical 8/14 who referred her to me for injection.  Trigger finger injection thumb   right hand  Discussed risks and benefits of procedure with patient.  Discussed possibility of infection, damage to nerve, tendon, artery, infection.  Discussed alternative treatment-referral to hand surgeon for management, possible tendon release surgery.  Patient requested injection/agreed to risks.    Skin prepped with antiseptic, alcohol  Using a 27-gauge 1-1/4 needle, injected 1/2 cc of Depo-Medrol 40 and 1 cc of lidocaine without epinephrine into the region, care not to inject tendon directly.  Patient tolerated the procedure well.  There were no complications.    Omeprazole renewed.  Also, patient referred for mammogram.

## 2021-06-13 NOTE — PROGRESS NOTES
Office Visit  [facility]  Date of Service: 12/04/2020      Romel Thrasher is a 55 y.o. female who presents for Shoulder Pain and Medication Refill    Shoulder Pain  Right side  Fairly new  Muscles tight  Joint tight  Tingly in hand when raises her hand  Upper back, too  No injury    Has had injection for trigger thumb before  Wore brace on forearm and hand. The forearm brace no longer usable. It was helpful for pain. Wants a new one    Med Refill      No data recorded   Objective   /75 (Patient Site: Left Arm, Patient Position: Sitting, Cuff Size: Adult Regular)   Pulse 76   Resp 16   Wt 137 lb (62.1 kg)   LMP 04/15/2017   SpO2 99%   BMI 29.65 kg/m     She reports that she has never smoked. She has never used smokeless tobacco.    Gen: Alert, no apparent distress.  Heart: Regular rate and rhythm, no murmurs.  Lungs: Clear to auscultation bilaterally, no increased work of breathing.  Abdomen: Soft, non-tender, non-distended, bowel sounds normal.  Extremities: Inspection of right upper extremity is normal. Muscle spasm in upper back. Normal strength in rotator cuff and normal range of motion. No significant biceps tendon tenderness.    Results for orders placed or performed in visit on 12/04/20   Microalbumin, Random Urine   Result Value Ref Range    Microalbumin, Random Urine 3.87 (H) 0.00 - 1.99 mg/dL    Creatinine, Urine 112.3 mg/dL    Microalbumin/Creatinine Ratio Random Urine 34.5 (H) <=19.9 mg/g     Xr Shoulder Right 2 Or More Vws    Result Date: 12/4/2020  EXAM: XR SHOULDER RIGHT 2 OR MORE VWS LOCATION: [facility] DATE/TIME: 12/4/2020 12:15 PM INDICATION: Pain in right shoulder COMPARISON: None.     Normal joint spaces and alignment. No fracture. \    Hemoglobin A1c  Lab Results   Component Value Date    HGBA1C 6.1 (H) 11/06/2020    HGBA1C 6.0 (H) 08/05/2020    HGBA1C 6.2 (H) 02/04/2020     Creatinine  Lab Results   Component  "Value Date    CREATININE 0.74 11/06/2020     Microalbumin:Cr Ratio  Lab Results   Component Value Date    [facility] 34.5 (H) 12/04/2020    [facility]  02/24/2020      Comment:      \"Unable to calculate: Creatinine and/or Microalbumin value below detectable level\"    [facility]  09/18/2019      Comment:      \"Unable to calculate: Creatinine and/or Microalbumin value below detectable level\"     TSH  Lab Results   Component Value Date    TSH 3.04 02/26/2019      LDL  Lab Results   Component Value Date    LDLDIRECT 153 (H) 02/11/2016    LDLCALC 102 11/06/2020    LDLCALC 112 08/05/2020    LDLCALC 87 02/24/2020           Assessment & Plan     1. Right shoulder pain. Unremarkable shoulder exam and x-ray. Primarily due to muscle spasm. Recommend continued PT exercises (she has been doing them at home) and use of acetaminophen and T#3 when pain is bad.   2. Diabetes, type 2. Well controlled. History of stroke and diabetic nephropathy. On lisinopril.   3. Meds refilled. Mammogram ordered. Defer Shingles shot - patient will check insurance preference.      Order Summary                                                      1. Chronic right shoulder pain  acetaminophen-codeine (TYLENOL #3) 300-30 mg per tablet    XR Shoulder Right 2 or More VWS   2. Type 2 diabetes mellitus with other circulatory complication, without long-term current use of insulin (H)  Microalbumin, Random Urine    cholecalciferol, vitamin D3, (VITAMIN D3) 100 mcg (4,000 unit) cap    ascorbic acid, vitamin C, (VITAMIN C) 500 mg Chew chew tab    generic lancets (FINGERSTIX LANCETS)   3. Essential hypertension  DISCONTINUED: lisinopriL (PRINIVIL,ZESTRIL) 10 MG tablet   4. Pure hypercholesterolemia  rosuvastatin (CRESTOR) 40 MG tablet   5. Atypical squamous cells of undetermined significance on cytologic smear of cervix (ASC-US)     6. Immunization due  Varicella Zoster, Recombinant Vaccine IM    CANCELED: Hepatitis B Vaccine Age 20 years and above   7. Visit for " screening mammogram  Mammo Screening Bilateral   8. Chronic bilateral low back pain with bilateral sciatica  acetaminophen (TYLENOL) 500 MG tablet   9. Mild intermittent asthma without complication  albuterol (PROAIR HFA;PROVENTIL HFA;VENTOLIN HFA) 90 mcg/actuation inhaler    fluticasone propionate (FLOVENT HFA) 110 mcg/actuation inhaler    montelukast (SINGULAIR) 10 mg tablet   10. Acute infarct lazara, right (H)  aspirin 81 mg chewable tablet    clopidogreL (PLAVIX) 75 mg tablet   11. Lumbosacral radiculopathy at S1  diclofenac sodium (VOLTAREN) 1 % Gel   12. Environmental allergies  ketotifen (ZADITOR/ZYRTEC ITCHY EYES) 0.025 % (0.035 %) ophthalmic solution    loratadine (CLARITIN) 10 mg tablet    montelukast (SINGULAIR) 10 mg tablet   13. Malar rash  metroNIDAZOLE (METROGEL) 0.75 % gel   14. Gastroesophageal reflux disease without esophagitis  omeprazole (PRILOSEC) 20 MG capsule   15. Chronic constipation  polyethylene glycol 3350,bulk, Powd   16. Dry eyes  polyvinyl alcohol (ARTIFICIAL TEARS, POLYVIN ALC,) 1.4 % ophthalmic solution   17. Anxiety  traZODone (DESYREL) 50 MG tablet   18. Lateral epicondylitis of right elbow  Wrist/Arm DME: Tennis Elbow Arm Band; Right    acetaminophen-codeine (TYLENOL #3) 300-30 mg per tablet   19. Type 2 diabetes mellitus with diabetic nephropathy, without long-term current use of insulin (H)  lisinopriL (PRINIVIL,ZESTRIL) 20 MG tablet      No future appointments.    Completed by: Ema Gentile M.D., [facility]. 12/8/2020 11:29 AM.  This transcription uses voice recognition software, which may contain typographical errors.

## 2021-06-13 NOTE — TELEPHONE ENCOUNTER
----- Message from Ema Gentile MD sent at 12/4/2020  4:53 PM CST -----  Please let Mao know that her kidneys simran showing a little bit of protein in her urine.  This is one of the first signs of kidney damage from diabetes.   I would recommend increasing her lisinopril from 10 mg to 20 mg to protect the kidneys. This medication can help keep the kidneys healthy.

## 2021-06-15 NOTE — TELEPHONE ENCOUNTER
Pt daughter called in ask the result of 2/8/21 lab collection.  Inform the result of the lab, verbalized understand, no other concern at this time.      Manny Alexandra RN, [facility] Triage/Med Refill 2/26/2021 3:52 PM

## 2021-06-15 NOTE — PROGRESS NOTES
OFFICE VISIT - FAMILY MEDICINE     ASSESSMENT AND PLAN     1. Essential hypertension  Microalbumin, Random Urine   2. Type 2 diabetes mellitus with diabetic nephropathy, without long-term current use of insulin (H)     3. Proteinuria, unspecified type  Microalbumin, Random Urine   4. Right shoulder pain, unspecified chronicity     5. Acute infarct lazara, right (H)     Hypertension, not adequately controlled, management discussed with patient, continue healthy lifestyle changes, she was intolerant to lisinopril 20 mg, with dizziness and low blood pressure. I  Suggested she takes 15 mg daily and continue to monitor her blood pressure, urine microalbumin today.  Right shoulder pain with tendinitis type of symptoms, symptomatic care discussed, consider PT referral, pharmacological treatment discussed, takes Tylenol and sometimes Tylenol.  She will follow with Dr. Gentile for controlled substance agreement  History of stroke, discussed importance of risk factor modification including tight control of diabetes, blood pressure, weight and cholesterol.  CHIEF COMPLAINT   Follow-up, Pain, and Diabetes    HPI   Kevin Thrasher is a 55 y.o. female.  No Patient Care Coordination Note on file.  She is here to follow-up on chronic medical issues, she last saw Dr. Gentile in December, her urine did show mild proteinuria, she was asked to increase lisinopril to 20 mg daily, she is single medication did make her feel dizzy with low blood pressure, she  decreased to 10 mg.  She has chronic shoulder and right upper extremity pain, x-ray done on December 4 was reviewed, normal with no  osteoarthritis changes.  Patient will be soft tissue tendinitis, she also has a history of lumbosacral pain denies any recent exacerbation.  She uses topical diclofenac gel, Tylenol and sometimes Tylenol 3.   She does have a history of stroke, she is on aspirin and Plavix.  Diabetes has been stable, last A1c was 6.1%.  She has mild depression and insomnia, controlled  with Effexor and trazodone.  She takes Crestor for hyperlipidemia  She brought her medication boxes, we reviewed them today.    Review of Systems As per HPI, otherwise negative.    OBJECTIVE   /82 (Patient Site: Left Arm, Patient Position: Sitting, Cuff Size: Adult Regular)   Pulse 72   Temp 99.1  F (37.3  C) (Other)   Wt 137 lb (62.1 kg)   LMP 04/15/2017   BMI 29.65 kg/m    Physical Exam   Constitutional: She is oriented to person, place, and time. She appears well-developed and well-nourished.   HENT:   Head: Normocephalic and atraumatic.   Neck: Normal range of motion. Neck supple. No JVD present. No tracheal deviation present. No thyromegaly present.   Cardiovascular: Normal rate, regular rhythm, normal heart sounds and intact distal pulses. Exam reveals no gallop and no friction rub.   No murmur heard.  Pulmonary/Chest: Effort normal and breath sounds normal. No respiratory distress. She has no wheezes. She has no rales.   Musculoskeletal:         General: No tenderness or edema.   Lymphadenopathy:     She has no cervical adenopathy.   Neurological: She is alert and oriented to person, place, and time.   Psychiatric: She has a normal mood and affect. Judgment and thought content normal.       [facility]     Family History   Family history unknown: Yes     Social History     Socioeconomic History     Marital status:      Spouse name: Amee Avila     Number of children: 1     Years of education: Not on file     Highest education level: Not on file   Occupational History     Not on file   Social Needs     Financial resource strain: Not on file     Food insecurity     Worry: Not on file     Inability: Not on file     Transportation needs     Medical: Not on file     Non-medical: Not on file   Tobacco Use     Smoking status: Never Smoker     Smokeless tobacco: Never Used     Tobacco comment: no second hand smoke exposure   Substance and Sexual Activity     Alcohol use: No     Drug use: No     Sexual  "activity: Yes     Partners: Male     Birth control/protection: None     Comment: Wants to get pregnant 2017   Lifestyle     Physical activity     Days per week: Not on file     Minutes per session: Not on file     Stress: Not on file   Relationships     Social connections     Talks on phone: Not on file     Gets together: Not on file     Attends [employer] service: Not on file     Active member of club or organization: Not on file     Attends meetings of clubs or organizations: Not on file     Relationship status: Not on file     Intimate partner violence     Fear of current or ex partner: Not on file     Emotionally abused: Not on file     Physically abused: Not on file     Forced sexual activity: Not on file   Other Topics Concern     Not on file   Social History Narrative     Not on file     Relevant history was reviewed with the patient today, unless noted in HPI, nothing is pertinent for this visit.  [facility]     Patient Active Problem List    Diagnosis Date Noted     Weakness due to acute stroke (H) 02/24/2020     Overview Note:     Left sided weakness due to ischemic stroke of right lazara 2/20.       Acute infarct lazara, right (H) 02/02/2020     Overview Note:     2/20: residual left sided weakness.       HTN (hypertension) 03/26/2019     Type 2 diabetes mellitus (H) 02/26/2019     Overview Note:     Dx 2019  Stroke 2020       Vitamin D deficiency 08/18/2017     Lumbosacral radiculopathy at S1 09/19/2016     Overview Note:     Since 2006, slipped and fell on ice.       Anxiety 01/04/2016     Fatty liver      Obesity (BMI 30.0-34.9)      Abnormal Pap smear of cervix      Overview Note:     5/8/12: ASCUS.  4/11/13: NILM.  8/14/2017: Pap smear advised and declined.  The patient stated \"maybe next time\".         Hyperlipidemia      Overview Note:     3/19: New diabetes, 10 year risk 6%. Start moderate intensity statin.         Mild intermittent asthma without complication      Past Surgical History:   Procedure " Laterality Date     COMBINED HYSTEROSCOPY DIAGNOSTIC / D&C  07/15/2011    Planned hysteroscopy/D&C for evaluation/treatment of menorrhagia 7/15/11.        RESULTS/CONSULTS (Lab/Rad)     Recent Results (from the past 168 hour(s))   Microalbumin, Random Urine   Result Value Ref Range    Microalbumin, Random Urine <0.50 0.00 - 1.99 mg/dL    Creatinine, Urine 18.9 mg/dL    Microalbumin/Creatinine Ratio Random Urine       No results found.  MEDICATIONS     Current Outpatient Medications on File Prior to Visit   Medication Sig Dispense Refill     albuterol (PROAIR HFA;PROVENTIL HFA;VENTOLIN HFA) 90 mcg/actuation inhaler Inhale 2 puffs every 4 (four) hours as needed for wheezing or shortness of breath. 8.5 g 1     aspirin 81 mg chewable tablet Chew 1 tablet (81 mg total) daily. 100 tablet 3     blood glucose test strips TEST ONCE DAILY. Dispense brand per patient's insurance at pharmacy discretion. 100 strip 5     clopidogreL (PLAVIX) 75 mg tablet Take 1 tablet (75 mg total) by mouth daily. 100 tablet 3     diclofenac sodium (VOLTAREN) 1 % Gel APPLY TOPICALLY TO THE AFFECTED AREA(S) 1-3 TIMES A DAY, AS NEEDED 100 g 3     generic lancets (FINGERSTIX LANCETS) TEST ONCE DAILY. Dispense brand per patient's insurance at pharmacy discretion. 100 each 5     lisinopriL (PRINIVIL,ZESTRIL) 20 MG tablet Take 1 tablet (20 mg total) by mouth daily. 90 tablet 3     loratadine (CLARITIN) 10 mg tablet Take 1 tablet (10 mg total) by mouth daily. 90 tablet 4     omeprazole (PRILOSEC) 20 MG capsule Take 1 capsule (20 mg total) by mouth daily before breakfast. 100 capsule 4     rosuvastatin (CRESTOR) 40 MG tablet Take 1 tablet (40 mg total) by mouth at bedtime. 90 tablet 3     traZODone (DESYREL) 50 MG tablet Take 1 tablet (50 mg total) by mouth at bedtime as needed for sleep. 100 tablet 3     venlafaxine (EFFEXOR-XR) 75 MG 24 hr capsule Take 1 capsule (75 mg total) by mouth daily with breakfast. 100 capsule 3     acetaminophen (TYLENOL) 500  MG tablet Take 1 tablet (500 mg total) by mouth every 6 (six) hours as needed for pain. 200 tablet 4     acetaminophen-codeine (TYLENOL #3) 300-30 mg per tablet Take 1 tablet by mouth every 6 (six) hours as needed for pain. 30 tablet 0     fluticasone propionate (FLOVENT HFA) 110 mcg/actuation inhaler Inhale 2 puffs daily. 3 Inhaler 4     montelukast (SINGULAIR) 10 mg tablet Take 1 tablet (10 mg total) by mouth daily. 100 tablet 3     No current facility-administered medications on file prior to visit.        HEALTH MAINTENANCE / SCREENING   PHQ-2 Total Score: 0 (2/8/2021  1:19 PM)  , No data recorded,No data recorded  Immunization History   Administered Date(s) Administered     Hep B, historic 03/20/1995     Hepatitis A: Immune 02/28/2013     Hepatitis B: Immune 02/28/2013     INFLUENZA,RECOMBINANT,INJ,PF QUADRIVALENT 18+YRS 12/11/2018, 09/18/2019, 02/15/2020, 11/06/2020     INFLUENZA,SEASONAL QUAD, PF, =/> 6months 01/20/2016     Influenza, inj, historic,unspecified 10/30/2006, 10/19/2007, 10/07/2008     Influenza, seasonal,quad inj 6-35 mos 09/28/2011, 12/21/2012     Influenza,seasonal,quad inj =/> 6months 09/19/2016     MMR 02/17/1995     Pneumo Polysac 23-V 07/14/2011     Td, adult adsorbed, PF 08/05/2020     Td,adult,historic,unspecified 05/27/1994, 07/27/1994, 02/17/1995     Tdap 07/14/2011     Typhoid, Inj, Inactive 04/13/2018     Health Maintenance   Topic     MAMMOGRAM      ZOSTER VACCINES (1 of 2)     PAP SMEAR      HPV TEST      DIABETIC EYE EXAM      A1C      ASTHMA ACTION PLAN      Asthma Control Test      PREVENTIVE CARE VISIT      BMP      DIABETIC FOOT EXAM      LIPID      MICROALBUMIN      ADVANCE CARE PLANNING      COLORECTAL CANCER SCREENING      TD 18+ HE      HEPATITIS C SCREENING      HIV SCREENING      Pneumococcal Vaccine: Pediatrics (0 to 5 Years) and At-Risk Patients (6 to 64 Years)      INFLUENZA VACCINE RULE BASED      TDAP ADULT ONE TIME DOSE      HEPATITIS B VACCINES      Review of  external notes as documented above   Review of the result(s) of each unique test - Xray Shoulder    Diagnosis or treatment significantly limited by social determinants of health - language barrier    25 minutes spent on the date of the encounter doing chart review, review of test results, interpretation of tests, patient visit and documentation   The following high BMI interventions were performed this visit: encouragement to exercise and weight loss from baseline weight      Usha Mao MD  Family Medicine, [facility]     This note was dictated using a voice recognition software.  Any grammatical or context distortion are unintentional and inherent to the software.

## 2021-06-15 NOTE — TELEPHONE ENCOUNTER
Refill Approved    Rx renewed per Medication Renewal Policy. Medication was last renewed on 2/24/20.    Frank Jacobs, [facility] Triage/Med Refill 3/2/2021     Requested Prescriptions   Pending Prescriptions Disp Refills     clopidogreL (PLAVIX) 75 mg tablet [Pharmacy Med Name: CLOPIDOGREL 75 MG TABLET] 30 tablet 13     Sig: TAKE 1 TABLET BY MOUTH EVERY DAY       Clopidogrel/Prasugrel/Ticagrelor Refill Protocol Passed - 3/1/2021  1:47 PM        Passed - PCP or prescribing provider visit in past 6 months       Last office visit with prescriber/PCP: 12/4/2020 OR same dept: 2/8/2021 Usha Mao MD OR same specialty: 2/8/2021 Usha Mao MD Last physical: Visit date not found Last MTM visit: Visit date not found     Next appt within 3 mo: Visit date not found  Next physical within 3 mo: Visit date not found  Prescriber OR PCP: Ema Gentile MD  Last diagnosis associated with med order: 1. Acute infarct lazara, right (H)  - clopidogreL (PLAVIX) 75 mg tablet [Pharmacy Med Name: CLOPIDOGREL 75 MG TABLET]; TAKE 1 TABLET BY MOUTH EVERY DAY  Dispense: 30 tablet; Refill: 13    2. Type 2 diabetes mellitus without complication, without long-term current use of insulin (H)  - ACCU-CHEK GUIDE TEST STRIPS strips [Pharmacy Med Name: ACCU-CHEK GUIDE TEST STRIP]; TEST ONCE DAILY.  Dispense: 50 strip; Refill: 11    3. Pure hypercholesterolemia  - rosuvastatin (CRESTOR) 40 MG tablet [Pharmacy Med Name: ROSUVASTATIN CALCIUM 40 MG TAB]; TAKE 1 TABLET BY MOUTH EVERYDAY AT BEDTIME  Dispense: 90 tablet; Refill: 3    If protocol passes may refill for 6 months if within 3 months of last provider visit (or a total of 9 months).              Passed - Hemoglobin in past 12 months     Hemoglobin   Date Value Ref Range Status   11/06/2020 15.2 12.0 - 16.0 g/dL Final                ACCU-CHEK GUIDE TEST STRIPS strips [Pharmacy Med Name: ACCU-CHEK GUIDE TEST STRIP] 50 strip 11     Sig: TEST ONCE DAILY.       Diabetic  Supplies Refill Protocol Passed - 3/1/2021  1:47 PM        Passed - Visit with PCP or prescribing provider visit in last 6 months     Last office visit with prescriber/PCP: 12/4/2020 Ema Gentile MD OR same dept: 2/8/2021 Usha aMo MD OR same specialty: 2/8/2021 Usha Mao MD  Last physical: 2/26/2019 Last MTM visit: Visit date not found   Next visit within 3 mo: Visit date not found  Next physical within 3 mo: Visit date not found  Prescriber OR PCP: Ema Gentile MD  Last diagnosis associated with med order: 1. Acute infarct lazara, right (H)  - clopidogreL (PLAVIX) 75 mg tablet [Pharmacy Med Name: CLOPIDOGREL 75 MG TABLET]; TAKE 1 TABLET BY MOUTH EVERY DAY  Dispense: 30 tablet; Refill: 13    2. Type 2 diabetes mellitus without complication, without long-term current use of insulin (H)  - ACCU-CHEK GUIDE TEST STRIPS strips [Pharmacy Med Name: ACCU-CHEK GUIDE TEST STRIP]; TEST ONCE DAILY.  Dispense: 50 strip; Refill: 11    3. Pure hypercholesterolemia  - rosuvastatin (CRESTOR) 40 MG tablet [Pharmacy Med Name: ROSUVASTATIN CALCIUM 40 MG TAB]; TAKE 1 TABLET BY MOUTH EVERYDAY AT BEDTIME  Dispense: 90 tablet; Refill: 3    If protocol passes may refill for 12 months if within 3 months of last provider visit (or a total of 15 months).             Passed - A1C in last 6 months     Hemoglobin A1c   Date Value Ref Range Status   11/06/2020 6.1 (H) <=5.6 % Final     Comment:     Normal <5.7% Prediabete 5.7-6.4% Diabletes 6.5% or higher - adopted from ADA consensus guidelines                  rosuvastatin (CRESTOR) 40 MG tablet [Pharmacy Med Name: ROSUVASTATIN CALCIUM 40 MG TAB] 90 tablet 3     Sig: TAKE 1 TABLET BY MOUTH EVERYDAY AT BEDTIME       Statins Refill Protocol (Hmg CoA Reductase Inhibitors) Passed - 3/1/2021  1:47 PM        Passed - PCP or prescribing provider visit in past 12 months      Last office visit with prescriber/PCP: 12/4/2020 Ema Gentile MD OR same dept: 2/8/2021  Usha Mao MD OR same specialty: 2/8/2021 Usha Mao MD  Last physical: 2/26/2019 Last MTM visit: Visit date not found   Next visit within 3 mo: Visit date not found  Next physical within 3 mo: Visit date not found  Prescriber OR PCP: Ema Gentile MD  Last diagnosis associated with med order: 1. Acute infarct lazara, right (H)  - clopidogreL (PLAVIX) 75 mg tablet [Pharmacy Med Name: CLOPIDOGREL 75 MG TABLET]; TAKE 1 TABLET BY MOUTH EVERY DAY  Dispense: 30 tablet; Refill: 13    2. Type 2 diabetes mellitus without complication, without long-term current use of insulin (H)  - ACCU-CHEK GUIDE TEST STRIPS strips [Pharmacy Med Name: ACCU-CHEK GUIDE TEST STRIP]; TEST ONCE DAILY.  Dispense: 50 strip; Refill: 11    3. Pure hypercholesterolemia  - rosuvastatin (CRESTOR) 40 MG tablet [Pharmacy Med Name: ROSUVASTATIN CALCIUM 40 MG TAB]; TAKE 1 TABLET BY MOUTH EVERYDAY AT BEDTIME  Dispense: 90 tablet; Refill: 3    If protocol passes may refill for 12 months if within 3 months of last provider visit (or a total of 15 months).

## 2021-06-16 NOTE — TELEPHONE ENCOUNTER
Telephone Encounter by Sherron Salazar at 9/20/2019  3:22 PM     Author: Sherron Salazar Service: -- Author Type: --    Filed: 9/20/2019  3:22 PM Encounter Date: 9/17/2019 Status: Signed    : Sherron Salazar APPROVED:    Approval start date:8/20/19  Approval end date:9/19/2020    Pharmacy has been notified of approval and will contact patient when medication is ready for pickup.

## 2021-06-16 NOTE — TELEPHONE ENCOUNTER
Refilled 12/4/20    Outpatient Medication Detail     Disp Refills Start End MAHENDRA   atorvastatin (LIPITOR) 40 MG tablet (Discontinued)   7/14/2020 8/5/2020 --   Sig - Route: Take 40 mg by mouth daily. - Oral   Class: Historical Med   atorvastatin (LIPITOR) 40 MG tablet [[id number]]  Patient-reported historical medication  Ordering date: 08/05/20 0819 Authorized by: PROVIDER, HISTORICAL   Frequency: DAILY 07/14/20 - 08/05/20  Discontinued by: Stephen Mayfield MD 08/05/20 0839

## 2021-06-17 ENCOUNTER — COMMUNICATION - HEALTHEAST (OUTPATIENT)
Dept: FAMILY MEDICINE | Facility: CLINIC | Age: 56
End: 2021-06-17

## 2021-06-17 DIAGNOSIS — Z12.31 VISIT FOR SCREENING MAMMOGRAM: ICD-10-CM

## 2021-06-17 DIAGNOSIS — M54.2 NECK PAIN: ICD-10-CM

## 2021-06-17 NOTE — PROGRESS NOTES
ASSESSMENT/PLAN:  1. Environmental allergies  methylPREDNISolone (MEDROL DOSEPACK) 4 mg tablet   2. Lumbosacral radiculopathy at S1  TiZANidine (ZANAFLEX) 2 MG capsule   3. Rash  clotrimazole-betamethasone (LOTRISONE) cream       This is a 53 yo female with:  1.  Environmental allergies - has used Medrol dosepak when most symptomatic.  She usually keeps one on hand for transition seasons.  Not currently terribly symptomatic.  Will provide one dose edmond at this time.  Would encourage more regular use of her Claritin.   2.  Rash - uses Lotrisone cream as needed.  Discussed limiting use as able.  3.  Lumbar radiculopathy - uses Tizanidine low dose for muscle relaxer - again, would use this in limited quantities.    4.  Encourage regular follow up with her primary care provider.         There are no discontinued medications.  There are no Patient Instructions on file for this visit.    Chief Complaint:  Chief Complaint   Patient presents with     Medication Refill     prednisone and clotrimazole and betamethasone dipropionate cream,      medication     need muscle relax medication.       HPI:   Kevin Thrasher is a 52 y.o. female c/o  Wants refills on meds  Has used Prednisone - for allergies - uses Claritin more regularly   Also, has used muscle relaxers in past - thinks this could be helpful for pain syndrome    PMH:   Patient Active Problem List    Diagnosis Date Noted     Vitamin D deficiency 08/18/2017     Elevated bilirubin 08/15/2017     Infertility  08/14/2017     Helicobacter pylori gastritis 09/26/2016     Angular cheilitis 09/19/2016     Lumbosacral radiculopathy at S1 09/19/2016     Shrimp allergy 01/04/2016     Anxiety 01/04/2016     Fatty liver      Obesity (BMI 30.0-34.9)      Abnormal Pap smear of cervix      Prediabetes      Hyperlipidemia      Dry skin dermatitis      Mild intermittent asthma without complication      Lower Back Pain      Environmental allergies      Past Medical History:   Diagnosis Date      Depression      Head Injury      Helicobacter pylori gastritis      Infertility, female     eval at infertility clinic was normal.  declined evaluation. h/o ovarian hyperstimulation - did not result in pregnancy.      Menorrhagia      Pterygium      Shingles 2/9/2015     Past Surgical History:   Procedure Laterality Date     COMBINED HYSTEROSCOPY DIAGNOSTIC / D&C  07/15/2011    Planned hysteroscopy/D&C for evaluation/treatment of menorrhagia 7/15/11.      Social History     Social History     Marital status:      Spouse name: Amee Avila     Number of children: 1     Years of education: N/A     Occupational History     Not on file.     Social History Main Topics     Smoking status: Never Smoker     Smokeless tobacco: Never Used      Comment: no second hand smoke exposure     Alcohol use No     Drug use: No     Sexual activity: Yes     Partners: Male     Birth control/ protection: None      Comment: Wants to get pregnant 2017     Other Topics Concern     Not on file     Social History Narrative       Meds:    Current Outpatient Prescriptions:      acetaminophen (PAIN AND FEVER) 500 MG tablet, Take 1 tablet (500 mg total) by mouth every 6 (six) hours as needed for pain., Disp: 100 tablet, Rfl: 0     albuterol (PROAIR HFA;PROVENTIL HFA;VENTOLIN HFA) 90 mcg/actuation inhaler, Inhale 2 puffs every 4 (four) hours as needed for wheezing or shortness of breath., Disp: 8.5 g, Rfl: 1     atovaquone-proguanil (MALARONE) 250-100 mg Tab, Take 1 tablet by mouth daily. Start 2 days before you leave, take every day while you are on your trip, and for 7 days after you are home., Disp: 40 tablet, Rfl: 0     cholecalciferol, vitamin D3, (VITAMIN D3) 1,000 unit capsule, Take 1 capsule (1,000 Units total) by mouth daily., Disp: 100 capsule, Rfl: 3     ibuprofen (ADVIL,MOTRIN) 600 MG tablet, Take 1 tablet (600 mg total) by mouth every 8 (eight) hours as needed for pain (take with food)., Disp: 50 tablet, Rfl: 0     ketotifen  (ZADITOR/ZYRTEC ITCHY EYES) 0.025 % (0.035 %) ophthalmic solution, Administer 1 drop to both eyes 2 (two) times a day as needed., Disp: 10 mL, Rfl: 1     loratadine (CLARITIN) 10 mg tablet, Take 1 tablet (10 mg total) by mouth daily as needed for allergies., Disp: 30 tablet, Rfl: 11     methyl salicylate-menthol 29-7.6 % Oint, Apply to affected areas twice daily, as needed for pain., Disp: 99 g, Rfl: 0     metroNIDAZOLE (METROGEL) 0.75 % gel, Apply to face and rub in twice daily., Disp: 45 g, Rfl: 0     omeprazole (PRILOSEC) 20 MG capsule, Take 1 capsule (20 mg total) by mouth daily before breakfast. (30 minutes before first meal of the day)., Disp: 30 capsule, Rfl: 5     polyethylene glycol 3350,bulk, Powd, Take 17 g by mouth daily as needed. MIX 17 GRAMS IN 8 OUNCES OF WATER AND DRINK ONCE DAILY, Disp: 500 mL, Rfl: 3     polyvinyl alcohol (ARTIFICIAL TEARS, POLYVIN ALC,) 1.4 % ophthalmic solution, Instill 1-2 drops each eye, Disp: 15 mL, Rfl: 2     prednisoLONE acetate (PRED-FORTE) 1 % ophthalmic suspension, 1 DROP INTO BOTH EYES TWICE A DAY, Disp: , Rfl: 0     prenatal vit-iron fum-folic ac (PRENATAL VITAMIN) 27 mg iron- 0.8 mg Tab, Take 1 tablet by mouth once daily., Disp: 100 tablet, Rfl: 3     traZODone (DESYREL) 50 MG tablet, Take 50 mg by mouth at bedtime., Disp: , Rfl: 5     venlafaxine (EFFEXOR-XR) 75 MG 24 hr capsule, Take 75 mg by mouth daily., Disp: , Rfl:      clotrimazole-betamethasone (LOTRISONE) cream, Apply topically sparingly BID to affected areas only, Disp: 30 g, Rfl: 1     methylPREDNISolone (MEDROL DOSEPACK) 4 mg tablet, follow package directions, Disp: 21 tablet, Rfl: 0     TiZANidine (ZANAFLEX) 2 MG capsule, Take 1 capsule (2 mg total) by mouth at bedtime., Disp: 30 capsule, Rfl: 1    Allergies:  Allergies   Allergen Reactions     Other Drug Allergy (See Comments)      Tide detergent - hives     Shellfish Derived        ROS:  Pertinent positives as noted in HPI; otherwise 12 point ROS  negative.      Physical Exam:  EXAM:  /80 (Patient Site: Right Arm, Patient Position: Sitting, Cuff Size: Adult Large)  Pulse 64  Temp 98.1  F (36.7  C) (Oral)   Resp 20  Wt 151 lb 3 oz (68.6 kg)  Breastfeeding? No  BMI 33.3 kg/m2   Gen:  NAD, appears well, well-hydrated  HEENT:  TMs nl, oropharynx benign, nasal mucosa nl, conjunctiva clear  Neck:  Supple, no adenopathy, no thyromegaly, no carotid bruits, no JVD  Lungs:  Clear to auscultation bilaterally  Cor:  RRR no murmur  Abd:  Soft, nontender, BS+, no masses, no guarding or rebound, no HSM  Extr:  Neg.  Neuro:  No asymmetry  Skin:  Warm/dry        Results:  Results for orders placed or performed in visit on 08/18/17   H. pylori Antigen, Stool   Result Value Ref Range    Helicobacter Pylori Antigen Negative Negative

## 2021-06-17 NOTE — TELEPHONE ENCOUNTER
Telephone Encounter by Ligia Thrasher CMA at 12/7/2020  9:32 AM     Author: Ligia Thrasher CMA Service: -- Author Type: Medical Assistant    Filed: 12/7/2020  9:33 AM Encounter Date: 12/7/2020 Status: Signed    : Ligia Thrasher CMA (Medical Assistant)       Called and was unable to leave a message due to mail box being full. Attempt #1. Okay to relay the message below. There's two messages.    Ema Gentile MD [facility], [facility] Team Pool             Please let Mao know that her shoulder x-ray looks GOOD. No significant arthritis or problems with the bones.   She should use the Voltaren gel on her shoulder, take the tylenol and tylenol #3 pills when needed, and continue her exercises.

## 2021-06-17 NOTE — PROGRESS NOTES
Subjective:      Kevin Thrasher is a 52 y.o. female who presents for evaluation of travel consult and leg pain.  1.  Travel consult.  She will be traveling to [facility], leaving 4/30/18 and returning in mid May.  Current vaccinations reviewed.  No documented immunity to varicella.  No recent foreign travel.    2.  Bilateral leg pain.  She feels that the muscles in both of her legs are tight.  On the left side, muscles are painful and tight from the hip down to the foot.  On the right side, pain starts in the low back and extends down from there.  This has been a problem for some time.  She has been seen by physical therapy and feels that they have not helped her.  She has failed home exercises as well.  She feels like pain is worse at night.  She took the muscle relaxer provided, but that made her too sleepy.  She takes Tylenol or ibuprofen and they do help somewhat.  She is wondering if there is any medicine we can give her to help while she is gone on her trip.    Patient Active Problem List   Diagnosis     Obesity (BMI 30.0-34.9)     Abnormal Pap smear of cervix     Prediabetes     Hyperlipidemia     Dry skin dermatitis     Mild intermittent asthma without complication     Lower Back Pain     Environmental allergies     Fatty liver     Shrimp allergy     Anxiety     Angular cheilitis     Lumbosacral radiculopathy at S1     Helicobacter pylori gastritis     Infertility      Elevated bilirubin     Vitamin D deficiency       Current Outpatient Prescriptions:      acetaminophen (PAIN AND FEVER) 500 MG tablet, Take 1 tablet (500 mg total) by mouth every 6 (six) hours as needed for pain., Disp: 100 tablet, Rfl: 0     albuterol (PROAIR HFA;PROVENTIL HFA;VENTOLIN HFA) 90 mcg/actuation inhaler, Inhale 2 puffs every 4 (four) hours as needed for wheezing or shortness of breath., Disp: 8.5 g, Rfl: 1     cholecalciferol, vitamin D3, (VITAMIN D3) 1,000 unit capsule, Take 1 capsule (1,000 Units total) by mouth daily., Disp: 100  capsule, Rfl: 3     ibuprofen (ADVIL,MOTRIN) 600 MG tablet, Take 1 tablet (600 mg total) by mouth every 8 (eight) hours as needed for pain (take with food)., Disp: 50 tablet, Rfl: 0     ketotifen (ZADITOR/ZYRTEC ITCHY EYES) 0.025 % (0.035 %) ophthalmic solution, Administer 1 drop to both eyes 2 (two) times a day as needed., Disp: 10 mL, Rfl: 1     loratadine (CLARITIN) 10 mg tablet, Take 1 tablet (10 mg total) by mouth daily as needed for allergies., Disp: 30 tablet, Rfl: 11     metroNIDAZOLE (METROGEL) 0.75 % gel, Apply to face and rub in twice daily., Disp: 45 g, Rfl: 0     omeprazole (PRILOSEC) 20 MG capsule, Take 1 capsule (20 mg total) by mouth daily before breakfast. (30 minutes before first meal of the day)., Disp: 30 capsule, Rfl: 5     polyethylene glycol 3350,bulk, Powd, Take 17 g by mouth daily as needed. MIX 17 GRAMS IN 8 OUNCES OF WATER AND DRINK ONCE DAILY, Disp: 500 mL, Rfl: 3     polyvinyl alcohol (ARTIFICIAL TEARS, POLYVIN ALC,) 1.4 % ophthalmic solution, Instill 1-2 drops each eye, Disp: 15 mL, Rfl: 2     prednisoLONE acetate (PRED-FORTE) 1 % ophthalmic suspension, 1 DROP INTO BOTH EYES TWICE A DAY, Disp: , Rfl: 0     prenatal vit-iron fum-folic ac (PRENATAL VITAMIN) 27 mg iron- 0.8 mg Tab, Take 1 tablet by mouth once daily., Disp: 100 tablet, Rfl: 3     venlafaxine (EFFEXOR-XR) 75 MG 24 hr capsule, Take 75 mg by mouth daily., Disp: , Rfl:      atovaquone-proguanil (MALARONE) 250-100 mg Tab, Take 1 tablet by mouth daily. Start 2 days before you leave, take every day while you are on your trip, and for 7 days after you are home., Disp: 40 tablet, Rfl: 0     methyl salicylate-menthol 29-7.6 % Oint, Apply to affected areas twice daily, as needed for pain., Disp: 99 g, Rfl: 0     traZODone (DESYREL) 50 MG tablet, Take 50 mg by mouth at bedtime., Disp: , Rfl: 5     Objective:     Allergies   Allergen Reactions     Other Drug Allergy (See Comments)      Tide detergent - hives     Shellfish Derived       Vitals:    04/13/18 1051   BP: 138/74   Pulse: 72   Resp: 20     Body mass index is 33.04 kg/(m^2).    Vitals reviewed as above.  General: Patient is alert and oriented x 3, in no apparent distress  Cardiac: Regular rate and rhythm, no murmurs  Pulmonary: lungs clear to ausculation, no crackles, rales, rhonchi, or wheezing  Musculoskeletal: normal 4/5 strength in major muscle groups in lower extremities bilaterally, patient walks a normal tandem gait, negative straight leg raise test bilaterally, no pain with palpation of the bilateral lower back    Assessment and Plan:     1.  Travel consult.  Travelling to [facility], leaving 4/30/18, returning in mid May.  We discussed all recommended travel vaccinations and precautions.  She is immune to hepatitis A and hepatitis B.  No documented immunity to varicella.  She declines varicella vaccination today.  She thinks she had chickenpox when she was a child.  Typhoid vaccine was given today.  Malaria prophylaxis was prescribed.  She declines Hungarian encephalitis vaccination.    2.  Bilateral leg pain, chronic.  With further discussion, some of this is connected to her low back as well.  She has tried and failed physical therapy.  For now, continue using Tylenol and ibuprofen.  When she returns from her trip, plan on seeing [facility] spine care for further evaluation.  Referral placed today.    This dictation uses voice recognition software, which may contain typographical errors.

## 2021-06-17 NOTE — PROGRESS NOTES
Injection tendon or ligament    Date/Time: 4/30/2021 8:57 AM  Performed by: Usha Mao MD  Authorized by: Usha Mao MD   Consent: Verbal consent obtained.  Consent given by: patient        Shoulder     Timeout was taken.   Anatomic landmarks were  identified and marked   The  rightshoulder  was prepped in the usual sterile fashion and the overlying skin cleaned using povidone iodine x3  Local anesthesia achieved using Ethyl Chloride spray (Cooling spray).Pain Ease    A mixture of Sensorcaine 0.25% without epinephrine  total of 3 mL and 1ml of Kenalog 40 mg/1mL in a needle of appropriate length and gauge was injected into  the glenohumeral joint through the subacromial space using a posterior approach.1/2 dose injected at the bicep tendinitis anteriorly with a separate syringe and needle.  Gentle aspiration before injection didn t show any blood return.    Estimated blood loss was less than 0.1 cc.    A dressing was applied to the area. Anticipatory guidance, as well as standard post-procedure care, was explained. Return precautions were given. The patient tolerated the procedure well without complications. Follow-up visit as needed.

## 2021-06-18 NOTE — LETTER
Letter by Ema Gentile MD at      Author: Ema Gentile MD Service: -- Author Type: --    Filed:  Encounter Date: 3/4/2019 Status: (Other)       Kevin Thrasher  [address] [place] [address]          3/4/2019    Re: Kevin Thrasher  YOB: 1965      Dear Kevin;    These are the results of your recent labs. They show:  1. You have diabetes.   2. You need cholesterol medicine.  3. Your vitamin D level is low.  4. Your potassium level is low.    This is what you should do:  1. See diabetes educator to get machine to test your sugar and learn about how to eat and exercise.  2. Start cholesterol pill, atorvastatin 10 mg every night.  3. Take your vitamin D pill.  4. Eat fruits + vegetables.  5. See Dr. Gentile to talk about these problems.    Physical on 02/26/2019   Component Date Value Ref Range Status   ? Hemoglobin A1c 02/26/2019 6.8* 3.5 - 6.0 % Final   ? Sodium 02/26/2019 143  136 - 145 mmol/L Final   ? Potassium 02/26/2019 3.4* 3.5 - 5.0 mmol/L Final   ? Chloride 02/26/2019 101  98 - 107 mmol/L Final   ? CO2 02/26/2019 29  22 - 31 mmol/L Final   ? Anion Gap, Calculation 02/26/2019 13  5 - 18 mmol/L Final   ? Glucose 02/26/2019 84  70 - 125 mg/dL Final   ? BUN 02/26/2019 13  8 - 22 mg/dL Final   ? Creatinine 02/26/2019 0.78  0.60 - 1.10 mg/dL Final   ? GFR MDRD Af Amer 02/26/2019 >60  >60 mL/min/1.73m2 Final   ? GFR MDRD Non Af Amer 02/26/2019 >60  >60 mL/min/1.73m2 Final   ? Bilirubin, Total 02/26/2019 0.5  0.0 - 1.0 mg/dL Final   ? Calcium 02/26/2019 9.3  8.5 - 10.5 mg/dL Final   ? Protein, Total 02/26/2019 7.0  6.0 - 8.0 g/dL Final   ? Albumin 02/26/2019 3.6  3.5 - 5.0 g/dL Final   ? Alkaline Phosphatase 02/26/2019 85  45 - 120 U/L Final   ? AST 02/26/2019 26  0 - 40 U/L Final   ? ALT 02/26/2019 30  0 - 45 U/L Final   ? Cholesterol 02/26/2019 266* <=199 mg/dL Final   ? Triglycerides 02/26/2019 152* <=149 mg/dL Final   ? HDL Cholesterol 02/26/2019 61  >=50 mg/dL Final   ? LDL Calculated 02/26/2019 175* <=129  mg/dL Final   ? Patient Fasting > 8hrs? 02/26/2019 Yes   Final   ? TSH 02/26/2019 3.04  0.30 - 5.00 uIU/mL Final   ? Vitamin D, Total (25-Hydroxy) 02/26/2019 16.6* 30.0 - 80.0 ng/mL Final   ? Case Report 02/26/2019    Final                    Value:Gynecologic Cytology Report                       Case: [id number]                                   Authorizing Provider:  Ema Gentile MD        Collected:           02/26/2019 0932              Ordering Location:     [facility]  Received:            02/26/2019 0933                                     Medicine/OB                                                                  First Screen:          Renu Ramirez, CT                                                                            (ASCP)                                                                       Specimen:    SUREPATH PAP, SCREENING, Endocervical/cervical                                            ? Interpretation 02/26/2019 Negative for squamous intraepithelial lesion or malignancy  Negative for squamous intraepithelial lesion or malignancy.  Final   ? HPV16 DNA 02/26/2019 Negative  NEG Final   ? HPV18 DNA 02/26/2019 Negative  NEG Final   ? Other HR HPV 02/26/2019 Negative  NEG Final       If you have any questions, please call [phone].    Sincerely,    Ema Gentile MD  Family Physician  [facility]

## 2021-06-19 NOTE — LETTER
Letter by Ema Gentile MD at      Author: Ema Gentile MD Service: -- Author Type: --    Filed:  Encounter Date: 5/10/2019 Status: (Other)         Kevin Thrasher  [address] [place] [address]      May 10, 2019      Dear Kevin,    As a valued [facility] patient, your healthcare needs are our priority.  Your health care team has determined that you are due for an appointment regarding your Diabetes.    To help prevent delays in your care, please call the [facility] at [phone].    We look forward to partnering with you to achieve optimal health and wellbeing.    Sincerely,  Your care team at [facility] and [facility]

## 2021-06-19 NOTE — LETTER
Letter by Ema Gentile MD at      Author: Ema Gentile MD Service: -- Author Type: --    Filed:  Encounter Date: 6/7/2019 Status: (Other)         Kevin Thrasher  [address] [place] [address]      June 7, 2019      Dear Kevin,    As a valued [facility] patient, your healthcare needs are our priority.  Your health care team has determined that you are due for an appointment regarding your Asthma.    To help prevent delays in your care, please call the [facility] at [phone].    We look forward to partnering with you to achieve optimal health and wellbeing.    Sincerely,  Your care team at [facility] and [facility]

## 2021-06-20 NOTE — LETTER
Letter by Stephen Mayfield MD at      Author: Stephen Mayfield MD Service: -- Author Type: --    Filed:  Encounter Date: 8/7/2020 Status: (Other)         Kevin Thrasher  [address] [facility] [address]             August 7, 2020         Dear Ms. Thrasher,    Below are the results from your recent visit:    Resulted Orders   Glycosylated Hemoglobin A1c   Result Value Ref Range    Hemoglobin A1c 6.0 (H) <=5.6 %      Comment:      Normal <5.7% Prediabete 5.7-6.4% Diabletes 6.5% or higher - adopted from ADA consensus guidelines       Diabetes test looks good.    Please call with questions or contact us using [employer].    Sincerely,        Electronically signed by Stephen Mayfield MD

## 2021-06-20 NOTE — LETTER
Letter by Stephen Mayfield MD at      Author: Stephen Mayfield MD Service: -- Author Type: --    Filed:  Encounter Date: 8/7/2020 Status: (Other)         Kevin Thrasher  [address] [facility] [address]             August 7, 2020         Dear Ms. Thrasher,    Below are the results from your recent visit:    Resulted Orders   Lipid [place] FASTING   Result Value Ref Range    Cholesterol 187 <=199 mg/dL    Triglycerides 73 <=149 mg/dL    HDL Cholesterol 60 >=50 mg/dL    LDL Calculated 112 <=129 mg/dL    Patient Fasting > 8hrs? Yes    ALT (SGPT)   Result Value Ref Range    ALT 38 0 - 45 U/L       Cholesterol and liver tests look good    Please call with questions or contact us using [employer].    Sincerely,        Electronically signed by Stephen Mayfield MD

## 2021-06-20 NOTE — LETTER
Letter by Ema Gentile MD at      Author: Ema Gentile MD Service: -- Author Type: --    Filed:  Encounter Date: 2/25/2020 Status: (Other)       Kevin Thrasher  [address] [place] [address]          2/25/2020    Re: Kevin Thrasher  YOB: 1965      Dear Kevin;    Your cholesterol is getting better.  But because of your recent stroke, the LDL needs to be less than 70.  You need a stronger cholesterol medication.  I will send in the prescription for a new cholesterol pill called rosuvastatin.  It is the same number of milligrams, but it is stronger.  ---OLD medicine: Atorvastatin 40 mg (STOP this).  ---NEW medicine: Rosuvastatin 40 mg.    Your kidney and liver tests are good.    Office Visit on 02/24/2020   Component Date Value Ref Range Status   ? Cholesterol 02/24/2020 153  <=199 mg/dL Final   ? Triglycerides 02/24/2020 106  <=149 mg/dL Final   ? HDL Cholesterol 02/24/2020 45* >=50 mg/dL Final   ? LDL Calculated 02/24/2020 87  <=129 mg/dL Final   ? Patient Fasting > 8hrs? 02/24/2020 No   Final   ? Sodium 02/24/2020 141  136 - 145 mmol/L Final   ? Potassium 02/24/2020 4.1  3.5 - 5.0 mmol/L Final   ? Chloride 02/24/2020 102  98 - 107 mmol/L Final   ? CO2 02/24/2020 30  22 - 31 mmol/L Final   ? Anion Gap, Calculation 02/24/2020 9  5 - 18 mmol/L Final   ? Glucose 02/24/2020 88  70 - 125 mg/dL Final   ? BUN 02/24/2020 19  8 - 22 mg/dL Final   ? Creatinine 02/24/2020 0.68  0.60 - 1.10 mg/dL Final   ? GFR MDRD Af Amer 02/24/2020 >60  >60 mL/min/1.73m2 Final   ? GFR MDRD Non Af Amer 02/24/2020 >60  >60 mL/min/1.73m2 Final   ? Bilirubin, Total 02/24/2020 0.8  0.0 - 1.0 mg/dL Final   ? Calcium 02/24/2020 9.4  8.5 - 10.5 mg/dL Final   ? Protein, Total 02/24/2020 6.8  6.0 - 8.0 g/dL Final   ? Albumin 02/24/2020 3.7  3.5 - 5.0 g/dL Final   ? Alkaline Phosphatase 02/24/2020 80  45 - 120 U/L Final   ? AST 02/24/2020 26  0 - 40 U/L Final   ? ALT 02/24/2020 39  0 - 45 U/L Final       If you have any questions, please call  [phone].    Sincerely,    Ema Gentile MD  Family Physician  [facility]

## 2021-06-20 NOTE — PROGRESS NOTES
One year right arm pain middle third flexor aspect.  Sharp.  Cannot put finger on it.  Daily.  Felt when turning arm.  Six minutes.   Dull lasts longer.  Sometimes gone.      Has neck tension.  Denies neck or shoulder related rom sxs    At time had thumb trigger injection also.  Does not remember any unusual activity preceding.     Did garden and weeding.    Stomach sxs.   Feels like food does not go down.  Stuck.  Sometimes stuck while swallowing.  Need to flush with water.  No vomit.  Endorse nausea.  No melena or hematochezia.  No wt loss    Shows omeprazole.  In a week takes 4.   Four years.  Hx treated but still pain.  2016 positive stool  2017 neg pylori stool         OBJECTIVE:   Vitals:    09/26/18 1442   BP: 144/86   Pulse: 68      Wt is noted.  No diaphoresis  Eyes: nl eom, anicteric   External ears, nose: nl    Neck: nl nodes, supple, thyroid normal   Lungs: clear to ausc   Heart: regular rhythm  Abd:  Epigastric tender     No cva (renal) tenderness  Neuro: no weakness  Skin no rash  Joints: uninflamed   No ketotic breath odor noted  Mental: euthymic  Ext: nontender calves   Gait: normal    Body mass index is 33.26 kg/(m^2).  Neuro: Normal strength of interossei, normal , wrist extension and flexion.  Normal biceps strength.  Musculoskeletal: Normal shoulder range of motion.  Neck: Normal neck range of motion and negative head compression sign.  Skin: no rash  Adenopathy: normal    ASSESSMENT/PLAN:  Benign arm sx/ expect resolution with light use    Dysphagia/ to endoscopy    High bp related to anx/ reassured and to follow up pcp    1. Pain of right upper arm     2. Dysphagia  Ambulatory referral for Upper GI Endoscopy   3. Helicobacter pylori gastritis     4. High blood pressure         Chronic issues stable/ same treatment.

## 2021-06-20 NOTE — LETTER
Letter by Ema Gentile MD at      Author: Ema Gentile MD Service: -- Author Type: --    Filed:  Encounter Date: 2/24/2020 Status: (Other)       My Asthma Action Plan     Name: Kevin Thrasher   YOB: 1965  Date: 2/24/2020   My doctor: Ema Gentile MD   My clinic: [facility]        My Rescue Medicine:   Albuterol (Proair/Ventolin/Proventil HFA) 2-4 puffs EVERY 4 HOURS as needed. Use a spacer if recommended by your provider.   My Asthma Severity:   Intermittent/Exercise Induced               GREEN ZONE   Good Control    I feel good    No cough or wheeze    Can work, sleep and play without asthma symptoms     Take your asthma control medicine every day.     1. If exercise triggers your asthma, take your rescue medication    15 minutes before exercise or sports, and    During exercise if you have asthma symptoms  2. Spacer to use with inhaler: If you have a spacer, make sure to use it with your inhaler             YELLOW ZONE Getting Worse  I have ANY of these:    I do not feel good    Cough or wheeze    Chest feels tight    Wake up at night 1. Keep taking your Green Zone medications  2. Start taking your rescue medicine:    every 20 minutes for up to 1 hour. Then every 4 hours for 24-48 hours.  3. If you stay in the Yellow Zone for more than 12-24 hours, contact your doctor.  4. If you do not return to the Green Zone in 12-24 hours or you get worse, start taking your oral steroid medicine if prescribed by your provider.           RED ZONE Medical Alert - Get Help  I have ANY of these:    I feel awful    Medicine is not helping    Breathing getting harder    Trouble walking or talking    Nose opens wide to breathe     1. Take your rescue medicine NOW  2. If your provider has prescribed an oral steroid medicine, start taking it NOW  3. Call your doctor NOW  4. If you are still in the Red Zone after 20 minutes and you have not reached your doctor:    Take your rescue medicine  again and    Call 911 or go to the emergency room right away    See your regular doctor within 2 weeks of an Emergency Room or Urgent Care visit for follow-up treatment.          Annual Reminders:  Meet with Asthma Educator,  Flu Shot in the Fall, consider Pneumonia Vaccination for patients with asthma (aged 19 and older).    Pharmacy:   [facility]/pharm[facility] #[id number] - [place], [place] - [address]  [address]  [place] [address]  Phone: [phone] Fax: [phone]      Electronically signed by Ema Gentile MD   Date: 02/24/20                      Asthma Triggers  How To Control Things That Make Your Asthma Worse    Triggers are things that make your asthma worse.  Look at the list below to help you find your triggers and what you can do about them.  You can help prevent asthma flare-ups by staying away from your triggers.      Trigger                                                          What you can do   Cigarette Smoke  Tobacco smoke can make asthma worse. Do not allow smoking in your home, car or around you.  Be sure no one smokes at a claudia day care or school.  If you smoke, ask your health care provider for ways to help you quit.  Ask family members to quit too.  Ask your health care provider for a referral to Quit Plan to help you quit smoking, or call 9-404-259-PLAN.     Colds, Flu, Bronchitis  These are common triggers of asthma. Wash your hands often.  Dont touch your eyes, nose or mouth.  Get a flu shot every year.     Dust Mites  These are tiny bugs that live in cloth or carpet. They are too small to see. Wash sheets and blankets in hot water every week.   Encase pillows and mattress in dust mite proof covers.  Avoid having carpet if you can. If you have carpet, vacuum weekly.   Use a dust mask and HEPA vacuum.   Pollen and Outdoor Mold  Some people are allergic to trees, grass, or weed pollen, or molds. Try to keep your windows closed.  Limit time out doors when pollen count is high.    Ask you health care provider about taking medicine during allergy season.     Animal Dander  Some people are allergic to skin flakes, urine or saliva from pets with fur or feathers. Keep pets with fur or feathers out of your home.    If you cant keep the pet outdoors, then keep the pet out of your bedroom.  Keep the bedroom door closed.  Keep pets off cloth furniture and away from stuffed toys.     Mice, Rats, and Cockroaches  Some people are allergic to the waste from these pests.   Cover food and garbage.  Clean up spills and food crumbs.  Store grease in the refrigerator.   Keep food out of the bedroom.   Indoor Mold  This can be a trigger if your home has high moisture. Fix leaking faucets, pipes, or other sources of water.   Clean moldy surfaces.  Dehumidify basement if it is damp and smelly.   Smoke, Strong Odors, and Sprays  These can reduce air quality. Stay away from strong odors and sprays, such as perfume, powder, hair spray, paints, smoke incense, paint, cleaning products, candles and new carpet.   Exercise or Sports  Some people with asthma have this trigger. Be active!  Ask your doctor about taking medicine before sports or exercise to prevent symptoms.    Warm up for 5-10 minutes before and after sports or exercise.     Other Triggers of Asthma  Cold air:  Cover your nose and mouth with a scarf.  Sometimes laughing or crying can be a trigger.  Some medicines and food can trigger asthma.

## 2021-06-21 NOTE — LETTER
Letter by Dominique Odonnell MD at      Author: Dominique Odonnell MD Service: -- Author Type: --    Filed:  Encounter Date: 12/11/2020 Status: (Other)         Kevin Thrasher  [address] [facility] [address]             December 11, 2020         Dear Ms. Thrasher,    Below are the results from your recent visit:    Resulted Orders   Glycosylated Hemoglobin A1c   Result Value Ref Range    Hemoglobin A1c 6.1 (H) <=5.6 %      Comment:      Normal <5.7% Prediabete 5.7-6.4% Diabletes 6.5% or higher - adopted from ADA consensus guidelines   Lipid [place] FASTING   Result Value Ref Range    Cholesterol 185 <=199 mg/dL    Triglycerides 90 <=149 mg/dL    HDL Cholesterol 65 >=50 mg/dL    LDL Calculated 102 <=129 mg/dL    Patient Fasting > 8hrs? Yes    Comprehensive Metabolic Panel   Result Value Ref Range    Sodium 143 136 - 145 mmol/L    Potassium 4.1 3.5 - 5.0 mmol/L    Chloride 102 98 - 107 mmol/L    CO2 30 22 - 31 mmol/L    Anion Gap, Calculation 11 5 - 18 mmol/L    Glucose 87 70 - 125 mg/dL    BUN 15 8 - 22 mg/dL    Creatinine 0.74 0.60 - 1.10 mg/dL    GFR MDRD Af Amer >60 >60 mL/min/1.73m2    GFR MDRD Non Af Amer >60 >60 mL/min/1.73m2    Bilirubin, Total 0.7 0.0 - 1.0 mg/dL    Calcium 9.2 8.5 - 10.5 mg/dL    Protein, Total 6.7 6.0 - 8.0 g/dL    Albumin 3.7 3.5 - 5.0 g/dL    Alkaline Phosphatase 79 45 - 120 U/L    AST 18 0 - 40 U/L    ALT 33 0 - 45 U/L    Narrative    Fasting Glucose reference range is 70-99 mg/dL per  American Diabetes Association (ADA) guidelines.   Vitamin D, Total (25-Hydroxy)   Result Value Ref Range    Vitamin D, Total (25-Hydroxy) 27.0 (L) 30.0 - 80.0 ng/mL    Narrative    Deficiency <10.0 ng/mL  Insufficiency 10.0-29.9 ng/mL  Sufficiency 30.0-80.0 ng/mL  Toxicity (possible) >100.0 ng/mL   HM1 (CBC with Diff)   Result Value Ref Range    WBC 8.2 4.0 - 11.0 thou/uL    RBC 5.12 3.80 - 5.40 mill/uL    Hemoglobin 15.2 12.0 - 16.0 g/dL    Hematocrit 45.8 35.0 - 47.0 %    MCV 89 80 - 100 fL    MCH  29.7 27.0 - 34.0 pg    MCHC 33.2 32.0 - 36.0 g/dL    RDW 11.1 11.0 - 14.5 %    Platelets 198 140 - 440 thou/uL    MPV 7.7 7.0 - 10.0 fL    Neutrophils % 55 50 - 70 %    Lymphocytes % 37 20 - 40 %    Monocytes % 6 2 - 10 %    Eosinophils % 1 0 - 6 %    Basophils % 1 0 - 2 %    Neutrophils Absolute 4.5 2.0 - 7.7 thou/uL    Lymphocytes Absolute 3.1 0.8 - 4.4 thou/uL    Monocytes Absolute 0.5 0.0 - 0.9 thou/uL    Eosinophils Absolute 0.1 0.0 - 0.4 thou/uL    Basophils Absolute 0.1 0.0 - 0.2 thou/uL       Here's your labs from previous visit.  Generally look pretty good.  Blood sugar numbers are good.     Please call with questions or contact us using [employer].    Sincerely,        Electronically signed by Dominique Odonnell MD

## 2021-06-22 NOTE — PROGRESS NOTES
Subjective:      Kevin Thrasher is a 53 y.o. female who presents for evaluation of right side pain.  Patient has had some mild back and side pain for 2 weeks.  It has not been too bothersome.  However yesterday she is right sided lateral rib pain.  She woke up on Monday and the pain was there.  Falls or injuries that could have caused this pain.  The pain starts at the right sided posterior thoracic ribs and then stretches around to the right lateral thoracic ribs.  He has not noticed any rashes but she estimates her pain averages 7-8/10.  No history of kidney stones, no blood in her urine, no pain with urination.  She is never had pain in this area before.  IcyHot and ibuprofen help with pain short-term only.  She notes that pain is worse at night, if she is lying down, or if she changes positions.  She thinks she had chicken pox as a child.  She has never had shingles before.  No previous record of varicella vaccination or proof of immunity found in EHR.    Patient Active Problem List   Diagnosis     Obesity (BMI 30.0-34.9)     Abnormal Pap smear of cervix     Prediabetes     Hyperlipidemia     Dry skin dermatitis     Mild intermittent asthma without complication     Lower Back Pain     Environmental allergies     Fatty liver     Shrimp allergy     Anxiety     Angular cheilitis     Lumbosacral radiculopathy at S1     Helicobacter pylori gastritis     Infertility      Elevated bilirubin     Vitamin D deficiency       Current Outpatient Medications:      acetaminophen (PAIN AND FEVER) 500 MG tablet, Take 1 tablet (500 mg total) by mouth every 6 (six) hours as needed for pain., Disp: 100 tablet, Rfl: 0     albuterol (PROAIR HFA;PROVENTIL HFA;VENTOLIN HFA) 90 mcg/actuation inhaler, Inhale 2 puffs every 4 (four) hours as needed for wheezing or shortness of breath., Disp: 8.5 g, Rfl: 1     atovaquone-proguanil (MALARONE) 250-100 mg Tab, Take 1 tablet by mouth daily. Start 2 days before you leave, take every day while you are  on your trip, and for 7 days after you are home., Disp: 40 tablet, Rfl: 0     cholecalciferol, vitamin D3, (VITAMIN D3) 1,000 unit capsule, Take 1 capsule (1,000 Units total) by mouth daily., Disp: 100 capsule, Rfl: 3     clotrimazole-betamethasone (LOTRISONE) cream, Apply topically sparingly BID to affected areas only, Disp: 30 g, Rfl: 1     ibuprofen (ADVIL,MOTRIN) 600 MG tablet, Take 1 tablet (600 mg total) by mouth every 8 (eight) hours as needed for pain (take with food)., Disp: 50 tablet, Rfl: 0     ketotifen (ZADITOR/ZYRTEC ITCHY EYES) 0.025 % (0.035 %) ophthalmic solution, Administer 1 drop to both eyes 2 (two) times a day as needed., Disp: 10 mL, Rfl: 1     loratadine (CLARITIN) 10 mg tablet, Take 1 tablet (10 mg total) by mouth daily as needed for allergies., Disp: 30 tablet, Rfl: 7     methyl salicylate-menthol 29-7.6 % Oint, Apply to affected areas twice daily, as needed for pain., Disp: 99 g, Rfl: 0     methylPREDNISolone (MEDROL DOSEPACK) 4 mg tablet, follow package directions, Disp: 21 tablet, Rfl: 0     metroNIDAZOLE (METROGEL) 0.75 % gel, Apply to face and rub in twice daily., Disp: 45 g, Rfl: 0     omeprazole (PRILOSEC) 20 MG capsule, TAKE ONE CAPSULE BY MOUTH EVERY DAY 30 MINUTES BEFORE BREAKFAST OR THE 1ST MEAL OF THE DAY, Disp: 90 capsule, Rfl: 0     polyethylene glycol 3350,bulk, Powd, Take 17 g by mouth daily as needed. MIX 17 GRAMS IN 8 OUNCES OF WATER AND DRINK ONCE DAILY, Disp: 500 mL, Rfl: 3     polyvinyl alcohol (ARTIFICIAL TEARS, POLYVIN ALC,) 1.4 % ophthalmic solution, Instill 1-2 drops each eye, Disp: 15 mL, Rfl: 2     prednisoLONE acetate (PRED-FORTE) 1 % ophthalmic suspension, 1 DROP INTO BOTH EYES TWICE A DAY, Disp: , Rfl: 0     TiZANidine (ZANAFLEX) 2 MG capsule, Take 1 capsule (2 mg total) by mouth at bedtime., Disp: 30 capsule, Rfl: 1     traZODone (DESYREL) 50 MG tablet, Take 50 mg by mouth at bedtime., Disp: , Rfl: 5     venlafaxine (EFFEXOR-XR) 75 MG 24 hr capsule, Take 75 mg  by mouth daily., Disp: , Rfl:      Objective:     Allergies   Allergen Reactions     Other Drug Allergy (See Comments)      Tide detergent - hives     Shellfish Derived      Vitals:    12/11/18 1333   BP: 118/60   Pulse: 66   SpO2: 97%     Body mass index is 32.82 kg/m .    Vitals reviewed as above.  General: Patient is alert and oriented x 3, in no apparent distress  Cardiac: Regular rate and rhythm, no murmurs  Pulmonary: lungs clear to ausculation, no crackles, rales, rhonchi, or wheezing  Musculoskeletal: normal 4/5 strength in major muscle groups in upper extremities bilaterally, no pain with palpation of right posterior and lateral lower thoracic ribs, no pain triggered with movement of the arms  Skin: Skin in the affected areas healthy and normal, no rashes or other abnormalities noted    Personally reviewed normal chest and right sided rib x-rays today.  XR RIBS RIGHT W PA CHEST  12/11/2018 2:10 PM   INDICATION: Pleurodynia  COMPARISON: None.   FINDINGS: The visualized heart and lungs are negative. No rib fractures.    Assessment and Plan:     Right-sided chest pain, likely musculoskeletal in nature.  I reviewed continued symptomatic treatment with patient.  Prescription was sent for Voltaren gel.  It is unclear how much ibuprofen she is taking.  She reports she is taking 2 pills once a day.  She is unclear whether this is 200 mg pills or 600 mg pills.  I reviewed with her that if she is taking 206 mg to 600 mg pills that would be too much to take it once.  I wrote out specific directions in her care plan and she will take this home for her children to review and help her with.  I also reviewed the small possibility that this pain could be due to shingles.  If she notices a rash developing she will call us and would want to start her on antiviral medication.  We will follow-up with PCP in 1-2 weeks if no better, sooner if worsening or other concerns.    This dictation uses voice recognition software, which  may contain typographical errors.

## 2021-06-23 NOTE — TELEPHONE ENCOUNTER
SPOKE WITH PT @ [phone] PT STATED PT WILL HAVE HER SON CALL IN TO SCHEDULE PHY APPT WITH DR. CAMPBELL. POSTPONE OUT 1 WEEK.

## 2021-06-24 NOTE — PROGRESS NOTES
"SUBJECTIVE:   Kevin Thrasher is a 53 y.o. female who presents for a routine physical exam.     Current concerns include the following:     Vaginal dryness - postmenopausal now. Having discomfort with intercourse. Discussed risks, benefits, and alternatives.    Wants prednisone, albuterol neb. Nebulizer seems to be working better than inhaler. Has a 10 year old spacer, wonders if it is even helpful now. ACT 19. Allergies tend to be worse in winter.    Name brand Claritin works better than loratadine    Declines mammogram - \"next time\"    Wants skin tags removed from neck, chest.    Patient Active Problem List    Diagnosis Date Noted     Type 2 diabetes mellitus without complication, without long-term current use of insulin (H) 02/26/2019     Priority: High     Overview Note:     Dx 2019       Elevated bilirubin 08/15/2017     Priority: Medium     Overview Note:     Indirect. Needs recheck.       Lumbosacral radiculopathy at S1 09/19/2016     Priority: Medium     Overview Note:     Since 2006, slipped and fell on ice.       Shrimp allergy 01/04/2016     Priority: Medium     Anxiety 01/04/2016     Priority: Medium     Obesity (BMI 30.0-34.9)      Priority: Medium     Abnormal Pap smear of cervix      Priority: Medium     Overview Note:     5/8/12: ASCUS.  4/11/13: NILM.  8/14/2017: Pap smear advised and declined.  The patient stated \"maybe next time\".         Prediabetes      Priority: Medium     Hyperlipidemia      Priority: Medium     Overview Note:     diet-controlled         Mild intermittent asthma without complication      Priority: Medium     Low Back Pain      Priority: Medium     Infertility  08/14/2017     Priority: Low     Fatty liver      Priority: Low     Dry skin dermatitis      Priority: Low     Environmental allergies      Priority: Low     Vitamin D deficiency 08/18/2017     Past Medical History:   Diagnosis Date     Angular cheilitis 9/19/2016     Depression      Head Injury      Helicobacter pylori " gastritis      Infertility, female     eval at infertility clinic was normal.  declined evaluation. h/o ovarian hyperstimulation - did not result in pregnancy.      Menorrhagia      Pterygium      Shingles 2/9/2015      Past Surgical History:   Procedure Laterality Date     COMBINED HYSTEROSCOPY DIAGNOSTIC / D&C  07/15/2011    Planned hysteroscopy/D&C for evaluation/treatment of menorrhagia 7/15/11.       Current Outpatient Medications   Medication Sig Dispense Refill     acetaminophen (PAIN AND FEVER) 500 MG tablet Take 1 tablet (500 mg total) by mouth every 6 (six) hours as needed for pain. 100 tablet 4     albuterol (PROAIR HFA;PROVENTIL HFA;VENTOLIN HFA) 90 mcg/actuation inhaler Inhale 2 puffs every 4 (four) hours as needed for wheezing or shortness of breath. 8.5 g 1     cholecalciferol, vitamin D3, (VITAMIN D3) 1,000 unit capsule Take 1 capsule (1,000 Units total) by mouth daily. 100 capsule 3     diclofenac sodium (VOLTAREN) 1 % Gel Apply to affected areas 1-3 times a day, as needed. 1 Tube 0     ibuprofen (ADVIL,MOTRIN) 600 MG tablet Take 1 tablet (600 mg total) by mouth every 8 (eight) hours as needed for pain (take with food). 50 tablet 0     methyl salicylate-menthol 29-7.6 % Oint Apply to affected areas twice daily, as needed for pain. 99.2 g 3     metroNIDAZOLE (METROGEL) 0.75 % gel Apply to face and rub in twice daily. 45 g 0     omeprazole (PRILOSEC) 20 MG capsule Take 1 capsule (20 mg total) by mouth daily before breakfast. 90 capsule 3     polyethylene glycol 3350,bulk, Powd Take 17 g by mouth daily as needed. MIX 17 GRAMS IN 8 OUNCES OF WATER AND DRINK ONCE DAILY 500 mL 3     polyvinyl alcohol (ARTIFICIAL TEARS, POLYVIN ALC,) 1.4 % ophthalmic solution Administer 1 drop to both eyes as needed for dry eyes. Instill 1-2 drops each eye 15 mL 2     traZODone (DESYREL) 50 MG tablet Take 50 mg by mouth at bedtime.  5     venlafaxine (EFFEXOR-XR) 75 MG 24 hr capsule Take 75 mg by mouth daily.        atovaquone-proguanil (MALARONE) 250-100 mg Tab Take 1 tablet by mouth daily. Start 2 days before you leave, take every day while you are on your trip, and for 7 days after you are home. 40 tablet 0     CLARITIN 10 mg tablet Take 1 tablet (10 mg total) by mouth daily. 90 tablet 3     conjugated estrogens (PREMARIN) vaginal cream Insert 0.5 g into the vagina daily as needed. 30 g 5     fluticasone (FLOVENT HFA) 110 mcg/actuation inhaler Inhale 2 puffs daily. 1 Inhaler 2     inhalational spacing device Spcr Chamber style spacer. Use with ihalers 1 each 0     ketotifen (ZADITOR/ZYRTEC ITCHY EYES) 0.025 % (0.035 %) ophthalmic solution Administer 1 drop to both eyes 2 (two) times a day as needed. 10 mL 1     prednisoLONE acetate (PRED-FORTE) 1 % ophthalmic suspension 1 DROP INTO BOTH EYES TWICE A DAY  0     predniSONE (DELTASONE) 20 MG tablet Take 40 mg by mouth daily for 5 days. 10 tablet 0     No current facility-administered medications for this visit.       Allergies   Allergen Reactions     Other Drug Allergy (See Comments)      Tide detergent - hives     Shellfish Derived       Social History     Socioeconomic History     Marital status:      Spouse name: Amee Avila     Number of children: 1     Years of education: Not on file     Highest education level: Not on file   Occupational History     Not on file   Social Needs     Financial resource strain: Not on file     Food insecurity:     Worry: Not on file     Inability: Not on file     Transportation needs:     Medical: Not on file     Non-medical: Not on file   Tobacco Use     Smoking status: Never Smoker     Smokeless tobacco: Never Used     Tobacco comment: no second hand smoke exposure   Substance and Sexual Activity     Alcohol use: No     Drug use: No     Sexual activity: Yes     Partners: Male     Birth control/protection: None     Comment: Wants to get pregnant 2017   Lifestyle     Physical activity:     Days per week: Not on file     Minutes per  "session: Not on file     Stress: Not on file   Relationships     Social connections:     Talks on phone: Not on file     Gets together: Not on file     Attends [employer] service: Not on file     Active member of club or organization: Not on file     Attends meetings of clubs or organizations: Not on file     Relationship status: Not on file     Intimate partner violence:     Fear of current or ex partner: Not on file     Emotionally abused: Not on file     Physically abused: Not on file     Forced sexual activity: Not on file   Other Topics Concern     Not on file   Social History Narrative     Not on file      Family History   Family history unknown: Yes      REVIEW OF SYSTEMS: negative, except as listed in subjective above.    PHYSICAL EXAM:   BP (!) 140/100 (Patient Site: Right Arm, Patient Position: Sitting, Cuff Size: Adult Large)   Pulse 64   Resp 16   Ht 4' 8.5\" (1.435 m)   Wt 152 lb (68.9 kg)   BMI 33.48 kg/m     Social History     Tobacco Use   Smoking Status Never Smoker   Smokeless Tobacco Never Used   Tobacco Comment    no second hand smoke exposure     GENERAL: Alert, NAD. Obese.  HEAD: NC/AT.  EARS: Normal canal, pinnae and tympanic membrane.  EYES: PERRL, normal fundi.  NOSE: Normal mucosa.  MOUTH: Adequate dentition, normal throat.  NECK: normal thyroid, midline trachea.  BREASTS: no masses, skin changes, nipple discharge or tenderness.  LUNGS: CTA bilaterally, no increased work of breathing.  HEART: RRR, no m/r/g  ABDOMEN: soft, nt/nd, BS+  : Normal vulva, normal vaginal mucosa, cervix normal appearance. No cervical or adnexal tenderness. No adnexal fullness.  MSK: No significant deformity.  SKIN: Acanthosis nigricans, mild sun damage. Several small skin tags. Two tiny skin tags (one on left neck and one on right chest, below right breast) were cleaned with alcohol wipe and excised, antibiotic ointment applied, bandaid put on.  LYMPHATICS: no lymphadenopathy.   PSYCH: normal " affect.    Hemoglobin A1c  Lab Results   Component Value Date    HGBA1C 6.8 (H) 02/26/2019    HGBA1C 6.1 (H) 08/14/2017    HGBA1C 5.9 02/28/2013     Creatinine  Lab Results   Component Value Date    CREATININE 0.73 08/14/2017     Microalbumin:Cr Ratio  No results found for: [facility]  TSH  Lab Results   Component Value Date    TSH 1.17 08/14/2017      LDL  Lab Results   Component Value Date    LDLDIRECT 153 (H) 02/11/2016    LDLCALC 127 08/14/2017    LDLCALC 152 (H) 03/14/2016    LDLCALC 166 (H) 02/28/2013           Recent Results (from the past 24 hour(s))   Glycosylated Hemoglobin A1c   Result Value Ref Range    Hemoglobin A1c 6.8 (H) 3.5 - 6.0 %     No results found.    ASSESSMENT/PLAN:   1. Cancer screening: Pap smear done.  History of ASCUS Pap smear in 2012, NILM in 2013, no subsequent Pap smears.  Declines mammo today - maybe next time. Stool cards given. Declines colonoscopy and Cologuard after conversation.   2. Bone health: Discussed Calcium, vitamin D, and weight bearing exercise.  3. Obesity - BMI 33.5. Discussed maintenance of healthy body weight. The following high BMI interventions were performed this visit: encouragement to exercise and lifestyle education regarding diet.  4. Advance directive: not discussed   5. Mild intermittent asthma, with exacerbation, though possibly persistent now.  Trigger seems to be indoor allergens and winter.  Suspect inhaler technique could use improvement based on request for nebulizer.  Sent in prescription for fluticasone inhaler to use 2 puffs daily for the next month.  New spacer sent.  Refilled albuterol.  Prednisone burst prescribed.  When she comes back in a month, she will see clinic pharmacist for inhaler teaching, as well.  6. Allergies.  Indoor allergens.  Patient requests name brand Claritin, as she perceives that it works better than generic loratadine.  Sent as d.a.w.  7. Vaginal dryness, postmenopausal.  Prescription sent for Premarin cream.  8. Skin tags.   2 small skin tags were removed on the left neck and one on the right chest below the breast.  No complications.  Specimens were not sent due to entirely benign appearance.  9. Type 2 diabetes- new diagnosis.  Previously prediabetic.  Referral will be made to diabetes education.  Consider starting metformin at next visit.  10. Back pain.  Refilled Tylenol.    Problem List Items Addressed This Visit        ENT/CARD/PULM/ENDO Problems    Type 2 diabetes mellitus without complication, without long-term current use of insulin (H)    Hyperlipidemia    Relevant Orders    Lipid Cascade    Mild intermittent asthma without complication    Relevant Medications    CLARITIN 10 mg tablet    predniSONE (DELTASONE) 20 MG tablet    fluticasone (FLOVENT HFA) 110 mcg/actuation inhaler    albuterol (PROAIR HFA;PROVENTIL HFA;VENTOLIN HFA) 90 mcg/actuation inhaler    inhalational spacing device Spcr    ketotifen (ZADITOR/ZYRTEC ITCHY EYES) 0.025 % (0.035 %) ophthalmic solution    Other Relevant Orders    Amb Referral to Medication Management       Other    Obesity (BMI 30.0-34.9)    Relevant Orders    Thyroid Cascade    Abnormal Pap smear of cervix    Prediabetes    Relevant Orders    Glycosylated Hemoglobin A1c (Completed)    Low Back Pain    Relevant Medications    acetaminophen (PAIN AND FEVER) 500 MG tablet    diclofenac sodium (VOLTAREN) 1 % Gel    ibuprofen (ADVIL,MOTRIN) 600 MG tablet    methyl salicylate-menthol 29-7.6 % Oint    Lumbosacral radiculopathy at S1    Elevated bilirubin    Relevant Orders    Comprehensive Metabolic Panel    RESOLVED: Helicobacter pylori gastritis    Environmental allergies    Relevant Medications    CLARITIN 10 mg tablet    ketotifen (ZADITOR/ZYRTEC ITCHY EYES) 0.025 % (0.035 %) ophthalmic solution    Fatty liver    Relevant Orders    Comprehensive Metabolic Panel    Vitamin D deficiency    Relevant Medications    cholecalciferol, vitamin D3, (VITAMIN D3) 1,000 unit capsule    Other Relevant Orders     Vitamin D, Total (25-Hydroxy)      Other Visit Diagnoses     Routine general medical examination at a health care facility    -  Primary    Visit for screening mammogram        Relevant Orders    Mammo Screening Bilateral    Special screening for malignant neoplasms, colon        Relevant Orders    Occult Blood(ICT)    Vaginal dryness, menopausal        Relevant Medications    conjugated estrogens (PREMARIN) vaginal cream    Cervical cancer screening        Relevant Orders    Gynecologic Cytology (PAP Smear)    Rash        Relevant Medications    diclofenac sodium (VOLTAREN) 1 % Gel    methyl salicylate-menthol 29-7.6 % Oint    metroNIDAZOLE (METROGEL) 0.75 % gel    Foot pain, bilateral        Relevant Medications    ibuprofen (ADVIL,MOTRIN) 600 MG tablet    Malar rash        Relevant Medications    metroNIDAZOLE (METROGEL) 0.75 % gel    Epigastric abdominal pain        Relevant Medications    omeprazole (PRILOSEC) 20 MG capsule    Chronic constipation        Relevant Medications    polyethylene glycol 3350,bulk, Powd    Dry eyes        Relevant Medications    polyvinyl alcohol (ARTIFICIAL TEARS, POLYVIN ALC,) 1.4 % ophthalmic solution

## 2021-06-24 NOTE — TELEPHONE ENCOUNTER
Please tell the patient that her labs show:    1. She now has early diabetes [this is new for her]. She should see the diabetes educator. I see that's not scheduled yet. Please assist with scheduling.  2. Her cholesterol is too high. Due to now having diabetes, she should start a cholesterol pill. I'll send it in for her.  3. Her vitamin D level is low. She should take the vitamin D pill that's on her list.  4. Her potassium level is little low. She should eat more fruit + vegetables.  5. Her kidney + liver + thyroid tests are normal.  6. Her pap smear was normal. No sign of cervical cancer.    This is a lot of new information. We will see her back on 3/26/19 and discuss this more.    Future Appointments   Date Time Provider Department Center   3/26/2019  3:30 PM Kenton Jose, PharmD [facility]   3/26/2019  4:20 PM Ema Gentile MD [facility]      Physical on 02/26/2019   Component Date Value Ref Range Status     Hemoglobin A1c 02/26/2019 6.8* 3.5 - 6.0 % Final     Sodium 02/26/2019 143  136 - 145 mmol/L Final     Potassium 02/26/2019 3.4* 3.5 - 5.0 mmol/L Final     Chloride 02/26/2019 101  98 - 107 mmol/L Final     CO2 02/26/2019 29  22 - 31 mmol/L Final     Anion Gap, Calculation 02/26/2019 13  5 - 18 mmol/L Final     Glucose 02/26/2019 84  70 - 125 mg/dL Final     BUN 02/26/2019 13  8 - 22 mg/dL Final     Creatinine 02/26/2019 0.78  0.60 - 1.10 mg/dL Final     GFR MDRD Af Amer 02/26/2019 >60  >60 mL/min/1.73m2 Final     GFR MDRD Non Af Amer 02/26/2019 >60  >60 mL/min/1.73m2 Final     Bilirubin, Total 02/26/2019 0.5  0.0 - 1.0 mg/dL Final     Calcium 02/26/2019 9.3  8.5 - 10.5 mg/dL Final     Protein, Total 02/26/2019 7.0  6.0 - 8.0 g/dL Final     Albumin 02/26/2019 3.6  3.5 - 5.0 g/dL Final     Alkaline Phosphatase 02/26/2019 85  45 - 120 U/L Final     AST 02/26/2019 26  0 - 40 U/L Final     ALT 02/26/2019 30  0 - 45 U/L Final     Cholesterol 02/26/2019 266* <=199 mg/dL Final      Triglycerides 02/26/2019 152* <=149 mg/dL Final     HDL Cholesterol 02/26/2019 61  >=50 mg/dL Final     LDL Calculated 02/26/2019 175* <=129 mg/dL Final     Patient Fasting > 8hrs? 02/26/2019 Yes   Final     TSH 02/26/2019 3.04  0.30 - 5.00 uIU/mL Final     Vitamin D, Total (25-Hydroxy) 02/26/2019 16.6* 30.0 - 80.0 ng/mL Final                    Value:Gynecologic Cytology Report                       Case: [id number]                                   Authorizing Provider:  Ema Gentile MD        Collected:           02/26/2019 0932              Ordering Location:     [facility]  Received:            02/26/2019 0933                                     Medicine/OB                                                                  First Screen:          Renu Ramirez CT                                                                            (ASCP)                                                                       Specimen:    SUREPATH PAP, SCREENING, Endocervical/cervical                                              Interpretation 02/26/2019 Negative for squamous intraepithelial lesion or malignancy  Negative for squamous intraepithelial lesion or malignancy.  Final     HPV16 DNA 02/26/2019 Negative  NEG Final     HPV18 DNA 02/26/2019 Negative  NEG Final     Other HR HPV 02/26/2019 Negative  NEG Final

## 2021-06-24 NOTE — TELEPHONE ENCOUNTER
----- Message from Ema Gentile MD sent at 2/26/2019 12:36 PM CST -----  Please tell the patient that her diabetes test is now showing that she has early diabetes.  I would like her to see the diabetes educator so that she can get a machine to check her blood sugars and learn about how to care for herself.

## 2021-06-24 NOTE — TELEPHONE ENCOUNTER
----- Message from Ema Gentile MD sent at 3/7/2019  3:34 PM CST -----  Please let the patient know that her stool test did not show any blood.  Her next stool tests, to screen for colon cancer, will be due in 1 year.

## 2021-06-25 NOTE — PROGRESS NOTES
"Kevin Thrasher is a 55 y.o. female who is being evaluated via a billable telephone visit.      What phone number would you like to be contacted at? [phone]  How would you like to obtain your AVS? AVS Preference: Mail a copy.  ____________________________    Virtual Visit - Telephone Encounter  [facility]  Date of Service: 5/27/2021    Subjective:    : Ro [id number]    Covid shot  Had stroke, wants to know if she can get Covid shots safely    Cholesterol Medication  Feels tired from night medicine    Rosuvastatin - no problems  Atorvastatin - this is causing problems    Objective:  Last menstrual period 04/15/2017, not currently breastfeeding.   Speech is normal  Patient is calm  No visits with results within 1 Week(s) from this visit.   Latest known visit with results is:   Office Visit on 02/08/2021   Component Date Value Ref Range Status     Microalbumin, Random Urine 02/08/2021 <0.50  0.00 - 1.99 mg/dL Final     Creatinine, Urine 02/08/2021 18.9  mg/dL Final     Microalbumin/Creatinine Ratio Rand* 02/08/2021    Final    \"Unable to calculate: Creatinine and/or Microalbumin value below detectable level\"     No results found.   Assessment & Plan:  1. Hyperlipidemia - patient has been taking rosuvastatin and atorvastatin. Stop atorvastatin due to side effects and duplication of statin.   2. History of stroke - OK for Pfizer or Moderna Covid shots - prefer to avoid J&J shot.       Order Summary                                                      1. Pure hypercholesterolemia  rosuvastatin (CRESTOR) 40 MG tablet   2. Acute infarct lazara, right (H)        Future Appointments   Date Time Provider Department Center   9/3/2021 10:20 AM Ema Gentile MD [facility] OB [facility]       Completed by: Ema Gentile M.D., [facility]. 5/27/2021 11:21 AM.  This transcription uses voice recognition software, which may contain typographical " errors.  ____________________________  Start call: 11:21 AM   End call: 11:46 AM    Phone call duration: 25 minutes

## 2021-06-26 NOTE — TELEPHONE ENCOUNTER
RN cannot approve Refill Request    RN can NOT refill this medication med is not covered by policy/route to provider. Last office visit: 4/27/2021 Usha Mao MD Last Physical: Visit date not found Last MTM visit: Visit date not found Last visit same specialty: 4/27/2021 Usha Mao MD.  Next visit within 3 mo: Visit date not found  Next physical within 3 mo: Visit date not found      Paola Johnson, [facility] Triage/Med Refill 6/17/2021    Requested Prescriptions   Pending Prescriptions Disp Refills     cyclobenzaprine (FLEXERIL) 5 MG tablet [Pharmacy Med Name: CYCLOBENZAPRINE 5 MG TABLET] 15 tablet 0     Sig: TAKE 1 TABLET (5 MG TOTAL) BY MOUTH EVERY 8 (EIGHT) HOURS AS NEEDED FOR MUSCLE SPASMS.       There is no refill protocol information for this order

## 2021-06-26 NOTE — TELEPHONE ENCOUNTER
Rx refilled.  Also put in referral for mammogram, which is due.  Has upcoming appt.    Kevin was seen today for medication refill.    Diagnoses and all orders for this visit:    Visit for screening mammogram  -     Mammo Screening Bilateral; Future    Neck pain  -     cyclobenzaprine (FLEXERIL) 5 MG tablet; TAKE 1 TABLET (5 MG TOTAL) BY MOUTH EVERY 8 (EIGHT) HOURS AS NEEDED FOR MUSCLE SPASMS.          Future Appointments   Date Time Provider Department Center   9/3/2021 10:20 AM Ema Gentile MD [facility] OB [facility]     Health Maintenance Due   Topic Date Due     MAMMOGRAM  Never done     COVID-19 Vaccine (1) Never done     ZOSTER VACCINES (1 of 2) Never done     PAP SMEAR  02/26/2020     HPV TEST  02/26/2020     A1C  05/06/2021     BP Readings from Last 3 Encounters:   04/27/21 114/66   02/08/21 143/82   12/04/20 134/75

## 2021-06-28 NOTE — PROGRESS NOTES
Progress Notes by Mike Harris PT at 3/9/2020  4:15 PM     Author: Mike Harris PT Service: -- Author Type: Physical Therapist    Filed: 3/9/2020  6:24 PM Encounter Date: 3/9/2020 Status: Attested    : Mike Harris PT (Physical Therapist) Cosigner: Dominique Odonnell MD at 3/10/2020 12:59 PM    Attestation signed by Dominique Odonnell MD at 3/10/2020 12:59 PM    agree                    [facility] Certification Request    March 9, 2020      Patient: Kevin Thrasher  MR Number: [id number]  YOB: 1965  Date of Visit: 3/9/2020      Dear Dr. Lott, Michelle Church MD:    Thank you for this referral.   We are seeing Kevin Thrasher in Physical Therapy for Cerebrovascular accident (CVA) due to other mechanism (H).    Medicare and/or Medicaid requires physician review and approval of the treatment plan. Please review the plan of care and verify that you agree with the therapy plan of care by co-signing this note.      Plan of Care  Authorization / Certification Start Date: 03/09/20  Authorization / Certification End Date: 05/08/20  Authorization / Certification Number of Visits: 12  Communication with: Referral Source  Patient Related Instruction: Nature of Condition;Precautions;Treatment plan and rationale;Next steps;Expected outcome;Self Care instruction;Basis of treatment;Body mechanics;Posture  Times per Week: 2-1  Number of Weeks: 8-12  Number of Visits: 12  Discharge Planning: to include HEP and self management strategy  Precautions / Restrictions : obesity, hyperlipidemia, mild intermittent asthma, anxiety lumbosacral radiculopathy at S1 HTN h/o DMII, recent CVA right lazara.  Therapeutic Exercise: ROM;Stretching;Strengthening  Neuromuscular Reeducation: posture;balance/proprioception;core;stroke rehabilitation  Manual Therapy: soft tissue mobilization;myofascial release;joint mobilization;muscle energy;strain counterstrain  Gait Training: mechanics,  mobility, stairs training      Goals:  Pt. will demonstrate/verbalize independence in self-management of condition in : 12 weeks  Pt. will be independent with home exercise program in : 6 weeks  Pt. will show improved balance for safer : ambulation;for household ambulation;for chores;for community ambulation;in 6 weeks  Pt. will improve posture : and demonstrate posture with minimal to no cuing;in standing;in walking;in 6 weeks  Pt. will be able to walk : 20 minutes;on even surfaces;for community mobility;with less difficulty;in 6 weeks  Patient will ascend / descend: stairs;with recipricol gait;with less difficulty;in 6 weeks  Patient will transfer: sit/stand;supine/sit;floor/stand;for in/out of bed;for in/out of chair;with less difficulty;in 6 weeks    No data recorded      If you have any questions or concerns, please don't hesitate to call.    Sincerely,      Mike Harris, PT      Physician:    For Medicare/MA patients:      Physician recommendation:     ___ Follow therapist's recommendation        ___ Modify therapy      *Physician co-signature indicates they certify the need for these services furnished within this plan and while under their care.         [facility]   Balance Initial Evaluation    Patient Name: Kevin Thrasher  Date of evaluation: 3/9/2020  Referral Diagnosis: Cerebrovascular accident (CVA) due to other mechanism (H)  Referring provider: Michelle Lott MD  Visit Diagnosis:     ICD-10-CM    1. Muscle weakness of left upper extremity  M62.81 Ambulatory referral to PT/OT   2. Weakness due to acute stroke (H)  I63.9 Ambulatory referral to PT/OT    R53.1    3. Gait instability  R26.81        Precautions / Restrictions : obesity, hyperlipidemia, mild intermittent asthma, anxiety lumbosacral radiculopathy at S1 HTN h/o DMII, recent CVA right lazara.       Assessment:      Impairments in  pain, posture, ROM, joint mobility, strength, gait/locomotion and balance  Patient's signs and  symptoms are consistent with recovery post stroke.  Patient responded well to manual therapy and exercise.  Prognosis to achieve goals is  good   Pt. is appropriate for skilled PT intervention as outlined in the Plan of Care (POC).  Based on falls assessment, patient is at an increased risk for falling. Plan of care will address this risk with lower extremity strengthening and balance training.    Goals:  Pt. will demonstrate/verbalize independence in self-management of condition in : 12 weeks  Pt. will be independent with home exercise program in : 6 weeks  Pt. will show improved balance for safer : ambulation;for household ambulation;for chores;for community ambulation;in 6 weeks  Pt. will improve posture : and demonstrate posture with minimal to no cuing;in standing;in walking;in 6 weeks  Pt. will be able to walk : 20 minutes;on even surfaces;for community mobility;with less difficulty;in 6 weeks  Patient will ascend / descend: stairs;with recipricol gait;with less difficulty;in 6 weeks  Patient will transfer: sit/stand;supine/sit;floor/stand;for in/out of bed;for in/out of chair;with less difficulty;in 6 weeks    No data recorded    Patient's expectations/goals are realistic.    Barriers to Learning or Achieving Goals:  No Barriers.       Plan / Patient Instructions:        Plan of Care:   Authorization / Certification Start Date: 03/09/20  Authorization / Certification End Date: 05/08/20  Authorization / Certification Number of Visits: 12  Communication with: Referral Source  Patient Related Instruction: Nature of Condition;Precautions;Treatment plan and rationale;Next steps;Expected outcome;Self Care instruction;Basis of treatment;Body mechanics;Posture  Times per Week: 2-1  Number of Weeks: 8-12  Number of Visits: 12  Discharge Planning: to include HEP and self management strategy  Precautions / Restrictions : obesity, hyperlipidemia, mild intermittent asthma, anxiety lumbosacral radiculopathy at S1 HTN h/o  DMII, recent CVA right lazara.  Therapeutic Exercise: ROM;Stretching;Strengthening  Neuromuscular Reeducation: posture;balance/proprioception;core;stroke rehabilitation  Manual Therapy: soft tissue mobilization;myofascial release;joint mobilization;muscle energy;strain counterstrain  Gait Training: mechanics, mobility, stairs training      Pt. is in agreement with the Plan of Care  A Home Exercise Program (HEP) was initiated today.  Pt. was instructed in exercises by PT and patient was given a handout with detailed instructions.       Plan for next visit: review HEP, progress strength and balance as tolerated.     Subjective:         Social information:       History of Present Illness:    Kevin is a 54 y.o. female who presents to therapy today with complaints of weakness and numbness in left side after CVA. Date of onset:  20. Onset was suddensudden. Symptoms are getting better. She denies history of similar symptoms. She describes their previous level of function as not limited    Pain Ratin  Pain rating at best: 0  Pain description: numbness and weakness    Functional limitations are described as occurring with:   ascending and descending stairs or curbs  balance  standing    transitional movements getting in  bed, chair, tub and car, getting out of  bed, chair, tub and car, sit to stand and sit to supine  walking           Objective:      Note: Items left blank indicates the item was not performed or not indicated at the time of the evaluation.    Patient Outcome Measures :    Lower Extremity Functional Scale (_/80): 37     Scores range from 0-80, where a score of 80 represents maximum function. The minimal clinically important difference is a positive change of 9 points.    Balance Examination  1. Muscle weakness of left upper extremity  Ambulatory referral to PT/OT   2. Weakness due to acute stroke (H)  Ambulatory referral to PT/OT   3. Gait instability       Precautions/Restrictions: CVA  Involved side:  "Left  Posture Observation:      Cervical:  Moderate forward head  Shoulder/Thoracic complex: Moderate bilateral scapular protraction   Lumbopelvic complex: Pelvic alignment: mildly flexed  Lower extremity:  Foot/Ankle:  toe out  left>right   Assistive Device:  SEC  Gait Observation:  Slow, WBOS, increased lateral postural sway    LE Strength:   Hip/Knee Strength  Date: 3/9/20   Hip/Knee Strength (/5) MMT    Right Left   Hip Flexion 4 3-   Hip Abduction 4 3-   Hip Adduction 4 3-   Hip Extension  3-   Hip External Rotation     Hip Internal Rotation     Knee Extension 5 3   Knee Flexion 5 3   Ankle Dorsiflexion 5 3-            LE ROM: PROM WNL, but with some  Increased tone in left LE    Balance Assessment:    Rhomberg - Eyes Open:  25 seconds  Rhomberg - Eyes Closed:  5 seconds      Treatment Today     Exercises:  Exercise #1: seated heel/toe raises  Comment #1: 10 bilateral  Exercise #2: seated LAQ  Comment #2: 10 bilateral  Exercise #3: seated marching  Comment #3: 10 bilateral  Exercise #4: feet together eyes open  Comment #4: 60\" x 2 , trial of eyes closed held secondary to LOB too quickly.     Manual therapy: MFR left: TFL, Quad, hamstring    TREATMENT MINUTES COMMENTS   Evaluation 30    Self-care/ Home management     Manual therapy 15    Neuromuscular Re-education 3    Therapeutic Activity     Therapeutic Exercises 10    Gait training     Modality__________________                Total 58    Blank areas are intentional and mean the treatment did not include these items.     PT Evaluation Code: (Please list factors)  Patient History/Comorbidities:   Patient Active Problem List   Diagnosis   ? Obesity (BMI 30.0-34.9)   ? Abnormal Pap smear of cervix   ? Hyperlipidemia   ? Mild intermittent asthma without complication   ? Fatty liver   ? Anxiety   ? Lumbosacral radiculopathy at S1   ? Vitamin D deficiency   ? Type 2 diabetes mellitus (H)   ? HTN (hypertension)   ? Acute infarct lazara, right (H)   ? Weakness due to " acute stroke (H)      Examination: as above  Clinical Presentation: stable  Clinical Decision Making: low complexity      Patient History/  Comorbidities Examination  (body structures and functions, activity limitations, and/or participation restrictions) Clinical Presentation Clinical Decision Making (Complexity)   No documented Comorbidities or personal factors 1-2 Elements Stable and/or uncomplicated Low   1-2 documented comorbidities or personal factor 3 Elements Evolving clinical presentation with changing characteristics Moderate   3-4 documented comorbidities or personal factors 4 or more Unstable and unpredictable High               Mike Harris, PT  3/9/2020  4:21 PM

## 2021-06-30 NOTE — PROGRESS NOTES
"Progress Notes by Usha Mao MD at 4/27/2021  9:20 AM     Author: Usha Mao MD Service: -- Author Type: Physician    Filed: 4/30/2021  8:59 AM Encounter Date: 4/27/2021 Status: Signed    : Usha Mao MD (Physician)       OFFICE VISIT - FAMILY MEDICINE     ASSESSMENT AND PLAN     1. Right shoulder pain, unspecified chronicity  triamcinolone acetonide 40 mg/mL injection 40 mg (KENALOG-40)    triamcinolone acetonide 40 mg/mL injection 40 mg (KENALOG-40)   2. Neck pain  cyclobenzaprine (FLEXERIL) 5 MG tablet   3. Type 2 diabetes mellitus with diabetic nephropathy, without long-term current use of insulin (H)  lisinopriL (PRINIVIL,ZESTRIL) 20 MG tablet   Right shoulder pain with adhesive capsulitis, I discussed risk and benefit of steroid injection discussed with the patient, please see procedure note.  Occasional neck pain, symptomatic care discussed, Flexeril as needed possible side effect discussed.  Diabetes type 2 appears stable on current regimen, continue to monitor blood sugar continue healthy lifestyle changes.    CHIEF COMPLAINT   No chief complaint on file.    HPI   Kevin Thrasher is a 55 y.o. female.  No Patient Care Coordination Note on file.    Right shoulder pain with adhesive capsulitis mild to moderate for the past several months, has discussed with PCP about steroid shot in the past, has tried over-the-counter analgesic with no improvement, she is interested on getting steroid injection today.  Occasional neck pain but not all the time.  Diabetes type 2 appears stable with current medication, no hypoglycemia.  Mild depression appears stable.      Review of Systems As per HPI, otherwise negative.    OBJECTIVE   /66 (Patient Site: Right Arm, Patient Position: Sitting, Cuff Size: Adult Small)   Pulse 79   Temp 98.6  F (37  C) (Temporal)   Resp 20   Ht 4' 9.17\" (1.452 m)   Wt 135 lb (61.2 kg)   LMP 04/15/2017   SpO2 98%   BMI 29.05 kg/m    Physical " Exam   Constitutional: She is oriented to person, place, and time. She appears well-developed and well-nourished.   HENT:   Head: Normocephalic and atraumatic.   Neck: Normal range of motion. Neck supple. No JVD present. No tracheal deviation present. No thyromegaly present.   Cardiovascular: Normal rate, regular rhythm, normal heart sounds and intact distal pulses. Exam reveals no gallop and no friction rub.   No murmur heard.  Pulmonary/Chest: Effort normal and breath sounds normal. No respiratory distress. She has no wheezes. She has no rales.   Musculoskeletal:         General: No edema.      Right shoulder: She exhibits decreased range of motion and tenderness.        Arms:    Lymphadenopathy:     She has no cervical adenopathy.   Neurological: She is alert and oriented to person, place, and time. Coordination normal.   Psychiatric: She has a normal mood and affect. Judgment and thought content normal.   Nursing note reviewed.      [facility]     Family History   Family history unknown: Yes     Social History     Socioeconomic History   ? Marital status:      Spouse name: Amee Avila   ? Number of children: 1   ? Years of education: Not on file   ? Highest education level: Not on file   Occupational History   ? Not on file   Social Needs   ? Financial resource strain: Not on file   ? Food insecurity     Worry: Not on file     Inability: Not on file   ? Transportation needs     Medical: Not on file     Non-medical: Not on file   Tobacco Use   ? Smoking status: Never Smoker   ? Smokeless tobacco: Never Used   ? Tobacco comment: no second hand smoke exposure   Substance and Sexual Activity   ? Alcohol use: No   ? Drug use: No   ? Sexual activity: Yes     Partners: Male     Birth control/protection: None     Comment: Wants to get pregnant 2017   Lifestyle   ? Physical activity     Days per week: Not on file     Minutes per session: Not on file   ? Stress: Not on file   Relationships   ? Social connections     Talks  "on phone: Not on file     Gets together: Not on file     Attends Alevism service: Not on file     Active member of club or organization: Not on file     Attends meetings of clubs or organizations: Not on file     Relationship status: Not on file   ? Intimate partner violence     Fear of current or ex partner: Not on file     Emotionally abused: Not on file     Physically abused: Not on file     Forced sexual activity: Not on file   Other Topics Concern   ? Not on file   Social History Narrative    Has 3.5 hours PCA/day - 3/21.     Relevant history was reviewed with the patient today, unless noted in HPI, nothing is pertinent for this visit.  [facility]     Patient Active Problem List    Diagnosis Date Noted   ? Weakness due to acute stroke (H) 02/24/2020     Overview Note:     Left sided weakness due to ischemic stroke of right lazara 2/20.     ? Acute infarct lazara, right (H) 02/02/2020     Overview Note:     2/20: residual left sided weakness.     ? HTN (hypertension) 03/26/2019   ? Type 2 diabetes mellitus (H) 02/26/2019     Overview Note:     Dx 2019  Stroke 2020     ? Vitamin D deficiency 08/18/2017   ? Lumbosacral radiculopathy at S1 09/19/2016     Overview Note:     Since 2006, slipped and fell on ice.     ? Anxiety 01/04/2016   ? Fatty liver    ? Obesity (BMI 30.0-34.9)    ? Abnormal Pap smear of cervix      Overview Note:     5/8/12: ASCUS.  4/11/13: NILM.  8/14/2017: Pap smear advised and declined.  The patient stated \"maybe next time\".       ? Hyperlipidemia      Overview Note:     3/19: New diabetes, 10 year risk 6%. Start moderate intensity statin.       ? Mild intermittent asthma without complication      Past Surgical History:   Procedure Laterality Date   ? COMBINED HYSTEROSCOPY DIAGNOSTIC / D&C  07/15/2011    Planned hysteroscopy/D&C for evaluation/treatment of menorrhagia 7/15/11.        RESULTS/CONSULTS (Lab/Rad)   No results found for this or any previous visit (from the past 168 hour(s)).  No results " found.  MEDICATIONS     Current Outpatient Medications on File Prior to Visit   Medication Sig Dispense Refill   ? ACCU-CHEK GUIDE TEST STRIPS strips TEST ONCE DAILY. 100 strip 2   ? acetaminophen (TYLENOL) 500 MG tablet Take 1 tablet (500 mg total) by mouth every 6 (six) hours as needed for pain. 200 tablet 4   ? acetaminophen-codeine (TYLENOL #3) 300-30 mg per tablet Take 1 tablet by mouth every 6 (six) hours as needed for pain. 30 tablet 0   ? aspirin 81 mg chewable tablet Chew 1 tablet (81 mg total) daily. 100 tablet 3   ? clopidogreL (PLAVIX) 75 mg tablet Take 1 tablet (75 mg total) by mouth daily. 100 tablet 3   ? diclofenac sodium (VOLTAREN) 1 % Gel APPLY TOPICALLY TO THE AFFECTED AREA(S) 1-3 TIMES A DAY, AS NEEDED 100 g 3   ? generic lancets (FINGERSTIX LANCETS) TEST ONCE DAILY. Dispense brand per patient's insurance at pharmacy discretion. 100 each 5   ? loratadine (CLARITIN) 10 mg tablet Take 1 tablet (10 mg total) by mouth daily. 90 tablet 4   ? montelukast (SINGULAIR) 10 mg tablet Take 1 tablet (10 mg total) by mouth daily. 100 tablet 3   ? rosuvastatin (CRESTOR) 40 MG tablet Take 1 tablet (40 mg total) by mouth at bedtime. 90 tablet 3   ? traZODone (DESYREL) 50 MG tablet Take 1 tablet (50 mg total) by mouth at bedtime as needed for sleep. 100 tablet 3   ? venlafaxine (EFFEXOR-XR) 75 MG 24 hr capsule Take 1 capsule (75 mg total) by mouth daily with breakfast. 100 capsule 3   ? [DISCONTINUED] lisinopriL (PRINIVIL,ZESTRIL) 20 MG tablet Take 1 tablet (20 mg total) by mouth daily. 90 tablet 3   ? albuterol (PROAIR HFA;PROVENTIL HFA;VENTOLIN HFA) 90 mcg/actuation inhaler Inhale 2 puffs every 4 (four) hours as needed for wheezing or shortness of breath. 8.5 g 1   ? fluticasone propionate (FLOVENT HFA) 110 mcg/actuation inhaler Inhale 2 puffs daily. 3 Inhaler 4   ? omeprazole (PRILOSEC) 20 MG capsule Take 1 capsule (20 mg total) by mouth daily before breakfast. 100 capsule 4     No current facility-administered  medications on file prior to visit.        HEALTH MAINTENANCE / SCREENING   PHQ-2 Total Score: 0 (2/8/2021  1:19 PM)  , No data recorded,No data recorded  Immunization History   Administered Date(s) Administered   ? Hep B, historic 03/20/1995   ? Hepatitis A: Immune 02/28/2013   ? Hepatitis B: Immune 02/28/2013   ? INFLUENZA,RECOMBINANT,INJ,PF QUADRIVALENT 18+YRS 12/11/2018, 09/18/2019, 02/15/2020, 11/06/2020   ? INFLUENZA,SEASONAL QUAD, PF, =/> 6months 01/20/2016   ? Influenza, inj, historic,unspecified 10/30/2006, 10/19/2007, 10/07/2008   ? Influenza, seasonal,quad inj 6-35 mos 09/28/2011, 12/21/2012   ? Influenza,seasonal,quad inj =/> 6months 09/19/2016   ? MMR 02/17/1995   ? Pneumo Polysac 23-V 07/14/2011   ? Td, adult adsorbed, PF 08/05/2020   ? Td,adult,historic,unspecified 05/27/1994, 07/27/1994, 02/17/1995   ? Tdap 07/14/2011   ? Typhoid, Inj, Inactive 04/13/2018     Health Maintenance   Topic   ? MAMMOGRAM    ? COVID-19 Vaccine (1)   ? ZOSTER VACCINES (1 of 2)   ? PAP SMEAR    ? HPV TEST    ? A1C    ? ASTHMA ACTION PLAN    ? PREVENTIVE CARE VISIT    ? BMP    ? DIABETIC FOOT EXAM    ? LIPID    ? DIABETIC EYE EXAM    ? MICROALBUMIN    ? Asthma Control Test    ? ADVANCE CARE PLANNING    ? COLORECTAL CANCER SCREENING    ? Pneumococcal Vaccine: Pediatrics (0 to 5 Years) and At-Risk Patients (6 to 64 Years) (2 of 2)   ? TD 18+ HE    ? HEPATITIS C SCREENING    ? HIV SCREENING    ? INFLUENZA VACCINE RULE BASED    ? TDAP ADULT ONE TIME DOSE    ? HEPATITIS B VACCINES      Review of external notes as documented above       25 minutes spent on the date of the encounter doing chart review, review of outside records, review of test results, interpretation of tests, patient visit and documentation       Usha Mao MD  [facility]     This note was dictated using a voice recognition software.  Any grammatical or context distortion are unintentional and inherent to the  software.

## 2021-07-03 NOTE — ADDENDUM NOTE
Addendum Note by Ema Gentile MD at 2/24/2020  8:40 AM     Author: Ema Gentile MD Service: -- Author Type: Physician    Filed: 2/25/2020  2:51 PM Encounter Date: 2/24/2020 Status: Signed    : Ema Gentile MD (Physician)    Addended by: EMA GENTILE on: 2/25/2020 02:51 PM        Modules accepted: Orders

## 2021-07-03 NOTE — ADDENDUM NOTE
Addendum Note by Ela Avila CMA at 8/30/2017  4:03 PM     Author: Ela Avila CMA Service: -- Author Type: Certified Medical Assistant    Filed: 8/30/2017  4:03 PM Encounter Date: 8/25/2017 Status: Signed    : Ela Avila CMA (Certified Medical Assistant)    Addended by: ELA AVILA on: 8/30/2017 04:03 PM        Modules accepted: Orders

## 2021-07-03 NOTE — ADDENDUM NOTE
Addendum Note by Ema Gentile MD at 2/26/2019  8:00 AM     Author: Ema Gentile MD Service: -- Author Type: Physician    Filed: 2/26/2019 12:29 PM Encounter Date: 2/26/2019 Status: Signed    : Ema Gentile MD (Physician)    Addended by: EMA GENTILE on: 2/26/2019 12:29 PM        Modules accepted: Orders

## 2021-07-03 NOTE — ADDENDUM NOTE
Addendum Note by Stephen Mayfield MD at 8/30/2017  6:22 PM     Author: Stephen Mayfield MD Service: -- Author Type: Physician    Filed: 8/30/2017  6:22 PM Encounter Date: 8/25/2017 Status: Signed    : Stephen Mayfield MD (Physician)    Addended by: STEPHEN MAYFIELD on: 8/30/2017 06:22 PM        Modules accepted: Orders

## 2021-09-03 ENCOUNTER — OFFICE VISIT (OUTPATIENT)
Dept: FAMILY MEDICINE | Facility: CLINIC | Age: 56
End: 2021-09-03
Payer: COMMERCIAL

## 2021-09-03 ENCOUNTER — APPOINTMENT (OUTPATIENT)
Dept: INTERPRETER SERVICES | Facility: CLINIC | Age: 56
End: 2021-09-03
Payer: COMMERCIAL

## 2021-09-03 VITALS
TEMPERATURE: 97.5 F | SYSTOLIC BLOOD PRESSURE: 136 MMHG | DIASTOLIC BLOOD PRESSURE: 72 MMHG | WEIGHT: 132 LBS | BODY MASS INDEX: 29.69 KG/M2 | HEIGHT: 56 IN | HEART RATE: 66 BPM | RESPIRATION RATE: 18 BRPM

## 2021-09-03 DIAGNOSIS — Z00.00 ROUTINE ADULT HEALTH MAINTENANCE: Primary | ICD-10-CM

## 2021-09-03 DIAGNOSIS — M54.17 LUMBOSACRAL RADICULOPATHY AT S1: ICD-10-CM

## 2021-09-03 DIAGNOSIS — R87.610 ATYPICAL SQUAMOUS CELLS OF UNDETERMINED SIGNIFICANCE ON CYTOLOGIC SMEAR OF CERVIX (ASC-US): ICD-10-CM

## 2021-09-03 DIAGNOSIS — I63.211 ACUTE ISCHEMIC VBA BRAINSTEM STROKE, RIGHT (H): ICD-10-CM

## 2021-09-03 DIAGNOSIS — E11.59 TYPE 2 DIABETES MELLITUS WITH OTHER CIRCULATORY COMPLICATION, WITHOUT LONG-TERM CURRENT USE OF INSULIN (H): ICD-10-CM

## 2021-09-03 DIAGNOSIS — N39.3 URINARY, INCONTINENCE, STRESS FEMALE: ICD-10-CM

## 2021-09-03 DIAGNOSIS — N89.8 VAGINAL DISCHARGE: ICD-10-CM

## 2021-09-03 DIAGNOSIS — E11.42 DIABETIC POLYNEUROPATHY ASSOCIATED WITH TYPE 2 DIABETES MELLITUS (H): ICD-10-CM

## 2021-09-03 DIAGNOSIS — F11.90 CHRONIC, CONTINUOUS USE OF OPIOIDS: ICD-10-CM

## 2021-09-03 DIAGNOSIS — E55.9 VITAMIN D DEFICIENCY: ICD-10-CM

## 2021-09-03 DIAGNOSIS — E78.2 MIXED HYPERLIPIDEMIA: ICD-10-CM

## 2021-09-03 DIAGNOSIS — I10 ESSENTIAL HYPERTENSION: ICD-10-CM

## 2021-09-03 DIAGNOSIS — Z12.31 VISIT FOR SCREENING MAMMOGRAM: ICD-10-CM

## 2021-09-03 DIAGNOSIS — M75.80 ROTATOR CUFF TENDINITIS, UNSPECIFIED LATERALITY: ICD-10-CM

## 2021-09-03 DIAGNOSIS — Z12.4 CERVICAL CANCER SCREENING: ICD-10-CM

## 2021-09-03 DIAGNOSIS — E11.21 DIABETIC NEPHROPATHY ASSOCIATED WITH TYPE 2 DIABETES MELLITUS (H): ICD-10-CM

## 2021-09-03 DIAGNOSIS — I63.89 CEREBROVASCULAR ACCIDENT (CVA) DUE TO OTHER MECHANISM (H): ICD-10-CM

## 2021-09-03 DIAGNOSIS — I63.22 ACUTE ISCHEMIC VBA BRAINSTEM STROKE, RIGHT (H): ICD-10-CM

## 2021-09-03 DIAGNOSIS — I10 HYPERTENSION GOAL BP (BLOOD PRESSURE) < 130/80: ICD-10-CM

## 2021-09-03 DIAGNOSIS — N81.4 CYSTOCELE WITH UTERINE PROLAPSE: ICD-10-CM

## 2021-09-03 PROBLEM — K76.0 FATTY LIVER: Status: ACTIVE | Noted: 2021-09-03

## 2021-09-03 PROBLEM — E11.9 TYPE 2 DIABETES MELLITUS (H): Status: ACTIVE | Noted: 2019-02-26

## 2021-09-03 PROBLEM — J45.20 MILD INTERMITTENT ASTHMA WITHOUT COMPLICATION: Status: ACTIVE | Noted: 2021-09-03

## 2021-09-03 PROBLEM — E66.3 OVERWEIGHT: Status: ACTIVE | Noted: 2021-09-03

## 2021-09-03 PROBLEM — R87.619 ABNORMAL PAP SMEAR OF CERVIX: Status: ACTIVE | Noted: 2021-09-03

## 2021-09-03 PROBLEM — E66.811 OBESITY (BMI 30.0-34.9): Status: ACTIVE | Noted: 2021-09-03

## 2021-09-03 LAB
ALBUMIN SERPL-MCNC: 3.5 G/DL (ref 3.5–5)
ALP SERPL-CCNC: 78 U/L (ref 45–120)
ALT SERPL W P-5'-P-CCNC: 29 U/L (ref 0–45)
AMPHETAMINES UR QL SCN: NORMAL
ANION GAP SERPL CALCULATED.3IONS-SCNC: 11 MMOL/L (ref 5–18)
AST SERPL W P-5'-P-CCNC: 21 U/L (ref 0–40)
BARBITURATES UR QL: NORMAL
BENZODIAZ UR QL: NORMAL
BILIRUB SERPL-MCNC: 0.6 MG/DL (ref 0–1)
BUN SERPL-MCNC: 12 MG/DL (ref 8–22)
CALCIUM SERPL-MCNC: 8.9 MG/DL (ref 8.5–10.5)
CANNABINOIDS UR QL SCN: NORMAL
CHLORIDE BLD-SCNC: 103 MMOL/L (ref 98–107)
CHOLEST SERPL-MCNC: 163 MG/DL
CLUE CELLS: ABNORMAL
CO2 SERPL-SCNC: 25 MMOL/L (ref 22–31)
COCAINE UR QL: NORMAL
CREAT SERPL-MCNC: 0.82 MG/DL (ref 0.6–1.1)
CREAT UR-MCNC: 18 MG/DL
CREAT UR-MCNC: 20 MG/DL
ERYTHROCYTE [DISTWIDTH] IN BLOOD BY AUTOMATED COUNT: 12.5 % (ref 10–15)
FASTING STATUS PATIENT QL REPORTED: NO
GFR SERPL CREATININE-BSD FRML MDRD: 79 ML/MIN/1.73M2
GLUCOSE BLD-MCNC: 120 MG/DL (ref 70–125)
HBA1C MFR BLD: 6.6 % (ref 0–5.6)
HCT VFR BLD AUTO: 45.4 % (ref 35–47)
HDLC SERPL-MCNC: 59 MG/DL
HGB BLD-MCNC: 14.6 G/DL (ref 11.7–15.7)
LDLC SERPL CALC-MCNC: 82 MG/DL
MCH RBC QN AUTO: 28.4 PG (ref 26.5–33)
MCHC RBC AUTO-ENTMCNC: 32.2 G/DL (ref 31.5–36.5)
MCV RBC AUTO: 88 FL (ref 78–100)
MICROALBUMIN UR-MCNC: <0.5 MG/DL (ref 0–1.99)
MICROALBUMIN/CREAT UR: NORMAL MG/G{CREAT}
OPIATES UR QL SCN: NORMAL
OXYCODONE UR QL: NORMAL
PCP UR QL SCN: NORMAL
PLATELET # BLD AUTO: 206 10E3/UL (ref 150–450)
POTASSIUM BLD-SCNC: 3.3 MMOL/L (ref 3.5–5)
PROT SERPL-MCNC: 6.7 G/DL (ref 6–8)
RBC # BLD AUTO: 5.14 10E6/UL (ref 3.8–5.2)
SODIUM SERPL-SCNC: 139 MMOL/L (ref 136–145)
TRICHOMONAS, WET PREP: ABNORMAL
TRIGL SERPL-MCNC: 109 MG/DL
TSH SERPL DL<=0.005 MIU/L-ACNC: 1.48 UIU/ML (ref 0.3–5)
WBC # BLD AUTO: 10.5 10E3/UL (ref 4–11)
WBC'S/HIGH POWER FIELD, WET PREP: ABNORMAL
YEAST, WET PREP: ABNORMAL

## 2021-09-03 PROCEDURE — 80061 LIPID PANEL: CPT | Performed by: FAMILY MEDICINE

## 2021-09-03 PROCEDURE — 99213 OFFICE O/P EST LOW 20 MIN: CPT | Mod: 25 | Performed by: FAMILY MEDICINE

## 2021-09-03 PROCEDURE — 36415 COLL VENOUS BLD VENIPUNCTURE: CPT | Performed by: FAMILY MEDICINE

## 2021-09-03 PROCEDURE — 87591 N.GONORRHOEAE DNA AMP PROB: CPT | Performed by: FAMILY MEDICINE

## 2021-09-03 PROCEDURE — 87624 HPV HI-RISK TYP POOLED RSLT: CPT | Performed by: FAMILY MEDICINE

## 2021-09-03 PROCEDURE — 90471 IMMUNIZATION ADMIN: CPT | Performed by: FAMILY MEDICINE

## 2021-09-03 PROCEDURE — 82306 VITAMIN D 25 HYDROXY: CPT | Performed by: FAMILY MEDICINE

## 2021-09-03 PROCEDURE — 80307 DRUG TEST PRSMV CHEM ANLYZR: CPT | Performed by: FAMILY MEDICINE

## 2021-09-03 PROCEDURE — 84443 ASSAY THYROID STIM HORMONE: CPT | Performed by: FAMILY MEDICINE

## 2021-09-03 PROCEDURE — 82043 UR ALBUMIN QUANTITATIVE: CPT | Performed by: FAMILY MEDICINE

## 2021-09-03 PROCEDURE — 80053 COMPREHEN METABOLIC PANEL: CPT | Performed by: FAMILY MEDICINE

## 2021-09-03 PROCEDURE — 90750 HZV VACC RECOMBINANT IM: CPT | Performed by: FAMILY MEDICINE

## 2021-09-03 PROCEDURE — 85027 COMPLETE CBC AUTOMATED: CPT | Performed by: FAMILY MEDICINE

## 2021-09-03 PROCEDURE — G0123 SCREEN CERV/VAG THIN LAYER: HCPCS | Performed by: FAMILY MEDICINE

## 2021-09-03 PROCEDURE — 87210 SMEAR WET MOUNT SALINE/INK: CPT | Performed by: FAMILY MEDICINE

## 2021-09-03 PROCEDURE — 87491 CHLMYD TRACH DNA AMP PROBE: CPT | Performed by: FAMILY MEDICINE

## 2021-09-03 PROCEDURE — 83036 HEMOGLOBIN GLYCOSYLATED A1C: CPT | Performed by: FAMILY MEDICINE

## 2021-09-03 PROCEDURE — 99396 PREV VISIT EST AGE 40-64: CPT | Mod: 25 | Performed by: FAMILY MEDICINE

## 2021-09-03 RX ORDER — CYCLOBENZAPRINE HCL 5 MG
5 TABLET ORAL 3 TIMES DAILY PRN
COMMUNITY
Start: 2021-06-17 | End: 2022-01-24

## 2021-09-03 RX ORDER — LISINOPRIL 20 MG/1
20 TABLET ORAL DAILY
Qty: 90 TABLET | Refills: 4 | Status: SHIPPED | OUTPATIENT
Start: 2021-09-03 | End: 2022-01-24

## 2021-09-03 RX ORDER — POLYVINYL ALCOHOL 14 MG/ML
1 SOLUTION/ DROPS OPHTHALMIC
COMMUNITY
Start: 2020-12-04 | End: 2022-01-24

## 2021-09-03 RX ORDER — LORATADINE 10 MG/1
10 TABLET ORAL DAILY
COMMUNITY
Start: 2020-12-04 | End: 2022-01-24

## 2021-09-03 RX ORDER — LISINOPRIL 20 MG/1
20 TABLET ORAL DAILY
COMMUNITY
Start: 2021-04-27 | End: 2021-09-03

## 2021-09-03 RX ORDER — ASPIRIN 81 MG/1
81 TABLET, CHEWABLE ORAL DAILY
Qty: 200 TABLET | Refills: 1 | Status: SHIPPED | OUTPATIENT
Start: 2021-09-03 | End: 2022-01-24

## 2021-09-03 RX ORDER — CLOPIDOGREL BISULFATE 75 MG/1
75 TABLET ORAL DAILY
Qty: 90 TABLET | Refills: 4 | Status: SHIPPED | OUTPATIENT
Start: 2021-09-03 | End: 2022-01-24

## 2021-09-03 RX ORDER — TRAZODONE HYDROCHLORIDE 50 MG/1
50 TABLET, FILM COATED ORAL AT BEDTIME
COMMUNITY
Start: 2020-12-15 | End: 2022-01-24

## 2021-09-03 RX ORDER — ROSUVASTATIN CALCIUM 40 MG/1
40 TABLET, COATED ORAL AT BEDTIME
COMMUNITY
Start: 2021-05-27 | End: 2021-09-03

## 2021-09-03 RX ORDER — LANCETS
EACH MISCELLANEOUS
COMMUNITY
Start: 2020-12-04 | End: 2022-01-24

## 2021-09-03 RX ORDER — BLOOD SUGAR DIAGNOSTIC
1 STRIP MISCELLANEOUS DAILY
COMMUNITY
Start: 2021-07-06 | End: 2022-01-24

## 2021-09-03 RX ORDER — POLYETHYLENE GLYCOL 3350 17 G/17G
17 POWDER, FOR SOLUTION ORAL
COMMUNITY
Start: 2021-01-07 | End: 2022-01-24

## 2021-09-03 RX ORDER — ROSUVASTATIN CALCIUM 40 MG/1
40 TABLET, COATED ORAL AT BEDTIME
Qty: 90 TABLET | Refills: 4 | Status: SHIPPED | OUTPATIENT
Start: 2021-09-03 | End: 2022-01-24

## 2021-09-03 RX ORDER — ACETAMINOPHEN AND CODEINE PHOSPHATE 300; 30 MG/1; MG/1
1 TABLET ORAL EVERY 6 HOURS PRN
COMMUNITY
Start: 2020-12-04 | End: 2021-09-03

## 2021-09-03 RX ORDER — METRONIDAZOLE 7.5 MG/G
1 GEL TOPICAL DAILY
COMMUNITY
Start: 2020-12-04 | End: 2022-01-24

## 2021-09-03 RX ORDER — ACETAMINOPHEN 500 MG
500-1000 TABLET ORAL EVERY 6 HOURS PRN
Qty: 200 TABLET | Refills: 4 | Status: SHIPPED | OUTPATIENT
Start: 2021-09-03 | End: 2022-01-24

## 2021-09-03 RX ORDER — ASPIRIN 81 MG
81 TABLET,CHEWABLE ORAL DAILY
COMMUNITY
Start: 2021-07-06 | End: 2021-09-03

## 2021-09-03 RX ORDER — NEOMYCN/BACITRC/POLYMYX/PRAMOX 3.5-10K-1
1000 OINTMENT (GRAM) TOPICAL DAILY
COMMUNITY
Start: 2021-07-06 | End: 2022-01-24

## 2021-09-03 RX ORDER — ACETAMINOPHEN AND CODEINE PHOSPHATE 300; 30 MG/1; MG/1
1 TABLET ORAL
Qty: 30 TABLET | Refills: 0 | Status: SHIPPED | OUTPATIENT
Start: 2021-09-03 | End: 2022-01-24

## 2021-09-03 RX ORDER — ACETAMINOPHEN 160 MG
4000 TABLET,DISINTEGRATING ORAL DAILY
COMMUNITY
Start: 2021-01-07 | End: 2021-09-07

## 2021-09-03 ASSESSMENT — MIFFLIN-ST. JEOR: SCORE: 1036.75

## 2021-09-03 NOTE — LETTER
My Depression Action Plan  Name: Kevin Thrasher   Date of Birth 1965  Date: 9/3/2021    My doctor: Ema Gentile   My clinic: [facility] [address] [place] [id number] [phone]          GREEN    ZONE   Good Control    What it looks like:     Things are going generally well. You have normal ups and downs. You may even feel depressed from time to time, but bad moods usually last less than a day.   What you need to do:  1. Continue to care for yourself (see self care plan)  2. Check your depression survival kit and update it as needed  3. Follow your physician s recommendations including any medication.  4. Do not stop taking medication unless you consult with your physician first.           YELLOW         ZONE Getting Worse    What it looks like:     Depression is starting to interfere with your life.     It may be hard to get out of bed; you may be starting to isolate yourself from others.    Symptoms of depression are starting to last most all day and this has happened for several days.     You may have suicidal thoughts but they are not constant.   What you need to do:     1. Call your care team. Your response to treatment will improve if you keep your care team informed of your progress. Yellow periods are signs an adjustment may need to be made.     2. Continue your self-care.  Just get dressed and ready for the day.  Don't give yourself time to talk yourself out of it.    3. Talk to someone in your support network.    4. Open up your Depression Self-Care Plan/Wellness Kit.           RED    ZONE Medical Alert - Get Help    What it looks like:     Depression is seriously interfering with your life.     You may experience these or other symptoms: You can t get out of bed most days, can t work or engage in other necessary activities, you have trouble taking care of basic hygiene, or basic responsibilities, thoughts of suicide or death that will not go away,  self-injurious behavior.     What you need to do:  1. Call your care team and request a same-day appointment. If they are not available (weekends or after hours) call your local crisis line, emergency room or 911.          Depression Self-Care Plan / Wellness Kit    Many people find that medication and therapy are helpful treatments for managing depression. In addition, making small changes to your everyday life can help to boost your mood and improve your wellbeing. Below are some tips for you to consider. Be sure to talk with your medical provider and/or behavioral health consultant if your symptoms are worsening or not improving.     Sleep   Sleep hygiene  means all of the habits that support good, restful sleep. It includes maintaining a consistent bedtime and wake time, using your bedroom only for sleeping or sex, and keeping the bedroom dark and free of distractions like a computer, smartphone, or television.     Develop a Healthy Routine  Maintain good hygiene. Get out of bed in the morning, make your bed, brush your teeth, take a shower, and get dressed. Don t spend too much time viewing media that makes you feel stressed. Find time to relax each day.    Exercise  Get some form of exercise every day. This will help reduce pain and release endorphins, the  feel good  chemicals in your brain. It can be as simple as just going for a walk or doing some gardening, anything that will get you moving.      Diet  Strive to eat healthy foods, including fruits and vegetables. Drink plenty of water. Avoid excessive sugar, caffeine, alcohol, and other mood-altering substances.     Stay Connected with Others  Stay in touch with friends and family members.    Manage Your Mood  Try deep breathing, massage therapy, biofeedback, or meditation. Take part in fun activities when you can. Try to find something to smile about each day.     Psychotherapy  Be open to working with a therapist if your provider recommends it.      Medication  Be sure to take your medication as prescribed. Most anti-depressants need to be taken every day. It usually takes several weeks for medications to work. Not all medicines work for all people. It is important to follow-up with your provider to make sure you have a treatment plan that is working for you. Do not stop your medication abruptly without first discussing it with your provider.    Crisis Resources   These hotlines are for both adults and children. They and are open 24 hours a day, 7 days a week unless noted otherwise.      National Suicide Prevention Lifeline   2-588-922-TALK (4300)      Crisis Text Line    www.crisistextline.org  Text HOME to 980699 from anywhere in the [place], anytime, about any type of crisis. A live, trained crisis counselor will receive the text and respond quickly.      [place] Lifeline for LGBTQ Youth  A national crisis intervention and suicide lifeline for LGBTQ youth under 25. Provides a safe place to talk without judgement. Call 1-305.661.2779; text START to 341456 or visit www.thetrevorproject.org to talk to a trained counselor.      For [facility] crisis numbers, visit the [facility] website at:  https://[place].gov/dhs/people-we-serve/adults/health-care/mental-health/resources/crisis-contacts.jsp

## 2021-09-03 NOTE — PROGRESS NOTES
SUBJECTIVE:   CC: Kevin Thrasher is an 56 year old woman who presents for preventive health visit.   Patient has been advised of split billing requirements and indicates understanding: No     Handicap Parking Form - left sided weakness from stroke makes it hard to walk.  Shoulder and Arm Pain - bilateral injection helped for a while. Now difficult to sleep.  Urinary incontinence - worse since stroke. Can't get to bathroom in time.      Healthy Habits:     Getting at least 3 servings of Calcium per day:  Yes    Bi-annual eye exam:  Yes    Dental care twice a year:  NO    Sleep apnea or symptoms of sleep apnea:  None    Diet:  Regular (no restrictions)    Frequency of exercise:  4-5 days/week    Duration of exercise:  30-45 minutes    Taking medications regularly:  Yes    Medication side effects:  None    PHQ-2 Total Score: 0    Additional concerns today:  No    Today's PHQ-2 Score:   PHQ-2 ( 1999 Pf[employer]) 9/3/2021   Q1: Little interest or pleasure in doing things 0   Q2: Feeling down, depressed or hopeless 0   PHQ-2 Score 0   Q1: Little interest or pleasure in doing things Not at all   Q2: Feeling down, depressed or hopeless Not at all   PHQ-2 Score 0     Abuse: Current or Past (Physical, Sexual or Emotional) - No  Do you feel safe in your environment? Yes    Social History     Tobacco Use     Smoking status: Never Smoker     Smokeless tobacco: Never Used     Tobacco comment: no second hand smoke exposure   Substance Use Topics     Alcohol use: No       Alcohol Use 9/3/2021   Prescreen: >3 drinks/day or >7 drinks/week? No     Reviewed orders with patient.  Reviewed health maintenance and updated orders accordingly - Yes    Breast Cancer Screening:  Any new diagnosis of family breast, ovarian, or bowel cancer? No    History of abnormal Pap smear: YES - updated in Problem List and Health Maintenance accordingly  PAP / HPV Latest Ref Rng & Units 2/26/2019   PAP Negative for squamous intraepithelial lesion or malignancy.  "Negative for squamous intraepithelial lesion or malignancy  Electronically signed by Renu Ramirez CT (ASCP) on 3/4/2019 at  2:10 PM     HPV16 NEG Negative   HPV18 NEG Negative   HRHPV NEG Negative     Reviewed and updated as needed this visit by clinical staff  Tobacco  Allergies  Meds  Problems  Med Hx  Surg Hx  Fam Hx          Reviewed and updated as needed this visit by Provider  Tobacco  Allergies  Meds  Problems  Med Hx  Surg Hx  Fam Hx           OBJECTIVE:   /72 (BP Location: Left arm, Patient Position: Sitting, Cuff Size: Adult Regular)   Pulse 66   Temp 97.5  F (36.4  C) (Temporal)   Resp 18   Ht 1.422 m (4' 8\")   Wt 59.9 kg (132 lb)   BMI 29.59 kg/m    Physical Exam  PHYSICAL EXAM:   /72 (BP Location: Left arm, Patient Position: Sitting, Cuff Size: Adult Regular)   Pulse 66   Temp 97.5  F (36.4  C) (Temporal)   Resp 18   Ht 1.422 m (4' 8\")   Wt 59.9 kg (132 lb)   BMI 29.59 kg/m     History   Smoking Status     Never Smoker   Smokeless Tobacco     Never Used     Comment: no second hand smoke exposure     GENERAL: Alert, NAD.  HEAD: NC/AT.  EARS: Normal canal, pinnae and tympanic membrane.  EYES: PERRL, normal fundi.  NOSE: Normal mucosa.  MOUTH: Adequate dentition, normal throat.  NECK: normal thyroid, midline trachea.  BREASTS: no masses, skin changes, nipple discharge or tenderness.  LUNGS: CTA bilaterally, no increased work of breathing.  HEART: RRR, no m/r/g  ABDOMEN: soft, nt/nd, BS+  : Normal vulva, normal vaginal mucosa, cervix normal appearance. No cervical or adnexal tenderness. No adnexal fullness.  MSK: No significant deformity.  SKIN: no atypical lesion or rash.  LYMPHATICS: no lymphadenopathy.   PSYCH: normal affect.  Office Visit on 09/03/2021   Component Date Value Ref Range Status     Hemoglobin A1C 09/03/2021 6.6* 0.0 - 5.6 % Final    Normal <5.7%   Prediabetes 5.7-6.4%    Diabetes 6.5% or higher     Note: Adopted from ADA consensus " guidelines.     WBC Count 09/03/2021 10.5  4.0 - 11.0 10e3/uL Final     RBC Count 09/03/2021 5.14  3.80 - 5.20 10e6/uL Final     Hemoglobin 09/03/2021 14.6  11.7 - 15.7 g/dL Final     Hematocrit 09/03/2021 45.4  35.0 - 47.0 % Final     MCV 09/03/2021 88  78 - 100 fL Final     MCH 09/03/2021 28.4  26.5 - 33.0 pg Final     MCHC 09/03/2021 32.2  31.5 - 36.5 g/dL Final     RDW 09/03/2021 12.5  10.0 - 15.0 % Final     Platelet Count 09/03/2021 206  150 - 450 10e3/uL Final       ASSESSMENT/PLAN:     1. Cancer screening:   o Breast cancer: mammogram ordered (not done because not available on site today).  o Cervical cancer: pap smear obtained.  o Colon cancer: up to date  2. Aspirin:   o Indicated for secondary prevention. Rx aspirin 81 mg daily.  3. Immunizations:   o Reviewed and discussed with Kevin. She is willing to do Covid immunization, but we don't have it on site today. Scheduled for immuniation. She will return in one month for flu, Shingrix, and covid #2.  4. Body mass index is 29.59 kg/m . - Overweight. Discussed diet + exercise.   5. Advance directive:   o Due.  6. History of stroke. On chronic aspirin + plavix. Has mild residual left-sided weakness. Handicap parking permit completed.   7. Type 2 diabetes mellitus.  Well controlled.  Complicated by history of stroke, diabetic nephropathy, diabetic neuropathy.  No retinopathy.  8. Mild intermittent asthma.  Well controlled.  Patient declines refills today.  9. Bilateral shoulder pain due to rotator cuff tendinitis.  Referral made to orthopedics for corticosteroid injection and physical therapy.  Prescribed Tylenol and Tylenol 3.  NSAID avoided due to diabetic nephropathy.  Controlled substance agreement and drug screen was done for the Tylenol 3.  She anticipates using 1 tablet at bedtime for pain overnight.  10. Cystocele with mild uterine prolapse and associated urinary incontinence.  Referral made to gynecology.  Prescribed incontinence pads.  11. Hypertension  with borderline control.  Refilled blood pressure medication.  Future Appointments   Date Time Provider Department Center   9/10/2021  9:30 AM [facility]   Kevin was seen today for physical.    Diagnoses and all orders for this visit:    Routine adult health maintenance    Type 2 diabetes mellitus with other circulatory complication, without long-term current use of insulin (H)  -     Hemoglobin A1c; Future  -     Albumin Random Urine Quantitative with Creat Ratio; Future  -     TSH with free T4 reflex; Future  -     Hemoglobin A1c  -     TSH with free T4 reflex  -     Albumin Random Urine Quantitative with Creat Ratio    Visit for screening mammogram  -     MA Screening Digital Bilateral; Future    Diabetic polyneuropathy associated with type 2 diabetes mellitus (H)    Diabetic nephropathy associated with type 2 diabetes mellitus (H)  -     Comprehensive metabolic panel; Future  -     CBC with platelets; Future  -     Comprehensive metabolic panel  -     CBC with platelets    Vitamin D deficiency  -     Vitamin D Deficiency; Future  -     Vitamin D Deficiency    Cervical cancer screening  -     Gynecologic Cytology (PAP Smear); Future  -     Gynecologic Cytology (PAP Smear)    Mixed hyperlipidemia  -     Lipid Profile; Future  -     rosuvastatin (CRESTOR) 40 MG tablet; Take 1 tablet (40 mg) by mouth At Bedtime  -     Lipid Profile    Atypical squamous cells of undetermined significance on cytologic smear of cervix (ASC-US)    Acute ischemic VBA brainstem stroke, right (H)  -     aspirin (ASPIRIN LOW DOSE) 81 MG chewable tablet; Take 1 tablet (81 mg) by mouth daily  -     clopidogrel (PLAVIX) 75 MG tablet; Take 1 tablet (75 mg) by mouth daily    Lumbosacral radiculopathy at S1    Rotator cuff tendinitis, unspecified laterality  -     Orthopedic  Referral; Future  -     acetaminophen-codeine (TYLENOL W/CODEINE #3) 300-30 MG per tablet; Take 1 tablet by mouth nightly as needed for moderate to  "severe pain  -     acetaminophen (TYLENOL) 500 MG tablet; Take 1-2 tablets (500-1,000 mg) by mouth every 6 hours as needed for mild pain    Cerebrovascular accident (CVA) due to other mechanism (H)    Hypertension  -     lisinopril (ZESTRIL) 20 MG tablet; Take 1 tablet (20 mg) by mouth daily    Urinary, incontinence, stress female  -     Ob/Gyn Referral; Future  -     Incontinence Supplies Order    Cystocele with uterine prolapse  -     Ob/Gyn Referral; Future    Chronic, continuous use of opioids  -     Cancel: Drug Abuse Screen Panel 13, Urine (Pain Care Package) - lab collect; Future  -     Urine Drugs of Abuse Screen Panel 1 - Drug Screen (Full); Future  -     Urine Drugs of Abuse Screen Panel 1 - Drug Screen (Full)    Other orders  -     COVID-19,PF,MODERNA; Future  -     ZOSTER VACCINE RECOMBINANT ADJUVANTED IM NJX  -     FLU VAC QUAD SPLIT VIR, IM (6+ MO)         Completed by: Ema Gentile M.D., [facility]. 9/3/2021 11:05 AM.  This transcription uses voice recognition software, which may contain typographical errors.        Patient has been advised of split billing requirements and indicates understanding: No  COUNSELING:  Reviewed preventive health counseling, as reflected in patient instructions    Estimated body mass index is 29.59 kg/m  as calculated from the following:    Height as of this encounter: 1.422 m (4' 8\").    Weight as of this encounter: 59.9 kg (132 lb).    Weight management plan: Discussed healthy diet and exercise guidelines    She reports that she has never smoked. She has never used smokeless tobacco.      Counseling Resources:  ATP IV Guidelines  Pooled Cohorts Equation Calculator  Breast Cancer Risk Calculator  BRCA-Related Cancer Risk Assessment: FHS-7 Tool  FRAX Risk Assessment  ICSI Preventive Guidelines  Dietary Guidelines for Americans, 2010  [employer] MyPlate  ASA Prophylaxis  Lung CA Screening    Ema Gentile MD  [facility]  DME (Durable " Medical Equipment) Orders and Documentation  Orders Placed This Encounter   Procedures     Incontinence Supplies Order      The patient was assessed and it was determined the patient is in need of the following listed DME Supplies/Equipment. Please complete supporting documentation below to demonstrate medical necessity.      Incontinence Supplies Documentation  Incontinence supplies are needed due to urinary stress incontinence.

## 2021-09-03 NOTE — LETTER
Opioid / Opioid Plus Controlled Substance Agreement    This is an agreement between you and your provider about the safe and appropriate use of controlled substance/opioids prescribed by your care team. Controlled substances are medicines that can cause physical and mental dependence (abuse).    There are strict laws about having and using these medicines. We here at [facility] are committing to working with you in your efforts to get better. To support you in this work, we ll help you schedule regular office appointments for medicine refills. If we must cancel or change your appointment for any reason, we ll make sure you have enough medicine to last until your next appointment.     As a Provider, I will:    Listen carefully to your concerns and treat you with respect.     Recommend a treatment plan that I believe is in your best interest. This plan may involve therapies other than opioid pain medication.     Talk with you often about the possible benefits, and the risk of harm of any medicine that we prescribe for you.     Provide a plan on how to taper (discontinue or go off) using this medicine if the decision is made to stop its use.    As a Patient, I understand that opioid(s):     Are a controlled substance prescribed by my care team to help me function or work and manage my condition(s).     Are strong medicines and can cause serious side effects such as:    Drowsiness, which can seriously affect my driving ability    A lower breathing rate, enough to cause death    Harm to my thinking ability     Depression     Abuse of and addiction to this medicine    Need to be taken exactly as prescribed. Combining opioids with certain medicines or chemicals (such as illegal drugs, sedatives, sleeping pills, and benzodiazepines) can be dangerous or even fatal. If I stop opioids suddenly, I may have severe withdrawal symptoms.    Do not work for all types of pain nor for all patients. If they re not helpful, I may  be asked to stop them.        The risks, benefits and side effects of these medicine(s) were explained to me. I agree that:  1. I will take part in other treatments as advised by my care team. This may be psychiatry or counseling, physical therapy, behavioral therapy, group treatment or a referral to a specialist.     2. I will keep all my appointments. I understand that this is part of the monitoring of opioids. My care team may require an office visit for EVERY opioid/controlled substance refill. If I miss appointments or don t follow instructions, my care team may stop my medicine.    3. I will take my medicines as prescribed. I will not change the dose or schedule unless my care team tells me to. There will be no refills if I run out early.     4. I may be asked to come to the clinic and complete a urine drug test or complete a pill count at any time. If I don t give a urine sample or participate in a pill count, the care team may stop my medicine.    5. I will only receive prescriptions from this clinic for chronic pain. If I am treated by another provider for acute pain issues, I will tell them that I am taking opioid pain medication for chronic pain and that I have a treatment agreement with this provider. I will inform my [facility] care team within one business day if I am given a prescription for any pain medication by another healthcare provider. My [facility] care team can contact other providers and pharmacists about my use of any medicines.    6. It is up to me to make sure that I don t run out of my medicines on weekends or holidays. If my care team is willing to refill my opioid prescription without a visit, I must request refills only during office hours. Refills may take up to 3 business days to process. I will use one pharmacy to fill all my opioid and other controlled substance prescriptions. I will notify the clinic about any changes to my insurance or medication  availability.    7. I am responsible for my prescriptions. If the medicine/prescription is lost, stolen or destroyed, it will not be replaced. I also agree not to share controlled substance medicines with anyone.    8. I am aware I should not use any illegal or recreational drugs. I agree not to drink alcohol unless my care team says I can.       9. If I enroll in the [place] [facility] Cannabis program, I will tell my care team prior to my next refill.     10. I will tell my care team right away if I become pregnant, have a new medical problem treated outside of my regular clinic, or have a change in my medications.    11. I understand that this medicine can affect my thinking, judgment and reaction time. Alcohol and drugs affect the brain and body, which can affect the safety of my driving. Being under the influence of alcohol or drugs can affect my decision-making, behaviors, personal safety, and the safety of others. Driving while impaired (DWI) can occur if a person is driving, operating, or in physical control of a car, motorcycle, boat, snowmobile, ATV, motorbike, off-road vehicle, or any other motor vehicle ([place] Statute 169A.20). I understand the risk if I choose to drive or operate any vehicle or machinery.    I understand that if I do not follow any of the conditions above, my prescriptions or treatment may be stopped or changed.          Opioids  What You Need to Know    What are opioids?   Opioids are pain medicines that must be prescribed by a doctor. They are also known as narcotics.     Examples are:   1. morphine (MS Contin, Claribel)  2. oxycodone (Oxycontin)  3. oxycodone and acetaminophen (Percocet)  4. hydrocodone and acetaminophen (Vicodin, Norco)   5. fentanyl patch (Duragesic)   6. hydromorphone (Dilaudid)   7. methadone  8. codeine (Tylenol #3)     What do opioids do well?   Opioids are best for severe short-term pain such as after a surgery or injury. They may work well for cancer pain. They may  help some people with long-lasting (chronic) pain.     What do opioids NOT do well?   Opioids never get rid of pain entirely, and they don t work well for most patients with chronic pain. Opioids don t reduce swelling, one of the causes of pain.                                    Other ways to manage chronic pain and improve function include:       Treat the health problem that may be causing pain    Anti-inflammation medicines, which reduce swelling and tenderness, such as ibuprofen (Advil, Motrin) or naproxen (Aleve)    Acetaminophen (Tylenol)    Antidepressants and anti-seizure medicines, especially for nerve pain    Topical treatments such as patches or creams    Injections or nerve blocks    Chiropractic or osteopathic treatment    Acupuncture, massage, deep breathing, meditation, visual imagery, aromatherapy    Use heat or ice at the pain site    Physical therapy     Exercise    Stop smoking    Take part in therapy       Risks and side effects     Talk to your doctor before you start or decide to keep taking opioids. Possible side effects include:      Lowering your breathing rate enough to cause death    Overdose, including death, especially if taking higher than prescribed doses    Worse depression symptoms; less pleasure in things you usually enjoy    Feeling tired or sluggish    Slower thoughts or cloudy thinking    Being more sensitive to pain over time; pain is harder to control    Trouble sleeping or restless sleep    Changes in hormone levels (for example, less testosterone)    Changes in sex drive or ability to have sex    Constipation    Unsafe driving    Itching and sweating    Dizziness    Nausea, throwing up and dry mouth    What else should I know about opioids?    Opioids may lead to dependence, tolerance, or addiction.      Dependence means that if you stop or reduce the medicine too quickly, you will have withdrawal symptoms. These include loose poop (diarrhea), jitters, flu-like symptoms,  nervousness and tremors. Dependence is not the same as addiction.                       Tolerance means needing higher doses over time to get the same effect. This may increase the chance of serious side effects.      Addiction is when people improperly use a substance that harms their body, their mind or their relations with others. Use of opiates can cause a relapse of addiction if you have a history of drug or alcohol abuse.      People who have used opioids for a long time may have a lower quality of life, worse depression, higher levels of pain and more visits to doctors.    You can overdose on opioids. Take these steps to lower your risk of overdose:    1. Recognize the signs:  Signs of overdose include decrease or loss of consciousness (blackout), slowed breathing, trouble waking up and blue lips. If someone is worried about overdose, they should call 911.    2. Talk to your doctor about Narcan (naloxone).   If you are at risk for overdose, you may be given a prescription for Narcan. This medicine very quickly reverses the effects of opioids.   If you overdose, a friend or family member can give you Narcan while waiting for the ambulance. They need to know the signs of overdose and how to give Narcan.     3. Don't use alcohol or street drugs.   Taking them with opioids can cause death.    4. Do not take any of these medicines unless your doctor says it s OK. Taking these with opioids can cause death:    Benzodiazepines, such as lorazepam (Ativan), alprazolam (Xanax) or diazepam (Valium)    Muscle relaxers, such as cyclobenzaprine (Flexeril)    Sleeping pills like zolpidem (Ambien)     Other opioids      How to keep you and other people safe while taking opioids:    1. Never share your opioids with others.  Opioid medicines are regulated by the Drug Enforcement Agency (LEV). Selling or sharing medications is a criminal act.    2. Be sure to store opioids in a secure place, locked up if possible. Young children  can easily swallow them and overdose.    3. When you are traveling with your medicines, keep them in the original bottles. If you use a pill box, be sure you also carry a copy of your medicine list from your clinic or pharmacy.    4. Safe disposal of opioids    Most pharmacies have places to get rid of medicine, called disposal kiosks. Medicine disposal options are also available in every [facility]. Search your county and  medication disposal  to find more options. You can find more details at:  [email or web address]-green/managing-unwanted-medications     I agree that my provider, clinic care team, and pharmacy may work with any city, state or federal law enforcement agency that investigates the misuse, sale, or other diversion of my controlled medicine. I will allow my provider to discuss my care with, or share a copy of, this agreement with any other treating provider, pharmacy or emergency room where I receive care.    I have read this agreement and have asked questions about anything I did not understand.    _______________________________________________________  Patient Signature - Kevni Thrasher _____________________                   Date     _______________________________________________________  Provider Signature - Ema Gentile MD   _____________________                   Date     _______________________________________________________  Witness Signature (required if provider not present while patient signing)   _____________________                   Date

## 2021-09-05 LAB
C TRACH DNA SPEC QL NAA+PROBE: NEGATIVE
N GONORRHOEA DNA SPEC QL NAA+PROBE: NEGATIVE

## 2021-09-06 LAB — DEPRECATED CALCIDIOL+CALCIFEROL SERPL-MC: 26 UG/L (ref 30–80)

## 2021-09-07 ENCOUNTER — TELEPHONE (OUTPATIENT)
Dept: FAMILY MEDICINE | Facility: CLINIC | Age: 56
End: 2021-09-07

## 2021-09-07 DIAGNOSIS — E55.9 VITAMIN D DEFICIENCY: Primary | ICD-10-CM

## 2021-09-07 NOTE — TELEPHONE ENCOUNTER
Relayed message to patient. She needs more Vitamin D 3 Pills can we send to pharmacy.       ----- Message from Ema Gentile MD sent at 9/7/2021  2:55 PM CDT -----  Please let the patient know:-Your vitamin D level is just a little bit low.  She should make sure that she is taking her vitamin D pills regularly.-She does not have any vaginal infections including gonorrhea, chlamydia, yeast.  -Her drug screen was normal.  -Her thyroid is normal.-Her kidney and liver tests are NORMAL.  The potassium level was just a little bit low.  She should make sure that she is eating fruits and vegetables 5 times per day.  Fruits and vegetables are good sources of potassium from her foods.  -Cholesterol levels are just a tiny bit high.  We want the LDL to be less than 70 when people have had a stroke before.  And hers is 82 right now.  Is she taking her cholesterol pills regularly, or did she miss some?  -Her diabetes remains well controlled.

## 2021-09-08 LAB
HUMAN PAPILLOMA VIRUS 16 DNA: NEGATIVE
HUMAN PAPILLOMA VIRUS 18 DNA: NEGATIVE
HUMAN PAPILLOMA VIRUS FINAL DIAGNOSIS: NORMAL
HUMAN PAPILLOMA VIRUS OTHER HR: NEGATIVE

## 2021-09-10 ENCOUNTER — ALLIED HEALTH/NURSE VISIT (OUTPATIENT)
Dept: FAMILY MEDICINE | Facility: CLINIC | Age: 56
End: 2021-09-10
Payer: COMMERCIAL

## 2021-09-10 DIAGNOSIS — Z23 ENCOUNTER FOR IMMUNIZATION: Primary | ICD-10-CM

## 2021-09-10 PROCEDURE — 99207 PR NO CHARGE NURSE ONLY: CPT

## 2021-09-10 PROCEDURE — 91301 PR COVID VAC MODERNA 100 MCG/0.5 ML IM: CPT | Performed by: FAMILY MEDICINE

## 2021-09-10 PROCEDURE — 0011A PR COVID VAC MODERNA 100 MCG/0.5 ML IM: CPT | Performed by: FAMILY MEDICINE

## 2021-09-14 PROBLEM — R87.619 ABNORMAL PAP SMEAR OF CERVIX: Status: RESOLVED | Noted: 2021-09-03 | Resolved: 2021-09-14

## 2021-09-14 LAB
BKR LAB AP GYN ADEQUACY: NORMAL
BKR LAB AP GYN INTERPRETATION: NORMAL
BKR LAB AP HPV REFLEX: NORMAL
BKR LAB AP PREVIOUS ABNL DX: NORMAL
BKR LAB AP PREVIOUS ABNORMAL: NORMAL
PATH REPORT.COMMENTS IMP SPEC: NORMAL
PATH REPORT.RELEVANT HX SPEC: NORMAL

## 2021-09-14 RX ORDER — ACETAMINOPHEN 160 MG
4000 TABLET,DISINTEGRATING ORAL DAILY
Qty: 90 CAPSULE | Refills: 4 | Status: SHIPPED | OUTPATIENT
Start: 2021-09-14 | End: 2022-01-24

## 2021-10-13 ENCOUNTER — APPOINTMENT (OUTPATIENT)
Dept: INTERPRETER SERVICES | Facility: CLINIC | Age: 56
End: 2021-10-13
Payer: COMMERCIAL

## 2021-10-18 DIAGNOSIS — Z12.31 VISIT FOR SCREENING MAMMOGRAM: Primary | ICD-10-CM

## 2021-12-02 ENCOUNTER — TELEPHONE (OUTPATIENT)
Dept: FAMILY MEDICINE | Facility: CLINIC | Age: 56
End: 2021-12-02
Payer: COMMERCIAL

## 2021-12-02 NOTE — TELEPHONE ENCOUNTER
I have put in multiple orders for mammogram, but she hasn't had it done yet. Please assist with scheduling.

## 2021-12-08 ENCOUNTER — IMMUNIZATION (OUTPATIENT)
Dept: NURSING | Facility: CLINIC | Age: 56
End: 2021-12-08
Attending: FAMILY MEDICINE
Payer: COMMERCIAL

## 2021-12-08 PROCEDURE — 91301 PR COVID VAC MODERNA 100 MCG/0.5 ML IM: CPT

## 2021-12-08 PROCEDURE — 0012A PR COVID VAC MODERNA 100 MCG/0.5 ML IM: CPT

## 2021-12-15 DIAGNOSIS — K21.9 GASTRO-ESOPHAGEAL REFLUX DISEASE WITHOUT ESOPHAGITIS: ICD-10-CM

## 2021-12-16 ENCOUNTER — ANCILLARY PROCEDURE (OUTPATIENT)
Dept: GENERAL RADIOLOGY | Facility: CLINIC | Age: 56
End: 2021-12-16
Attending: FAMILY MEDICINE
Payer: COMMERCIAL

## 2021-12-16 ENCOUNTER — OFFICE VISIT (OUTPATIENT)
Dept: ORTHOPEDICS | Facility: CLINIC | Age: 56
End: 2021-12-16
Attending: FAMILY MEDICINE
Payer: COMMERCIAL

## 2021-12-16 VITALS
HEIGHT: 56 IN | SYSTOLIC BLOOD PRESSURE: 131 MMHG | DIASTOLIC BLOOD PRESSURE: 82 MMHG | BODY MASS INDEX: 29.02 KG/M2 | WEIGHT: 129 LBS

## 2021-12-16 DIAGNOSIS — M25.511 PAIN OF BOTH SHOULDER JOINTS: ICD-10-CM

## 2021-12-16 DIAGNOSIS — M25.512 PAIN OF BOTH SHOULDER JOINTS: ICD-10-CM

## 2021-12-16 DIAGNOSIS — M75.80 ROTATOR CUFF TENDINITIS, UNSPECIFIED LATERALITY: ICD-10-CM

## 2021-12-16 DIAGNOSIS — M25.512 PAIN OF BOTH SHOULDER JOINTS: Primary | ICD-10-CM

## 2021-12-16 DIAGNOSIS — M25.511 PAIN OF BOTH SHOULDER JOINTS: Primary | ICD-10-CM

## 2021-12-16 PROCEDURE — 73030 X-RAY EXAM OF SHOULDER: CPT | Mod: RT | Performed by: RADIOLOGY

## 2021-12-16 PROCEDURE — 99204 OFFICE O/P NEW MOD 45 MIN: CPT | Mod: 25 | Performed by: FAMILY MEDICINE

## 2021-12-16 PROCEDURE — 20611 DRAIN/INJ JOINT/BURSA W/US: CPT | Mod: RT | Performed by: FAMILY MEDICINE

## 2021-12-16 RX ADMIN — TRIAMCINOLONE ACETONIDE 40 MG: 40 INJECTION, SUSPENSION INTRA-ARTICULAR; INTRAMUSCULAR at 17:30

## 2021-12-16 RX ADMIN — ROPIVACAINE HYDROCHLORIDE 3 ML: 5 INJECTION, SOLUTION EPIDURAL; INFILTRATION; PERINEURAL at 17:30

## 2021-12-16 ASSESSMENT — MIFFLIN-ST. JEOR: SCORE: 1023.14

## 2021-12-16 NOTE — LETTER
2021         RE: Kevin Thrasher  [address] [facility] [id number]        Dear Colleague,    Thank you for referring your patient, Kevin Thrasher, to the [facility]. Please see a copy of my visit note below.    Kevin Thrasher  :  1965  DOS: 2021  MRN: [id number]    Sports Medicine Clinic Visit    PCP: Ema Gentile    Kevin Thrasher is a 56 year old Right hand dominant female who is seen in consultation at the request of  Ema Gentile M.D. presenting with chronic bilateral shoulder pain.    Injury: Gradual onset of pain over the past 12+ months.  Pain located over bilateral posterior lateral shoulder, right>>>left, radiating to lateral upper arm.  Additional Features:  Positive: weakness.  Symptoms are better with Other medications: tylenol #3 and Rest.  Symptoms are worse with: reaching overhead, shoulder flexion/abduction, lying on either shoulder.  Other evaluation and/or treatments so far consists of: Ice, Tylenol, Other medications: tylenol #3 and Rest.  Recent imaging completed: X-rays right shoulder completed 2020.  Prior History of related problems: none - patient does have history of CVA that mostly affects left lower extremity.    Social History: retired   Native Hmong speaker that presents with her son to interpret today.  They decline an .    Review of Systems  Musculoskeletal: as above  Remainder of review of systems is negative including constitutional, CV, pulmonary, GI, Skin and Neurologic except as noted in HPI or medical history.    Past Medical History:   Diagnosis Date     Angular cheilitis 2016     Depression      Excessive or frequent menstruation      Head injury, unspecified      Helicobacter pylori gastritis      HTN (hypertension) 3/26/2019     Hyperlipidemia     3/19: New diabetes, 10 year risk 6%. Start moderate intensity statin.      Infertility, female     eval at infertility clinic was normal.  declined evaluation. h/o ovarian  "hyperstimulation - did not result in pregnancy.      Obesity (BMI 30.0-34.9)      Pterygium      Shingles 2/9/2015     Type 2 diabetes mellitus without complication, without long-term current use of insulin (H) 2/26/2019    Dx 2019     Past Surgical History:   Procedure Laterality Date     DILATION AND CURETTAGE, OPERATIVE HYSTEROSCOPY, COMBINED  07/15/2011    Planned hysteroscopy/D&C for evaluation/treatment of menorrhagia 7/15/11.      No family history on file.    Objective  /82   Ht 1.422 m (4' 8\")   Wt 58.5 kg (129 lb)   BMI 28.92 kg/m        General: healthy, alert and in no distress      HEENT: no scleral icterus or conjunctival erythema     Skin: no suspicious lesions or rash. No jaundice.     CV: regular rhythm by palpation, 2+ distal pulses, no pedal edema      Resp: normal respiratory effort without conversational dyspnea     Psych: normal mood and affect      Gait: nonantalgic, appropriate coordination and balance     Neuro: normal light touch sensory exam of the extremities. Motor strength as noted below     Bilateral Shoulder exam    ROM:        Full active and passive ROM with flexion, extension, abduction, internal and external rotation.       Dysfunctional scapular motion on R > L       Painful terminal flexion and abduction, mildly in ER, R>>L    Tender:        subacromial space R>L       Lateral deltoid    Non Tender:       remainder of shoulder       sternoclavicular joint       acromioclavicular joint       posterior shoulder       periscapular region    Strength:        abduction 4/5       internal rotation 4/5       external rotation 5/5       adduction 5/5    Impingement testing:        positive (+) Neer       positive (+) Cabrera       positive (+) empty can       neg (-) crossover       neg (-) crank    Stability testing:       neg (-) relocation       neg (-) anterior glide       neg (-) sulcus sign    Skin:       no visible deformities       well perfused       capillary refill " brisk    Sensation:        normal sensation over shoulder and upper extremity     Radiology  Recent Results (from the past 744 hour(s))   XR Shoulder 3 View Bilateral    Narrative    XR SHOULDER 3 VIEWS BILATERAL 12/16/2021 5:00 PM     HISTORY: Pain of both shoulder joints; Pain of both shoulder joints      Impression    IMPRESSION: Negative exam.    RANI CHEEMA MD         SYSTEM ID:  [id number]       Large Joint Injection/Arthocentesis: R subacromial bursa    Date/Time: 12/16/2021 5:30 PM  Performed by: Vik Acuna DO  Authorized by: Vik Acuna DO     Indications:  Pain  Needle Size:  22 G  Guidance: ultrasound    Approach:  Lateral  Location:  Shoulder      Site:  R subacromial bursa  Medications:  3 mL ropivacaine 5 MG/ML; 40 mg triamcinolone 40 MG/ML  Outcome:  Tolerated well, no immediate complications  Procedure discussed: discussed risks, benefits, and alternatives    Consent Given by:  Patient  Timeout: timeout called immediately prior to procedure    Prep: patient was prepped and draped in usual sterile fashion        Assessment:  1. Pain of both shoulder joints    2. Rotator cuff tendinitis, unspecified laterality        Plan:  Discussed the assessment with the patient.  Follow up: 2-3 weeks prn if sx not improved, or left side worsening  R>>L shoulder pain, consistent with RTC tendinitis  Trial of US guided subacromial CSI today for diagnostic and therapeutic value, some initial relief reported from local anesthetic effect  XR images independently visualized and reviewed with patient today in clinic  No concern for full-thickness RTC based on exam, consider advanced imaging vs another location for injection if sx remain labile  Basic HEP reviewed, encouraged PT, available anytime  Expectations and goals of CSI reviewed  Often 2-3 days for steroid effect, and can take up to two weeks for maximum effect  We discussed modified progressive pain-free activity as tolerated  Do not  overuse in first two weeks if feeling better due to concern for vulnerability while steroid is working  Supportive care reviewed  All questions were answered today  Contact us with additional questions or concerns  Signs and sx of concern reviewed    Thanks very much for sending this nice lady to us, I will keep you updated with her progress    Vik Acuna DO, [facility]  Sports Medicine Physician  [facility]      Time spent in chart review, one-on-one evaluation, discussion with patient regarding nature of problem, course, prior treatments, and therapeutic options, share-decision making, procedures and referrals as appropriate/documented, and charting related to the visit: 32 minutes            Disclaimer: This note consists of symbols derived from keyboarding, dictation and/or voice recognition software. As a result, there may be errors in the script that have gone undetected. Please consider this when interpreting information found in this chart.      Again, thank you for allowing me to participate in the care of your patient.        Sincerely,        Vik Acuna DO

## 2021-12-16 NOTE — PROGRESS NOTES
Kevin Thrasher  :  1965  DOS: 2021  MRN: [id number]    Sports Medicine Clinic Visit    PCP: Ema Gentile    Kevin Thrasher is a 56 year old Right hand dominant female who is seen in consultation at the request of  Ema Gentile M.D. presenting with chronic bilateral shoulder pain.    Injury: Gradual onset of pain over the past 12+ months.  Pain located over bilateral posterior lateral shoulder, right>>>left, radiating to lateral upper arm.  Additional Features:  Positive: weakness.  Symptoms are better with Other medications: tylenol #3 and Rest.  Symptoms are worse with: reaching overhead, shoulder flexion/abduction, lying on either shoulder.  Other evaluation and/or treatments so far consists of: Ice, Tylenol, Other medications: tylenol #3 and Rest.  Recent imaging completed: X-rays right shoulder completed 2020.  Prior History of related problems: none - patient does have history of CVA that mostly affects left lower extremity.    Social History: retired   Native [employer] speaker that presents with her son to interpret today.  They decline an .    Review of Systems  Musculoskeletal: as above  Remainder of review of systems is negative including constitutional, CV, pulmonary, GI, Skin and Neurologic except as noted in HPI or medical history.    Past Medical History:   Diagnosis Date     Angular cheilitis 2016     Depression      Excessive or frequent menstruation      Head injury, unspecified      Helicobacter pylori gastritis      HTN (hypertension) 3/26/2019     Hyperlipidemia     3/19: New diabetes, 10 year risk 6%. Start moderate intensity statin.      Infertility, female     eval at infertility clinic was normal.  declined evaluation. h/o ovarian hyperstimulation - did not result in pregnancy.      Obesity (BMI 30.0-34.9)      Pterygium      Shingles 2015     Type 2 diabetes mellitus without complication, without long-term current use of insulin (H) 2019    Dx 2019     Past  "Surgical History:   Procedure Laterality Date     DILATION AND CURETTAGE, OPERATIVE HYSTEROSCOPY, COMBINED  07/15/2011    Planned hysteroscopy/D&C for evaluation/treatment of menorrhagia 7/15/11.      No family history on file.    Objective  /82   Ht 1.422 m (4' 8\")   Wt 58.5 kg (129 lb)   BMI 28.92 kg/m        General: healthy, alert and in no distress      HEENT: no scleral icterus or conjunctival erythema     Skin: no suspicious lesions or rash. No jaundice.     CV: regular rhythm by palpation, 2+ distal pulses, no pedal edema      Resp: normal respiratory effort without conversational dyspnea     Psych: normal mood and affect      Gait: nonantalgic, appropriate coordination and balance     Neuro: normal light touch sensory exam of the extremities. Motor strength as noted below     Bilateral Shoulder exam    ROM:        Full active and passive ROM with flexion, extension, abduction, internal and external rotation.       Dysfunctional scapular motion on R > L       Painful terminal flexion and abduction, mildly in ER, R>>L    Tender:        subacromial space R>L       Lateral deltoid    Non Tender:       remainder of shoulder       sternoclavicular joint       acromioclavicular joint       posterior shoulder       periscapular region    Strength:        abduction 4/5       internal rotation 4/5       external rotation 5/5       adduction 5/5    Impingement testing:        positive (+) Neer       positive (+) Cabrera       positive (+) empty can       neg (-) crossover       neg (-) crank    Stability testing:       neg (-) relocation       neg (-) anterior glide       neg (-) sulcus sign    Skin:       no visible deformities       well perfused       capillary refill brisk    Sensation:        normal sensation over shoulder and upper extremity     Radiology  Recent Results (from the past 744 hour(s))   XR Shoulder 3 View Bilateral    Narrative    XR SHOULDER 3 VIEWS BILATERAL 12/16/2021 5:00 PM "     HISTORY: Pain of both shoulder joints; Pain of both shoulder joints      Impression    IMPRESSION: Negative exam.    RANI CHEEMA MD         SYSTEM ID:  [id number]       Large Joint Injection/Arthocentesis: R subacromial bursa    Date/Time: 12/16/2021 5:30 PM  Performed by: Vik Acuna DO  Authorized by: Vik Acuna DO     Indications:  Pain  Needle Size:  22 G  Guidance: ultrasound    Approach:  Lateral  Location:  Shoulder      Site:  R subacromial bursa  Medications:  3 mL ropivacaine 5 MG/ML; 40 mg triamcinolone 40 MG/ML  Outcome:  Tolerated well, no immediate complications  Procedure discussed: discussed risks, benefits, and alternatives    Consent Given by:  Patient  Timeout: timeout called immediately prior to procedure    Prep: patient was prepped and draped in usual sterile fashion        Assessment:  1. Pain of both shoulder joints    2. Rotator cuff tendinitis, unspecified laterality        Plan:  Discussed the assessment with the patient.  Follow up: 2-3 weeks prn if sx not improved, or left side worsening  R>>L shoulder pain, consistent with RTC tendinitis  Trial of US guided subacromial CSI today for diagnostic and therapeutic value, some initial relief reported from local anesthetic effect  XR images independently visualized and reviewed with patient today in clinic  No concern for full-thickness RTC based on exam, consider advanced imaging vs another location for injection if sx remain labile  Basic HEP reviewed, encouraged PT, available anytime  Expectations and goals of CSI reviewed  Often 2-3 days for steroid effect, and can take up to two weeks for maximum effect  We discussed modified progressive pain-free activity as tolerated  Do not overuse in first two weeks if feeling better due to concern for vulnerability while steroid is working  Supportive care reviewed  All questions were answered today  Contact us with additional questions or concerns  Signs and sx of  concern reviewed    Thanks very much for sending this nice lady to us, I will keep you updated with her progress    Vik Acuna DO, [facility]  Sports Medicine Physician  [facility]      Time spent in chart review, one-on-one evaluation, discussion with patient regarding nature of problem, course, prior treatments, and therapeutic options, share-decision making, procedures and referrals as appropriate/documented, and charting related to the visit: 32 minutes            Disclaimer: This note consists of symbols derived from keyboarding, dictation and/or voice recognition software. As a result, there may be errors in the script that have gone undetected. Please consider this when interpreting information found in this chart.

## 2021-12-17 ENCOUNTER — IMMUNIZATION (OUTPATIENT)
Dept: FAMILY MEDICINE | Facility: CLINIC | Age: 56
End: 2021-12-17
Payer: COMMERCIAL

## 2021-12-17 PROCEDURE — 90682 RIV4 VACC RECOMBINANT DNA IM: CPT

## 2021-12-17 PROCEDURE — 90471 IMMUNIZATION ADMIN: CPT

## 2021-12-17 NOTE — TELEPHONE ENCOUNTER
"Outpatient Medication Detail     Disp Refills Start End MAHENDRA   omeprazole (PRILOSEC) 20 MG capsule 100 capsule 4 12/4/2020  No   Sig - Route: Take 1 capsule (20 mg total) by mouth daily before breakfast. - Oral   Sent to pharmacy as: omeprazole 20 mg capsule,delayed release (PriLOSEC)   E-Prescribing Status: Receipt confirmed by pharmacy (12/4/2020  2:06 PM CST)       omeprazole (PRILOSEC) 20 MG capsule [[id number]]    Electronically signed by: Ema Gentile MD on 12/04/20 1139 Status: Active   Ordering user: Ema Gentile MD 12/04/20 1139 Authorized by: Ema Gentile MD   Frequency: QAM (0621) 12/04/20 - Until Discontinued   Diagnoses  Gastroesophageal reflux disease without esophagitis [K21.9]     Routing refill request to provider for review/approval because:  Drug interaction warning    Last office visit provider:  9/3/21     Requested Prescriptions   Pending Prescriptions Disp Refills     omeprazole (PRILOSEC) 20 MG DR capsule [Pharmacy Med Name: OMEPRAZOLE DR 20 MG CAPSULE] 90 capsule 5     Sig: TAKE 1 CAPSULE (20 MG TOTAL) BY MOUTH DAILY BEFORE BREAKFAST.       PPI Protocol Failed - 12/15/2021 12:44 PM        Failed - Not on Clopidogrel (unless Pantoprazole ordered)        Passed - No diagnosis of osteoporosis on record        Passed - Recent (12 mo) or future (30 days) visit within the authorizing provider's specialty     Patient has had an office visit with the authorizing provider or a provider within the authorizing providers department within the previous 12 mos or has a future within next 30 days. See \"Patient Info\" tab in inbasket, or \"Choose Columns\" in Meds & Orders section of the refill encounter.              Passed - Medication is active on med list        Passed - Patient is age 18 or older        Passed - No active pregnacy on record        Passed - No positive pregnancy test in past 12 months             Frank Jacobs RN 12/17/21 1:53 PM  "

## 2021-12-20 RX ORDER — TRIAMCINOLONE ACETONIDE 40 MG/ML
40 INJECTION, SUSPENSION INTRA-ARTICULAR; INTRAMUSCULAR
Status: DISCONTINUED | OUTPATIENT
Start: 2021-12-16 | End: 2022-01-24

## 2021-12-20 RX ORDER — ROPIVACAINE HYDROCHLORIDE 5 MG/ML
3 INJECTION, SOLUTION EPIDURAL; INFILTRATION; PERINEURAL
Status: DISCONTINUED | OUTPATIENT
Start: 2021-12-16 | End: 2022-01-24

## 2022-01-24 ENCOUNTER — OFFICE VISIT (OUTPATIENT)
Dept: FAMILY MEDICINE | Facility: CLINIC | Age: 57
End: 2022-01-24
Payer: COMMERCIAL

## 2022-01-24 VITALS
WEIGHT: 130 LBS | OXYGEN SATURATION: 99 % | RESPIRATION RATE: 16 BRPM | SYSTOLIC BLOOD PRESSURE: 116 MMHG | DIASTOLIC BLOOD PRESSURE: 60 MMHG | BODY MASS INDEX: 29.15 KG/M2 | HEART RATE: 54 BPM

## 2022-01-24 DIAGNOSIS — I63.22 ACUTE ISCHEMIC VBA BRAINSTEM STROKE, RIGHT (H): ICD-10-CM

## 2022-01-24 DIAGNOSIS — Z79.899 HIGH RISK MEDICATION USE: ICD-10-CM

## 2022-01-24 DIAGNOSIS — K59.04 FUNCTIONAL CONSTIPATION: ICD-10-CM

## 2022-01-24 DIAGNOSIS — M62.838 MUSCLE SPASM: ICD-10-CM

## 2022-01-24 DIAGNOSIS — N76.0 VAGINITIS AND VULVOVAGINITIS: ICD-10-CM

## 2022-01-24 DIAGNOSIS — E11.59 TYPE 2 DIABETES MELLITUS WITH OTHER CIRCULATORY COMPLICATION, WITHOUT LONG-TERM CURRENT USE OF INSULIN (H): Primary | ICD-10-CM

## 2022-01-24 DIAGNOSIS — I63.89 CEREBROVASCULAR ACCIDENT (CVA) DUE TO OTHER MECHANISM (H): ICD-10-CM

## 2022-01-24 DIAGNOSIS — M75.80 ROTATOR CUFF TENDINITIS, UNSPECIFIED LATERALITY: ICD-10-CM

## 2022-01-24 DIAGNOSIS — N39.3 URINARY, INCONTINENCE, STRESS FEMALE: ICD-10-CM

## 2022-01-24 DIAGNOSIS — E78.2 MIXED HYPERLIPIDEMIA: ICD-10-CM

## 2022-01-24 DIAGNOSIS — B02.9 HERPES ZOSTER WITHOUT COMPLICATION: ICD-10-CM

## 2022-01-24 DIAGNOSIS — Z79.899 ENCOUNTER FOR LONG-TERM (CURRENT) USE OF MEDICATIONS: ICD-10-CM

## 2022-01-24 DIAGNOSIS — J30.89 ALLERGIC RHINITIS DUE TO OTHER ALLERGIC TRIGGER, UNSPECIFIED SEASONALITY: ICD-10-CM

## 2022-01-24 DIAGNOSIS — K21.9 GASTRO-ESOPHAGEAL REFLUX DISEASE WITHOUT ESOPHAGITIS: ICD-10-CM

## 2022-01-24 DIAGNOSIS — I10 ESSENTIAL HYPERTENSION: ICD-10-CM

## 2022-01-24 DIAGNOSIS — H04.123 DRY EYES: ICD-10-CM

## 2022-01-24 DIAGNOSIS — F32.A DEPRESSION, UNSPECIFIED DEPRESSION TYPE: ICD-10-CM

## 2022-01-24 DIAGNOSIS — E55.9 VITAMIN D DEFICIENCY: ICD-10-CM

## 2022-01-24 DIAGNOSIS — I63.211 ACUTE ISCHEMIC VBA BRAINSTEM STROKE, RIGHT (H): ICD-10-CM

## 2022-01-24 LAB
ALBUMIN SERPL-MCNC: 3.7 G/DL (ref 3.5–5)
ANION GAP SERPL CALCULATED.3IONS-SCNC: 13 MMOL/L (ref 5–18)
BUN SERPL-MCNC: 15 MG/DL (ref 8–22)
CALCIUM SERPL-MCNC: 9.1 MG/DL (ref 8.5–10.5)
CHLORIDE BLD-SCNC: 104 MMOL/L (ref 98–107)
CHOLEST SERPL-MCNC: 192 MG/DL
CO2 SERPL-SCNC: 26 MMOL/L (ref 22–31)
CREAT SERPL-MCNC: 0.72 MG/DL (ref 0.6–1.1)
CREAT UR-MCNC: 93 MG/DL
FASTING STATUS PATIENT QL REPORTED: YES
GFR SERPL CREATININE-BSD FRML MDRD: >90 ML/MIN/1.73M2
GLUCOSE BLD-MCNC: 110 MG/DL (ref 70–125)
HBA1C MFR BLD: 6.6 % (ref 0–5.6)
HDLC SERPL-MCNC: 66 MG/DL
LDLC SERPL CALC-MCNC: 108 MG/DL
PHOSPHATE SERPL-MCNC: 3.5 MG/DL (ref 2.5–4.5)
POTASSIUM BLD-SCNC: 3.2 MMOL/L (ref 3.5–5)
SODIUM SERPL-SCNC: 143 MMOL/L (ref 136–145)
TRIGL SERPL-MCNC: 88 MG/DL

## 2022-01-24 PROCEDURE — 80307 DRUG TEST PRSMV CHEM ANLYZR: CPT | Performed by: FAMILY MEDICINE

## 2022-01-24 PROCEDURE — 82306 VITAMIN D 25 HYDROXY: CPT | Performed by: FAMILY MEDICINE

## 2022-01-24 PROCEDURE — 90750 HZV VACC RECOMBINANT IM: CPT | Performed by: FAMILY MEDICINE

## 2022-01-24 PROCEDURE — 90471 IMMUNIZATION ADMIN: CPT | Performed by: FAMILY MEDICINE

## 2022-01-24 PROCEDURE — 99214 OFFICE O/P EST MOD 30 MIN: CPT | Mod: 25 | Performed by: FAMILY MEDICINE

## 2022-01-24 PROCEDURE — 83036 HEMOGLOBIN GLYCOSYLATED A1C: CPT | Performed by: FAMILY MEDICINE

## 2022-01-24 PROCEDURE — 80069 RENAL FUNCTION PANEL: CPT | Performed by: FAMILY MEDICINE

## 2022-01-24 PROCEDURE — 36415 COLL VENOUS BLD VENIPUNCTURE: CPT | Performed by: FAMILY MEDICINE

## 2022-01-24 PROCEDURE — 80061 LIPID PANEL: CPT | Performed by: FAMILY MEDICINE

## 2022-01-24 RX ORDER — CYCLOBENZAPRINE HCL 5 MG
5 TABLET ORAL 3 TIMES DAILY PRN
Qty: 90 TABLET | Refills: 3 | Status: SHIPPED | OUTPATIENT
Start: 2022-01-24 | End: 2022-06-13

## 2022-01-24 RX ORDER — VENLAFAXINE HYDROCHLORIDE 75 MG/1
75 TABLET, EXTENDED RELEASE ORAL
Qty: 90 TABLET | Refills: 3 | Status: SHIPPED | OUTPATIENT
Start: 2022-01-24 | End: 2022-01-25

## 2022-01-24 RX ORDER — METFORMIN HCL 500 MG
500 TABLET, EXTENDED RELEASE 24 HR ORAL
Qty: 90 TABLET | Refills: 3 | Status: SHIPPED | OUTPATIENT
Start: 2022-01-24 | End: 2022-07-07

## 2022-01-24 RX ORDER — FLUTICASONE PROPIONATE 110 UG/1
2 AEROSOL, METERED RESPIRATORY (INHALATION) DAILY
Qty: 12 G | Refills: 11 | Status: SHIPPED | OUTPATIENT
Start: 2022-01-24 | End: 2023-03-06

## 2022-01-24 RX ORDER — ACETAMINOPHEN 500 MG
500-1000 TABLET ORAL EVERY 6 HOURS PRN
Qty: 200 TABLET | Refills: 4 | Status: SHIPPED | OUTPATIENT
Start: 2022-01-24 | End: 2023-02-27

## 2022-01-24 RX ORDER — ASPIRIN 81 MG/1
81 TABLET, CHEWABLE ORAL DAILY
Qty: 200 TABLET | Refills: 1 | Status: SHIPPED | OUTPATIENT
Start: 2022-01-24 | End: 2022-09-30

## 2022-01-24 RX ORDER — ACETAMINOPHEN 160 MG
4000 TABLET,DISINTEGRATING ORAL DAILY
Qty: 90 CAPSULE | Refills: 4 | Status: SHIPPED | OUTPATIENT
Start: 2022-01-24 | End: 2022-01-25 | Stop reason: ALTCHOICE

## 2022-01-24 RX ORDER — POLYETHYLENE GLYCOL 3350 17 G/17G
17 POWDER, FOR SOLUTION ORAL
Qty: 850 G | Refills: 3 | Status: SHIPPED | OUTPATIENT
Start: 2022-01-24 | End: 2023-06-12

## 2022-01-24 RX ORDER — ACETAMINOPHEN AND CODEINE PHOSPHATE 300; 30 MG/1; MG/1
1 TABLET ORAL
Qty: 30 TABLET | Refills: 0 | Status: SHIPPED | OUTPATIENT
Start: 2022-01-24 | End: 2022-06-13

## 2022-01-24 RX ORDER — LORATADINE 10 MG/1
10 TABLET ORAL DAILY PRN
Qty: 90 TABLET | Refills: 3 | Status: SHIPPED | OUTPATIENT
Start: 2022-01-24 | End: 2023-03-06

## 2022-01-24 RX ORDER — BLOOD SUGAR DIAGNOSTIC
1 STRIP MISCELLANEOUS DAILY
Qty: 100 STRIP | Refills: 3 | Status: SHIPPED | OUTPATIENT
Start: 2022-01-24 | End: 2023-03-30

## 2022-01-24 RX ORDER — TRAZODONE HYDROCHLORIDE 50 MG/1
50 TABLET, FILM COATED ORAL AT BEDTIME
Qty: 90 TABLET | Refills: 3 | Status: SHIPPED | OUTPATIENT
Start: 2022-01-24 | End: 2022-06-13

## 2022-01-24 RX ORDER — METFORMIN HCL 500 MG
500 TABLET, EXTENDED RELEASE 24 HR ORAL
Qty: 90 TABLET | Refills: 3 | Status: SHIPPED | OUTPATIENT
Start: 2022-01-24 | End: 2022-01-24

## 2022-01-24 RX ORDER — LISINOPRIL 20 MG/1
20 TABLET ORAL DAILY
Qty: 90 TABLET | Refills: 4 | Status: SHIPPED | OUTPATIENT
Start: 2022-01-24 | End: 2022-06-13

## 2022-01-24 RX ORDER — LANCETS
1 EACH MISCELLANEOUS DAILY
Qty: 102 EACH | Refills: 11 | Status: SHIPPED | OUTPATIENT
Start: 2022-01-24

## 2022-01-24 RX ORDER — MONTELUKAST SODIUM 10 MG/1
10 TABLET ORAL AT BEDTIME
Qty: 90 TABLET | Refills: 3 | Status: SHIPPED | OUTPATIENT
Start: 2022-01-24 | End: 2022-03-24 | Stop reason: SINTOL

## 2022-01-24 RX ORDER — MICONAZOLE NITRATE 2 %
1 CREAM WITH APPLICATOR VAGINAL AT BEDTIME
Qty: 45 G | Refills: 3 | Status: SHIPPED | OUTPATIENT
Start: 2022-01-24 | End: 2022-06-13

## 2022-01-24 RX ORDER — CLOPIDOGREL BISULFATE 75 MG/1
75 TABLET ORAL DAILY
Qty: 90 TABLET | Refills: 4 | Status: SHIPPED | OUTPATIENT
Start: 2022-01-24 | End: 2022-10-25

## 2022-01-24 RX ORDER — ALBUTEROL SULFATE 90 UG/1
2 AEROSOL, METERED RESPIRATORY (INHALATION) EVERY 4 HOURS PRN
Qty: 18 G | Refills: 0 | Status: SHIPPED | OUTPATIENT
Start: 2022-01-24 | End: 2023-06-30

## 2022-01-24 RX ORDER — ROSUVASTATIN CALCIUM 40 MG/1
40 TABLET, COATED ORAL AT BEDTIME
Qty: 90 TABLET | Refills: 4 | Status: SHIPPED | OUTPATIENT
Start: 2022-01-24 | End: 2022-07-07

## 2022-01-24 RX ORDER — POLYVINYL ALCOHOL 14 MG/ML
1 SOLUTION/ DROPS OPHTHALMIC
Qty: 15 ML | Refills: 11 | Status: SHIPPED | OUTPATIENT
Start: 2022-01-24

## 2022-01-24 ASSESSMENT — PATIENT HEALTH QUESTIONNAIRE - PHQ9
5. POOR APPETITE OR OVEREATING: MORE THAN HALF THE DAYS
SUM OF ALL RESPONSES TO PHQ QUESTIONS 1-9: 8

## 2022-01-24 ASSESSMENT — ANXIETY QUESTIONNAIRES
GAD7 TOTAL SCORE: 9
6. BECOMING EASILY ANNOYED OR IRRITABLE: SEVERAL DAYS
2. NOT BEING ABLE TO STOP OR CONTROL WORRYING: MORE THAN HALF THE DAYS
3. WORRYING TOO MUCH ABOUT DIFFERENT THINGS: MORE THAN HALF THE DAYS
5. BEING SO RESTLESS THAT IT IS HARD TO SIT STILL: SEVERAL DAYS
1. FEELING NERVOUS, ANXIOUS, OR ON EDGE: SEVERAL DAYS
IF YOU CHECKED OFF ANY PROBLEMS ON THIS QUESTIONNAIRE, HOW DIFFICULT HAVE THESE PROBLEMS MADE IT FOR YOU TO DO YOUR WORK, TAKE CARE OF THINGS AT HOME, OR GET ALONG WITH OTHER PEOPLE: SOMEWHAT DIFFICULT
7. FEELING AFRAID AS IF SOMETHING AWFUL MIGHT HAPPEN: NOT AT ALL

## 2022-01-24 ASSESSMENT — ASTHMA QUESTIONNAIRES: ACT_TOTALSCORE: 24

## 2022-01-24 NOTE — LETTER
Opioid / Opioid Plus Controlled Substance Agreement    This is an agreement between you and your provider about the safe and appropriate use of controlled substance/opioids prescribed by your care team. Controlled substances are medicines that can cause physical and mental dependence (abuse).    There are strict laws about having and using these medicines. We here at [facility] are committing to working with you in your efforts to get better. To support you in this work, we ll help you schedule regular office appointments for medicine refills. If we must cancel or change your appointment for any reason, we ll make sure you have enough medicine to last until your next appointment.     As a Provider, I will:    Listen carefully to your concerns and treat you with respect.     Recommend a treatment plan that I believe is in your best interest. This plan may involve therapies other than opioid pain medication.     Talk with you often about the possible benefits, and the risk of harm of any medicine that we prescribe for you.     Provide a plan on how to taper (discontinue or go off) using this medicine if the decision is made to stop its use.    As a Patient, I understand that opioid(s):     Are a controlled substance prescribed by my care team to help me function or work and manage my condition(s).     Are strong medicines and can cause serious side effects such as:    Drowsiness, which can seriously affect my driving ability    A lower breathing rate, enough to cause death    Harm to my thinking ability     Depression     Abuse of and addiction to this medicine    Need to be taken exactly as prescribed. Combining opioids with certain medicines or chemicals (such as illegal drugs, sedatives, sleeping pills, and benzodiazepines) can be dangerous or even fatal. If I stop opioids suddenly, I may have severe withdrawal symptoms.    Do not work for all types of pain nor for all patients. If they re not helpful, I may  be asked to stop them.        The risks, benefits and side effects of these medicine(s) were explained to me. I agree that:  1. I will take part in other treatments as advised by my care team. This may be psychiatry or counseling, physical therapy, behavioral therapy, group treatment or a referral to a specialist.     2. I will keep all my appointments. I understand that this is part of the monitoring of opioids. My care team may require an office visit for EVERY opioid/controlled substance refill. If I miss appointments or don t follow instructions, my care team may stop my medicine.    3. I will take my medicines as prescribed. I will not change the dose or schedule unless my care team tells me to. There will be no refills if I run out early.     4. I may be asked to come to the clinic and complete a urine drug test or complete a pill count at any time. If I don t give a urine sample or participate in a pill count, the care team may stop my medicine.    5. I will only receive prescriptions from this clinic for chronic pain. If I am treated by another provider for acute pain issues, I will tell them that I am taking opioid pain medication for chronic pain and that I have a treatment agreement with this provider. I will inform my [facility] care team within one business day if I am given a prescription for any pain medication by another healthcare provider. My [facility] care team can contact other providers and pharmacists about my use of any medicines.    6. It is up to me to make sure that I don t run out of my medicines on weekends or holidays. If my care team is willing to refill my opioid prescription without a visit, I must request refills only during office hours. Refills may take up to 3 business days to process. I will use one pharmacy to fill all my opioid and other controlled substance prescriptions. I will notify the clinic about any changes to my insurance or medication  availability.    7. I am responsible for my prescriptions. If the medicine/prescription is lost, stolen or destroyed, it will not be replaced. I also agree not to share controlled substance medicines with anyone.    8. I am aware I should not use any illegal or recreational drugs. I agree not to drink alcohol unless my care team says I can.       9. If I enroll in the [place] [facility] Cannabis program, I will tell my care team prior to my next refill.     10. I will tell my care team right away if I become pregnant, have a new medical problem treated outside of my regular clinic, or have a change in my medications.    11. I understand that this medicine can affect my thinking, judgment and reaction time. Alcohol and drugs affect the brain and body, which can affect the safety of my driving. Being under the influence of alcohol or drugs can affect my decision-making, behaviors, personal safety, and the safety of others. Driving while impaired (DWI) can occur if a person is driving, operating, or in physical control of a car, motorcycle, boat, snowmobile, ATV, motorbike, off-road vehicle, or any other motor vehicle ([place] Statute 169A.20). I understand the risk if I choose to drive or operate any vehicle or machinery.    I understand that if I do not follow any of the conditions above, my prescriptions or treatment may be stopped or changed.          Opioids  What You Need to Know    What are opioids?   Opioids are pain medicines that must be prescribed by a doctor. They are also known as narcotics.     Examples are:   1. morphine (MS Contin, Claribel)  2. oxycodone (Oxycontin)  3. oxycodone and acetaminophen (Percocet)  4. hydrocodone and acetaminophen (Vicodin, Norco)   5. fentanyl patch (Duragesic)   6. hydromorphone (Dilaudid)   7. methadone  8. codeine (Tylenol #3)     What do opioids do well?   Opioids are best for severe short-term pain such as after a surgery or injury. They may work well for cancer pain. They may  help some people with long-lasting (chronic) pain.     What do opioids NOT do well?   Opioids never get rid of pain entirely, and they don t work well for most patients with chronic pain. Opioids don t reduce swelling, one of the causes of pain.                                    Other ways to manage chronic pain and improve function include:       Treat the health problem that may be causing pain    Anti-inflammation medicines, which reduce swelling and tenderness, such as ibuprofen (Advil, Motrin) or naproxen (Aleve)    Acetaminophen (Tylenol)    Antidepressants and anti-seizure medicines, especially for nerve pain    Topical treatments such as patches or creams    Injections or nerve blocks    Chiropractic or osteopathic treatment    Acupuncture, massage, deep breathing, meditation, visual imagery, aromatherapy    Use heat or ice at the pain site    Physical therapy     Exercise    Stop smoking    Take part in therapy       Risks and side effects     Talk to your doctor before you start or decide to keep taking opioids. Possible side effects include:      Lowering your breathing rate enough to cause death    Overdose, including death, especially if taking higher than prescribed doses    Worse depression symptoms; less pleasure in things you usually enjoy    Feeling tired or sluggish    Slower thoughts or cloudy thinking    Being more sensitive to pain over time; pain is harder to control    Trouble sleeping or restless sleep    Changes in hormone levels (for example, less testosterone)    Changes in sex drive or ability to have sex    Constipation    Unsafe driving    Itching and sweating    Dizziness    Nausea, throwing up and dry mouth    What else should I know about opioids?    Opioids may lead to dependence, tolerance, or addiction.      Dependence means that if you stop or reduce the medicine too quickly, you will have withdrawal symptoms. These include loose poop (diarrhea), jitters, flu-like symptoms,  nervousness and tremors. Dependence is not the same as addiction.                       Tolerance means needing higher doses over time to get the same effect. This may increase the chance of serious side effects.      Addiction is when people improperly use a substance that harms their body, their mind or their relations with others. Use of opiates can cause a relapse of addiction if you have a history of drug or alcohol abuse.      People who have used opioids for a long time may have a lower quality of life, worse depression, higher levels of pain and more visits to doctors.    You can overdose on opioids. Take these steps to lower your risk of overdose:    1. Recognize the signs:  Signs of overdose include decrease or loss of consciousness (blackout), slowed breathing, trouble waking up and blue lips. If someone is worried about overdose, they should call 911.    2. Talk to your doctor about Narcan (naloxone).   If you are at risk for overdose, you may be given a prescription for Narcan. This medicine very quickly reverses the effects of opioids.   If you overdose, a friend or family member can give you Narcan while waiting for the ambulance. They need to know the signs of overdose and how to give Narcan.     3. Don't use alcohol or street drugs.   Taking them with opioids can cause death.    4. Do not take any of these medicines unless your doctor says it s OK. Taking these with opioids can cause death:    Benzodiazepines, such as lorazepam (Ativan), alprazolam (Xanax) or diazepam (Valium)    Muscle relaxers, such as cyclobenzaprine (Flexeril)    Sleeping pills like zolpidem (Ambien)     Other opioids      How to keep you and other people safe while taking opioids:    1. Never share your opioids with others.  Opioid medicines are regulated by the Drug Enforcement Agency ([employer]). Selling or sharing medications is a criminal act.    2. Be sure to store opioids in a secure place, locked up if possible. Young children  can easily swallow them and overdose.    3. When you are traveling with your medicines, keep them in the original bottles. If you use a pill box, be sure you also carry a copy of your medicine list from your clinic or pharmacy.    4. Safe disposal of opioids    Most pharmacies have places to get rid of medicine, called disposal kiosks. Medicine disposal options are also available in every [facility]. Search your county and  medication disposal  to find more options. You can find more details at:  [email or web address]./living-green/managing-unwanted-medications     I agree that my provider, clinic care team, and pharmacy may work with any city, state or federal law enforcement agency that investigates the misuse, sale, or other diversion of my controlled medicine. I will allow my provider to discuss my care with, or share a copy of, this agreement with any other treating provider, pharmacy or emergency room where I receive care.    I have read this agreement and have asked questions about anything I did not understand.    _______________________________________________________  Patient Signature - Kevin Thrasher _____________________                   Date     _______________________________________________________  Provider Signature - Ema Gentile MD   _____________________                   Date     _______________________________________________________  Witness Signature (required if provider not present while patient signing)   _____________________                   Date

## 2022-01-24 NOTE — PROGRESS NOTES
Office Visit  [facility] - Family Medicine  Date of Service: Jan 24, 2022      Subjective   Kevin Thrasher is a 56 year old female who presents for   Chief Complaint   Patient presents with     Diabetes     Right shoulder pain  Got injection helped. Would like other shoulder injected now.   Left shoulder pain  Using tylenol #3    Diabetes  Interested in medicine to treat it  Some meals - blood sugar is in the 200s after meals. Good blood sugar prior to eating.  Exercises to lower it    Objective   /60 (BP Location: Left arm, Patient Position: Sitting, Cuff Size: Adult Regular)   Pulse 54   Resp 16   Wt 59 kg (130 lb)   SpO2 99%   BMI 29.15 kg/m     She reports that she has never smoked. She has never used smokeless tobacco.    Gen: Alert, no apparent distress.  Heart: Regular rate and rhythm, no murmurs.  Lungs: Clear to auscultation bilaterally, no increased work of breathing.  Abdomen: Soft, non-tender, non-distended, bowel sounds normal.  Extremities: No clubbing, cyanosis, edema  No results found for any visits on 01/24/22.      Assessment & Plan     1. DM2, well controlled with diet. Wants metformin due to postprandial hyperglycemia. Rx metformin.  2. Shoulder pain. Follow up with ortho. Continue T#3 as needed. Controlled substance agreement, drug screen done.      Order Summary                                                      Type 2 diabetes mellitus with other circulatory complication, without long-term current use of insulin (H)  -     OPTOMETRY REFERRAL; Future  -     Hemoglobin A1c; Future  -     Renal panel; Future  -     Lipid panel reflex to direct LDL Fasting; Future  -     ACCU-CHEK GUIDE test strip; 1 strip by In Vitro route daily Use to test once daily  -     blood glucose monitoring (ACCU-CHEK FASTCLIX) lancets; 1 each daily  -     metFORMIN (GLUCOPHAGE-XR) 500 MG 24 hr tablet; Take 1 tablet (500 mg) by mouth daily (with dinner)    High risk medication use  -      [id number] - Urine Drug Confirmation Panel (Comprehensive); Future    Encounter for long-term (current) use of medications    Vitamin D deficiency  -     Vitamin D deficiency screening; Future  -     Cholecalciferol (VITAMIN D3) 50 MCG (2000 UT) CAPS; Take 4,000 Units by mouth daily    Mixed hyperlipidemia  -     rosuvastatin (CRESTOR) 40 MG tablet; Take 1 tablet (40 mg) by mouth At Bedtime    Rotator cuff tendinitis, unspecified laterality  -     acetaminophen (TYLENOL) 500 MG tablet; Take 1-2 tablets (500-1,000 mg) by mouth every 6 hours as needed for mild pain  -     acetaminophen-codeine (TYLENOL W/CODEINE #3) 300-30 MG per tablet; Take 1 tablet by mouth nightly as needed for moderate to severe pain  -     diclofenac (VOLTAREN) 1 % topical gel; Apply 4 g topically 4 times daily To shoulder and back    Allergic rhinitis due to other allergic trigger, unspecified seasonality  -     albuterol (PROAIR HFA/PROVENTIL HFA/VENTOLIN HFA) 108 (90 Base) MCG/ACT inhaler; Inhale 2 puffs into the lungs every 4 hours as needed for shortness of breath / dyspnea or wheezing  -     fluticasone (FLOVENT HFA) 110 MCG/ACT inhaler; Inhale 2 puffs into the lungs daily  -     ketotifen (ZADITOR) 0.025 % ophthalmic solution; Place 1 drop into both eyes 2 times daily as needed for itching  -     loratadine (CLARITIN) 10 MG tablet; Take 1 tablet (10 mg) by mouth daily as needed for allergies  -     montelukast (SINGULAIR) 10 MG tablet; Take 1 tablet (10 mg) by mouth At Bedtime    Acute ischemic VBA brainstem stroke, right (H)  -     aspirin (ASPIRIN LOW DOSE) 81 MG chewable tablet; Take 1 tablet (81 mg) by mouth daily  -     clopidogrel (PLAVIX) 75 MG tablet; Take 1 tablet (75 mg) by mouth daily    Hypertension  -     lisinopril (ZESTRIL) 20 MG tablet; Take 1 tablet (20 mg) by mouth daily    Gastro-esophageal reflux disease without esophagitis  -     omeprazole (PRILOSEC) 20 MG DR capsule; Take 1 capsule (20 mg) by mouth daily before  breakfast    Cerebrovascular accident (CVA) due to other mechanism (H)  -     venlafaxine (EFFEXOR-ER) 75 MG 24 hr tablet; Take 1 tablet (75 mg) by mouth daily (with breakfast)    Depression, unspecified depression type  -     traZODone (DESYREL) 50 MG tablet; Take 1 tablet (50 mg) by mouth At Bedtime  -     venlafaxine (EFFEXOR-ER) 75 MG 24 hr tablet; Take 1 tablet (75 mg) by mouth daily (with breakfast)    Dry eyes  -     ARTIFICIAL TEARS 1.4 % ophthalmic solution; Apply 1 drop to eye every 2 hours as needed for dry eyes    Muscle spasm  -     cyclobenzaprine (FLEXERIL) 5 MG tablet; Take 1 tablet (5 mg) by mouth 3 times daily as needed for muscle spasms    Urinary, incontinence, stress female    Vaginitis and vulvovaginitis  -     miconazole (MICONAZOLE 7) 2 % cream; Place 1 applicator vaginally At Bedtime    Functional constipation  -     polyethylene glycol (MIRALAX) 17 GM/Dose powder; Take 17 g by mouth daily (with breakfast) In 8 ounces of water.    Other orders  -     REVIEW OF HEALTH MAINTENANCE PROTOCOL ORDERS  -     ZOSTER VACCINE RECOMBINANT (Shingrix)            Future Appointments   Date Time Provider Department Center   1/24/2022  8:20 AM Ema Gentile MD [facility]       Completed by: Ema Gentile M.D., [facility]. 1/24/2022 8:15 AM.  This transcription uses voice recognition software, which may contain typographical errors.  DME (Durable Medical Equipment) Orders and Documentation  Orders Placed This Encounter   Procedures     Incontinence Supplies Order      The patient was assessed and it was determined the patient is in need of the following listed DME Supplies/Equipment. Please complete supporting documentation below to demonstrate medical necessity.      Incontinence Supplies Documentation  Incontinence supplies are needed due to Urinary stress incontinence.

## 2022-01-24 NOTE — LETTER
My Asthma Action Plan    Name: Kevin Thrasher   YOB: 1965  Date: 1/24/2022   My doctor: Ema Gentile MD   My clinic: [facility]        My Rescue Medicine:   Albuterol inhaler (Proair/Ventolin/Proventil HFA)  2-4 puffs EVERY 4 HOURS as needed. Use a spacer if recommended by your provider.   My Asthma Severity:   Intermittent / Exercise Induced  Know your asthma triggers: upper respiratory infections             GREEN ZONE   Good Control    I feel good    No cough or wheeze    Can work, sleep and play without asthma symptoms       Take your asthma control medicine every day.     1. If exercise triggers your asthma, take your rescue medication    15 minutes before exercise or sports, and    During exercise if you have asthma symptoms  2. Spacer to use with inhaler: If you have a spacer, make sure to use it with your inhaler             YELLOW ZONE Getting Worse  I have ANY of these:    I do not feel good    Cough or wheeze    Chest feels tight    Wake up at night   1. Keep taking your Green Zone medications  2. Start taking your rescue medicine:    every 20 minutes for up to 1 hour. Then every 4 hours for 24-48 hours.  3. If you stay in the Yellow Zone for more than 12-24 hours, contact your doctor.  4. If you do not return to the Green Zone in 12-24 hours or you get worse, start taking your oral steroid medicine if prescribed by your provider.           RED ZONE Medical Alert - Get Help  I have ANY of these:    I feel awful    Medicine is not helping    Breathing getting harder    Trouble walking or talking    Nose opens wide to breathe       1. Take your rescue medicine NOW  2. If your provider has prescribed an oral steroid medicine, start taking it NOW  3. Call your doctor NOW  4. If you are still in the Red Zone after 20 minutes and you have not reached your doctor:    Take your rescue medicine again and    Call 911 or go to the emergency room right away    See your regular  doctor within 2 weeks of an Emergency Room or Urgent Care visit for follow-up treatment.          Annual Reminders:  Meet with Asthma Educator,  Flu Shot in the Fall, consider Pneumonia Vaccination for patients with asthma (aged 19 and older).    Pharmacy: [id number] - [place], [place] - [address]    Electronically signed by Ema Gentile MD   Date: 01/24/22                    Asthma Triggers  How To Control Things That Make Your Asthma Worse    Triggers are things that make your asthma worse.  Look at the list below to help you find your triggers and   what you can do about them. You can help prevent asthma flare-ups by staying away from your triggers.      Trigger                                                          What you can do   Cigarette Smoke  Tobacco smoke can make asthma worse. Do not allow smoking in your home, car or around you.  Be sure no one smokes at a child s day care or school.  If you smoke, ask your health care provider for ways to help you quit.  Ask family members to quit too.  Ask your health care provider for a referral to Quit Plan to help you quit smoking, or call 4-641-912-PLAN.     Colds, Flu, Bronchitis  These are common triggers of asthma. Wash your hands often.  Don t touch your eyes, nose or mouth.  Get a flu shot every year.     Dust Mites  These are tiny bugs that live in cloth or carpet. They are too small to see. Wash sheets and blankets in hot water every week.   Encase pillows and mattress in dust mite proof covers.  Avoid having carpet if you can. If you have carpet, vacuum weekly.   Use a dust mask and HEPA vacuum.   Pollen and Outdoor Mold  Some people are allergic to trees, grass, or weed pollen, or molds. Try to keep your windows closed.  Limit time out doors when pollen count is high.   Ask you health care provider about taking medicine during allergy season.     Animal Dander  Some people are allergic to skin flakes, urine or saliva from pets with fur  or feathers. Keep pets with fur or feathers out of your home.    If you can t keep the pet outdoors, then keep the pet out of your bedroom.  Keep the bedroom door closed.  Keep pets off cloth furniture and away from stuffed toys.     Mice, Rats, and Cockroaches  Some people are allergic to the waste from these pests.   Cover food and garbage.  Clean up spills and food crumbs.  Store grease in the refrigerator.   Keep food out of the bedroom.   Indoor Mold  This can be a trigger if your home has high moisture. Fix leaking faucets, pipes, or other sources of water.   Clean moldy surfaces.  Dehumidify basement if it is damp and smelly.   Smoke, Strong Odors, and Sprays  These can reduce air quality. Stay away from strong odors and sprays, such as perfume, powder, hair spray, paints, smoke incense, paint, cleaning products, candles and new carpet.   Exercise or Sports  Some people with asthma have this trigger. Be active!  Ask your doctor about taking medicine before sports or exercise to prevent symptoms.    Warm up for 5-10 minutes before and after sports or exercise.     Other Triggers of Asthma  Cold air:  Cover your nose and mouth with a scarf.  Sometimes laughing or crying can be a trigger.  Some medicines and food can trigger asthma.

## 2022-01-25 ENCOUNTER — TELEPHONE (OUTPATIENT)
Dept: FAMILY MEDICINE | Facility: CLINIC | Age: 57
End: 2022-01-25
Payer: COMMERCIAL

## 2022-01-25 DIAGNOSIS — E55.9 VITAMIN D DEFICIENCY: Primary | ICD-10-CM

## 2022-01-25 DIAGNOSIS — E78.2 MIXED HYPERLIPIDEMIA: ICD-10-CM

## 2022-01-25 DIAGNOSIS — E87.6 HYPOKALEMIA: ICD-10-CM

## 2022-01-25 LAB — DEPRECATED CALCIDIOL+CALCIFEROL SERPL-MC: 19 UG/L (ref 30–80)

## 2022-01-25 RX ORDER — ERGOCALCIFEROL 1.25 MG/1
50000 CAPSULE, LIQUID FILLED ORAL WEEKLY
Qty: 12 CAPSULE | Refills: 4 | Status: SHIPPED | OUTPATIENT
Start: 2022-01-25 | End: 2023-06-12

## 2022-01-25 RX ORDER — POTASSIUM CHLORIDE 750 MG/1
10 TABLET, EXTENDED RELEASE ORAL 2 TIMES DAILY
Qty: 90 TABLET | Refills: 4 | Status: SHIPPED | OUTPATIENT
Start: 2022-01-25 | End: 2022-06-13

## 2022-01-25 RX ORDER — VENLAFAXINE 75 MG/1
75 TABLET ORAL 3 TIMES DAILY
Qty: 90 TABLET | Refills: 3 | Status: SHIPPED | OUTPATIENT
Start: 2022-01-25 | End: 2022-03-24 | Stop reason: SINTOL

## 2022-01-25 RX ORDER — EZETIMIBE 10 MG/1
10 TABLET ORAL DAILY
Qty: 90 TABLET | Refills: 4 | Status: SHIPPED | OUTPATIENT
Start: 2022-01-25 | End: 2022-07-07

## 2022-01-25 ASSESSMENT — ANXIETY QUESTIONNAIRES: GAD7 TOTAL SCORE: 9

## 2022-01-25 NOTE — TELEPHONE ENCOUNTER
Please let Mao know:      Your vitamin D level is low. Stop taking the vitamin D 2000 units pills. Start taking vitamin D 69162 units once per week.    Normal kidney and liver tests.    Your potassium level is low. I sent in a potassium pill for her.    Your cholesterol level is too high. Please add ezetimibe 10 mg daily to help get your cholesterol down.    Your prediabetes is stable - no diabetes.    I put in a community paramedic referral to help with med set up.    Please schedule a lab visit for 6 weeks from now. Come fasting.    Diagnoses and all orders for this visit:    Vitamin D deficiency  -     vitamin D2 (ERGOCALCIFEROL) 64650 units (1250 mcg) capsule; Take 1 capsule (50,000 Units) by mouth once a week    Mixed hyperlipidemia  -     ezetimibe (ZETIA) 10 MG tablet; Take 1 tablet (10 mg) by mouth daily  -     Community Paramedic Referral; Future  -     Lipid panel reflex to direct LDL Fasting; Future    Hypokalemia  -     potassium chloride ER (K-TAB/KLOR-CON) 10 MEQ CR tablet; Take 1 tablet (10 mEq) by mouth 2 times daily  -     Aldosterone; Future  -     Renin activity; Future  -     Aldosterone Renin Ratio; Future  -     Renal panel; Future      No future appointments.

## 2022-01-26 NOTE — TELEPHONE ENCOUNTER
RN attempted to contact patient using a [employer] , but no answer. Patient does not have a voice mail set up, so unable to leave a message. Will try patient later.    Helen Freed RN  [facility]

## 2022-01-27 NOTE — TELEPHONE ENCOUNTER
RN attempted to contact patient using a [employer] , but no answer. Patient does not have a voice mail set up, so unable to leave a message. Will try patient later.    Helen Freed RN  [facility]

## 2022-01-28 ENCOUNTER — PATIENT OUTREACH (OUTPATIENT)
Dept: CARE COORDINATION | Facility: CLINIC | Age: 57
End: 2022-01-28
Payer: COMMERCIAL

## 2022-01-28 NOTE — PROGRESS NOTES
Community Paramedic Program  Community Health Worker Initial Outreach    Referral source: Pt's PCP  Referral reason:   Reason(s) for visit: Initial Assessment  Med Check/Setup    Goals for visit(s): Medication Compliance     How often should patient be seen: Every Other Week     Preference on when patient should be seen: Within 1 Month     Comments   PCP: Ema Gentile  Other info that would be helpful:      Initial visit: Thursday, February 10th / 3 pm / CP Mahi (date/time/CP)    Visit location: [address], [place], [address]    Additional information:   Spoke with pt with [employer] . Accepted offer to schedule initial CP visit after answering pt's questions & explaining reason for visit. Confirmed that CP will use  over the phone.     Pt asked that visit take place at daughter's home in [place], not at pt's home in [place]. Confirmed daughter's address with pt and . Requested that pt make sure to bring her medications for review during the visit, and she agreed.

## 2022-01-28 NOTE — TELEPHONE ENCOUNTER
Contacted patient using a [employer]  and relayed message below from Dr Gentile to patient.  Patient already picked up her medications.  Patient agrees with plan and will have her daughter set up her medications using a pill box.  She will also turn up her phone volume so she does not miss the call from community paramedics. I tried to contact patient 3 times and she does not have a voice mail.    Lab appointment scheduled on 3/7/2022 and patient will come in fasting.    No further action needed.    Helen Freed RN  [facility]

## 2022-02-10 ENCOUNTER — ALLIED HEALTH/NURSE VISIT (OUTPATIENT)
Dept: OTHER | Facility: CLINIC | Age: 57
End: 2022-02-10
Attending: FAMILY MEDICINE
Payer: COMMERCIAL

## 2022-02-10 DIAGNOSIS — E78.2 MIXED HYPERLIPIDEMIA: ICD-10-CM

## 2022-02-10 PROCEDURE — 99207 PR COMMUNITY PARAMEDIC-PATIENT MEETS CRITERIA: CPT

## 2022-02-18 VITALS
DIASTOLIC BLOOD PRESSURE: 59 MMHG | HEART RATE: 57 BPM | TEMPERATURE: 98.9 F | RESPIRATION RATE: 12 BRPM | OXYGEN SATURATION: 97 % | SYSTOLIC BLOOD PRESSURE: 124 MMHG

## 2022-02-18 DIAGNOSIS — F32.A DEPRESSION, UNSPECIFIED DEPRESSION TYPE: ICD-10-CM

## 2022-02-18 NOTE — PROGRESS NOTES
Community Paramedics Initial Visit  February 10, 2022 TIME: Marbella Thrasher is a 56 year old female being seen at home for a Community Paramedic Home visit.    Present at appointment: patient, patients daughter, Community Paramedic, [place]  via phone          Chief Complaint   Patient presents with     Outreach       [place] Utilization:      Utilization    Hospital Admissions  0             ED Visits  0             No Show Count (past year)  5                Current as of: 2/18/2022  5:58 AM              Clinical Concerns:  Current Medical Concerns:  Diabetes      Current Behavioral Concerns: taking medications correctly    Education Provided to patient: explained to family and daughter how to use the pill box     Vitals: /59   Pulse 57   Temp 98.9  F (37.2  C)   Resp 12   SpO2 97%   BMI: There is no height or weight on file to calculate BMI.       Health Maintenance Reviewed:      Clinical Pathway: None    Review of Symptoms/PE    Skin: negative  Eyes: negative  Ears/Nose/Throat: negative  Respiratory: No shortness of breath, dyspnea on exertion, cough, or hemoptysis  Cardiovascular: negative  Gastrointestinal: negative  Genitourinary: negative  Musculoskeletal: negative  Neurologic: negative  Psychiatric: negative    Medication Management:  Family sets up medications for patient  Medications need to be refilled: none     Medications set up: Yes  Pill Box issued: No  Scale issued: No  Flu Shot given: No  COVID Vaccine Given: No  Lab draw or specimen collection: No  Food box issued: No  Collaborative visit with PCP: No  Wound Care: No    Functional Status:       Living Situation:       Community Paramedics Home Safety Assessment  Floors:  Are there throw rugs on the floor?: No  Are there papers, books, towels, shoes, magazines on the floor?: No  Are there loose wires and cords you need to walk around? : No  Stairs and Steps  Are there papers, books, towels, shoes, magazines on the stairs?:  No  Are some steps broken or uneven?: No  Is there a light over the stairway?: No  Has the stairway bulb burned out?: No  Is the carpet on the steps loose or torn?: No  Are the handrails loose or broken?: No  Kitchen:  Are the things you use often on high shelves?: No  Is your step stool unsteady?: No  Bathrooms:  Is the tub or shower floor slippery?: No  Do you need support when you get in and out of the tub?: No  Bedrooms:  Is the light near the bed hard to reach?: No  Is the path from your bed to the bathroom dark?: No       Diet/Exercise/Sleep:       Transportation:           Psychosocial:            No  Face to Face in Home / Community    Today's PHQ-2 Score:      Today's PHQ-9 Score:   PHQ-9 SCORE 1/24/2022   PHQ-9 Total Score 8        Today's GAD7 score:   LORENA-7 SCORE 1/24/2022   Total Score 9       HEALTH CARE GOALS:      Patient Active Problem List   Diagnosis     Acute ischemic VBA brainstem stroke, right (H)     Vitamin D deficiency     Type 2 diabetes mellitus (H)     Overweight     Mild intermittent asthma without complication     Lumbosacral radiculopathy at S1     Hyperlipidemia     Hypertension goal BP (blood pressure) < 130/80     Fatty liver     Anxiety     Urinary, incontinence, stress female     Cystocele with uterine prolapse     Chronic, continuous use of opioids     Diabetic nephropathy associated with type 2 diabetes mellitus (H)     Diabetic polyneuropathy associated with type 2 diabetes mellitus (H)     Rotator cuff tendinitis, unspecified laterality         Current Outpatient Medications:      ACCU-CHEK GUIDE test strip, 1 strip by In Vitro route daily Use to test once daily, Disp: 100 strip, Rfl: 3     acetaminophen (TYLENOL) 500 MG tablet, Take 1-2 tablets (500-1,000 mg) by mouth every 6 hours as needed for mild pain, Disp: 200 tablet, Rfl: 4     acetaminophen-codeine (TYLENOL W/CODEINE #3) 300-30 MG per tablet, Take 1 tablet by mouth nightly as needed for moderate to severe pain, Disp:  30 tablet, Rfl: 0     albuterol (PROAIR HFA/PROVENTIL HFA/VENTOLIN HFA) 108 (90 Base) MCG/ACT inhaler, Inhale 2 puffs into the lungs every 4 hours as needed for shortness of breath / dyspnea or wheezing, Disp: 18 g, Rfl: 0     ARTIFICIAL TEARS 1.4 % ophthalmic solution, Apply 1 drop to eye every 2 hours as needed for dry eyes, Disp: 15 mL, Rfl: 11     aspirin (ASPIRIN LOW DOSE) 81 MG chewable tablet, Take 1 tablet (81 mg) by mouth daily, Disp: 200 tablet, Rfl: 1     blood glucose monitoring (ACCU-CHEK FASTCLIX) lancets, 1 each daily, Disp: 102 each, Rfl: 11     clopidogrel (PLAVIX) 75 MG tablet, Take 1 tablet (75 mg) by mouth daily, Disp: 90 tablet, Rfl: 4     cyclobenzaprine (FLEXERIL) 5 MG tablet, Take 1 tablet (5 mg) by mouth 3 times daily as needed for muscle spasms, Disp: 90 tablet, Rfl: 3     diclofenac (VOLTAREN) 1 % topical gel, Apply 4 g topically 4 times daily To shoulder and back, Disp: 350 g, Rfl: 3     ezetimibe (ZETIA) 10 MG tablet, Take 1 tablet (10 mg) by mouth daily, Disp: 90 tablet, Rfl: 4     fluticasone (FLOVENT HFA) 110 MCG/ACT inhaler, Inhale 2 puffs into the lungs daily, Disp: 12 g, Rfl: 11     ketotifen (ZADITOR) 0.025 % ophthalmic solution, Place 1 drop into both eyes 2 times daily as needed for itching, Disp: 10 mL, Rfl: 11     lisinopril (ZESTRIL) 20 MG tablet, Take 1 tablet (20 mg) by mouth daily, Disp: 90 tablet, Rfl: 4     loratadine (CLARITIN) 10 MG tablet, Take 1 tablet (10 mg) by mouth daily as needed for allergies, Disp: 90 tablet, Rfl: 3     metFORMIN (GLUCOPHAGE-XR) 500 MG 24 hr tablet, Take 1 tablet (500 mg) by mouth daily (with dinner), Disp: 90 tablet, Rfl: 3     miconazole (MICONAZOLE 7) 2 % cream, Place 1 applicator vaginally At Bedtime, Disp: 45 g, Rfl: 3     montelukast (SINGULAIR) 10 MG tablet, Take 1 tablet (10 mg) by mouth At Bedtime, Disp: 90 tablet, Rfl: 3     omeprazole (PRILOSEC) 20 MG DR capsule, Take 1 capsule (20 mg) by mouth daily before breakfast, Disp: 90  capsule, Rfl: 5     polyethylene glycol (MIRALAX) 17 GM/Dose powder, Take 17 g by mouth daily (with breakfast) In 8 ounces of water., Disp: 850 g, Rfl: 3     potassium chloride ER (K-TAB/KLOR-CON) 10 MEQ CR tablet, Take 1 tablet (10 mEq) by mouth 2 times daily, Disp: 90 tablet, Rfl: 4     rosuvastatin (CRESTOR) 40 MG tablet, Take 1 tablet (40 mg) by mouth At Bedtime, Disp: 90 tablet, Rfl: 4     traZODone (DESYREL) 50 MG tablet, Take 1 tablet (50 mg) by mouth At Bedtime, Disp: 90 tablet, Rfl: 3     venlafaxine (EFFEXOR) 75 MG tablet, Take 1 tablet (75 mg) by mouth 3 times daily, Disp: 90 tablet, Rfl: 3     vitamin D2 (ERGOCALCIFEROL) 73527 units (1250 mcg) capsule, Take 1 capsule (50,000 Units) by mouth once a week, Disp: 12 capsule, Rfl: 4    Time spent with patient: 90    The patient meets one or more of the following criteria:  * Requires services to prevent readmission to a nursing home or hospital    Acute concern/Follow-up recommendations: follow current treatment plan and follow the pill box correctly    Next CP visit scheduled: TBD    Issues for Provider to follow up on: Community Paramedic visited with patient in her home. Patient lives with her daughter and family and they help patient set up her medications. Patient already has a pill box identical to what Community Paramedic hands out. Went through all patients medications and explained why she is taking them. Patient taking several medications incorrectly after looking in pill box. Patient and family were using the pill box going across for each day rather than down. Medications missing and others doubled up. Took all pills out of pill box and respectively placed them back into the right pill bottles. Community Paramedic then set up medications according to pill prescriptions. Also wrote down how medications are suppose to be taken for further reference. Explained to daughter and patient how they are suppose to use the pill box. Daughter understood and  patient remained confused. After additional explaining patient then understood. Daughter stated she would help patient going forward with taking the medications correctly and liked how the medications were set up. Asked if family wanted another CP visited and they denied    Provider follow up visit needed: 3/7/22 - lab  3/24 - Dr Gentile

## 2022-02-21 RX ORDER — VENLAFAXINE 75 MG/1
TABLET ORAL
Qty: 90 TABLET | Refills: 3 | OUTPATIENT
Start: 2022-02-21

## 2022-02-21 NOTE — TELEPHONE ENCOUNTER
RN declined refill request for Venlafaxine. Rx last filled on 1/25/22  for qty 90 refill 3. Pharmacy informed.    Disp Refills Start End MAHENDRA   venlafaxine (EFFEXOR) 75 MG tablet 90 tablet 3 1/25/2022  --   Sig - Route: Take 1 tablet (75 mg) by mouth 3 times daily - Oral   Sent to pharmacy as: Venlafaxine HCl 75 MG Oral Tablet (EFFEXOR)   Class: E-Prescribe   Order: [id number]   E-Prescribing Status: Receipt confirmed by pharmacy (1/25/2022  2:39 PM CST)     Niyah Hutchins RN BSN 2/21/2022 9:48 AM

## 2022-03-05 DIAGNOSIS — E87.6 HYPOKALEMIA: ICD-10-CM

## 2022-03-07 ENCOUNTER — LAB (OUTPATIENT)
Dept: LAB | Facility: CLINIC | Age: 57
End: 2022-03-07
Payer: COMMERCIAL

## 2022-03-07 DIAGNOSIS — E78.2 MIXED HYPERLIPIDEMIA: ICD-10-CM

## 2022-03-07 DIAGNOSIS — E87.6 HYPOKALEMIA: ICD-10-CM

## 2022-03-07 LAB
ALBUMIN SERPL-MCNC: 3.3 G/DL (ref 3.5–5)
ANION GAP SERPL CALCULATED.3IONS-SCNC: 13 MMOL/L (ref 5–18)
BUN SERPL-MCNC: 16 MG/DL (ref 8–22)
CALCIUM SERPL-MCNC: 8.9 MG/DL (ref 8.5–10.5)
CHLORIDE BLD-SCNC: 104 MMOL/L (ref 98–107)
CHOLEST SERPL-MCNC: 165 MG/DL
CO2 SERPL-SCNC: 26 MMOL/L (ref 22–31)
CREAT SERPL-MCNC: 0.77 MG/DL (ref 0.6–1.1)
FASTING STATUS PATIENT QL REPORTED: YES
GFR SERPL CREATININE-BSD FRML MDRD: 89 ML/MIN/1.73M2
GLUCOSE BLD-MCNC: 89 MG/DL (ref 70–125)
HDLC SERPL-MCNC: 52 MG/DL
LDLC SERPL CALC-MCNC: 89 MG/DL
PHOSPHATE SERPL-MCNC: 4.7 MG/DL (ref 2.5–4.5)
POTASSIUM BLD-SCNC: 3.9 MMOL/L (ref 3.5–5)
SODIUM SERPL-SCNC: 143 MMOL/L (ref 136–145)
TRIGL SERPL-MCNC: 120 MG/DL

## 2022-03-07 PROCEDURE — 82088 ASSAY OF ALDOSTERONE: CPT

## 2022-03-07 PROCEDURE — 99000 SPECIMEN HANDLING OFFICE-LAB: CPT

## 2022-03-07 PROCEDURE — 36415 COLL VENOUS BLD VENIPUNCTURE: CPT

## 2022-03-07 PROCEDURE — 84244 ASSAY OF RENIN: CPT | Mod: 90

## 2022-03-07 PROCEDURE — 80061 LIPID PANEL: CPT

## 2022-03-07 PROCEDURE — 80069 RENAL FUNCTION PANEL: CPT

## 2022-03-07 RX ORDER — POTASSIUM CHLORIDE 750 MG/1
TABLET, EXTENDED RELEASE ORAL
Qty: 180 TABLET | Refills: 2 | OUTPATIENT
Start: 2022-03-07

## 2022-03-10 LAB — RENIN PLAS-CCNC: 5.7 NG/ML/HR

## 2022-03-11 ENCOUNTER — TELEPHONE (OUTPATIENT)
Dept: FAMILY MEDICINE | Facility: CLINIC | Age: 57
End: 2022-03-11
Payer: COMMERCIAL

## 2022-03-11 PROBLEM — E78.5 HYPERLIPIDEMIA LDL GOAL <70: Status: ACTIVE | Noted: 2021-09-03

## 2022-03-11 LAB
ALDOST SERPL-MCNC: 7.1 NG/DL (ref 0–31)
ALDOST/RENIN PLAS-RTO: 1.2 {RATIO} (ref 0–25)

## 2022-03-11 NOTE — TELEPHONE ENCOUNTER
RN called pt with the help of a [employer]  regarding Dr. Gentile' message below.    Pt requesting letter regarding what she should be eating. RN verified correct address on file. Letter of results including result  will be sent to pt, per request.    Pt is taking her medications as prescribed.     Routing back to PCP as MORELIA Nicholas, MARGAUX   [facility]      ----- Message from Ema Gentile MD sent at 3/11/2022 11:47 AM [facility] -----    Please let Mao know:     Her albumin is a little low. I want her to eat more protein in her diet like beans, nuts (any kind, including spreads like peanut butter), seeds (like sunflower and pumpkin seeds), eggs, dairy, fish, and meat. Eat these 4 times a day.    Her LDL cholesterol is too high. It has improved since starting the ezetimibe. Check to make sure she is taking her rosuvastatin 40 mg and ezetimibe 10 mg every day.    Her renin and aldosterone hormones are normal. This was checked because of her low potassium problems. But they're OK.

## 2022-03-11 NOTE — LETTER
"March 11, 2022      Kevin Thrasher  [address] [place] [id number]        Dear ,    We are writing to inform you of your test results.    Test results indicate you may require additional follow up, see comment below.    \"Please let Kevin know:     Her albumin is a little low. I want her to eat more protein in her diet like beans, nuts (any kind, including spreads like peanut butter), seeds (like sunflower and pumpkin seeds), eggs, dairy, fish, and meat. Eat these 4 times a day.    Her LDL cholesterol is too high. It has improved since starting the ezetimibe. Check to make sure she is taking her rosuvastatin 40 mg and ezetimibe 10 mg every day.    Her renin and aldosterone hormones are normal. This was checked because of her low potassium problems. But they're OK.\"    Resulted Orders   Renal panel   Result Value Ref Range    Sodium 143 136 - 145 mmol/L    Potassium 3.9 3.5 - 5.0 mmol/L    Chloride 104 98 - 107 mmol/L    Carbon Dioxide (CO2) 26 22 - 31 mmol/L    Anion Gap 13 5 - 18 mmol/L    Urea Nitrogen 16 8 - 22 mg/dL    Creatinine 0.77 0.60 - 1.10 mg/dL    Calcium 8.9 8.5 - 10.5 mg/dL    Glucose 89 70 - 125 mg/dL    Albumin 3.3 (L) 3.5 - 5.0 g/dL    Phosphorus 4.7 (H) 2.5 - 4.5 mg/dL    GFR Estimate 89 >60 mL/min/1.73m2      Comment:      Effective December 21, 2021 eGFRcr in adults is calculated using the 2021 CKD-EPI creatinine equation which includes age and gender (Matthew et al., NEJ, DOI: 10.1056/IPEOpd2321212)   Lipid panel reflex to direct LDL Fasting   Result Value Ref Range    Cholesterol 165 <=199 mg/dL    Triglycerides 120 <=149 mg/dL    Direct Measure HDL 52 >=50 mg/dL      Comment:      HDL Cholesterol Reference Range:     0-2 years:   No reference ranges established for patients under 2 years old  at [facility] for lipid analytes.    2-8 years:  Greater than 45 mg/dL     18 years and older:   Female: Greater than or equal to 50 mg/dL   Male:   Greater than or equal to 40 mg/dL    LDL " Cholesterol Calculated 89 <=129 mg/dL    Patient Fasting > 8hrs? Yes    Aldosterone   Result Value Ref Range    Aldosterone 7.1 0.0 - 31.0 ng/dL   Renin activity   Result Value Ref Range    Renin Activity 5.7 ng/mL/hr      Comment:      INTERPRETIVE INFORMATION: Renin Activity    Adult, Normal sodium diet:    Supine ................. 0.2-1.6 ng/mL/hr    Upright ................ 0.5-4.0 ng/mL/hr    Children, Normal sodium diet, Supine:     (1-7 days) ..... 2.0-35.0 ng/mL/hr    Cord blood ............. 4.0-32.0 ng/mL/hr    1-12 mos ............... 2.4-37.0 ng/mL/hr    13 mos-3 yrs ........... 1.7-11.2 ng/mL/hr    4-5 yrs ................ 1.0- 6.5 ng/mL/hr    6-10 yrs ............... 0.5- 5.9 ng/mL/hr    11-15 yrs .............. 0.5- 3.3 ng/mL/hr    Children, normal sodium diet, Upright:    0-3 yrs ................ Not Available    4-5 yrs ................ Less than or equal to 15 ng/mL/hr    6-10 yrs ............... Less than or equal to 17 ng/mL/hr    11-15 yrs .............. Less than or equal to 16 ng/mL/hr    Plasma renin activity measures enzyme ability to convert   angiotensinogen to angiotensin I and is limited by the   availability of angiotensinogen. Plasma renin activity is   not an accurate indicator  of enzyme activity when   angiotensinogen is decreased.    This test was developed and its performance characteristics   determined by [facility]. It has not been cleared or   approved by the [place] Food and Drug Administration. This test   was performed in a CLIA certified laboratory and is   intended for clinical purposes.  Performed By: [facility]  [address] [place] [id number]  : Annie Gibson MD   Aldosterone Renin Ratio   Result Value Ref Range    Aldosterone Renin Ratio 1.2 0.0 - 25.0       If you have any questions or concerns, please call the clinic at the number listed above.       Sincerely,        Dr. Gentile

## 2022-03-17 NOTE — TELEPHONE ENCOUNTER
LDL goal is <70 due to history of ischemic stroke. She is on rosuvastatin 40 mg and ezetimibe 10 mg. We believe she is taking these as prescribed. She is a possible candidate for PCSK9 inhibitor therapy. I'll discuss it with her at our upcoming visit. What would we have to do to get it for her?    Recent Labs   Lab Test 03/07/22  0754 01/24/22  0906   CHOL 165 192   HDL 52 66   LDL 89 (on rosuvastatin 40 + ezetimibe 10) 108 (on rosuvastatin 40)   TRIG 120 88     Future Appointments   Date Time Provider Department Center   3/24/2022 12:20 PM Ema Gentile MD [facility]     Lab Results   Component Value Date    A1C 6.6 01/24/2022    A1C 6.6 09/03/2021    A1C 6.1 11/06/2020    A1C 6.0 08/05/2020    A1C 6.1 02/06/2020    A1C 6.2 02/04/2020     BP Readings from Last 6 Encounters:   02/10/22 124/59   01/24/22 116/60   12/16/21 131/82   09/03/21 136/72   04/27/21 114/66   02/08/21 (!) 143/82

## 2022-03-22 ENCOUNTER — TELEPHONE (OUTPATIENT)
Dept: FAMILY MEDICINE | Facility: CLINIC | Age: 57
End: 2022-03-22
Payer: COMMERCIAL

## 2022-03-22 NOTE — TELEPHONE ENCOUNTER
Due for mammogram.    Future Appointments   Date Time Provider Department Center   3/24/2022 12:20 PM Ema Gentile MD [facility]      Health Maintenance Due   Topic Date Due     MAMMO SCREENING  Never done     TREATMENT AGREEMENT FOR CHRONIC PAIN MANAGEMENT  Never done     PREVENTIVE CARE VISIT  11/06/2021     DIABETIC FOOT EXAM  11/06/2021     EYE EXAM  02/09/2022     ZOSTER IMMUNIZATION (2 of 2) 03/21/2022     BP Readings from Last 3 Encounters:   02/10/22 124/59   01/24/22 116/60   12/16/21 131/82

## 2022-03-24 ENCOUNTER — OFFICE VISIT (OUTPATIENT)
Dept: FAMILY MEDICINE | Facility: CLINIC | Age: 57
End: 2022-03-24
Payer: COMMERCIAL

## 2022-03-24 VITALS
TEMPERATURE: 97 F | OXYGEN SATURATION: 98 % | DIASTOLIC BLOOD PRESSURE: 49 MMHG | WEIGHT: 131 LBS | HEART RATE: 60 BPM | SYSTOLIC BLOOD PRESSURE: 108 MMHG | RESPIRATION RATE: 14 BRPM | BODY MASS INDEX: 29.37 KG/M2

## 2022-03-24 DIAGNOSIS — E78.5 HYPERLIPIDEMIA LDL GOAL <70: ICD-10-CM

## 2022-03-24 DIAGNOSIS — M47.22 OSTEOARTHRITIS OF SPINE WITH RADICULOPATHY, CERVICAL REGION: ICD-10-CM

## 2022-03-24 DIAGNOSIS — F11.90 CHRONIC, CONTINUOUS USE OF OPIOIDS: ICD-10-CM

## 2022-03-24 DIAGNOSIS — M75.80 ROTATOR CUFF TENDINITIS, UNSPECIFIED LATERALITY: ICD-10-CM

## 2022-03-24 DIAGNOSIS — I63.22 ACUTE ISCHEMIC VBA BRAINSTEM STROKE, RIGHT (H): ICD-10-CM

## 2022-03-24 DIAGNOSIS — F41.9 ANXIETY: ICD-10-CM

## 2022-03-24 DIAGNOSIS — E11.59 TYPE 2 DIABETES MELLITUS WITH OTHER CIRCULATORY COMPLICATION, WITHOUT LONG-TERM CURRENT USE OF INSULIN (H): Primary | ICD-10-CM

## 2022-03-24 DIAGNOSIS — I63.211 ACUTE ISCHEMIC VBA BRAINSTEM STROKE, RIGHT (H): ICD-10-CM

## 2022-03-24 PROCEDURE — 99214 OFFICE O/P EST MOD 30 MIN: CPT | Mod: 25 | Performed by: FAMILY MEDICINE

## 2022-03-24 PROCEDURE — 90471 IMMUNIZATION ADMIN: CPT | Performed by: FAMILY MEDICINE

## 2022-03-24 PROCEDURE — 90750 HZV VACC RECOMBINANT IM: CPT | Performed by: FAMILY MEDICINE

## 2022-03-24 RX ORDER — MELOXICAM 7.5 MG/1
7.5 TABLET ORAL DAILY PRN
Qty: 50 TABLET | Refills: 11 | Status: SHIPPED | OUTPATIENT
Start: 2022-03-24 | End: 2022-06-13

## 2022-03-24 RX ORDER — VENLAFAXINE HYDROCHLORIDE 75 MG/1
75 CAPSULE, EXTENDED RELEASE ORAL DAILY
Qty: 90 CAPSULE | Refills: 4 | Status: SHIPPED | OUTPATIENT
Start: 2022-03-24 | End: 2022-05-03

## 2022-03-24 RX ORDER — EVOLOCUMAB 140 MG/ML
140 INJECTION, SOLUTION SUBCUTANEOUS
Qty: 2 ML | Refills: 11 | Status: SHIPPED | OUTPATIENT
Start: 2022-03-24 | End: 2022-07-07

## 2022-03-24 ASSESSMENT — ANXIETY QUESTIONNAIRES
4. TROUBLE RELAXING: NOT AT ALL
5. BEING SO RESTLESS THAT IT IS HARD TO SIT STILL: NOT AT ALL
7. FEELING AFRAID AS IF SOMETHING AWFUL MIGHT HAPPEN: NOT AT ALL
1. FEELING NERVOUS, ANXIOUS, OR ON EDGE: NOT AT ALL
3. WORRYING TOO MUCH ABOUT DIFFERENT THINGS: NOT AT ALL
6. BECOMING EASILY ANNOYED OR IRRITABLE: NOT AT ALL
2. NOT BEING ABLE TO STOP OR CONTROL WORRYING: NOT AT ALL
IF YOU CHECKED OFF ANY PROBLEMS ON THIS QUESTIONNAIRE, HOW DIFFICULT HAVE THESE PROBLEMS MADE IT FOR YOU TO DO YOUR WORK, TAKE CARE OF THINGS AT HOME, OR GET ALONG WITH OTHER PEOPLE: SOMEWHAT DIFFICULT
GAD7 TOTAL SCORE: 0

## 2022-03-24 NOTE — PATIENT INSTRUCTIONS
Good Foods to Eat  Vegetables - as much as you want  Beans, Seeds, Nuts (and nut spreads like peanut butter)  Fish - at least two times a week  Poultry - chicken breasts  Fruit - eat often, but not too much at a time  Egg whites  Low fat dairy  Good grains: quinoa, brown rice      Eat Less  Animal fat  Sweets  Starches

## 2022-03-24 NOTE — LETTER
Opioid / Opioid Plus Controlled Substance Agreement    This is an agreement between you and your provider about the safe and appropriate use of controlled substance/opioids prescribed by your care team. Controlled substances are medicines that can cause physical and mental dependence (abuse).    There are strict laws about having and using these medicines. We here at [facility] are committing to working with you in your efforts to get better. To support you in this work, we ll help you schedule regular office appointments for medicine refills. If we must cancel or change your appointment for any reason, we ll make sure you have enough medicine to last until your next appointment.     As a Provider, I will:    Listen carefully to your concerns and treat you with respect.     Recommend a treatment plan that I believe is in your best interest. This plan may involve therapies other than opioid pain medication.     Talk with you often about the possible benefits, and the risk of harm of any medicine that we prescribe for you.     Provide a plan on how to taper (discontinue or go off) using this medicine if the decision is made to stop its use.    As a Patient, I understand that opioid(s):     Are a controlled substance prescribed by my care team to help me function or work and manage my condition(s).     Are strong medicines and can cause serious side effects such as:    Drowsiness, which can seriously affect my driving ability    A lower breathing rate, enough to cause death    Harm to my thinking ability     Depression     Abuse of and addiction to this medicine    Need to be taken exactly as prescribed. Combining opioids with certain medicines or chemicals (such as illegal drugs, sedatives, sleeping pills, and benzodiazepines) can be dangerous or even fatal. If I stop opioids suddenly, I may have severe withdrawal symptoms.    Do not work for all types of pain nor for all patients. If they re not helpful, I may  be asked to stop them.    {Benzo / Stimulant (Optional):000514}    The risks, benefits and side effects of these medicine(s) were explained to me. I agree that:  1. I will take part in other treatments as advised by my care team. This may be psychiatry or counseling, physical therapy, behavioral therapy, group treatment or a referral to a specialist.     2. I will keep all my appointments. I understand that this is part of the monitoring of opioids. My care team may require an office visit for EVERY opioid/controlled substance refill. If I miss appointments or don t follow instructions, my care team may stop my medicine.    3. I will take my medicines as prescribed. I will not change the dose or schedule unless my care team tells me to. There will be no refills if I run out early.     4. I may be asked to come to the clinic and complete a urine drug test or complete a pill count at any time. If I don t give a urine sample or participate in a pill count, the care team may stop my medicine.    5. I will only receive prescriptions from this clinic for chronic pain. If I am treated by another provider for acute pain issues, I will tell them that I am taking opioid pain medication for chronic pain and that I have a treatment agreement with this provider. I will inform my [facility] care team within one business day if I am given a prescription for any pain medication by another healthcare provider. My [facility] care team can contact other providers and pharmacists about my use of any medicines.    6. It is up to me to make sure that I don t run out of my medicines on weekends or holidays. If my care team is willing to refill my opioid prescription without a visit, I must request refills only during office hours. Refills may take up to 3 business days to process. I will use one pharmacy to fill all my opioid and other controlled substance prescriptions. I will notify the clinic about any changes to my  insurance or medication availability.    7. I am responsible for my prescriptions. If the medicine/prescription is lost, stolen or destroyed, it will not be replaced. I also agree not to share controlled substance medicines with anyone.    8. I am aware I should not use any illegal or recreational drugs. I agree not to drink alcohol unless my care team says I can.       9. If I enroll in the [place] [facility] Cannabis program, I will tell my care team prior to my next refill.     10. I will tell my care team right away if I become pregnant, have a new medical problem treated outside of my regular clinic, or have a change in my medications.    11. I understand that this medicine can affect my thinking, judgment and reaction time. Alcohol and drugs affect the brain and body, which can affect the safety of my driving. Being under the influence of alcohol or drugs can affect my decision-making, behaviors, personal safety, and the safety of others. Driving while impaired (DWI) can occur if a person is driving, operating, or in physical control of a car, motorcycle, boat, snowmobile, ATV, motorbike, off-road vehicle, or any other motor vehicle ([place] Statute 169A.20). I understand the risk if I choose to drive or operate any vehicle or machinery.    I understand that if I do not follow any of the conditions above, my prescriptions or treatment may be stopped or changed.          Opioids  What You Need to Know    What are opioids?   Opioids are pain medicines that must be prescribed by a doctor. They are also known as narcotics.     Examples are:   1. morphine (MS Contin, Claribel)  2. oxycodone (Oxycontin)  3. oxycodone and acetaminophen (Percocet)  4. hydrocodone and acetaminophen (Vicodin, Norco)   5. fentanyl patch (Duragesic)   6. hydromorphone (Dilaudid)   7. methadone  8. codeine (Tylenol #3)     What do opioids do well?   Opioids are best for severe short-term pain such as after a surgery or injury. They may work well  for cancer pain. They may help some people with long-lasting (chronic) pain.     What do opioids NOT do well?   Opioids never get rid of pain entirely, and they don t work well for most patients with chronic pain. Opioids don t reduce swelling, one of the causes of pain.                                    Other ways to manage chronic pain and improve function include:       Treat the health problem that may be causing pain    Anti-inflammation medicines, which reduce swelling and tenderness, such as ibuprofen (Advil, Motrin) or naproxen (Aleve)    Acetaminophen (Tylenol)    Antidepressants and anti-seizure medicines, especially for nerve pain    Topical treatments such as patches or creams    Injections or nerve blocks    Chiropractic or osteopathic treatment    Acupuncture, massage, deep breathing, meditation, visual imagery, aromatherapy    Use heat or ice at the pain site    Physical therapy     Exercise    Stop smoking    Take part in therapy       Risks and side effects     Talk to your doctor before you start or decide to keep taking opioids. Possible side effects include:      Lowering your breathing rate enough to cause death    Overdose, including death, especially if taking higher than prescribed doses    Worse depression symptoms; less pleasure in things you usually enjoy    Feeling tired or sluggish    Slower thoughts or cloudy thinking    Being more sensitive to pain over time; pain is harder to control    Trouble sleeping or restless sleep    Changes in hormone levels (for example, less testosterone)    Changes in sex drive or ability to have sex    Constipation    Unsafe driving    Itching and sweating    Dizziness    Nausea, throwing up and dry mouth    What else should I know about opioids?    Opioids may lead to dependence, tolerance, or addiction.      Dependence means that if you stop or reduce the medicine too quickly, you will have withdrawal symptoms. These include loose poop (diarrhea),  jitters, flu-like symptoms, nervousness and tremors. Dependence is not the same as addiction.                       Tolerance means needing higher doses over time to get the same effect. This may increase the chance of serious side effects.      Addiction is when people improperly use a substance that harms their body, their mind or their relations with others. Use of opiates can cause a relapse of addiction if you have a history of drug or alcohol abuse.      People who have used opioids for a long time may have a lower quality of life, worse depression, higher levels of pain and more visits to doctors.    You can overdose on opioids. Take these steps to lower your risk of overdose:    1. Recognize the signs:  Signs of overdose include decrease or loss of consciousness (blackout), slowed breathing, trouble waking up and blue lips. If someone is worried about overdose, they should call 911.    2. Talk to your doctor about Narcan (naloxone).   If you are at risk for overdose, you may be given a prescription for Narcan. This medicine very quickly reverses the effects of opioids.   If you overdose, a friend or family member can give you Narcan while waiting for the ambulance. They need to know the signs of overdose and how to give Narcan.     3. Don't use alcohol or street drugs.   Taking them with opioids can cause death.    4. Do not take any of these medicines unless your doctor says it s OK. Taking these with opioids can cause death:    Benzodiazepines, such as lorazepam (Ativan), alprazolam (Xanax) or diazepam (Valium)    Muscle relaxers, such as cyclobenzaprine (Flexeril)    Sleeping pills like zolpidem (Ambien)     Other opioids      How to keep you and other people safe while taking opioids:    1. Never share your opioids with others.  Opioid medicines are regulated by the Drug Enforcement Agency (LEV). Selling or sharing medications is a criminal act.    2. Be sure to store opioids in a secure place, locked up  if possible. Young children can easily swallow them and overdose.    3. When you are traveling with your medicines, keep them in the original bottles. If you use a pill box, be sure you also carry a copy of your medicine list from your clinic or pharmacy.    4. Safe disposal of opioids    Most pharmacies have places to get rid of medicine, called disposal kiosks. Medicine disposal options are also available in every [facility]. Search your county and  medication disposal  to find more options. You can find more details at:  https://www.pca.[facility].mn./living-green/managing-unwanted-medications     I agree that my provider, clinic care team, and pharmacy may work with any city, state or federal law enforcement agency that investigates the misuse, sale, or other diversion of my controlled medicine. I will allow my provider to discuss my care with, or share a copy of, this agreement with any other treating provider, pharmacy or emergency room where I receive care.    I have read this agreement and have asked questions about anything I did not understand.    _______________________________________________________  Patient Signature - Kevin Thrasher _____________________                   Date     _______________________________________________________  Provider Signature - Ema Gentile MD   _____________________                   Date     _______________________________________________________  Witness Signature (required if provider not present while patient signing)   _____________________                   Date

## 2022-03-24 NOTE — PROGRESS NOTES
Office Visit  [facility] - Family Medicine  Date of Service: Mar 24, 2022      Subjective   Kevin Thrasher is a 56 year old female who presents for   Chief Complaint   Patient presents with     Diabetes     Musculoskeletal Problem     Arm pain     Depression     Arm pain  Numb, tingly  Did PT after stroke    Depression  Venlafaxine caused chest discomfort and shortness of breath  Started at the same time as the venlafaxine 75 mg THREE TIMES DAILY. Did well with venlafaxine ER daily.     Montelukast  Mason like it made her have a hard time breathing.  It worked in the past. But now she's feeling better and doesn't need it.     Hyperlipidemia  Goal <70 due to history of stroke. Currently on rosuvastatin 40 + ezetimibe 10.   Eats pizza, eats red meat but chooses lean cuts.    Blood Pressure  Good    Diabetes  Blood sugars  80-90s preprandial  120-130s postpartndial      Objective   /49 (BP Location: Left arm, Patient Position: Sitting, Cuff Size: Adult Regular)   Pulse 60   Temp 97  F (36.1  C) (Temporal)   Resp 14   Wt 59.4 kg (131 lb)   SpO2 98%   BMI 29.37 kg/m     She reports that she has never smoked. She has never used smokeless tobacco.  BP Readings from Last 3 Encounters:   03/24/22 108/49   02/10/22 124/59   01/24/22 116/60       Gen: Alert, no apparent distress.  Heart: Regular rate and rhythm, no murmurs.  Lungs: Clear to auscultation bilaterally, no increased work of breathing.  Abdomen: Soft, non-tender, non-distended, bowel sounds normal.  Extremities: No clubbing, cyanosis, edema  No results found for any visits on 03/24/22.      Assessment & Plan     Arm pain  Due to cervical radiculopathy. Start PT, use meloxicam for pain.    Depression  Resume venlafaxine ER 75 mg. Did well with venlafaxine ER daily.     Montelukast  Discontinue.    Hyperlipidemia  Discussed PCSK9 inhibitor - she is interested. Referral made to [facility] pharmacist to initiate therapy.    Hypertension - BP goal  <130/80  Well-controlled    Diabetes  1. Well controlled.      Order Summary                                                      Type 2 diabetes mellitus with other circulatory complication, without long-term current use of insulin (H)  -     Adult Eye Referral; Future    Chronic, continuous use of opioids    Anxiety  -     venlafaxine (EFFEXOR-XR) 75 MG 24 hr capsule; Take 1 capsule (75 mg) by mouth daily    Acute ischemic VBA brainstem stroke, right (H)  -     evolocumab (REPATHA SURECLICK) 140 MG/ML prefilled autoinjector; Inject 1 mL (140 mg) Subcutaneous every 14 days  -     Med Therapy Management Referral    Hyperlipidemia LDL goal <70  -     evolocumab (REPATHA SURECLICK) 140 MG/ML prefilled autoinjector; Inject 1 mL (140 mg) Subcutaneous every 14 days  -     Med Therapy Management Referral    Rotator cuff tendinitis, unspecified laterality  -     Physical Therapy Referral; Future  -     meloxicam (MOBIC) 7.5 MG tablet; Take 1 tablet (7.5 mg) by mouth daily as needed for moderate pain    Osteoarthritis of spine with radiculopathy, cervical region  -     Physical Therapy Referral; Future  -     meloxicam (MOBIC) 7.5 MG tablet; Take 1 tablet (7.5 mg) by mouth daily as needed for moderate pain    Other orders  -     ZOSTER VACCINE RECOMBINANT ADJUVANTED IM NJX        Immunizations Administered     Name Date Dose VIS Date Route    Zoster vaccine recombinant adjuvanted (SHINGRIX) 3/24/22  1:00 PM 0.5 mL 10/30/2019, Given Today Intramuscular          Future Appointments   Date Time Provider Department Center   3/24/2022 12:20 PM Ema Gentile MD [facility]       Completed by: Ema Gentile M.D., [facility]. 3/24/2022 12:11 PM.  This transcription uses voice recognition software, which may contain typographical errors.  MDM: SDOH neighborhood: [facility] [id number], language: [facility].

## 2022-03-25 ASSESSMENT — ANXIETY QUESTIONNAIRES: GAD7 TOTAL SCORE: 0

## 2022-05-02 NOTE — PROGRESS NOTES
Medication Therapy Management (MTM) Encounter    ASSESSMENT:                            Medication Adherence/Access: Patient does not have medications with her.  Patient has basic understanding of indications for medications although she is unclear of exact names and doses.  Patient would benefit from bringing in all medications for complete assessment at next MTM visit.    Stroke Hx: Appropriately on DAPT, high intensity statin, and blood pressure controlling medication.  Patient counseled on signs and symptoms of bleeding such as black tarry stools, nosebleeds, and patient understands if she falls to present to nearest emergency room for further evaluation.    Hyperlipidemia: LDL above goal of <70 per ADA guidelines for secondary prevention. Patient appropriatey on high intensity statin and antihyperlipidemic. Recently prescribed PCSK9 inhibitor but has not yet started this medication and is waiting for it to be filled at pharmacy. MTM pharmacist to reach out to patient's pharmacy to see why the medication has not yet been filled. Once patient has medication, recommend she return for MTM visit to learn how to use the injection.  Plan to recheck lipid panel after patient has been on Repatha for 4 weeks.      Diabetes Type 2: Patient is at A1c goal of <7%.  Fasting sugars at goal of  per ADA guidelines although patient is having occasional lows overnight, these are infrequent and patient understands how to treat. And postprandial sugars at goal of less than 180.  Recommend recheck of A1c every 6-months, with next A1c to be checked in June.  Patient appropriately on high intensity statin, low-dose aspirin, as well as ACEi/ARB.  In future patient may benefit from decreasing to only checking her sugars 2-3 times per week.    Pain: Unable to fully assess medications patient is taking for pain control as she does not have medications with her and does not know the specific names or dosages of medications.  Of note,  patient stopped venlafaxine due to side effects (See below). Still having pain, specifically in her neck/shoulders and legs.  Patient has not tried duloxetine in the past. Patient may benefit from trying duloxetine to control pain and mood, as well as complete assessment of all medications to optimize pain control and limit pill burden.      Insomnia/mood: LORENA-7 score from March 2022 of 0, which is at goal and improvement from score of 9 in January 2022. Patient experienced intolerable side effects from once daily Venlafaxine.  Patient is no longer taking venlafaxine, okay to stay off this medication with plan to trial duloxetine to help with mood, pain, and sleep. Prescription sent to pharmacy and patient understands to start the new medication and stop venlafaxine.  In future may assess need for adjunctive therapy to further control signs and symptoms of anxiety.      Asthma: Patient recently discontinued montelukast per PCP, unable to assess whether patient has stopped taking this medication.  Will fully assess at next visit when patient brings in all medications.    Vit D Deficiency: Below target range for pain and mood support of 45-60. Would benefit from continued supplementation. Plan for repeat levels in 12 weeks (early August)     Hypertension: Blood pressure at ADA/AHA goal of <130/80. Pulse within range of .  No concerns at this point, patient to continue with current therapy.    Hypokalemia: Most recent potassium levels WNL of 3.5-5.  Plan for patient to stay on supplemental therapy with plan to recheck in 6 months.    GERD: Symptoms are well controlled with PPI.      PLAN:                            1. Start taking Duloxetine 60 mg daily at bedtime. Stop Venlafaxine  2. We will call the pharmacy to follow-up on the injectable medicine Dr. Gentile prescribed     Follow-up: Return in about 4 weeks (around 5/31/2022) for Medication Management Pharmacist, in person.    SUBJECTIVE/OBJECTIVE:                "           Kevin Thrasher is a 56 year old female coming in for an initial visit. She was referred to me from Ema Gentile MD.  (ID# Lamont) was used during today's visit.      Reason for visit: Retpatha education - per patient she is unsure what the visit is for.    Allergies/ADRs: Reviewed in chart  Past Medical History: Reviewed in chart  Tobacco: She reports that she has never smoked. She has never used smokeless tobacco.  Alcohol: none    Medication Adherence/Access: Patient has been unable to get Repatha from pharmacy and is unsure why, does not bring medications with her to visit, has basic understanding of medications and their indications but is unable to provide specific information regarding medication such as name, administration and dosages.    Stroke Hx: Clopidogrel 75mg every day, aspirin 81mg every day, lisinopril 20mg every day, rosuvastatin 40mg at bedtime   - Patient does report easily bruising. Has large bruises on her arm. Wears long sleeves to cover because she does not like the way that they look.   - Has not had any dark, tarry stools, nosebleeds, or recent falls    Per discharge summary from Feb 2020:   \"Dual antiplatelet therapy with aspirin and Plavix indefinitely due to significant intracranial atherosclerotic disease.\"       Diabetes Type 2: Metformin  mg with dinner  -Patient denies side effects or any GI upset with current dose of metformin    SMBG: two times daily.   Ranges: AM  After meals 160  Symptoms of low blood sugar? shaky, weak.   Frequency of hypoglycemia? occasionally in mid 70s usually in the morning when she wakes up, understands how to appropriately correct by eating rice or something sweet    Recent symptoms of high blood sugar? none.    ACEi/ARB: yes, lisinopril 20mg  Statin: rosuvastatin 40mg   Aspirin: yes, 81mg   Diet/Exercise: 3 meals per day     Lab Results   Component Value Date    A1C 6.6 01/24/2022    A1C 6.6 09/03/2021    A1C 6.1 11/06/2020    " A1C 6.0 08/05/2020    A1C 6.1 02/06/2020    A1C 6.2 02/04/2020      Microalbumin Urine mg/dL   Date Value Ref Range Status   09/03/2021 <0.50 0.00 - 1.99 mg/dL Final      Creatinine Urine mg/dL   Date Value Ref Range Status   09/03/2021 20 mg/dL Final     Creatinine Urine for Drug Screen   Date Value Ref Range Status   01/24/2022 93 mg/dL Final     Comment:     The reference range has not been established for creatinine in random urines. The results should be integrated into the clinical context for interpretation.     LDL Cholesterol Calculated   Date Value Ref Range Status   03/07/2022 89 <=129 mg/dL Final     LDL Cholesterol Direct   Date Value Ref Range Status   02/11/2016 153 (H) <=129 mg/dl Final     Creatinine   Date Value Ref Range Status   03/07/2022 0.77 0.60 - 1.10 mg/dL Final   02/14/2020 0.66 0.52 - 1.04 mg/dL Final       Pain: Tylenol 500mg 1-2 tabs every 6 hours as needed, diclofenac 1% gel, meloxicam 7.5 mg every day, trazodone 50mg at bedtime, and Tylenol #3 prescribed 1 tablet as needed at bedtime but she is taking twice daily (LF 1/24 #25/25)  - Hearts racing, feeling weak which is relieved when she stretches and states this occurs after she takes the venlafaxine and has since stopped this medication  - Medications have been helpful, especially the Tylenol #3 which she takes once during the day and once at bedtime (LF 1/24 #25/25) for controlling her pain   - Reports tightness/pulling in both legs as well as shoulder/neck area   - Denies burning or numbness   - Has a hard time falling asleep at night and is unsure what is causing this but does report having some pain when she lays down     Mood: venlafaxine ER 75mg every day - she is not taking due to side effects   - Reports feeling fatigued, tired, and weak when she takes this med and decided to stop taking it   - Mood is improved by spending time with her grandchildren   - Still feels scared and anxious, especially when she's alone.     LORENA-7  "SCORE 1/24/2022 3/24/2022   Total Score 9 0     PHQ 1/24/2022   PHQ-9 Total Score 8   Q9: Thoughts of better off dead/self-harm past 2 weeks Not at all     Asthma: Albuterol HFA 2 puffs every 4 hours as needed, Flovent HFA 2 puffs daily  - Montelukast 10mg at bedtime (discontinued at last PCP visit 3/24), unable to confirm if patient has stopped taking    Vit D Deficiency: vitamin D2 50,000 units once weekly   - Takes once weekly on Fridays     Vitamin D Deficiency Screening Results:  Lab Results   Component Value Date    VITDT 19 (L) 01/24/2022    VITDT 26 (L) 09/03/2021    VITDT 17 (L) 02/07/2020       Hyperlipidemia: rosuvastatin 40mg at bedtime, Zetia 10mg every day, Reaptha 140mg every 14 days    - Has not started Repatha (pharmacy has received rx but it has not yet been filled), patient understands why this medication is being initated      Recent Labs   Lab Test 03/07/22  0754 01/24/22  0906   CHOL 165 192   HDL 52 66   LDL 89 108   TRIG 120 88       Hypertension: Lisinopril 20mg every day   - Patient denies chest pain, dizziness, or headaches    BP Readings from Last 3 Encounters:   05/03/22 107/67   03/24/22 108/49   02/10/22 124/59      Pulse Readings from Last 3 Encounters:   05/03/22 72   03/24/22 60   02/10/22 57     Hypokalemia: Potassium CL ER 10mEq twice daily   Potassium   Date Value Ref Range Status   03/07/2022 3.9 3.5 - 5.0 mmol/L Final   02/14/2020 3.5 3.4 - 5.3 mmol/L Final     GERD: omeprazole 20mg every day  -  Has some stomach discomfort after eating. Feels very full. Has a \"purple medicine\" that she finds helpful.         Today's Vitals: /67   Pulse 72   ----------------      I spent 60 minutes with this patient today. All changes were made via collaborative practice agreement with Ema Gentile MD. A copy of the visit note was provided to the patient's provider(s).    The patient was given a summary of these recommendations.     Sarah Beth Renteria, PD IV Intern    I spent 100% " of the time in direct supervision of the PharmD IV student. I have reviewed the note and agree with the assessment/plan as it is now written.     Kenton Jose, PharmD  Medication Therapy Management (MTM) Pharmacist  [facility]         Medication Therapy Recommendations  Anxiety    Current Medication: venlafaxine (EFFEXOR-XR) 75 MG 24 hr capsule (Discontinued)   Rationale: Undesirable effect - Adverse medication event - Safety   Recommendation: Change Medication - DULoxetine 60 MG capsule   Status: Accepted per CPA         Hyperlipidemia LDL goal <70    Current Medication: evolocumab (REPATHA SURECLICK) 140 MG/ML prefilled autoinjector   Rationale: Medication product not available - Adherence - Adherence   Recommendation: Provide Adherence Intervention   Status: Accepted per CPA

## 2022-05-03 ENCOUNTER — TELEPHONE (OUTPATIENT)
Dept: FAMILY MEDICINE | Facility: CLINIC | Age: 57
End: 2022-05-03
Payer: COMMERCIAL

## 2022-05-03 ENCOUNTER — OFFICE VISIT (OUTPATIENT)
Dept: PHARMACY | Facility: CLINIC | Age: 57
End: 2022-05-03
Attending: FAMILY MEDICINE
Payer: COMMERCIAL

## 2022-05-03 ENCOUNTER — APPOINTMENT (OUTPATIENT)
Dept: INTERPRETER SERVICES | Facility: CLINIC | Age: 57
End: 2022-05-03
Payer: COMMERCIAL

## 2022-05-03 VITALS — SYSTOLIC BLOOD PRESSURE: 107 MMHG | HEART RATE: 72 BPM | DIASTOLIC BLOOD PRESSURE: 67 MMHG

## 2022-05-03 DIAGNOSIS — I63.211 ACUTE ISCHEMIC VBA BRAINSTEM STROKE, RIGHT (H): ICD-10-CM

## 2022-05-03 DIAGNOSIS — F41.9 ANXIETY: ICD-10-CM

## 2022-05-03 DIAGNOSIS — I10 HYPERTENSION GOAL BP (BLOOD PRESSURE) < 130/80: ICD-10-CM

## 2022-05-03 DIAGNOSIS — E78.5 HYPERLIPIDEMIA LDL GOAL <70: Primary | ICD-10-CM

## 2022-05-03 DIAGNOSIS — M47.22 OSTEOARTHRITIS OF SPINE WITH RADICULOPATHY, CERVICAL REGION: ICD-10-CM

## 2022-05-03 DIAGNOSIS — E11.59 TYPE 2 DIABETES MELLITUS WITH OTHER CIRCULATORY COMPLICATION, WITHOUT LONG-TERM CURRENT USE OF INSULIN (H): ICD-10-CM

## 2022-05-03 DIAGNOSIS — E55.9 VITAMIN D DEFICIENCY: ICD-10-CM

## 2022-05-03 DIAGNOSIS — I63.22 ACUTE ISCHEMIC VBA BRAINSTEM STROKE, RIGHT (H): ICD-10-CM

## 2022-05-03 PROCEDURE — 99607 MTMS BY PHARM ADDL 15 MIN: CPT | Performed by: PHARMACIST

## 2022-05-03 PROCEDURE — 99605 MTMS BY PHARM NP 15 MIN: CPT | Performed by: PHARMACIST

## 2022-05-03 RX ORDER — DULOXETIN HYDROCHLORIDE 60 MG/1
60 CAPSULE, DELAYED RELEASE ORAL DAILY
Qty: 30 CAPSULE | Refills: 1 | Status: SHIPPED | OUTPATIENT
Start: 2022-05-03 | End: 2022-06-13

## 2022-05-03 NOTE — PATIENT INSTRUCTIONS
"Recommendations from today's MTM visit:                                                      Start taking Duloxetine 60 mg daily at bedtime. Stop Venlafaxine  We will call the pharmacy to follow-up on the injectable medicine Dr. Gentile prescribed     Follow-up: Return in about 4 weeks (around 5/31/2022) for Medication Management Pharmacist, in person.    It was great speaking with you today.  I value your experience and would be very thankful for your time in providing feedback in our clinic survey. In the next few days, you may receive an email or text message from [employer] with a link to a survey related to your  clinical pharmacist.\"     To schedule another MTM appointment, please call the clinic directly or you may call the MTM scheduling line at [phone] or toll-free at [phone].     My Clinical Pharmacist's contact information:                                                      Please feel free to contact me with any questions or concerns you have.      Kenton Jose, PharmD  Medication Therapy Management (MTM) Pharmacist  [facility]      "

## 2022-05-03 NOTE — TELEPHONE ENCOUNTER
TRAY PA REQUEST    Prior Authorization Retail Medication Request    Medication/Dose: Repatha 140 mg   ICD code (if different than what is on RX):  NA  Previously Tried and Failed:  Currently on statin and zetia  Rationale:  Not at goal LDL after stroke despite high dose statin and zetia.     Insurance Name:  MIGUELlinette MA   Insurance ID:  [id number]

## 2022-05-06 NOTE — TELEPHONE ENCOUNTER
Central Prior Authorization Team   Phone: [phone]    PA Initiation    Medication: REPATHA  Insurance Company: [employer] - Phone [phone] Fax [phone]  Pharmacy Filling the Rx: [id number] - [place], [place] - [address] [place] [address] E  Filling Pharmacy Phone: [phone]  Filling Pharmacy Fax:    Start Date: 5/6/2022

## 2022-05-09 NOTE — TELEPHONE ENCOUNTER
Prior Authorization Approval    Authorization Effective Date: 4/6/2022  Authorization Expiration Date: 5/7/2023  Medication: REPATHA  Approved Dose/Quantity:   Reference #:     Insurance Company: [employer] - Phone [phone] Fax [phone]  Expected CoPay:       CoPay Card Available:      Foundation Assistance Needed:    Which Pharmacy is filling the prescription (Not needed for infusion/clinic administered): [id number] - [place], [place] - [address] [place] [address] [address]  Pharmacy Notified: Yes  Patient Notified: Yes

## 2022-05-20 ENCOUNTER — APPOINTMENT (OUTPATIENT)
Dept: INTERPRETER SERVICES | Facility: CLINIC | Age: 57
End: 2022-05-20
Payer: COMMERCIAL

## 2022-05-24 NOTE — PROGRESS NOTES
Clinical Pharmacy Consult:                                                    Kevin Thrasher is a 56 year old female coming in for a clinical pharmacist consult.  She was referred to me from Ema Gentile MD. [facility] [place], by phone.      Reason for Consult: Repatha eduction      Discussion:     Stroke Hx: Clopidogrel 75mg every day, aspirin 81mg every day, lisinopril 20mg every day, rosuvastatin 40mg at bedtime, Zetia 10mg every day, Reaptha 140mg every 14 days        Recent Labs   Lab Test 03/07/22  0754 01/24/22  0906   CHOL 165 192   HDL 52 66   LDL 89 108   TRIG 120 88       Reviewed medication indication, storage, and injection instructions. Pt wonders how long she'll have to use this medication - reviewed long-term use for secondary prevention.     Leaving June 14th to go [place] to visit daughter. Has a one-way ticket, doesn't know when she'll be returning. Would like to schedule fasting labs when she gets back.       Med Rec:   Brings bottles of medicines that she's currently using: Metformin, Potassium, Plavix, Lisinopril Crestor, Vit D, Aspirin, Zetia.     Has Venlafaxine ER 75 mg - at last visit reports she didn't tolerate but stopped. Also has a bottle from psychiatrist - also venlafaxine 75 mg ER daily. Thought they were different medicines. Also has Duloxetine that was prescribed at last MTM visit - hasn't started using.     Doesn't have Omeprazole, Trazodone, Meloxicam, Loratadine, Flexeril, Acetaminophen, APAP #3. States she has at home.       Plan:  1. Repatha education provided. Pt was able to administer first injection.   2. Pt scheduled for follow-up for full med rec       Return in about 19 days (around 6/13/2022) for Medication Management Pharmacist, in person.       Kenton Jose PharmD  Medication Therapy Management (MTM) Pharmacist  [facility]

## 2022-05-25 ENCOUNTER — OFFICE VISIT (OUTPATIENT)
Dept: PHARMACY | Facility: CLINIC | Age: 57
End: 2022-05-25
Payer: COMMERCIAL

## 2022-05-25 ENCOUNTER — APPOINTMENT (OUTPATIENT)
Dept: INTERPRETER SERVICES | Facility: CLINIC | Age: 57
End: 2022-05-25
Payer: COMMERCIAL

## 2022-05-25 DIAGNOSIS — E78.5 HYPERLIPIDEMIA LDL GOAL <70: Primary | ICD-10-CM

## 2022-05-25 PROCEDURE — 99207 PR NO CHARGE LOS: CPT | Performed by: PHARMACIST

## 2022-05-30 DIAGNOSIS — F41.9 ANXIETY: ICD-10-CM

## 2022-05-31 RX ORDER — DULOXETIN HYDROCHLORIDE 60 MG/1
CAPSULE, DELAYED RELEASE ORAL
Qty: 30 CAPSULE | Refills: 1 | OUTPATIENT
Start: 2022-05-31

## 2022-06-01 DIAGNOSIS — E78.5 HYPERLIPIDEMIA LDL GOAL <70: ICD-10-CM

## 2022-06-01 DIAGNOSIS — I63.211 ACUTE ISCHEMIC VBA BRAINSTEM STROKE, RIGHT (H): ICD-10-CM

## 2022-06-01 DIAGNOSIS — I63.22 ACUTE ISCHEMIC VBA BRAINSTEM STROKE, RIGHT (H): ICD-10-CM

## 2022-06-02 RX ORDER — EVOLOCUMAB 140 MG/ML
INJECTION, SOLUTION SUBCUTANEOUS
Refills: 11 | OUTPATIENT
Start: 2022-06-02

## 2022-06-08 NOTE — PROGRESS NOTES
Medication Therapy Management (MTM) Encounter    ASSESSMENT:                            Medication Adherence/Access: Multiple med adherence concerns - pt was instructed on how to dispose of all old/ medicines. These were  into 1 bag for disposal.     May benefit from community paramedic to help with med set up and adherence monitoring at home. Long-term consider [place] blister packs. CP assistance with this process would be appreciated.       Stroke Hx: LDL above goal of <70 per ADA guidelines for secondary prevention. Likely adherence related. Has recently started PCSK9 inhibitor. Adherence plan as above. Will plan for fasting lipids at follow-up. If at goal, consider discontinuing Zetia.        Diabetes Type 2: Patient is at A1c goal of <7%. Not discussed in detail. Will review at follow-up.         Pain: No meloxicam, Tylenol #3, Flexeril - will discontinue today. Okay to continue plain Acetaminophen which pt reports is managing symptoms well enough.       Insomnia/mood: Never started Duloxetine. Hasn't used. Denies anxiety or sleep concerns. Will discontinue.         Asthma: No inhalers at visit. Denies symptoms. Continue to monitor.       Vit D Deficiency: Last level low - pt has somehow on track to run out of supplement early. Likely not harmful, but will plan for repeat levels with next set up labs.       Hypertension: Blood pressure at ADA/AHA goal of <130/80. Pulse within range of .  Pt admits to poor adherence due to dizziness. Will trial lower dose to see if she can tolerate on daily basis.     Hypokalemia: Last levels WNL but pt not taking supplement on daily basis. Will decrease dose today, as pt starts ACEi more consistently, will monitor K+ levels - may be able to discontinue completely.     GERD: Only using PRN - given DAPT with bruising, would likely benefit from daily use.     PLAN:                            1. Community Paramedic referral  2. Reduce Lisinopril to 10 mg - take  "daily   3. Reduce Potassium 10 mEq to once daily   4. Discontinue Duloxetine.   5. Dispose of  medicines, as instructed.     Follow-up: Return in about 4 weeks (around 2022) for Medication Management Pharmacist, in person.    SUBJECTIVE/OBJECTIVE:                          Kevin Thrasher is a 56 year old female coming in for a follow-up visit. She was referred to me from Ema Gentile MD. [employer]  by phone (ID# [id number]).  Today's visit is a follow-up MTM visit from 2022       Reason for visit:  Medication Management follow-up.      Allergies/ADRs: Reviewed in chart  Past Medical History: Reviewed in chart  Tobacco: She reports that she has never smoked. She has never used smokeless tobacco.  Alcohol: none    Medication Adherence/Access: Med rec at last visit indicated pt was missing multiple medications. Ongoing confusion on Venlafaxine vs Duloxetine.     Going to [place] - daughter is graduating. Leaving in 2 days. Comes back.     Manages meds on her own- daughter helps to remind pt to take medicines. Daughter sets 1 week pill box.       Today brings:     1 bag of \"PRN meds\"   Vit D - #30 from 2020   Trazodone from 12/15/2020 + 2020  Flexeril - 21    Vit C 1/10/21   Ranitidine    Acetaminophen 22  Amoxicillin    Senna-S -   Venlafaxine IR 2020    Has Trazodone x 2 + Venlafaxine IR tabs -\"Antidepressants. When I take it, it makes me really tired\"       Separate baggy of medicines she uses daily:     Metformin  #90 - half full   /Omeprazole  #90 half full   Potassium 3/1/22 #180 - half full   Lisinopril 3/1/22 #90 - half full + half full   Duloxetine 5/3 #30 - full   Rosuvastatin  #90 -at least 30 remain   Vit D2 -  #12 - 2 caps remain   Plavix 22 #90 - at least 30 remain + 20 #90   Zetia  #90 - at least half full   Aspirin  #30 - full    Prednisolone drops.           Stroke Hx: Prescirbed Clopidogrel 75mg daily, " "aspirin 81mg daily, rosuvastatin 40mg at bedtime, ezetimibe 10 mg daily. At last visit, pt started Repatha 140 mg injections every 2 weeks.     Per discharge summary from Feb 2020:   \"Dual antiplatelet therapy with aspirin and Plavix indefinitely due to significant intracranial atherosclerotic disease.\"     Recent Labs   Lab Test 03/07/22  0754 01/24/22  0906   CHOL 165 192   HDL 52 66   LDL 89 108   TRIG 120 88            Diabetes Type 2: Metformin  mg with dinner. Patient denies side effects or any GI upset with current dose of metformin     SMBG: two times daily.   Ranges: AM  After meals 160  Symptoms of low blood sugar? shaky, weak.   Frequency of hypoglycemia? occasionally in mid 70s usually in the morning when she wakes up, understands how to appropriately correct by eating rice or something sweet    Recent symptoms of high blood sugar? none.    ACEi/ARB: yes, lisinopril 20mg  Statin: rosuvastatin 40mg   Aspirin: yes, 81mg   Diet/Exercise: 3 meals per day       Hemoglobin A1C POCT   Date Value Ref Range Status   02/06/2020 6.1 (H) 0 - 5.6 % Final     Comment:     Normal <5.7% Prediabetes 5.7-6.4%  Diabetes 6.5% or higher - adopted from ADA   consensus guidelines.       Hemoglobin A1C   Date Value Ref Range Status   01/24/2022 6.6 (H) 0.0 - 5.6 % Final     Comment:     Normal <5.7%   Prediabetes 5.7-6.4%    Diabetes 6.5% or higher     Note: Adopted from ADA consensus guidelines.   09/03/2021 6.6 (H) 0.0 - 5.6 % Final     Comment:     Normal <5.7%   Prediabetes 5.7-6.4%    Diabetes 6.5% or higher     Note: Adopted from ADA consensus guidelines.   11/06/2020 6.1 (H) <=5.6 % Final     Comment:     Normal <5.7% Prediabete 5.7-6.4% Diabletes 6.5% or higher - adopted from ADA consensus guidelines      Microalbumin Urine mg/dL   Date Value Ref Range Status   09/03/2021 <0.50 0.00 - 1.99 mg/dL Final      Creatinine Urine mg/dL   Date Value Ref Range Status   09/03/2021 20 mg/dL Final     Creatinine Urine " "for Drug Screen   Date Value Ref Range Status   01/24/2022 93 mg/dL Final     Comment:     The reference range has not been established for creatinine in random urines. The results should be integrated into the clinical context for interpretation.     LDL Cholesterol Calculated   Date Value Ref Range Status   03/07/2022 89 <=129 mg/dL Final     LDL Cholesterol Direct   Date Value Ref Range Status   02/11/2016 153 (H) <=129 mg/dl Final     Creatinine   Date Value Ref Range Status   03/07/2022 0.77 0.60 - 1.10 mg/dL Final   02/14/2020 0.66 0.52 - 1.04 mg/dL Final        Pain: Prescribed Tylenol 500mg 1-2 tabs every 6 hours as needed, diclofenac 1% gel, meloxicam 7.5 mg every day, Tylenol #3 1 tablet as needed at bedtime.     Does not have Meloxicam, Tylenol #3 at the visit.     Thought her plain Acetaminophen was Tylenol #3. Uses plain Acetaminophen at bedtime.     During the day, busy with the grandkids so pain is not bothersome.       Mood: At 5/3 MTM visit, pt reported adverse effects with Venlafaxine so was switched to Duloxetine 60 mg daily for mood/pain.     Reports mood is okay. States mood is improved by spending time with her grandchildren. States she's no longer feeling scared or anxious.    States she used to worry about a lot of different things but not anymore.     \"I try not to think too much\"         LORENA-7 SCORE 1/24/2022 3/24/2022   Total Score 9 0     PHQ 1/24/2022   PHQ-9 Total Score 8   Q9: Thoughts of better off dead/self-harm past 2 weeks Not at all       Asthma: Prescribed Albuterol HFA 2 puffs every 4 hours as needed, Flovent HFA 2 puffs daily    No inhalers at visit. Doesn't have any at home. States she was having allergies. Denies any trouble with shortness of breath.     Doesn't feel like she has asthma. Thinks it was just allergies.       Low Vitamin D: Prescribed Vitamin D2 50,000 units once weekly.     Vitamin D Deficiency Screening Results:  Lab Results   Component Value Date    VITDT 19 " (L) 01/24/2022    VITDT 26 (L) 09/03/2021    VITDT 17 (L) 02/07/2020       Hypertension: Lisinopril 20mg every day     Finally admits she does not take blood pressure daily because sometimes it's too low and she feels dizzy.     BP Readings from Last 3 Encounters:   06/13/22 133/83   05/03/22 107/67   03/24/22 108/49      Pulse Readings from Last 3 Encounters:   06/13/22 59   05/03/22 72   03/24/22 60         Hypokalemia: Potassium CL ER 10mEq twice daily     States she's been taking     Potassium   Date Value Ref Range Status   03/07/2022 3.9 3.5 - 5.0 mmol/L Final   02/14/2020 3.5 3.4 - 5.3 mmol/L Final         GERD: omeprazole 20mg every day    Only using as needed when she has stomach pain.         Today's Vitals: /83   Pulse 59   ----------------      I spent 70 minutes with this patient today. All changes were made via collaborative practice agreement with Ema Gentile MD. A copy of the visit note was provided to the patient's provider(s).    The patient was given a summary of these recommendations.     Kenton Jose, LupeD  Medication Therapy Management (MTM) Pharmacist  [facility]       Medication Therapy Recommendations  Acute ischemic VBA brainstem stroke, right (H)    Current Medication: aspirin (ASPIRIN LOW DOSE) 81 MG chewable tablet   Rationale: Patient forgets to take - Adherence - Adherence   Recommendation: Provide Adherence Intervention   Status: Patient Agreed - Adherence/Education         Anxiety    Current Medication: DULoxetine (CYMBALTA) 60 MG capsule (Discontinued)   Rationale: No medical indication at this time - Unnecessary medication therapy - Indication   Recommendation: Discontinue Medication   Status: Accepted per [facility]         Hypertension goal BP (blood pressure) < 130/80    Current Medication: lisinopril (ZESTRIL) 20 MG tablet (Discontinued)   Rationale: Dose too high - Dosage too high - Safety   Recommendation: Decrease Dose - lisinopril 10 MG tablet    Status: Accepted per CPA         Hypokalemia    Current Medication: potassium chloride ER (K-TAB/KLOR-CON) 10 MEQ CR tablet (Discontinued)   Rationale: Dose too high - Dosage too high - Safety   Recommendation: Decrease Frequency - daily   Status: Accepted per CPA

## 2022-06-13 ENCOUNTER — OFFICE VISIT (OUTPATIENT)
Dept: PHARMACY | Facility: OTHER | Age: 57
End: 2022-06-13
Payer: COMMERCIAL

## 2022-06-13 ENCOUNTER — APPOINTMENT (OUTPATIENT)
Dept: INTERPRETER SERVICES | Facility: CLINIC | Age: 57
End: 2022-06-13
Payer: COMMERCIAL

## 2022-06-13 VITALS — HEART RATE: 59 BPM | SYSTOLIC BLOOD PRESSURE: 133 MMHG | DIASTOLIC BLOOD PRESSURE: 83 MMHG

## 2022-06-13 DIAGNOSIS — E55.9 VITAMIN D DEFICIENCY: ICD-10-CM

## 2022-06-13 DIAGNOSIS — I63.22 ACUTE ISCHEMIC VBA BRAINSTEM STROKE, RIGHT (H): ICD-10-CM

## 2022-06-13 DIAGNOSIS — M47.22 OSTEOARTHRITIS OF SPINE WITH RADICULOPATHY, CERVICAL REGION: ICD-10-CM

## 2022-06-13 DIAGNOSIS — I63.211 ACUTE ISCHEMIC VBA BRAINSTEM STROKE, RIGHT (H): ICD-10-CM

## 2022-06-13 DIAGNOSIS — I10 ESSENTIAL HYPERTENSION: ICD-10-CM

## 2022-06-13 DIAGNOSIS — E87.6 HYPOKALEMIA: ICD-10-CM

## 2022-06-13 DIAGNOSIS — F41.9 ANXIETY: ICD-10-CM

## 2022-06-13 DIAGNOSIS — E78.5 HYPERLIPIDEMIA LDL GOAL <70: Primary | ICD-10-CM

## 2022-06-13 DIAGNOSIS — E11.59 TYPE 2 DIABETES MELLITUS WITH OTHER CIRCULATORY COMPLICATION, WITHOUT LONG-TERM CURRENT USE OF INSULIN (H): ICD-10-CM

## 2022-06-13 PROCEDURE — 99606 MTMS BY PHARM EST 15 MIN: CPT | Performed by: PHARMACIST

## 2022-06-13 PROCEDURE — 99607 MTMS BY PHARM ADDL 15 MIN: CPT | Performed by: PHARMACIST

## 2022-06-13 RX ORDER — POTASSIUM CHLORIDE 750 MG/1
10 TABLET, EXTENDED RELEASE ORAL DAILY
Qty: 90 TABLET | Refills: 4 | Status: SHIPPED | OUTPATIENT
Start: 2022-06-13 | End: 2023-03-30

## 2022-06-13 RX ORDER — LISINOPRIL 10 MG/1
10 TABLET ORAL DAILY
Qty: 30 TABLET | Refills: 1 | Status: SHIPPED | OUTPATIENT
Start: 2022-06-13 | End: 2022-08-18

## 2022-06-13 RX ORDER — LISINOPRIL 10 MG/1
20 TABLET ORAL DAILY
Qty: 30 TABLET | Refills: 1 | Status: SHIPPED | OUTPATIENT
Start: 2022-06-13 | End: 2022-06-13

## 2022-06-13 NOTE — PATIENT INSTRUCTIONS
"Recommendations from today's MTM visit:                                                       1. Community Paramedic referral  2. Reduce Lisinopril to 10 mg - take daily   3. Reduce Potassium 10 mEq to once daily   4. Discontinue Duloxetine.     Follow-up: Return in about 4 weeks (around 7/11/2022) for Medication Management Pharmacist, in person.    It was great speaking with you today.  I value your experience and would be very thankful for your time in providing feedback in our clinic survey. In the next few days, you may receive an email or text message from [employer] with a link to a survey related to your  clinical pharmacist.\"     To schedule another MTM appointment, please call the clinic directly or you may call the MTM scheduling line at [phone] or toll-free at [phone].     My Clinical Pharmacist's contact information:                                                      Please feel free to contact me with any questions or concerns you have.      Kenton Jose, PharmD  Medication Therapy Management (MTM) Pharmacist  [facility]      "

## 2022-06-14 ENCOUNTER — PATIENT OUTREACH (OUTPATIENT)
Dept: CARE COORDINATION | Facility: CLINIC | Age: 57
End: 2022-06-14
Payer: COMMERCIAL

## 2022-06-14 NOTE — PROGRESS NOTES
Community Paramedic Program  Community Health Worker Outreach    UTC/Voicemail    Outreach attempted x 1.      Left message on patient's voicemail with call back information and requested return call.    CHW Follow-up Plan: will try to reach patient again in 1-2 business days.    Unable to leave message because pt's vm box not set up.     Note: Available with CP1 on ?    Referral source: Pt's PCP & MTM pharmacist  Referral reason:   Reason(s) for visit: Initial Assessment  Med Check/Setup    Goals for visit(s): Medication Compliance     How often should patient be seen: Weekly Okay to extend interval per CP discretion   Preference on when patient should be seen: 3-7 Days       Comments   PCP: Ema Gentile  Other info that would be helpful: See MTM note.       From  MT pharmacist OV note:   Medication Adherence/Access: Multiple med adherence concerns - pt was instructed on how to dispose of all old/ medicines. These were  into 1 bag for disposal.      May benefit from community paramedic to help with med set up and adherence monitoring at home. Long-term consider [place] blister packs. CP assistance with this process would be appreciated.

## 2022-06-16 ENCOUNTER — APPOINTMENT (OUTPATIENT)
Dept: INTERPRETER SERVICES | Facility: CLINIC | Age: 57
End: 2022-06-16
Payer: COMMERCIAL

## 2022-06-17 NOTE — PROGRESS NOTES
Community Paramedic Program  Community Health Worker Outreach    UTC/Voicemail    Outreach attempted x 2.      Left message on patient's voicemail with [employer]  with call back information and requested return call.    CHW Follow-up Plan: will try to reach patient again in 1-2 business days.    Unable to leave message.     Note: Available with CP1 on 6/23 at 1 pm?    Referral source: Pt's PCP & MTM pharmacist  Referral reason:   Reason(s) for visit: Initial Assessment  Med Check/Setup    Goals for visit(s): Medication Compliance     How often should patient be seen: Weekly Okay to extend interval per CP discretion   Preference on when patient should be seen: 3-7 Days       Comments   PCP: Ema Gentile  Other info that would be helpful: See MTM note.

## 2022-06-20 NOTE — PROGRESS NOTES
"Community Paramedic Program  Community Health Worker Initial Outreach    Referral source: Pt's PCP & [facility] pharmacist  Referral reason:   Reason(s) for visit: Initial Assessment  Med Check/Setup    Goals for visit(s): Medication Compliance     How often should patient be seen: Weekly Okay to extend interval per CP discretion   Preference on when patient should be seen: 3-7 Days       Comments   PCP: Ema Gentile  Other info that would be helpful: See MT note.     Initial visit:  / 3:30 pm / CP Lucrecia (date/time/CP)         Additional information:   Called and spoke with pt with [facility] . Confirmed referral information with pt, who said \"the pharmacist let me know that many of my medications are out of date and that they were going to ask someone to come help me at home.\" Pt agreed to schedule initial CP visit and said \"thank you, this will be helpful.\" Pt asked for a visit at 3:30 pm or later \"so my daughter is home and can watch the little ones.\" Pt confirmed that she watches her grandchildren during the day while her daughter works.    Pt asked if the CP could meet her at her daughter's home. I agreed, and pt shared her daughter's home address: [address] [place] [address]. Pt's daughter and her grandchildren will be home during the visit.    Asked pt to please have her medications with her for review during the visit, and pt agreed to bring them with.     From  [facility] pharmacist OV note:  Medication Adherence/Access: Multiple med adherence concerns - pt was instructed on how to dispose of all old/ medicines. These were  into 1 bag for disposal.      May benefit from community paramedic to help with med set up and adherence monitoring at home. Long-term consider [place] blister packs. CP assistance with this process would be appreciated.         "

## 2022-06-27 ENCOUNTER — ALLIED HEALTH/NURSE VISIT (OUTPATIENT)
Dept: OTHER | Facility: CLINIC | Age: 57
End: 2022-06-27
Payer: COMMERCIAL

## 2022-06-27 VITALS
TEMPERATURE: 98 F | SYSTOLIC BLOOD PRESSURE: 106 MMHG | HEART RATE: 59 BPM | OXYGEN SATURATION: 98 % | DIASTOLIC BLOOD PRESSURE: 54 MMHG | RESPIRATION RATE: 14 BRPM

## 2022-06-27 DIAGNOSIS — I10 ESSENTIAL HYPERTENSION: ICD-10-CM

## 2022-06-27 DIAGNOSIS — E78.5 HYPERLIPIDEMIA LDL GOAL <70: ICD-10-CM

## 2022-06-27 DIAGNOSIS — E11.59 TYPE 2 DIABETES MELLITUS WITH OTHER CIRCULATORY COMPLICATION, WITHOUT LONG-TERM CURRENT USE OF INSULIN (H): ICD-10-CM

## 2022-06-27 DIAGNOSIS — M47.22 OSTEOARTHRITIS OF SPINE WITH RADICULOPATHY, CERVICAL REGION: ICD-10-CM

## 2022-06-27 DIAGNOSIS — I63.22 ACUTE ISCHEMIC VBA BRAINSTEM STROKE, RIGHT (H): ICD-10-CM

## 2022-06-27 DIAGNOSIS — M75.80 ROTATOR CUFF TENDINITIS, UNSPECIFIED LATERALITY: ICD-10-CM

## 2022-06-27 DIAGNOSIS — E55.9 VITAMIN D DEFICIENCY: ICD-10-CM

## 2022-06-27 DIAGNOSIS — E87.6 HYPOKALEMIA: ICD-10-CM

## 2022-06-27 DIAGNOSIS — F41.9 ANXIETY: ICD-10-CM

## 2022-06-27 DIAGNOSIS — M54.17 LUMBOSACRAL RADICULOPATHY AT S1: Primary | ICD-10-CM

## 2022-06-27 DIAGNOSIS — I63.211 ACUTE ISCHEMIC VBA BRAINSTEM STROKE, RIGHT (H): ICD-10-CM

## 2022-06-27 PROCEDURE — 99207 PR COMMUNITY PARAMEDIC-PATIENT MEETS CRITERIA: CPT

## 2022-06-27 NOTE — PROGRESS NOTES
Community Paramedics Initial Visit  June 27, 2022 TIME:1530    Kevin Thrasher is a 57 year old female being seen at home for a Community Paramedic Home visit.    Present at appointment:  Patient Kevin, daughter Lana, and Community Paramedic Lucrecia         Chief Complaint   Patient presents with     Outreach       [facility] Utilization:      Utilization    Hospital Admissions  0             ED Visits  0             No Show Count (past year)  6                Current as of: 6/26/2022  1:01 AM              Clinical Concerns:  Current Medical Concerns:  Chronic pain    Current Behavioral Concerns: None    Education Provided to patient: None     Vitals: /54 (BP Location: Right arm, Patient Position: Sitting, Cuff Size: Adult Regular)   Pulse 59   Temp 98  F (36.7  C)   Resp 14   SpO2 98%   BMI: There is no height or weight on file to calculate BMI.       Health Maintenance Reviewed:      Clinical Pathway: None    Review of Symptoms/PE    Skin: bruising, likely due to medication. See notes below to PCP  Eyes: glasses, Eye exam 5/2022.   Ears/Nose/Throat: negative  Respiratory: No shortness of breath, dyspnea on exertion, cough, or hemoptysis. Sometimes when her allergies are bad she gets short of breath.  Cardiovascular: negative  Gastrointestinal: heartburn-she takes Omeprazole and constipation on occasion-she takes Miralax   Genitourinary: negative  Musculoskeletal: muscular weakness on her left side due to CVA in 2020. She reports feeling right hip pain which she attributes it to her weight bearing.   Neurologic: headaches for one year after the stroke but now she says it's gotten better. She does experience numbness in both hands mostly noticeable in the morning then it subsides.  Psychiatric: negative but says she does worry about her health and she wants to get better.     Medication Management:  Self and daughter set it up.    Medications need to be refilled:Daughter manages refills.      Medications set up:  No  Pill Box issued: No  Scale issued: No  Flu Shot given: No  COVID Vaccine Given: No  Lab draw or specimen collection: No  Food box issued: No  Collaborative visit with PCP: No  Wound Care: No    Functional Status:       Living Situation:  Lives with family.     Community Paramedics Home Safety Assessment   No safety concerns noted.        Diet/Exercise/Sleep:  Diet: Breakfast, lunch and dinner every day.   Exercise: 3 times a day, 20 minutes per time. She does some therapies at home.  Sleep: She reports sleeping well most of the time. She reports taking Tylenol and Diclofenac medications to help her sleep.     Transportation:   Pt reports she does drive short distances. She has a handicap sticker.        Psychosocial:            No  Face to Face in Home / Community    Today's PHQ-2 Score:      Today's PHQ-9 Score:   PHQ-9 SCORE 1/24/2022   PHQ-9 Total Score 8        Today's GAD7 score:   LORENA-7 SCORE 3/24/2022   Total Score 0                 HEALTH CARE GOALS:      Patient Active Problem List   Diagnosis     Acute ischemic VBA brainstem stroke, right (H)     Vitamin D deficiency     Type 2 diabetes mellitus (H)     Overweight     Mild intermittent asthma without complication     Lumbosacral radiculopathy at S1     Hyperlipidemia LDL goal <70     Hypertension goal BP (blood pressure) < 130/80     Fatty liver     Anxiety     Urinary, incontinence, stress female     Cystocele with uterine prolapse     Chronic, continuous use of opioids     Diabetic nephropathy associated with type 2 diabetes mellitus (H)     Diabetic polyneuropathy associated with type 2 diabetes mellitus (H)     Rotator cuff tendinitis, unspecified laterality     Osteoarthritis of spine with radiculopathy, cervical region         Current Outpatient Medications:      ACCU-CHEK GUIDE test strip, 1 strip by In Vitro route daily Use to test once daily, Disp: 100 strip, Rfl: 3     acetaminophen (TYLENOL) 500 MG tablet, Take 1-2 tablets (500-1,000 mg) by  mouth every 6 hours as needed for mild pain, Disp: 200 tablet, Rfl: 4     albuterol (PROAIR HFA/PROVENTIL HFA/VENTOLIN HFA) 108 (90 Base) MCG/ACT inhaler, Inhale 2 puffs into the lungs every 4 hours as needed for shortness of breath / dyspnea or wheezing, Disp: 18 g, Rfl: 0     ARTIFICIAL TEARS 1.4 % ophthalmic solution, Apply 1 drop to eye every 2 hours as needed for dry eyes, Disp: 15 mL, Rfl: 11     aspirin (ASPIRIN LOW DOSE) 81 MG chewable tablet, Take 1 tablet (81 mg) by mouth daily, Disp: 200 tablet, Rfl: 1     blood glucose monitoring (ACCU-CHEK FASTCLIX) lancets, 1 each daily, Disp: 102 each, Rfl: 11     clopidogrel (PLAVIX) 75 MG tablet, Take 1 tablet (75 mg) by mouth daily, Disp: 90 tablet, Rfl: 4     evolocumab (REPATHA SURECLICK) 140 MG/ML prefilled autoinjector, Inject 1 mL (140 mg) Subcutaneous every 14 days, Disp: 2 mL, Rfl: 11     ezetimibe (ZETIA) 10 MG tablet, Take 1 tablet (10 mg) by mouth daily, Disp: 90 tablet, Rfl: 4     fluticasone (FLOVENT HFA) 110 MCG/ACT inhaler, Inhale 2 puffs into the lungs daily (Patient not taking: Reported on 6/13/2022), Disp: 12 g, Rfl: 11     ketotifen (ZADITOR) 0.025 % ophthalmic solution, Place 1 drop into both eyes 2 times daily as needed for itching (Patient not taking: Reported on 6/13/2022), Disp: 10 mL, Rfl: 11     lisinopril (ZESTRIL) 10 MG tablet, Take 1 tablet (10 mg) by mouth daily, Disp: 30 tablet, Rfl: 1     loratadine (CLARITIN) 10 MG tablet, Take 1 tablet (10 mg) by mouth daily as needed for allergies (Patient not taking: Reported on 5/25/2022), Disp: 90 tablet, Rfl: 3     metFORMIN (GLUCOPHAGE-XR) 500 MG 24 hr tablet, Take 1 tablet (500 mg) by mouth daily (with dinner), Disp: 90 tablet, Rfl: 3     omeprazole (PRILOSEC) 20 MG DR capsule, Take 1 capsule (20 mg) by mouth daily before breakfast, Disp: 90 capsule, Rfl: 5     polyethylene glycol (MIRALAX) 17 GM/Dose powder, Take 17 g by mouth daily (with breakfast) In 8 ounces of water. (Patient not taking:  No sig reported), Disp: 850 g, Rfl: 3     potassium chloride ER (K-TAB/KLOR-CON) 10 MEQ CR tablet, Take 1 tablet (10 mEq) by mouth daily, Disp: 90 tablet, Rfl: 4     rosuvastatin (CRESTOR) 40 MG tablet, Take 1 tablet (40 mg) by mouth At Bedtime, Disp: 90 tablet, Rfl: 4     vitamin D2 (ERGOCALCIFEROL) 34971 units (1250 mcg) capsule, Take 1 capsule (50,000 Units) by mouth once a week, Disp: 12 capsule, Rfl: 4    Plan:  Patient and daughter do not want patient to continue Community Paramedic program services at this time. I advised them should they need assistance in the future to reach out to PCP for a new referral. They both agreed.     Time spent with patient: 90 minutes    The patient meets one or more of the following criteria:  * Requires services to prevent readmission to a nursing home or hospital    Acute concern/Follow-up recommendations: She is c/o right hip pain which she attributes to it being her weight bearing side since the stroke in 2020 which effected her left side. I recommended she try chiropractic and/or acupuncture therapies. She is requesting help with finding out if her insurance will cover these therapies. I advised her and her daughter I would reach out to the CP program CHW for assistance and get back to her within a week. She agreed.     Next CP visit scheduled: N/A    Issues for Provider to follow up on: Kevin is going to be switching her Pharmacy to [facility] because she was told [facility] is closing.     She has a RX for Diclofenac but it is not on her Epic medication list.    She advised me her skin is bruising ever since she began taking Clopidogrel and Rosuvastatin. She said she's not sure which one but she was told her medications may cause bruising. I asked her if she is concerned and she said yes because she thinks she may have internal bruising, not just external. I explained to her the bruising is likely just appearing on her skin and as long as it is not painful or bleeding  she should not have to worry but I will let you know.   I assisted her with calling [facility] to fill her depends.     Mao open to trying chiropractic and possibly accupuncture therapies. I advised her I don't know if her insurance will provide coverage and I will send a message to the CP CHW Tati Wagner to request assistance. Her daughter Lana speaks fluent English and asked if Tati can call her with any information on coverage. [phone].     BG  Date  6/27 166  6/20 142  6/7 112  6/6 124  5/26 131  5/24 109  5/21 121  5/19 132    Average last 14 days 142      Provider follow up visit needed: 7/12/22 with MTBAILEY

## 2022-07-05 NOTE — PROGRESS NOTES
1. Pharmacy changed to [facility].  2. I added her diclofenac gel back to her med list.  3. Please reassure her that it is normal to bruise on aspirin and plavix. That means it's working! It is not a sign of internal problems.  4. I put in a referral for acupuncture through the pain clinic.   5. I put in a chiropractic referral in case she needs it for insurance coverage.

## 2022-07-07 ENCOUNTER — TELEPHONE (OUTPATIENT)
Dept: FAMILY MEDICINE | Facility: CLINIC | Age: 57
End: 2022-07-07

## 2022-07-07 DIAGNOSIS — I63.211 ACUTE ISCHEMIC VBA BRAINSTEM STROKE, RIGHT (H): ICD-10-CM

## 2022-07-07 DIAGNOSIS — Z12.31 VISIT FOR SCREENING MAMMOGRAM: ICD-10-CM

## 2022-07-07 DIAGNOSIS — E11.59 TYPE 2 DIABETES MELLITUS WITH OTHER CIRCULATORY COMPLICATION, WITHOUT LONG-TERM CURRENT USE OF INSULIN (H): ICD-10-CM

## 2022-07-07 DIAGNOSIS — Z23 IMMUNIZATION DUE: ICD-10-CM

## 2022-07-07 DIAGNOSIS — E78.5 HYPERLIPIDEMIA LDL GOAL <70: Primary | ICD-10-CM

## 2022-07-07 DIAGNOSIS — I63.22 ACUTE ISCHEMIC VBA BRAINSTEM STROKE, RIGHT (H): ICD-10-CM

## 2022-07-07 RX ORDER — EVOLOCUMAB 140 MG/ML
140 INJECTION, SOLUTION SUBCUTANEOUS
Qty: 2 ML | Refills: 3 | Status: SHIPPED | OUTPATIENT
Start: 2022-07-07 | End: 2022-09-28

## 2022-07-07 RX ORDER — ROSUVASTATIN CALCIUM 40 MG/1
40 TABLET, COATED ORAL AT BEDTIME
Qty: 90 TABLET | Refills: 0 | Status: SHIPPED | OUTPATIENT
Start: 2022-07-07 | End: 2022-07-18

## 2022-07-07 RX ORDER — EVOLOCUMAB 140 MG/ML
140 INJECTION, SOLUTION SUBCUTANEOUS
Qty: 2 ML | Refills: 11 | Status: CANCELLED | OUTPATIENT
Start: 2022-07-07

## 2022-07-07 RX ORDER — EZETIMIBE 10 MG/1
10 TABLET ORAL DAILY
Qty: 90 TABLET | Refills: 0 | Status: SHIPPED | OUTPATIENT
Start: 2022-07-07 | End: 2023-01-17 | Stop reason: ALTCHOICE

## 2022-07-07 RX ORDER — METFORMIN HCL 500 MG
500 TABLET, EXTENDED RELEASE 24 HR ORAL
Qty: 90 TABLET | Refills: 0 | Status: SHIPPED | OUTPATIENT
Start: 2022-07-07 | End: 2022-12-12

## 2022-07-07 NOTE — TELEPHONE ENCOUNTER
RN called patient via [employer]  to relay Dr. Gnetile' message below.       Patient verbalized understanding. RN assisted patient to make a lab appointment and MA visit.    Appointments in Next    Jul 08, 2022  7:45 AM  LAB with LAUREN LAB, PHONE,   [facility] ([facility] ) [phone]   Jul 08, 2022  8:00 AM  MA Visit with LAUREN MA/LPN, PHONE,   [facility] ([facility] ) [phone]       Patient stated that she saw the eye doctor last year at [place] and also get the eye glasses this year in May.         Patient stated she also needs Dr. Gentile to refill EVOLOCUMAB ( REPATHA SURECLICK).       RN will route this encounter to  to review and advise        Alyson Paris RN  [facility]

## 2022-07-07 NOTE — TELEPHONE ENCOUNTER
Rx refilled.  Lab orders placed. Please schedule lab + shot visit at least 2 days prior to MTM visit.  Let her know that I put in referrals for mammogram (she can have I done when she sees MTM, if in person), eye exam, and the shots she's due for.     Future Appointments   Date Time Provider Department Center   7/12/2022  3:00 PM Kenton Jose, PharmD [facility]      Health Maintenance Due   Topic Date Due     MAMMO SCREENING  Never done     Pneumococcal Vaccine: Pediatrics (0 to 5 Years) and At-Risk Patients (6 to 64 Years) (2 - PCV) 07/14/2012     PREVENTIVE CARE VISIT  11/06/2021     DIABETIC FOOT EXAM  11/06/2021     EYE EXAM  02/09/2022     COVID-19 Vaccine (3 - Booster for Moderna series) 05/08/2022     ASTHMA CONTROL TEST  07/24/2022     BP Readings from Last 3 Encounters:   06/27/22 106/54   06/13/22 133/83   05/03/22 107/67     Kevin was seen today for refill request.    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

## 2022-07-07 NOTE — TELEPHONE ENCOUNTER
Reason for Call:  Medication or medication refill:    Do you use a [facility]?  Name of the pharmacy and phone number for the current request:  [facility] - [place], [place] - [address]    Name of the medication requested: insulin and cholesterol medication. Pharmacy was not able to state name of prescription. Caller states patient was unable to provide name of prescription as well. Patient wanted refills send to [facility] instead of [facility]. Will provider know which medication to refill?    Other request: n/a    Can we leave a detailed message on this number? Not Applicable    Phone number pharmacy can be reached at: Other phone number:  [phone]    Best Time: business    Call taken on 7/7/2022 at 10:32 AM by Sintia Avila

## 2022-07-07 NOTE — TELEPHONE ENCOUNTER
RN did chart review.    Patient had an office visit with Dr. Gentile on 1/24/2022. According to the visit note, DM2, well controlled with diet. Wants metformin due to postprandial hyperglycemia. Rx metformin.       Patient had an office visit with Dr. Gentile on 3/24/2022. According to the visit note, Hyperlipidemia  Goal <70 due to history of stroke. Currently on rosuvastatin 40 + ezetimibe 10.       Patient had an visit with MTM on 5/3/2022. According to the visit note, Recommend recheck of A1c every 6-months, with next A1c to be checked in June.   Plan to recheck lipid panel after patient has been on Repatha for 4 weeks.       RN will route this encounter to  to see if lab needs to be ordered when patient has her appointment with MTM next week.       Alyson Paris RN  [facility]

## 2022-07-07 NOTE — TELEPHONE ENCOUNTER
According to the current medications list, patient still has refill remaining at [facility].    RN called [facility] and spoke with pharmacist. She stated that no prescription in their system.      RN called [facility] and spoke with  pharmacist. She stated that all the medication/prescription were transferred from [facility], except her cholesterol medication and diabetic medication.       will need to send the new prescription to [facility].       RN will route this encounter to  to review and advise.          Alyson Paris RN  [facility]

## 2022-07-08 ENCOUNTER — TELEPHONE (OUTPATIENT)
Dept: FAMILY MEDICINE | Facility: CLINIC | Age: 57
End: 2022-07-08

## 2022-07-08 ENCOUNTER — ALLIED HEALTH/NURSE VISIT (OUTPATIENT)
Dept: FAMILY MEDICINE | Facility: CLINIC | Age: 57
End: 2022-07-08
Payer: COMMERCIAL

## 2022-07-08 ENCOUNTER — LAB (OUTPATIENT)
Dept: LAB | Facility: CLINIC | Age: 57
End: 2022-07-08

## 2022-07-08 DIAGNOSIS — E78.5 HYPERLIPIDEMIA LDL GOAL <70: ICD-10-CM

## 2022-07-08 DIAGNOSIS — Z23 IMMUNIZATION DUE: ICD-10-CM

## 2022-07-08 DIAGNOSIS — E11.59 TYPE 2 DIABETES MELLITUS WITH OTHER CIRCULATORY COMPLICATION, WITHOUT LONG-TERM CURRENT USE OF INSULIN (H): ICD-10-CM

## 2022-07-08 LAB
CHOLEST SERPL-MCNC: 77 MG/DL
HBA1C MFR BLD: 6.5 % (ref 0–5.6)
HDLC SERPL-MCNC: 54 MG/DL
LDLC SERPL CALC-MCNC: 8 MG/DL
NONHDLC SERPL-MCNC: 23 MG/DL
TRIGL SERPL-MCNC: 74 MG/DL

## 2022-07-08 PROCEDURE — 80061 LIPID PANEL: CPT

## 2022-07-08 PROCEDURE — 36415 COLL VENOUS BLD VENIPUNCTURE: CPT

## 2022-07-08 PROCEDURE — 83036 HEMOGLOBIN GLYCOSYLATED A1C: CPT

## 2022-07-08 PROCEDURE — 0064A COVID-19,PF,MODERNA (18+ YRS BOOSTER .25ML): CPT

## 2022-07-08 PROCEDURE — 90677 PCV20 VACCINE IM: CPT

## 2022-07-08 PROCEDURE — 99207 PR NO CHARGE NURSE ONLY: CPT

## 2022-07-08 PROCEDURE — 91306 COVID-19,PF,MODERNA (18+ YRS BOOSTER .25ML): CPT

## 2022-07-08 PROCEDURE — 90471 IMMUNIZATION ADMIN: CPT

## 2022-07-08 NOTE — TELEPHONE ENCOUNTER
Called pt with [facility]  on the phone regarding the test result.           ----- Message from Ema Gentile MD sent at 7/8/2022  7:34 AM CDT -----  Please let Mao know:  Your diabetes test is good.

## 2022-07-08 NOTE — TELEPHONE ENCOUNTER
RN attempt to call patient with a [facility]  regarding Dr. Gentile' message below. RN relayed  message about Repatha medication being refilled.    Patient verbalizes understanding, agrees with plan and has no further questions.    Closing encounter.    MORELIA Silvestre, RN   [facility]

## 2022-07-18 DIAGNOSIS — E78.5 HYPERLIPIDEMIA LDL GOAL <70: Primary | ICD-10-CM

## 2022-07-18 RX ORDER — ROSUVASTATIN CALCIUM 40 MG/1
20 TABLET, COATED ORAL AT BEDTIME
Qty: 90 TABLET | Refills: 0 | Status: SHIPPED | OUTPATIENT
Start: 2022-07-18 | End: 2023-08-31

## 2022-07-19 ENCOUNTER — TELEPHONE (OUTPATIENT)
Dept: FAMILY MEDICINE | Facility: CLINIC | Age: 57
End: 2022-07-19

## 2022-07-19 NOTE — TELEPHONE ENCOUNTER
----- Message from Ema Gentile MD sent at 7/18/2022  2:14 PM CDT -----  Please let Mao know:  Her cholesterol levels are now too low! Her cholesterol medicine is really working.  I would recommend reducing her cholesterol medicine.    OLD dose: ONE pill rosuvastatin 40 mg  NEW dose: 1/2 pill rosuvastatin 40 mg (=20 mg)    Recheck cholesterol in 8 weeks. Please schedule lab visit (Fasting).

## 2022-07-19 NOTE — TELEPHONE ENCOUNTER
Attempted to call pt unable to Leave  message due to mailbox not set up yet x 1. Please review message thread below and advise the patient as indicated. Please schedule if necessary or indicated in message thread.  Use  line to contact patient :elodia

## 2022-07-22 NOTE — TELEPHONE ENCOUNTER
Provider response below relayed to patient. Pt is going out of town until the end of September. Scheduled lab only for October Use  line to contact patient :Dany

## 2022-07-27 ENCOUNTER — TRANSFERRED RECORDS (OUTPATIENT)
Dept: HEALTH INFORMATION MANAGEMENT | Facility: CLINIC | Age: 57
End: 2022-07-27

## 2022-09-16 ENCOUNTER — PATIENT OUTREACH (OUTPATIENT)
Dept: CARE COORDINATION | Facility: CLINIC | Age: 57
End: 2022-09-16

## 2022-09-16 NOTE — PROGRESS NOTES
I am graduating Mao from the [employer] [facility] program. A new referral can be placed should she need assistance in the future.

## 2022-09-28 ENCOUNTER — OFFICE VISIT (OUTPATIENT)
Dept: FAMILY MEDICINE | Facility: CLINIC | Age: 57
End: 2022-09-28
Payer: COMMERCIAL

## 2022-09-28 VITALS
SYSTOLIC BLOOD PRESSURE: 111 MMHG | TEMPERATURE: 97.5 F | DIASTOLIC BLOOD PRESSURE: 62 MMHG | BODY MASS INDEX: 30.21 KG/M2 | HEART RATE: 54 BPM | WEIGHT: 134.75 LBS | OXYGEN SATURATION: 99 %

## 2022-09-28 DIAGNOSIS — M25.552 HIP PAIN, LEFT: Primary | ICD-10-CM

## 2022-09-28 DIAGNOSIS — E78.5 HYPERLIPIDEMIA LDL GOAL <70: ICD-10-CM

## 2022-09-28 DIAGNOSIS — I63.211 ACUTE ISCHEMIC VBA BRAINSTEM STROKE, RIGHT (H): ICD-10-CM

## 2022-09-28 DIAGNOSIS — I63.22 ACUTE ISCHEMIC VBA BRAINSTEM STROKE, RIGHT (H): ICD-10-CM

## 2022-09-28 LAB
CHOLEST SERPL-MCNC: 94 MG/DL
HDLC SERPL-MCNC: 59 MG/DL
LDLC SERPL CALC-MCNC: 17 MG/DL
NONHDLC SERPL-MCNC: 35 MG/DL
TRIGL SERPL-MCNC: 92 MG/DL

## 2022-09-28 PROCEDURE — 80061 LIPID PANEL: CPT | Performed by: FAMILY MEDICINE

## 2022-09-28 PROCEDURE — 36415 COLL VENOUS BLD VENIPUNCTURE: CPT | Performed by: FAMILY MEDICINE

## 2022-09-28 PROCEDURE — 99214 OFFICE O/P EST MOD 30 MIN: CPT | Performed by: FAMILY MEDICINE

## 2022-09-28 RX ORDER — EVOLOCUMAB 140 MG/ML
140 INJECTION, SOLUTION SUBCUTANEOUS
Qty: 2 ML | Refills: 3 | Status: SHIPPED | OUTPATIENT
Start: 2022-09-28 | End: 2023-05-11

## 2022-09-28 RX ORDER — POLYETHYLENE GLYCOL 400 AND PROPYLENE GLYCOL 4; 3 MG/ML; MG/ML
SOLUTION/ DROPS OPHTHALMIC
COMMUNITY
Start: 2022-07-07 | End: 2024-01-22

## 2022-09-28 RX ORDER — CYCLOBENZAPRINE HCL 5 MG
TABLET ORAL
COMMUNITY
Start: 2022-07-07 | End: 2023-03-06

## 2022-09-28 RX ORDER — DULOXETIN HYDROCHLORIDE 60 MG/1
CAPSULE, DELAYED RELEASE ORAL
COMMUNITY
Start: 2022-07-07 | End: 2023-06-12

## 2022-09-28 RX ORDER — VENLAFAXINE HYDROCHLORIDE 75 MG/1
CAPSULE, EXTENDED RELEASE ORAL
COMMUNITY
Start: 2022-07-07 | End: 2023-06-12

## 2022-09-28 RX ORDER — MONTELUKAST SODIUM 10 MG/1
TABLET ORAL
COMMUNITY
Start: 2022-07-07 | End: 2023-03-06

## 2022-09-28 ASSESSMENT — ANXIETY QUESTIONNAIRES
7. FEELING AFRAID AS IF SOMETHING AWFUL MIGHT HAPPEN: SEVERAL DAYS
6. BECOMING EASILY ANNOYED OR IRRITABLE: NOT AT ALL
GAD7 TOTAL SCORE: 2
5. BEING SO RESTLESS THAT IT IS HARD TO SIT STILL: NOT AT ALL
IF YOU CHECKED OFF ANY PROBLEMS ON THIS QUESTIONNAIRE, HOW DIFFICULT HAVE THESE PROBLEMS MADE IT FOR YOU TO DO YOUR WORK, TAKE CARE OF THINGS AT HOME, OR GET ALONG WITH OTHER PEOPLE: SOMEWHAT DIFFICULT
7. FEELING AFRAID AS IF SOMETHING AWFUL MIGHT HAPPEN: SEVERAL DAYS
4. TROUBLE RELAXING: NOT AT ALL
3. WORRYING TOO MUCH ABOUT DIFFERENT THINGS: SEVERAL DAYS
2. NOT BEING ABLE TO STOP OR CONTROL WORRYING: NOT AT ALL
GAD7 TOTAL SCORE: 2
1. FEELING NERVOUS, ANXIOUS, OR ON EDGE: NOT AT ALL
8. IF YOU CHECKED OFF ANY PROBLEMS, HOW DIFFICULT HAVE THESE MADE IT FOR YOU TO DO YOUR WORK, TAKE CARE OF THINGS AT HOME, OR GET ALONG WITH OTHER PEOPLE?: SOMEWHAT DIFFICULT
GAD7 TOTAL SCORE: 2

## 2022-09-28 ASSESSMENT — ASTHMA QUESTIONNAIRES
ACT_TOTALSCORE: 25
QUESTION_3 LAST FOUR WEEKS HOW OFTEN DID YOUR ASTHMA SYMPTOMS (WHEEZING, COUGHING, SHORTNESS OF BREATH, CHEST TIGHTNESS OR PAIN) WAKE YOU UP AT NIGHT OR EARLIER THAN USUAL IN THE MORNING: NOT AT ALL
ACT_TOTALSCORE: 25
QUESTION_2 LAST FOUR WEEKS HOW OFTEN HAVE YOU HAD SHORTNESS OF BREATH: NOT AT ALL
QUESTION_1 LAST FOUR WEEKS HOW MUCH OF THE TIME DID YOUR ASTHMA KEEP YOU FROM GETTING AS MUCH DONE AT WORK, SCHOOL OR AT HOME: NONE OF THE TIME
QUESTION_4 LAST FOUR WEEKS HOW OFTEN HAVE YOU USED YOUR RESCUE INHALER OR NEBULIZER MEDICATION (SUCH AS ALBUTEROL): NOT AT ALL
QUESTION_5 LAST FOUR WEEKS HOW WOULD YOU RATE YOUR ASTHMA CONTROL: COMPLETELY CONTROLLED

## 2022-09-28 ASSESSMENT — PATIENT HEALTH QUESTIONNAIRE - PHQ9: SUM OF ALL RESPONSES TO PHQ QUESTIONS 1-9: 3

## 2022-09-28 NOTE — PROGRESS NOTES
Kevin was seen today for lt hip pain, cholesterol check and refill request.    Diagnoses and all orders for this visit:    Hip pain, left: X1 week.  A bit of a difficult historian as reported there was another pain back in August but that pain resolved.  This is a new pain.  It is posterior to the left greater trochanter.  Does not appear consistent with trochanter bursitis.  We will get x-ray today to assess left hip joint.  In addition I do see she has history of lumbosacral radiculopathy at S1 but I do not see an x-ray on her chart.  Discussed obtaining this today as well to just assess if perhaps the low back is getting worse.  Possibly radiculopathy is causing her pain and is just referred pain to the left hip.  Patient agreeable to plan.  -     XR Hip Left 2-3 Views; Future  -     XR Lumbar Spine 2/3 Views; Future    Acute ischemic VBA brainstem stroke, right (H): She was to check her cholesterol today.  She needs refill as below.  -     Lipid panel reflex to direct LDL Fasting; Future  -     evolocumab (REPATHA SURECLICK) 140 MG/ML prefilled autoinjector; Inject 1 mL (140 mg) Subcutaneous every 14 days  -     Lipid panel reflex to direct LDL Fasting    Hyperlipidemia LDL goal <70  -     evolocumab (REPATHA SURECLICK) 140 MG/ML prefilled autoinjector; Inject 1 mL (140 mg) Subcutaneous every 14 days      Subjective   Kevin is a 57 year old accompanied by her  and self, presenting for the following health issues:  Lt hip pain (X 1 week.), Cholesterol check (Fasting today), and Refill Request (Pt stated that her autoinjector was broken. She would like to have a new one if possible.)      HPI   Buttock/hip pain    1 week.   Never had before.   No injury  This side have had stroke befroe.   In august mostly sleeping on the right side.   Used medicaiton on that left area  Get up and I feel pain in that area.   initally in august  Used rubbing medicaiton on it  The pain right now about 1 week  A different  pain. august pain went away   this is a new area.   The lower back   That area is swollen still   Two weeks ago walking and feet got swollen.   Pain comes and goes  radiaties to low back  No numbnes/tingling  No increased weakness on the left  No loss of bladder or bowel    Review of Systems   Constitutional, HEENT, cardiovascular, pulmonary, gi and gu systems are negative, except as otherwise noted.      Objective    /62 (BP Location: Left arm, Patient Position: Sitting, Cuff Size: Adult Regular)   Pulse 54   Temp 97.5  F (36.4  C) (Temporal)   Wt 61.1 kg (134 lb 12 oz)   SpO2 99%   BMI 30.21 kg/m    Body mass index is 30.21 kg/m .  Physical Exam   GENERAL: healthy, alert and no distress  MS: RLE exam shows normal strength and muscle mass, no deformities and ROM of all joints is normal, ALLYSON negative.  LLE exam shows strength intact 5 out of 5 except for plantar and dorsiflexion of the left foot.  She states this is her baseline.  Sensation is intact.  She has tenderness posterior to the greater trochanter.  No tenderness to the greater trochanter itself.  With ALLYSON test she reports not really pain but feels very tight or like a good stretch.    Back exam:  Normal alignment. No midline tenderness or step offs. No Hypertonicity of lumbar paraspinal musculature bilaterally.. Negative straight leg raise.

## 2022-09-29 DIAGNOSIS — I63.211 ACUTE ISCHEMIC VBA BRAINSTEM STROKE, RIGHT (H): ICD-10-CM

## 2022-09-29 DIAGNOSIS — I63.22 ACUTE ISCHEMIC VBA BRAINSTEM STROKE, RIGHT (H): ICD-10-CM

## 2022-09-29 NOTE — TELEPHONE ENCOUNTER
Pt walk in said she was seen  on 9/28/2022 and forget to let dr know she needed new refill on Glucose machine and Aspirin as soon as possible please call pt to let them know what she need to do further.

## 2022-09-30 ENCOUNTER — TELEPHONE (OUTPATIENT)
Dept: FAMILY MEDICINE | Facility: CLINIC | Age: 57
End: 2022-09-30

## 2022-09-30 DIAGNOSIS — M25.552 HIP PAIN, LEFT: ICD-10-CM

## 2022-09-30 DIAGNOSIS — M43.17 SPONDYLOLISTHESIS OF LUMBOSACRAL REGION: Primary | ICD-10-CM

## 2022-09-30 DIAGNOSIS — M47.816 FACET ARTHRITIS OF LUMBAR REGION: ICD-10-CM

## 2022-09-30 DIAGNOSIS — I63.211 ACUTE ISCHEMIC VBA BRAINSTEM STROKE, RIGHT (H): ICD-10-CM

## 2022-09-30 DIAGNOSIS — I63.22 ACUTE ISCHEMIC VBA BRAINSTEM STROKE, RIGHT (H): ICD-10-CM

## 2022-09-30 RX ORDER — ASPIRIN 81 MG/1
81 TABLET, CHEWABLE ORAL DAILY
Qty: 200 TABLET | Refills: 1 | Status: CANCELLED | OUTPATIENT
Start: 2022-09-30

## 2022-09-30 RX ORDER — ASPIRIN 81 MG/1
81 TABLET, CHEWABLE ORAL DAILY
Qty: 200 TABLET | Refills: 1 | Status: SHIPPED | OUTPATIENT
Start: 2022-09-30 | End: 2023-06-06

## 2022-09-30 NOTE — TELEPHONE ENCOUNTER
Pt called pt with the help of a [email or web address]  regarding Dr. Multani's message below. Provider message relay to patient.    Pt verbalizes understanding, agreeable to see Spine Clinic.    Routing message back to Dr. Multani to place referral, as writer did not see referral placed for Spine clinic yet.    MORELIA Silvestre, RN   [facility]    ----- Message from Sarah Beth Multani DO sent at 9/29/2022  8:55 PM CDT -----  Please call:   1) cholesterol was normal.   2) Xray of hip showing mild arthritis.   3) you do have some significant arthritis in your low back and one of the bottom bones is falling forward. This could be pinching a nerve and causing the perceived hip pain.   I think it would be best to send you to the spine clinic at this time for further evaluation and recommendations.

## 2022-10-19 ENCOUNTER — TELEPHONE (OUTPATIENT)
Dept: FAMILY MEDICINE | Facility: CLINIC | Age: 57
End: 2022-10-19

## 2022-10-19 NOTE — TELEPHONE ENCOUNTER
----- Message from Kenton Jose PharmD sent at 10/19/2022 11:45 AM CDT -----  Patient due for MTM follow-up.     Please call to schedule for 60 minute, in person visit with meds.     Thanks!   Kenton

## 2022-10-25 DIAGNOSIS — I63.22 ACUTE ISCHEMIC VBA BRAINSTEM STROKE, RIGHT (H): ICD-10-CM

## 2022-10-25 DIAGNOSIS — E11.59 TYPE 2 DIABETES MELLITUS WITH OTHER CIRCULATORY COMPLICATION, WITHOUT LONG-TERM CURRENT USE OF INSULIN (H): ICD-10-CM

## 2022-10-25 DIAGNOSIS — I10 HYPERTENSION GOAL BP (BLOOD PRESSURE) < 130/80: Primary | ICD-10-CM

## 2022-10-25 DIAGNOSIS — I63.211 ACUTE ISCHEMIC VBA BRAINSTEM STROKE, RIGHT (H): ICD-10-CM

## 2022-10-25 DIAGNOSIS — K21.9 GASTRO-ESOPHAGEAL REFLUX DISEASE WITHOUT ESOPHAGITIS: ICD-10-CM

## 2022-10-25 DIAGNOSIS — Z12.31 VISIT FOR SCREENING MAMMOGRAM: ICD-10-CM

## 2022-10-25 RX ORDER — CLOPIDOGREL BISULFATE 75 MG/1
75 TABLET ORAL DAILY
Qty: 90 TABLET | Refills: 4 | Status: SHIPPED | OUTPATIENT
Start: 2022-10-25 | End: 2023-09-01

## 2022-10-25 NOTE — TELEPHONE ENCOUNTER
I see she has a lab visit for next week. Please schedule shots then, too.  Schedule physical + mammo when an appointment is available.    Future Appointments   Date Time Provider Department Center   10/31/2022  7:00 AM [facility] LAB [facility]      Health Maintenance Due   Topic Date Due     MAMMO SCREENING  Never done     YEARLY PREVENTIVE VISIT  11/06/2021     DIABETIC FOOT EXAM  11/06/2021     ADVANCE CARE PLANNING  08/14/2022     INFLUENZA VACCINE (1) 09/01/2022     COVID-19 Vaccine (4 - Booster for Moderna series) 09/02/2022     MICROALBUMIN  09/03/2022     BP Readings from Last 3 Encounters:   09/28/22 111/62   06/27/22 106/54   06/13/22 133/83     Mao was seen today for refill request.    Diagnoses and all orders for this visit:    Hypertension goal BP (blood pressure) < 130/80  -     Home Blood Pressure Monitor Order for DME - ONLY FOR DME    Acute ischemic VBA brainstem stroke, right (H)  -     clopidogrel (PLAVIX) 75 MG tablet; Take 1 tablet (75 mg) by mouth daily    Gastro-esophageal reflux disease without esophagitis  -     omeprazole (PRILOSEC) 20 MG DR capsule; Take 1 capsule (20 mg) by mouth daily before breakfast    Type 2 diabetes mellitus with other circulatory complication, without long-term current use of insulin (H)  -     Albumin Random Urine Quantitative with Creat Ratio; Future    Visit for screening mammogram  -     *MA Screening Digital Bilateral; Future    Other orders  -     INFLUENZA QUAD, RECOMBINANT, P-FREE (RIV4) (FLUBLOK) AGE 50-64 [CLR927]; Future  -     COVID-19,PF,PFIZER BOOSTER BIVALENT (12+YRS); Future

## 2022-12-10 DIAGNOSIS — E11.59 TYPE 2 DIABETES MELLITUS WITH OTHER CIRCULATORY COMPLICATION, WITHOUT LONG-TERM CURRENT USE OF INSULIN (H): ICD-10-CM

## 2022-12-11 NOTE — TELEPHONE ENCOUNTER
"Last Written Prescription Date:  7/7/22  Last Fill Quantity: 90,  # refills: 0   Last office visit provider:  3/24/22     Requested Prescriptions   Pending Prescriptions Disp Refills     metFORMIN (GLUCOPHAGE XR) 500 MG 24 hr tablet [Pharmacy Med Name: METFORMIN HCL  MG TB2 500 Tablet] 90 tablet 0     Sig: TAKE 1 PILL BY MOUTH ONCE DAILY WITH DINNER / NOJ 1 LUB TSHUAJ TXHUA HNUB NROG PLUAS HMO PAB NTSHAV QAB ZIB       Biguanide Agents Passed - 12/10/2022 10:48 AM        Passed - Patient is age 10 or older        Passed - Patient has documented A1c within the specified period of time.     If HgbA1C is 8 or greater, it needs to be on file within the past 3 months.  If less than 8, must be on file within the past 6 months.     Recent Labs   Lab Test 07/08/22  0716   A1C 6.5*             Passed - Patient's CR is NOT>1.4 OR Patient's EGFR is NOT<45 within past 12 mos.     Recent Labs   Lab Test 03/07/22  0754 09/03/21  1108 11/06/20  0929   GFRESTIMATED 89   < > >60   GFRESTBLACK  --   --  >60    < > = values in this interval not displayed.       Recent Labs   Lab Test 03/07/22  0754   CR 0.77             Passed - Patient does NOT have a diagnosis of CHF.        Passed - Medication is active on med list        Passed - Patient is not pregnant        Passed - Patient has not had a positive pregnancy test within the past 12 mos.         Passed - Recent (6 mo) or future (30 days) visit within the authorizing provider's specialty     Patient had office visit in the last 6 months or has a visit in the next 30 days with authorizing provider or within the authorizing provider's specialty.  See \"Patient Info\" tab in inbasket, or \"Choose Columns\" in Meds & Orders section of the refill encounter.                 Laverne Deluna RN 12/11/22 4:56 PM  "

## 2022-12-12 RX ORDER — METFORMIN HCL 500 MG
TABLET, EXTENDED RELEASE 24 HR ORAL
Qty: 90 TABLET | Refills: 0 | Status: SHIPPED | OUTPATIENT
Start: 2022-12-12 | End: 2023-07-28

## 2022-12-29 ENCOUNTER — IMMUNIZATION (OUTPATIENT)
Dept: FAMILY MEDICINE | Facility: CLINIC | Age: 57
End: 2022-12-29
Payer: COMMERCIAL

## 2022-12-29 DIAGNOSIS — Z23 NEED FOR INFLUENZA VACCINATION: Primary | ICD-10-CM

## 2022-12-29 PROCEDURE — 90682 RIV4 VACC RECOMBINANT DNA IM: CPT

## 2022-12-29 PROCEDURE — 90471 IMMUNIZATION ADMIN: CPT

## 2023-01-16 NOTE — TELEPHONE ENCOUNTER
"Routing refill request to provider for review/approval because:  Labs not current:  ALT  A break in medication    Last Written Prescription Date:  7/7/22  Last Fill Quantity: 90,  # refills: 0   Last office visit provider:  9/28/22     Requested Prescriptions   Pending Prescriptions Disp Refills     ezetimibe (ZETIA) 10 MG tablet [Pharmacy Med Name: EZETIMIBE 10 MG TABS 10 Tablet] 30 tablet 0     Sig: TAKE 1 PILL BY MOUTH EVERY DAY/TXHUA HNUB NOJ 1 LUB TSHUAJ PAB ZOO NTSHAV MUAJ ROJ       Antihyperlipidemic agents Failed - 1/16/2023 11:27 AM        Failed - Normal serum ALT on record in past 12 mos     Recent Labs   Lab Test 09/03/21  1108   ALT 29             Passed - Lipid panel on file in past 12 mos     Recent Labs   Lab Test 09/28/22  1118   CHOL 94   TRIG 92   HDL 59   LDL 17   NHDL 35               Passed - Recent (12 mo) or future (30 days) visit within the authorizing provider's specialty     Patient has had an office visit with the authorizing provider or a provider within the authorizing providers department within the previous 12 mos or has a future within next 30 days. See \"Patient Info\" tab in inbasket, or \"Choose Columns\" in Meds & Orders section of the refill encounter.              Passed - Medication is active on med list        Passed - Patient is age 18 years or older        Passed - No active pregnancy on record        Passed - No positive pregnancy test in past 12 mos             Frank Jacobs RN 01/16/23 11:28 AM  "

## 2023-01-17 RX ORDER — EZETIMIBE 10 MG/1
TABLET ORAL
Qty: 30 TABLET | Refills: 0 | OUTPATIENT
Start: 2023-01-17

## 2023-01-17 NOTE — TELEPHONE ENCOUNTER
Please let the patient's family know that I do not think she needs the ezetimibe anymore.  I reviewed her labs and her cholesterol was pretty low already.  I want her to stop the ezetimibe (Zetia).  Then we can recheck her cholesterol next time she comes in to see if there is any benefit to continuing it.  She SHOULD continue her other cholesterol pill, rosuvastatin.    Lab Results   Component Value Date    CHOL 94 09/28/2022    CHOL 77 07/08/2022     Lab Results   Component Value Date    HDL 59 09/28/2022    HDL 54 07/08/2022     Lab Results   Component Value Date    LDL 17 09/28/2022    LDL 8 07/08/2022     02/11/2016     Lab Results   Component Value Date    TRIG 92 09/28/2022    TRIG 74 07/08/2022

## 2023-01-20 ENCOUNTER — TELEPHONE (OUTPATIENT)
Dept: FAMILY MEDICINE | Facility: CLINIC | Age: 58
End: 2023-01-20
Payer: COMMERCIAL

## 2023-01-20 DIAGNOSIS — T75.3XXA MOTION SICKNESS, INITIAL ENCOUNTER: Primary | ICD-10-CM

## 2023-01-20 DIAGNOSIS — E11.59 TYPE 2 DIABETES MELLITUS WITH OTHER CIRCULATORY COMPLICATION, WITHOUT LONG-TERM CURRENT USE OF INSULIN (H): ICD-10-CM

## 2023-01-20 RX ORDER — BLOOD SUGAR DIAGNOSTIC
1 STRIP MISCELLANEOUS DAILY
Qty: 100 STRIP | Refills: 3 | Status: CANCELLED | OUTPATIENT
Start: 2023-01-20

## 2023-01-20 RX ORDER — LANCETS
1 EACH MISCELLANEOUS DAILY
Qty: 102 EACH | Refills: 11 | Status: CANCELLED | OUTPATIENT
Start: 2023-01-20

## 2023-01-20 NOTE — TELEPHONE ENCOUNTER
Medication Question or Refill and New medication request      Do you have any questions or concerns?  Yes: Pt requesting diabetic supplies and a new lancing device as her is broken. Pt also stated that she'll be taking a cruise some time early February and requesting if provider can help prescribe a med to help with nausea motion sickness. Please look into request and order meds if applicable. Thank you.      Preferred Pharmacy:   [facility] - [place], [place] - [address]  [address]  [place] [address]  Phone: [phone] Fax: [phone]      Okay to leave a detailed message?: Yes at Home number on file [phone] (home)

## 2023-01-20 NOTE — TELEPHONE ENCOUNTER
Called pt and relay provider's msg below to pt as indicated in Hmong. Pt verbalize understanding and have no other questions or concerns at the moment. Completing task.

## 2023-01-30 RX ORDER — GLUCOSAMINE HCL/CHONDROITIN SU 500-400 MG
CAPSULE ORAL
Qty: 100 EACH | Refills: 3 | Status: SHIPPED | OUTPATIENT
Start: 2023-01-30 | End: 2024-01-22

## 2023-01-30 RX ORDER — LANCETS
EACH MISCELLANEOUS
Qty: 100 EACH | Refills: 6 | Status: SHIPPED | OUTPATIENT
Start: 2023-01-30 | End: 2023-06-12

## 2023-01-30 RX ORDER — SCOLOPAMINE TRANSDERMAL SYSTEM 1 MG/1
1 PATCH, EXTENDED RELEASE TRANSDERMAL
Qty: 4 PATCH | Refills: 0 | Status: SHIPPED | OUTPATIENT
Start: 2023-01-30 | End: 2023-02-09

## 2023-01-30 NOTE — TELEPHONE ENCOUNTER
Testing supplies and scopolamine patch (for motion sickness) were sent.   Kevin was seen today for medication request.    Diagnoses and all orders for this visit:    Motion sickness, initial encounter  -     scopolamine (TRANSDERM) 1 MG/3DAYS 72 hr patch; Place 1 patch onto the skin every 72 hours    Type 2 diabetes mellitus with other circulatory complication, without long-term current use of insulin (H)  -     blood glucose monitoring (NO BRAND SPECIFIED) meter device kit; Use to test blood sugar 1 times daily or as directed. Preferred blood glucose meter OR supplies to accompany: Blood Glucose Monitor Brands: per insurance.  -     blood glucose (NO BRAND SPECIFIED) test strip; Use to test blood sugar 1 times daily or as directed. To accompany: Blood Glucose Monitor Brands: per insurance.  -     blood glucose calibration (NO BRAND SPECIFIED) solution; To accompany: Blood Glucose Monitor Brands: per insurance.  -     thin (NO BRAND SPECIFIED) lancets; Use with lanceting device. To accompany: Blood Glucose Monitor Brands: per insurance.  -     alcohol swab prep pads; Use to swab area of injection/nina as directed.

## 2023-02-08 DIAGNOSIS — T75.3XXA MOTION SICKNESS, INITIAL ENCOUNTER: ICD-10-CM

## 2023-02-09 ENCOUNTER — APPOINTMENT (OUTPATIENT)
Dept: INTERPRETER SERVICES | Facility: CLINIC | Age: 58
End: 2023-02-09
Payer: COMMERCIAL

## 2023-02-09 RX ORDER — SCOLOPAMINE TRANSDERMAL SYSTEM 1 MG/1
1 PATCH, EXTENDED RELEASE TRANSDERMAL
Qty: 4 PATCH | Refills: 0 | Status: SHIPPED | OUTPATIENT
Start: 2023-02-09 | End: 2023-06-12

## 2023-02-09 NOTE — TELEPHONE ENCOUNTER
Routing refill request to provider for review/approval because:  Drug not on the G refill protocol     Last Written Prescription Date:  1/30/23  Last Fill Quantity: 4,  # refills: 0   Last office visit provider:  9/28/22     Requested Prescriptions   Pending Prescriptions Disp Refills     scopolamine (TRANSDERM) 1 MG/3DAYS 72 hr patch 4 patch 0     Sig: Place 1 patch onto the skin every 72 hours       There is no refill protocol information for this order          Frank Jacobs RN 02/09/23 1:47 PM

## 2023-02-27 ENCOUNTER — OFFICE VISIT (OUTPATIENT)
Dept: FAMILY MEDICINE | Facility: CLINIC | Age: 58
End: 2023-02-27
Payer: COMMERCIAL

## 2023-02-27 VITALS
HEART RATE: 63 BPM | SYSTOLIC BLOOD PRESSURE: 113 MMHG | WEIGHT: 134.2 LBS | OXYGEN SATURATION: 95 % | TEMPERATURE: 98 F | BODY MASS INDEX: 30.09 KG/M2 | DIASTOLIC BLOOD PRESSURE: 74 MMHG | RESPIRATION RATE: 20 BRPM

## 2023-02-27 DIAGNOSIS — M25.511 ACUTE PAIN OF RIGHT SHOULDER: ICD-10-CM

## 2023-02-27 DIAGNOSIS — S46.911A STRAIN OF RIGHT SHOULDER, INITIAL ENCOUNTER: Primary | ICD-10-CM

## 2023-02-27 PROCEDURE — 99213 OFFICE O/P EST LOW 20 MIN: CPT | Performed by: NURSE PRACTITIONER

## 2023-02-27 RX ORDER — ACETAMINOPHEN 500 MG
1000 TABLET ORAL EVERY 8 HOURS PRN
Qty: 200 TABLET | Refills: 0 | Status: SHIPPED | OUTPATIENT
Start: 2023-02-27

## 2023-02-27 RX ORDER — ACETAMINOPHEN 325 MG/1
975 TABLET ORAL ONCE
Status: COMPLETED | OUTPATIENT
Start: 2023-02-27 | End: 2023-02-27

## 2023-02-27 RX ADMIN — ACETAMINOPHEN 975 MG: 325 TABLET ORAL at 11:02

## 2023-02-27 NOTE — PATIENT INSTRUCTIONS
Tylenol 1000 mg 3 times daily scheduled     Ice pack to shoulder 20 minutes at a time 4-6 times daily for 2 days     Rest arm.  Avoid overhead reaching and heavy lifting on the right side for 1 week     Rotate shoulder frequently.

## 2023-02-27 NOTE — PROGRESS NOTES
Assessment & Plan     Acute pain of right shoulder    - acetaminophen (TYLENOL) tablet 975 mg    Strain of right shoulder, initial encounter    - acetaminophen (TYLENOL) 500 MG tablet  Dispense: 200 tablet; Refill: 0     Patient with sharp pain to the anterior shoulder with reaching overhead with some underlying chronic anterior shoulder pain.  No significant trauma.  Is out of Tylenol.  Noticed and tingling down the arm with reaching overhead only, okay when resting.    Recommend the following    Tylenol 1000 mg 3 times daily scheduled     Ice pack to shoulder 20 minutes at a time 4-6 times daily for 2 days     Rest arm.  Avoid overhead reaching and heavy lifting on the right side for 1 week     Rotate shoulder frequently.             Return in about 10 days (around 3/9/2023) for If no better.    Naz Shea [facility]    Romel Brown is a 57 year old female who presents to clinic today for the following health issues:  Chief Complaint   Patient presents with     Musculoskeletal Problem     Rt shoulder pain x reoccurring sx yesterday. Unable to lift up arm, tingling and numbing sensation to fingers.      HPI    Rt shoulder pain starting yesterday night.  Tingling down to fingers with movement of the arm. No radiating sx if resting. Did lift a tablecloth to put it on the table - raised her shoulders up - and that aggravated it yesterday.  Has some chronic anterior shoulder pain that is ongoing.      Doesn't work.  Has not had anything for pain today.  Is out of Tylenol.  Normally uses Tylenol for these types of issues.    Due to language barrier, an  was present during the history-taking and subsequent discussion (and for part of the physical exam) with this patient.            Review of Systems  See HPI      Objective    /74 (BP Location: Left arm, Patient Position: Sitting, Cuff Size: Adult Regular)   Pulse 63   Temp 98  F (36.7  C) (Oral)   Resp 20    Wt 60.9 kg (134 lb 3.2 oz)   SpO2 95%   BMI 30.09 kg/m    Physical Exam  Constitutional:       Appearance: Normal appearance.   Cardiovascular:      Pulses: Normal pulses.   Pulmonary:      Effort: Pulmonary effort is normal.   Musculoskeletal:         General: Tenderness (Right anterior shoulder) present.      Cervical back: No tenderness.      Comments: Nearly normal overhead reaching of the right upper extremity compared to left.     Under also right trapezius muscle area.  No neck tenderness.   Neurological:      Mental Status: She is alert.      Motor: No weakness.   Psychiatric:         Mood and Affect: Mood normal.

## 2023-03-06 DIAGNOSIS — J30.89 ALLERGIC RHINITIS DUE TO OTHER ALLERGIC TRIGGER, UNSPECIFIED SEASONALITY: Primary | ICD-10-CM

## 2023-03-06 RX ORDER — FLUTICASONE PROPIONATE 220 UG/1
2 AEROSOL, METERED RESPIRATORY (INHALATION) 2 TIMES DAILY
Qty: 12 G | Refills: 4 | Status: SHIPPED | OUTPATIENT
Start: 2023-03-06 | End: 2023-06-30

## 2023-03-06 RX ORDER — MONTELUKAST SODIUM 10 MG/1
10 TABLET ORAL AT BEDTIME
Qty: 90 TABLET | Refills: 4 | Status: SHIPPED | OUTPATIENT
Start: 2023-03-06 | End: 2023-06-30

## 2023-03-06 RX ORDER — LORATADINE 10 MG/1
10 TABLET ORAL DAILY PRN
Qty: 90 TABLET | Refills: 3 | Status: SHIPPED | OUTPATIENT
Start: 2023-03-06 | End: 2023-06-30

## 2023-03-30 ENCOUNTER — OFFICE VISIT (OUTPATIENT)
Dept: FAMILY MEDICINE | Facility: CLINIC | Age: 58
End: 2023-03-30
Payer: COMMERCIAL

## 2023-03-30 VITALS
HEART RATE: 66 BPM | WEIGHT: 139.75 LBS | BODY MASS INDEX: 31.44 KG/M2 | SYSTOLIC BLOOD PRESSURE: 122 MMHG | DIASTOLIC BLOOD PRESSURE: 75 MMHG | OXYGEN SATURATION: 98 % | RESPIRATION RATE: 28 BRPM | TEMPERATURE: 98.2 F | HEIGHT: 56 IN

## 2023-03-30 DIAGNOSIS — I10 HYPERTENSION GOAL BP (BLOOD PRESSURE) < 130/80: ICD-10-CM

## 2023-03-30 DIAGNOSIS — M54.2 CERVICALGIA: Primary | ICD-10-CM

## 2023-03-30 DIAGNOSIS — E11.21 DIABETIC NEPHROPATHY ASSOCIATED WITH TYPE 2 DIABETES MELLITUS (H): ICD-10-CM

## 2023-03-30 DIAGNOSIS — E11.59 TYPE 2 DIABETES MELLITUS WITH OTHER CIRCULATORY COMPLICATION, WITHOUT LONG-TERM CURRENT USE OF INSULIN (H): ICD-10-CM

## 2023-03-30 LAB
ALT SERPL W P-5'-P-CCNC: 25 U/L (ref 10–35)
ANION GAP SERPL CALCULATED.3IONS-SCNC: 13 MMOL/L (ref 7–15)
BUN SERPL-MCNC: 19.7 MG/DL (ref 8–23)
CALCIUM SERPL-MCNC: 9.1 MG/DL (ref 8.6–10)
CHLORIDE SERPL-SCNC: 102 MMOL/L (ref 98–107)
CREAT SERPL-MCNC: 0.75 MG/DL (ref 0.51–0.95)
CREAT UR-MCNC: 27.8 MG/DL
DEPRECATED HCO3 PLAS-SCNC: 25 MMOL/L (ref 22–29)
GFR SERPL CREATININE-BSD FRML MDRD: >90 ML/MIN/1.73M2
GLUCOSE SERPL-MCNC: 131 MG/DL (ref 70–99)
HBA1C MFR BLD: 6.5 % (ref 0–5.6)
MICROALBUMIN UR-MCNC: <12 MG/L
MICROALBUMIN/CREAT UR: NORMAL MG/G{CREAT}
POTASSIUM SERPL-SCNC: 3.8 MMOL/L (ref 3.4–5.3)
SODIUM SERPL-SCNC: 140 MMOL/L (ref 136–145)

## 2023-03-30 PROCEDURE — 83036 HEMOGLOBIN GLYCOSYLATED A1C: CPT | Performed by: FAMILY MEDICINE

## 2023-03-30 PROCEDURE — 80048 BASIC METABOLIC PNL TOTAL CA: CPT | Performed by: FAMILY MEDICINE

## 2023-03-30 PROCEDURE — 99214 OFFICE O/P EST MOD 30 MIN: CPT | Performed by: FAMILY MEDICINE

## 2023-03-30 PROCEDURE — 82043 UR ALBUMIN QUANTITATIVE: CPT | Performed by: FAMILY MEDICINE

## 2023-03-30 PROCEDURE — 84460 ALANINE AMINO (ALT) (SGPT): CPT | Performed by: FAMILY MEDICINE

## 2023-03-30 PROCEDURE — 36415 COLL VENOUS BLD VENIPUNCTURE: CPT | Performed by: FAMILY MEDICINE

## 2023-03-30 PROCEDURE — 82570 ASSAY OF URINE CREATININE: CPT | Performed by: FAMILY MEDICINE

## 2023-03-30 RX ORDER — CYCLOBENZAPRINE HCL 5 MG
5 TABLET ORAL 3 TIMES DAILY PRN
Qty: 20 TABLET | Refills: 0 | Status: SHIPPED | OUTPATIENT
Start: 2023-03-30 | End: 2023-06-30

## 2023-03-30 RX ORDER — POTASSIUM CHLORIDE 750 MG/1
10 TABLET, EXTENDED RELEASE ORAL DAILY
Qty: 90 TABLET | Refills: 4 | Status: SHIPPED | OUTPATIENT
Start: 2023-03-30 | End: 2024-01-22

## 2023-03-30 RX ORDER — BLOOD SUGAR DIAGNOSTIC
1 STRIP MISCELLANEOUS DAILY
Qty: 100 STRIP | Refills: 3 | Status: SHIPPED | OUTPATIENT
Start: 2023-03-30 | End: 2024-01-22

## 2023-03-30 ASSESSMENT — ANXIETY QUESTIONNAIRES
GAD7 TOTAL SCORE: 0
5. BEING SO RESTLESS THAT IT IS HARD TO SIT STILL: NOT AT ALL
GAD7 TOTAL SCORE: 0
IF YOU CHECKED OFF ANY PROBLEMS ON THIS QUESTIONNAIRE, HOW DIFFICULT HAVE THESE PROBLEMS MADE IT FOR YOU TO DO YOUR WORK, TAKE CARE OF THINGS AT HOME, OR GET ALONG WITH OTHER PEOPLE: NOT DIFFICULT AT ALL
2. NOT BEING ABLE TO STOP OR CONTROL WORRYING: NOT AT ALL
7. FEELING AFRAID AS IF SOMETHING AWFUL MIGHT HAPPEN: NOT AT ALL
1. FEELING NERVOUS, ANXIOUS, OR ON EDGE: NOT AT ALL
GAD7 TOTAL SCORE: 0
7. FEELING AFRAID AS IF SOMETHING AWFUL MIGHT HAPPEN: NOT AT ALL
IF YOU CHECKED OFF ANY PROBLEMS ON THIS QUESTIONNAIRE, HOW DIFFICULT HAVE THESE PROBLEMS MADE IT FOR YOU TO DO YOUR WORK, TAKE CARE OF THINGS AT HOME, OR GET ALONG WITH OTHER PEOPLE: NOT DIFFICULT AT ALL
2. NOT BEING ABLE TO STOP OR CONTROL WORRYING: NOT AT ALL
6. BECOMING EASILY ANNOYED OR IRRITABLE: NOT AT ALL
8. IF YOU CHECKED OFF ANY PROBLEMS, HOW DIFFICULT HAVE THESE MADE IT FOR YOU TO DO YOUR WORK, TAKE CARE OF THINGS AT HOME, OR GET ALONG WITH OTHER PEOPLE?: NOT DIFFICULT AT ALL
6. BECOMING EASILY ANNOYED OR IRRITABLE: NOT AT ALL
GAD7 TOTAL SCORE: 0
3. WORRYING TOO MUCH ABOUT DIFFERENT THINGS: NOT AT ALL
7. FEELING AFRAID AS IF SOMETHING AWFUL MIGHT HAPPEN: NOT AT ALL
5. BEING SO RESTLESS THAT IT IS HARD TO SIT STILL: NOT AT ALL
4. TROUBLE RELAXING: NOT AT ALL
1. FEELING NERVOUS, ANXIOUS, OR ON EDGE: NOT AT ALL
4. TROUBLE RELAXING: NOT AT ALL
3. WORRYING TOO MUCH ABOUT DIFFERENT THINGS: NOT AT ALL

## 2023-03-30 ASSESSMENT — ASTHMA QUESTIONNAIRES
QUESTION_3 LAST FOUR WEEKS HOW OFTEN DID YOUR ASTHMA SYMPTOMS (WHEEZING, COUGHING, SHORTNESS OF BREATH, CHEST TIGHTNESS OR PAIN) WAKE YOU UP AT NIGHT OR EARLIER THAN USUAL IN THE MORNING: ONCE OR TWICE
QUESTION_2 LAST FOUR WEEKS HOW OFTEN HAVE YOU HAD SHORTNESS OF BREATH: ONCE OR TWICE A WEEK
QUESTION_2 LAST FOUR WEEKS HOW OFTEN HAVE YOU HAD SHORTNESS OF BREATH: ONCE OR TWICE A WEEK
QUESTION_3 LAST FOUR WEEKS HOW OFTEN DID YOUR ASTHMA SYMPTOMS (WHEEZING, COUGHING, SHORTNESS OF BREATH, CHEST TIGHTNESS OR PAIN) WAKE YOU UP AT NIGHT OR EARLIER THAN USUAL IN THE MORNING: ONCE OR TWICE
ACT_TOTALSCORE: 18
ACT_TOTALSCORE: 20
QUESTION_1 LAST FOUR WEEKS HOW MUCH OF THE TIME DID YOUR ASTHMA KEEP YOU FROM GETTING AS MUCH DONE AT WORK, SCHOOL OR AT HOME: A LITTLE OF THE TIME
QUESTION_4 LAST FOUR WEEKS HOW OFTEN HAVE YOU USED YOUR RESCUE INHALER OR NEBULIZER MEDICATION (SUCH AS ALBUTEROL): ONCE A WEEK OR LESS
QUESTION_4 LAST FOUR WEEKS HOW OFTEN HAVE YOU USED YOUR RESCUE INHALER OR NEBULIZER MEDICATION (SUCH AS ALBUTEROL): TWO OR THREE TIMES PER WEEK
QUESTION_5 LAST FOUR WEEKS HOW WOULD YOU RATE YOUR ASTHMA CONTROL: WELL CONTROLLED
QUESTION_5 LAST FOUR WEEKS HOW WOULD YOU RATE YOUR ASTHMA CONTROL: WELL CONTROLLED
QUESTION_1 LAST FOUR WEEKS HOW MUCH OF THE TIME DID YOUR ASTHMA KEEP YOU FROM GETTING AS MUCH DONE AT WORK, SCHOOL OR AT HOME: SOME OF THE TIME
ACT_TOTALSCORE: 20

## 2023-03-30 ASSESSMENT — PAIN SCALES - GENERAL: PAINLEVEL: EXTREME PAIN (8)

## 2023-03-30 NOTE — PROGRESS NOTES
Assessment/ Plan  1. Cervicalgia  2-week history, left paracervical left trapezius region.  No neuropathic pain  Exam abnormal due to previous CVA with weakness on the left side.  No other red flags    Taking Tylenol with inadequate relief  Add cyclobenzaprine.  Has also been using IcyHot which helps some.  Requesting prescription with this.    Suggested physical therapy which she declined for now  - cyclobenzaprine (FLEXERIL) 5 MG tablet; Take 1 tablet (5 mg) by mouth 3 times daily as needed for muscle spasms  Dispense: 20 tablet; Refill: 0  - Menthol, Topical Analgesic, 7.5 % (Roll) MISC; Apply to sore muscles every 6 hours as directed  Dispense: 1 each; Refill: 4    2. Diabetic nephropathy associated with type 2 diabetes mellitus (H)  Check A1c, microalbumin, 6 BMP  - HEMOGLOBIN A1C; Future  - HEMOGLOBIN A1C    3. Hypertension goal BP (blood pressure) < 130/80  Blood pressure well controlled, she is on lisinopril 20 mg  - BASIC METABOLIC PANEL; Future  - BASIC METABOLIC PANEL    4. Type 2 diabetes mellitus with other circulatory complication, without long-term current use of insulin (H)  A1c is pending, metformin alone 500 mg daily.  Last A1c 6.5.  - ALT; Future  - ACCU-CHEK GUIDE test strip; 1 strip by In Vitro route daily Use to test once daily  Dispense: 100 strip; Refill: 3  - ALT     Body mass index is 31.22 kg/m .    Subjective  CC:  chief complaint  HPI:  57-year-old, here with left shoulder pain.  Awoke with this 2 weeks ago.  No numbness or tingling or radiation.  She has had back problems in the past, remembers having something similar to this many years ago but nothing recently.  History of CVA.    History of type 2 diabetes, on metformin alone.  Takes lisinopril.  History of CVA, on Repatha, Crestor.  Also aspirin and Plavix.  Patient Active Problem List   Diagnosis     Acute ischemic VBA brainstem stroke, right (H)     Vitamin D deficiency     Type 2 diabetes mellitus (H)     Overweight     Mild  intermittent asthma without complication     Lumbosacral radiculopathy at S1     Hyperlipidemia LDL goal <70     Hypertension goal BP (blood pressure) < 130/80     Fatty liver     Anxiety     Urinary, incontinence, stress female     Cystocele with uterine prolapse     Chronic, continuous use of opioids     Diabetic nephropathy associated with type 2 diabetes mellitus (H)     Diabetic polyneuropathy associated with type 2 diabetes mellitus (H)     Rotator cuff tendinitis, unspecified laterality     Osteoarthritis of spine with radiculopathy, cervical region     Facet arthritis of lumbar region     Hip pain, left     Spondylolisthesis of lumbosacral region     Current medications reviewed as follows:  acetaminophen (TYLENOL) 500 MG tablet, Take 2 tablets (1,000 mg) by mouth every 8 hours as needed for mild pain  albuterol (PROAIR HFA/PROVENTIL HFA/VENTOLIN HFA) 108 (90 Base) MCG/ACT inhaler, Inhale 2 puffs into the lungs every 4 hours as needed for shortness of breath / dyspnea or wheezing  alcohol swab prep pads, Use to swab area of injection/nina as directed.  ARTIFICIAL TEARS 1.4 % ophthalmic solution, Apply 1 drop to eye every 2 hours as needed for dry eyes  aspirin (ASPIRIN LOW DOSE) 81 MG chewable tablet, Take 1 tablet (81 mg) by mouth daily  blood glucose (NO BRAND SPECIFIED) test strip, Use to test blood sugar 1 times daily or as directed. To accompany: Blood Glucose Monitor Brands: per insurance.  blood glucose calibration (NO BRAND SPECIFIED) solution, To accompany: Blood Glucose Monitor Brands: per insurance.  blood glucose monitoring (ACCU-CHEK FASTCLIX) lancets, 1 each daily  blood glucose monitoring (NO BRAND SPECIFIED) meter device kit, Use to test blood sugar 1 times daily or as directed. Preferred blood glucose meter OR supplies to accompany: Blood Glucose Monitor Brands: per insurance.  clopidogrel (PLAVIX) 75 MG tablet, Take 1 tablet (75 mg) by mouth daily  DULoxetine (CYMBALTA) 60 MG capsule, TAKE  1 PILL BY MOUTH EVERY DAY/TXHUA HNUB NOJ 1 LUB TSHUAJ PAB NYUAJ SIAB  evolocumab (REPATHA SURECLICK) 140 MG/ML prefilled autoinjector, Inject 1 mL (140 mg) Subcutaneous every 14 days  fluticasone (FLOVENT HFA) 220 MCG/ACT inhaler, Inhale 2 puffs into the lungs 2 times daily  ketotifen (ZADITOR) 0.025 % ophthalmic solution, Place 1 drop into both eyes 2 times daily as needed for itching  lisinopril (ZESTRIL) 20 MG tablet, Take 1 tablet (20 mg) by mouth daily  loratadine (CLARITIN) 10 MG tablet, Take 1 tablet (10 mg) by mouth daily as needed for allergies  metFORMIN (GLUCOPHAGE XR) 500 MG 24 hr tablet, TAKE 1 PILL BY MOUTH ONCE DAILY WITH DINNER / NOJ 1 LUB TSHUAJ TXHUA HNUB NROG PLUAS HMO PAB NTSHAV QAB ZIB  montelukast (SINGULAIR) 10 MG tablet, Take 1 tablet (10 mg) by mouth At Bedtime  omeprazole (PRILOSEC) 20 MG DR capsule, Take 1 capsule (20 mg) by mouth daily before breakfast  polyethylene glycol (MIRALAX) 17 GM/Dose powder, Take 17 g by mouth daily (with breakfast) In 8 ounces of water.  rosuvastatin (CRESTOR) 40 MG tablet, Take 0.5 tablets (20 mg) by mouth At Bedtime  scopolamine (TRANSDERM) 1 MG/3DAYS 72 hr patch, Place 1 patch onto the skin every 72 hours  SYSTANE 0.4-0.3 % SOLN ophthalmic solution, INSTILL 1 DROP INTO THE AFFECTED EYE(S) EVERY 2 HOURS AS NEEDED FOR DRY EYES/ YOG QHOV MUAG QHUAV TXHUA 2 TEEV ALEX 1 NCOS  thin (NO BRAND SPECIFIED) lancets, Use with lanceting device. To accompany: Blood Glucose Monitor Brands: per insurance.  venlafaxine (EFFEXOR XR) 75 MG 24 hr capsule, TAKE 1 PILL BY MOUTH EVERY DAY/TXHUA HNUB NOJ 1 LUB TSHUAJ  vitamin D2 (ERGOCALCIFEROL) 26479 units (1250 mcg) capsule, Take 1 capsule (50,000 Units) by mouth once a week    No current facility-administered medications on file prior to visit.     History   Smoking Status     Never   Smokeless Tobacco     Never     Social History     Social History Narrative    Has 3.5 hours PCA/day - 3/21.     Patient Care Team:  Ema Gentile  "MD BAILEY as PCP - General  Kenton Jose PharmD as Pharmacist (Pharmacist)  Ema Gentile MD as Assigned PCP  Vik Acuna DO as Assigned Musculoskeletal Provider  Kenton Jose PharmD as Assigned [place] Pharmacist        Objective  Physical Exam  Vitals:    03/30/23 1252   BP: 122/75   BP Location: Right arm   Patient Position: Sitting   Cuff Size: Adult Regular   Pulse: 66   Resp: 28   Temp: 98.2  F (36.8  C)   SpO2: 98%   Weight: 63.4 kg (139 lb 12 oz)   Height: 1.425 m (4' 8.1\")   Inspection:   Neck and shoulder musculature is grossly symmetric, no obvious spasm, no obvious spinal curvature or signs of trauma.    Overlying skin is normal    Palpation right    Spinous process: Normal  base of the occiput:Normal  paraspinous muscle(s) of neck: Normal  trapezius muscle(s): Normal  pain parascapular region(s) Normal    Palpation  left  Spinous process: Normal  base of the occiput:Normal  paraspinous muscle(s) of neck: Abnormal -mild tenderness  trapezius muscle(s): Abnormal -mild tenderness  pain parascapular region(s) Normal  Range of motion  Neck rotation:  Normal    Neck flexion and extension:Abnormal -normal range of motion but tenderness with flexion    Upper extremity reflexes done?  Yes: Abnormal, hyperreflexic on the left side    Strength in upper extremities tested?:  Yes: Abnormal, left hand, wrist, pronation/supination/elbow flexion weakness              Diagnostics  Pending    Please note: Voice recognition software was used in this dictation.  It may therefore contain typographical errors.    Answers for HPI/ROS submitted by the patient on 3/30/2023  LORENA 7 TOTAL SCORE: 0  What is the reason for your visit today? : Back pain on the left side upper back- no relief over the last two weeks      "

## 2023-03-30 NOTE — LETTER
March 31, 2023      Kevin Thrasher  [address] [address]        Dear ,    We are writing to inform you of your test results.    Kevin, your blood tests look okay.    Resulted Orders   HEMOGLOBIN A1C   Result Value Ref Range    Hemoglobin A1C 6.5 (H) 0.0 - 5.6 %      Comment:      Normal <5.7%   Prediabetes 5.7-6.4%    Diabetes 6.5% or higher     Note: Adopted from ADA consensus guidelines.   BASIC METABOLIC PANEL   Result Value Ref Range    Sodium 140 136 - 145 mmol/L    Potassium 3.8 3.4 - 5.3 mmol/L    Chloride 102 98 - 107 mmol/L    Carbon Dioxide (CO2) 25 22 - 29 mmol/L    Anion Gap 13 7 - 15 mmol/L    Urea Nitrogen 19.7 8.0 - 23.0 mg/dL    Creatinine 0.75 0.51 - 0.95 mg/dL    Calcium 9.1 8.6 - 10.0 mg/dL    Glucose 131 (H) 70 - 99 mg/dL    GFR Estimate >90 >60 mL/min/1.73m2      Comment:      eGFR calculated using 2021 CKD-EPI equation.   ALT   Result Value Ref Range    ALT 25 10 - 35 U/L       If you have any questions or concerns, please call the clinic at the number listed above.       Sincerely,      Stephen Mayfield MD

## 2023-03-31 RX ORDER — POTASSIUM CHLORIDE 750 MG/1
10 TABLET, EXTENDED RELEASE ORAL DAILY
Qty: 90 TABLET | Refills: 4 | OUTPATIENT
Start: 2023-03-31

## 2023-04-10 ENCOUNTER — TELEPHONE (OUTPATIENT)
Dept: FAMILY MEDICINE | Facility: CLINIC | Age: 58
End: 2023-04-10
Payer: COMMERCIAL

## 2023-04-10 NOTE — TELEPHONE ENCOUNTER
Prior Authorization Approval    Authorization Effective Date: 3/11/2023  Authorization Expiration Date: 4/9/2024  Medication: evolocumab (REPATHA SURECLICK) 140 MG/ML prefilled autoinjector - PA RENEWAL  Approved Dose/Quantity:   Reference #:     Insurance Company: [employer] - Phone [phone] Fax [phone]  Expected CoPay:       CoPay Card Available:      Foundation Assistance Needed:    Which Pharmacy is filling the prescription (Not needed for infusion/clinic administered): [facility] - [place], [place] - [id number]  Pharmacy Notified: Yes  Patient Notified: Yes

## 2023-04-10 NOTE — TELEPHONE ENCOUNTER
Central Prior Authorization Team   Phone: [phone]    PA Initiation    Medication: evolocumab (REPATHA SURECLICK) 140 MG/ML prefilled autoinjector - PA RENEWAL  Insurance Company: [employer] - Phone [phone] Fax [phone]  Pharmacy Filling the Rx: [facility] - [place], [place] - [address]  Filling Pharmacy Phone: [phone]  Filling Pharmacy Fax:    Start Date: 4/10/2023

## 2023-05-11 DIAGNOSIS — E78.5 HYPERLIPIDEMIA LDL GOAL <70: ICD-10-CM

## 2023-05-11 DIAGNOSIS — I63.22 ACUTE ISCHEMIC VBA BRAINSTEM STROKE, RIGHT (H): ICD-10-CM

## 2023-05-11 DIAGNOSIS — I63.211 ACUTE ISCHEMIC VBA BRAINSTEM STROKE, RIGHT (H): ICD-10-CM

## 2023-05-11 RX ORDER — EVOLOCUMAB 140 MG/ML
140 INJECTION, SOLUTION SUBCUTANEOUS
Qty: 2 ML | Refills: 3 | Status: SHIPPED | OUTPATIENT
Start: 2023-05-11 | End: 2023-09-22

## 2023-06-06 ENCOUNTER — OFFICE VISIT (OUTPATIENT)
Dept: FAMILY MEDICINE | Facility: CLINIC | Age: 58
End: 2023-06-06
Payer: COMMERCIAL

## 2023-06-06 VITALS
HEIGHT: 56 IN | WEIGHT: 140 LBS | SYSTOLIC BLOOD PRESSURE: 132 MMHG | DIASTOLIC BLOOD PRESSURE: 66 MMHG | HEART RATE: 78 BPM | BODY MASS INDEX: 31.49 KG/M2 | RESPIRATION RATE: 18 BRPM | TEMPERATURE: 97.4 F | OXYGEN SATURATION: 97 %

## 2023-06-06 DIAGNOSIS — R23.3 ABNORMAL BRUISING: Primary | ICD-10-CM

## 2023-06-06 DIAGNOSIS — E78.5 HYPERLIPIDEMIA LDL GOAL <70: ICD-10-CM

## 2023-06-06 DIAGNOSIS — I63.22 ACUTE ISCHEMIC VBA BRAINSTEM STROKE, RIGHT (H): ICD-10-CM

## 2023-06-06 DIAGNOSIS — I63.211 ACUTE ISCHEMIC VBA BRAINSTEM STROKE, RIGHT (H): ICD-10-CM

## 2023-06-06 DIAGNOSIS — Z12.11 SCREEN FOR COLON CANCER: ICD-10-CM

## 2023-06-06 LAB
BASOPHILS # BLD AUTO: 0 10E3/UL (ref 0–0.2)
BASOPHILS NFR BLD AUTO: 0 %
EOSINOPHIL # BLD AUTO: 0 10E3/UL (ref 0–0.7)
EOSINOPHIL NFR BLD AUTO: 0 %
ERYTHROCYTE [DISTWIDTH] IN BLOOD BY AUTOMATED COUNT: 12.9 % (ref 10–15)
HCT VFR BLD AUTO: 41.4 % (ref 35–47)
HGB BLD-MCNC: 13.4 G/DL (ref 11.7–15.7)
IMM GRANULOCYTES # BLD: 0 10E3/UL
IMM GRANULOCYTES NFR BLD: 0 %
LYMPHOCYTES # BLD AUTO: 3 10E3/UL (ref 0.8–5.3)
LYMPHOCYTES NFR BLD AUTO: 30 %
MCH RBC QN AUTO: 29.3 PG (ref 26.5–33)
MCHC RBC AUTO-ENTMCNC: 32.4 G/DL (ref 31.5–36.5)
MCV RBC AUTO: 90 FL (ref 78–100)
MONOCYTES # BLD AUTO: 0.7 10E3/UL (ref 0–1.3)
MONOCYTES NFR BLD AUTO: 7 %
NEUTROPHILS # BLD AUTO: 6.2 10E3/UL (ref 1.6–8.3)
NEUTROPHILS NFR BLD AUTO: 62 %
PLATELET # BLD AUTO: 193 10E3/UL (ref 150–450)
RBC # BLD AUTO: 4.58 10E6/UL (ref 3.8–5.2)
WBC # BLD AUTO: 10 10E3/UL (ref 4–11)

## 2023-06-06 PROCEDURE — 80061 LIPID PANEL: CPT | Performed by: FAMILY MEDICINE

## 2023-06-06 PROCEDURE — 99214 OFFICE O/P EST MOD 30 MIN: CPT | Performed by: FAMILY MEDICINE

## 2023-06-06 PROCEDURE — 36415 COLL VENOUS BLD VENIPUNCTURE: CPT | Performed by: FAMILY MEDICINE

## 2023-06-06 PROCEDURE — 85025 COMPLETE CBC W/AUTO DIFF WBC: CPT | Performed by: FAMILY MEDICINE

## 2023-06-06 PROCEDURE — 80053 COMPREHEN METABOLIC PANEL: CPT | Performed by: FAMILY MEDICINE

## 2023-06-06 PROCEDURE — 85610 PROTHROMBIN TIME: CPT | Performed by: FAMILY MEDICINE

## 2023-06-06 RX ORDER — ASPIRIN 81 MG/1
81 TABLET, CHEWABLE ORAL DAILY
Qty: 200 TABLET | Refills: 1 | Status: SHIPPED | OUTPATIENT
Start: 2023-06-06 | End: 2024-01-22

## 2023-06-06 ASSESSMENT — ANXIETY QUESTIONNAIRES
3. WORRYING TOO MUCH ABOUT DIFFERENT THINGS: NOT AT ALL
IF YOU CHECKED OFF ANY PROBLEMS ON THIS QUESTIONNAIRE, HOW DIFFICULT HAVE THESE PROBLEMS MADE IT FOR YOU TO DO YOUR WORK, TAKE CARE OF THINGS AT HOME, OR GET ALONG WITH OTHER PEOPLE: NOT DIFFICULT AT ALL
1. FEELING NERVOUS, ANXIOUS, OR ON EDGE: NOT AT ALL
5. BEING SO RESTLESS THAT IT IS HARD TO SIT STILL: NOT AT ALL
7. FEELING AFRAID AS IF SOMETHING AWFUL MIGHT HAPPEN: NOT AT ALL
4. TROUBLE RELAXING: NOT AT ALL
GAD7 TOTAL SCORE: 0
8. IF YOU CHECKED OFF ANY PROBLEMS, HOW DIFFICULT HAVE THESE MADE IT FOR YOU TO DO YOUR WORK, TAKE CARE OF THINGS AT HOME, OR GET ALONG WITH OTHER PEOPLE?: NOT DIFFICULT AT ALL
GAD7 TOTAL SCORE: 0
7. FEELING AFRAID AS IF SOMETHING AWFUL MIGHT HAPPEN: NOT AT ALL
6. BECOMING EASILY ANNOYED OR IRRITABLE: NOT AT ALL
2. NOT BEING ABLE TO STOP OR CONTROL WORRYING: NOT AT ALL

## 2023-06-06 NOTE — LETTER
June 22, 2023      Kevin Thrasher  [address] [place] [id number]        Dear ,    We are writing to inform you of your test results.    Your labs are generally normal - except your blood sugar.  Make sure you see the pharmacist (we referred you at your recent visit).       Resulted Orders   Comprehensive metabolic panel (BMP + Alb, Alk Phos, ALT, AST, Total. Bili, TP)   Result Value Ref Range    Sodium 139 136 - 145 mmol/L    Potassium 3.5 3.4 - 5.3 mmol/L    Chloride 103 98 - 107 mmol/L    Carbon Dioxide (CO2) 24 22 - 29 mmol/L    Anion Gap 12 7 - 15 mmol/L    Urea Nitrogen 15.5 8.0 - 23.0 mg/dL    Creatinine 0.78 0.51 - 0.95 mg/dL    Calcium 8.9 8.6 - 10.0 mg/dL    Glucose 200 (H) 70 - 99 mg/dL    Alkaline Phosphatase 74 35 - 104 U/L    AST 31 10 - 35 U/L    ALT 27 10 - 35 U/L    Protein Total 6.8 6.4 - 8.3 g/dL    Albumin 3.8 3.5 - 5.2 g/dL    Bilirubin Total 0.4 <=1.2 mg/dL    GFR Estimate 87 >60 mL/min/1.73m2      Comment:      eGFR calculated using 2021 CKD-EPI equation.   INR   Result Value Ref Range    INR 0.94 0.85 - 1.15   Lipid Profile (Chol, Trig, HDL, LDL calc)   Result Value Ref Range    Cholesterol 126 <200 mg/dL    Triglycerides 104 <150 mg/dL    Direct Measure HDL 59 >=50 mg/dL    LDL Cholesterol Calculated 46 <=100 mg/dL    Non HDL Cholesterol 67 <130 mg/dL    Narrative    Cholesterol  Desirable:  <200 mg/dL    Triglycerides  Normal:  Less than 150 mg/dL  Borderline High:  150-199 mg/dL  High:  200-499 mg/dL  Very High:  Greater than or equal to 500 mg/dL    Direct Measure HDL  Female:  Greater than or equal to 50 mg/dL   Male:  Greater than or equal to 40 mg/dL    LDL Cholesterol  Desirable:  <100mg/dL  Above Desirable:  100-129 mg/dL   Borderline High:  130-159 mg/dL   High:  160-189 mg/dL   Very High:  >= 190 mg/dL    Non HDL Cholesterol  Desirable:  130 mg/dL  Above Desirable:  130-159 mg/dL  Borderline High:  160-189 mg/dL  High:  190-219 mg/dL  Very High:  Greater than or equal to 220 mg/dL    CBC with platelets and differential   Result Value Ref Range    WBC Count 10.0 4.0 - 11.0 10e3/uL    RBC Count 4.58 3.80 - 5.20 10e6/uL    Hemoglobin 13.4 11.7 - 15.7 g/dL    Hematocrit 41.4 35.0 - 47.0 %    MCV 90 78 - 100 fL    MCH 29.3 26.5 - 33.0 pg    MCHC 32.4 31.5 - 36.5 g/dL    RDW 12.9 10.0 - 15.0 %    Platelet Count 193 150 - 450 10e3/uL    % Neutrophils 62 %    % Lymphocytes 30 %    % Monocytes 7 %    % Eosinophils 0 %    % Basophils 0 %    % Immature Granulocytes 0 %    Absolute Neutrophils 6.2 1.6 - 8.3 10e3/uL    Absolute Lymphocytes 3.0 0.8 - 5.3 10e3/uL    Absolute Monocytes 0.7 0.0 - 1.3 10e3/uL    Absolute Eosinophils 0.0 0.0 - 0.7 10e3/uL    Absolute Basophils 0.0 0.0 - 0.2 10e3/uL    Absolute Immature Granulocytes 0.0 <=0.4 10e3/uL       If you have any questions or concerns, please call the clinic at the number listed above.       Sincerely,      Dominique Odonnell MD

## 2023-06-06 NOTE — PROGRESS NOTES
"1. Abnormal bruising  Patient has significant bruising on arms and legs - she claims this has been present x years.  Yet, she is certain it is due to her statin medication.  Patient has been on Clopidogrel and ASA since stroke several years ago - I am suspicious that recommendations would indicate that she could drop at least one of these medications.  But - will ask her to continue until she can review with our pharmacist - have made that referral today.  She is okay with this.  I think it is less likely that it is a statin that is responsible for this as this has been longer standing than the statin.  Will check labs as noted.    - Med Therapy Management Referral  - Comprehensive metabolic panel (BMP + Alb, Alk Phos, ALT, AST, Total. Bili, TP); Future  - CBC with Platelets & Differential; Future  - INR; Future  - Comprehensive metabolic panel (BMP + Alb, Alk Phos, ALT, AST, Total. Bili, TP)  - CBC with Platelets & Differential  - INR    2. Acute ischemic VBA brainstem stroke, right (H)  Patient has h/o brainstem stroke - a few years ago - still on ASA and Clopidogrel.  Will refer to MTM  - aspirin (ASPIRIN LOW DOSE) 81 MG chewable tablet; Take 1 tablet (81 mg) by mouth daily  Dispense: 200 tablet; Refill: 1  - Med Therapy Management Referral    3. Hyperlipidemia LDL goal <70  On Atorvastatin - thinks this is responsible for her unsightly bruising - this is actually a little better (by report) than previous, but she is just tired of the bruising and wants to do something about it  - Lipid Profile (Chol, Trig, HDL, LDL calc); Future  - Lipid Profile (Chol, Trig, HDL, LDL calc)    4. Screen for colon cancer  Due for colon cancer screening - would need to decide on need for anticoagulation in relation to her screening      Subjective   Kevin is a 57 year old, presenting for the following health issues:  Recheck Medication (Per pt \"Cholesterol medication is making me bruise all over my arms\")        6/6/2023     3:19 PM " "  Additional Questions   Roomed by Geovanna     Bruised forehead -   Tells me she did acupuncture to self and it bruised    Has purple bruising on forearms and lower legs  Has ulcer (8 mm) on left anterior shin -     When had stroke had weakness on left side arm/legs  Still has some numbness, but weakness is improved - with exercises      History of Present Illness       Reason for visit:  Medication follow up    She eats 2-3 servings of fruits and vegetables daily.She consumes 0 sweetened beverage(s) daily.She exercises with enough effort to increase her heart rate 30 to 60 minutes per day.  She exercises with enough effort to increase her heart rate 4 days per week.   She is taking medications regularly.  Today's LORENA-7 Score: 0               Review of Systems   Constitutional: Negative for chills, fatigue, fever and unexpected weight change.   HENT: Negative.    Eyes: Negative for visual disturbance.   Respiratory: Negative for cough and shortness of breath.    Cardiovascular: Negative for chest pain and peripheral edema.   Gastrointestinal: Negative.    Endocrine: Negative for polydipsia and polyuria.   Genitourinary: Negative.    Musculoskeletal: Negative.    Skin:        Bruising - arms/legs   Neurological: Positive for weakness.   Hematological: Bruises/bleeds easily.   Psychiatric/Behavioral: Negative.    All other systems reviewed and are negative.           Objective    /66 (BP Location: Right arm, Patient Position: Sitting, Cuff Size: Adult Regular)   Pulse 78   Temp 97.4  F (36.3  C) (Temporal)   Resp 18   Ht 1.42 m (4' 7.91\")   Wt 63.5 kg (140 lb)   SpO2 97%   BMI 31.49 kg/m    Body mass index is 31.49 kg/m .  Physical Exam  Vitals reviewed.   Constitutional:       General: She is not in acute distress.     Appearance: Normal appearance.   HENT:      Head: Normocephalic.      Right Ear: Tympanic membrane, ear canal and external ear normal.      Left Ear: Tympanic membrane, ear canal and " external ear normal.      Nose: Nose normal.      Mouth/Throat:      Mouth: Mucous membranes are moist.      Pharynx: No posterior oropharyngeal erythema.   Eyes:      Extraocular Movements: Extraocular movements intact.      Conjunctiva/sclera: Conjunctivae normal.      Pupils: Pupils are equal, round, and reactive to light.   Cardiovascular:      Rate and Rhythm: Normal rate and regular rhythm.      Pulses: Normal pulses.      Heart sounds: Normal heart sounds. No murmur heard.  Pulmonary:      Effort: Pulmonary effort is normal.      Breath sounds: Normal breath sounds.   Abdominal:      Palpations: Abdomen is soft. There is no mass.      Tenderness: There is no abdominal tenderness. There is no guarding or rebound.   Musculoskeletal:         General: No deformity. Normal range of motion.      Cervical back: Normal range of motion and neck supple.   Lymphadenopathy:      Cervical: No cervical adenopathy.   Skin:     General: Skin is warm and dry.      Findings: Bruising (severe purple bruises on forearms/legs, especially ) present.   Neurological:      General: No focal deficit present.      Mental Status: She is alert.   Psychiatric:         Mood and Affect: Mood normal.         Behavior: Behavior normal.            Results for orders placed or performed in visit on 06/06/23   Comprehensive metabolic panel (BMP + Alb, Alk Phos, ALT, AST, Total. Bili, TP)     Status: Abnormal   Result Value Ref Range    Sodium 139 136 - 145 mmol/L    Potassium 3.5 3.4 - 5.3 mmol/L    Chloride 103 98 - 107 mmol/L    Carbon Dioxide (CO2) 24 22 - 29 mmol/L    Anion Gap 12 7 - 15 mmol/L    Urea Nitrogen 15.5 8.0 - 23.0 mg/dL    Creatinine 0.78 0.51 - 0.95 mg/dL    Calcium 8.9 8.6 - 10.0 mg/dL    Glucose 200 (H) 70 - 99 mg/dL    Alkaline Phosphatase 74 35 - 104 U/L    AST 31 10 - 35 U/L    ALT 27 10 - 35 U/L    Protein Total 6.8 6.4 - 8.3 g/dL    Albumin 3.8 3.5 - 5.2 g/dL    Bilirubin Total 0.4 <=1.2 mg/dL    GFR Estimate 87 >60  mL/min/1.73m2   INR     Status: Normal   Result Value Ref Range    INR 0.94 0.85 - 1.15   Lipid Profile (Chol, Trig, HDL, LDL calc)     Status: Normal   Result Value Ref Range    Cholesterol 126 <200 mg/dL    Triglycerides 104 <150 mg/dL    Direct Measure HDL 59 >=50 mg/dL    LDL Cholesterol Calculated 46 <=100 mg/dL    Non HDL Cholesterol 67 <130 mg/dL    Narrative    Cholesterol  Desirable:  <200 mg/dL    Triglycerides  Normal:  Less than 150 mg/dL  Borderline High:  150-199 mg/dL  High:  200-499 mg/dL  Very High:  Greater than or equal to 500 mg/dL    Direct Measure HDL  Female:  Greater than or equal to 50 mg/dL   Male:  Greater than or equal to 40 mg/dL    LDL Cholesterol  Desirable:  <100mg/dL  Above Desirable:  100-129 mg/dL   Borderline High:  130-159 mg/dL   High:  160-189 mg/dL   Very High:  >= 190 mg/dL    Non HDL Cholesterol  Desirable:  130 mg/dL  Above Desirable:  130-159 mg/dL  Borderline High:  160-189 mg/dL  High:  190-219 mg/dL  Very High:  Greater than or equal to 220 mg/dL   CBC with platelets and differential     Status: None   Result Value Ref Range    WBC Count 10.0 4.0 - 11.0 10e3/uL    RBC Count 4.58 3.80 - 5.20 10e6/uL    Hemoglobin 13.4 11.7 - 15.7 g/dL    Hematocrit 41.4 35.0 - 47.0 %    MCV 90 78 - 100 fL    MCH 29.3 26.5 - 33.0 pg    MCHC 32.4 31.5 - 36.5 g/dL    RDW 12.9 10.0 - 15.0 %    Platelet Count 193 150 - 450 10e3/uL    % Neutrophils 62 %    % Lymphocytes 30 %    % Monocytes 7 %    % Eosinophils 0 %    % Basophils 0 %    % Immature Granulocytes 0 %    Absolute Neutrophils 6.2 1.6 - 8.3 10e3/uL    Absolute Lymphocytes 3.0 0.8 - 5.3 10e3/uL    Absolute Monocytes 0.7 0.0 - 1.3 10e3/uL    Absolute Eosinophils 0.0 0.0 - 0.7 10e3/uL    Absolute Basophils 0.0 0.0 - 0.2 10e3/uL    Absolute Immature Granulocytes 0.0 <=0.4 10e3/uL   CBC with Platelets & Differential     Status: None    Narrative    The following orders were created for panel order CBC with Platelets &  Differential.  Procedure                               Abnormality         Status                     ---------                               -----------         ------                     CBC with platelets and d...[[id number]]                      Final result                 Please view results for these tests on the individual orders.

## 2023-06-07 LAB
ALBUMIN SERPL BCG-MCNC: 3.8 G/DL (ref 3.5–5.2)
ALP SERPL-CCNC: 74 U/L (ref 35–104)
ALT SERPL W P-5'-P-CCNC: 27 U/L (ref 10–35)
ANION GAP SERPL CALCULATED.3IONS-SCNC: 12 MMOL/L (ref 7–15)
AST SERPL W P-5'-P-CCNC: 31 U/L (ref 10–35)
BILIRUB SERPL-MCNC: 0.4 MG/DL
BUN SERPL-MCNC: 15.5 MG/DL (ref 8–23)
CALCIUM SERPL-MCNC: 8.9 MG/DL (ref 8.6–10)
CHLORIDE SERPL-SCNC: 103 MMOL/L (ref 98–107)
CHOLEST SERPL-MCNC: 126 MG/DL
CREAT SERPL-MCNC: 0.78 MG/DL (ref 0.51–0.95)
DEPRECATED HCO3 PLAS-SCNC: 24 MMOL/L (ref 22–29)
GFR SERPL CREATININE-BSD FRML MDRD: 87 ML/MIN/1.73M2
GLUCOSE SERPL-MCNC: 200 MG/DL (ref 70–99)
HDLC SERPL-MCNC: 59 MG/DL
INR PPP: 0.94 (ref 0.85–1.15)
LDLC SERPL CALC-MCNC: 46 MG/DL
NONHDLC SERPL-MCNC: 67 MG/DL
POTASSIUM SERPL-SCNC: 3.5 MMOL/L (ref 3.4–5.3)
PROT SERPL-MCNC: 6.8 G/DL (ref 6.4–8.3)
SODIUM SERPL-SCNC: 139 MMOL/L (ref 136–145)
TRIGL SERPL-MCNC: 104 MG/DL

## 2023-06-12 RX ORDER — MUSCLE RUB CREAM 100; 150 MG/G; MG/G
CREAM TOPICAL
COMMUNITY
Start: 2023-03-30 | End: 2024-01-22

## 2023-06-12 RX ORDER — LANCETS 30 GAUGE
EACH MISCELLANEOUS
COMMUNITY
Start: 2023-05-11

## 2023-06-12 ASSESSMENT — ENCOUNTER SYMPTOMS
BRUISES/BLEEDS EASILY: 1
CHILLS: 0
FEVER: 0
WEAKNESS: 1
POLYDIPSIA: 0
SHORTNESS OF BREATH: 0
COUGH: 0
FATIGUE: 0
PSYCHIATRIC NEGATIVE: 1
UNEXPECTED WEIGHT CHANGE: 0
MUSCULOSKELETAL NEGATIVE: 1
GASTROINTESTINAL NEGATIVE: 1

## 2023-06-23 ENCOUNTER — APPOINTMENT (OUTPATIENT)
Dept: RADIOLOGY | Facility: HOSPITAL | Age: 58
End: 2023-06-23
Attending: EMERGENCY MEDICINE
Payer: COMMERCIAL

## 2023-06-23 ENCOUNTER — HOSPITAL ENCOUNTER (EMERGENCY)
Facility: HOSPITAL | Age: 58
Discharge: HOME | End: 2023-06-23
Attending: EMERGENCY MEDICINE | Admitting: EMERGENCY MEDICINE
Payer: COMMERCIAL

## 2023-06-23 VITALS
OXYGEN SATURATION: 97 % | RESPIRATION RATE: 30 BRPM | HEIGHT: 57 IN | HEART RATE: 73 BPM | BODY MASS INDEX: 30.2 KG/M2 | SYSTOLIC BLOOD PRESSURE: 139 MMHG | TEMPERATURE: 98.8 F | DIASTOLIC BLOOD PRESSURE: 83 MMHG | WEIGHT: 140 LBS

## 2023-06-23 DIAGNOSIS — E86.0 DEHYDRATION: ICD-10-CM

## 2023-06-23 DIAGNOSIS — R53.83 OTHER FATIGUE: ICD-10-CM

## 2023-06-23 DIAGNOSIS — E83.42 HYPOMAGNESEMIA: ICD-10-CM

## 2023-06-23 LAB
ALBUMIN SERPL BCG-MCNC: 3.5 G/DL (ref 3.5–5.2)
ALBUMIN UR-MCNC: NEGATIVE MG/DL
ALP SERPL-CCNC: 77 U/L (ref 35–104)
ALT SERPL W P-5'-P-CCNC: 20 U/L (ref 0–50)
ANION GAP SERPL CALCULATED.3IONS-SCNC: 11 MMOL/L (ref 7–15)
APPEARANCE UR: CLEAR
AST SERPL W P-5'-P-CCNC: 27 U/L (ref 0–45)
BACTERIA #/AREA URNS HPF: ABNORMAL /HPF
BASOPHILS # BLD AUTO: 0 10E3/UL (ref 0–0.2)
BASOPHILS NFR BLD AUTO: 0 %
BILIRUB DIRECT SERPL-MCNC: <0.2 MG/DL (ref 0–0.3)
BILIRUB SERPL-MCNC: 0.7 MG/DL
BILIRUB UR QL STRIP: NEGATIVE
BUN SERPL-MCNC: 10.6 MG/DL (ref 8–23)
CALCIUM SERPL-MCNC: 8.7 MG/DL (ref 8.8–10.2)
CHLORIDE SERPL-SCNC: 89 MMOL/L (ref 98–107)
COLOR UR AUTO: COLORLESS
CREAT SERPL-MCNC: 0.62 MG/DL (ref 0.51–0.95)
D DIMER PPP FEU-MCNC: <0.27 UG/ML FEU (ref 0–0.5)
DEPRECATED HCO3 PLAS-SCNC: 26 MMOL/L (ref 22–29)
EOSINOPHIL # BLD AUTO: 0 10E3/UL (ref 0–0.7)
EOSINOPHIL NFR BLD AUTO: 0 %
ERYTHROCYTE [DISTWIDTH] IN BLOOD BY AUTOMATED COUNT: 12.2 % (ref 10–15)
GFR SERPL CREATININE-BSD FRML MDRD: >90 ML/MIN/1.73M2
GLUCOSE SERPL-MCNC: 150 MG/DL (ref 70–99)
GLUCOSE UR STRIP-MCNC: NEGATIVE MG/DL
HCT VFR BLD AUTO: 40.1 % (ref 35–47)
HGB BLD-MCNC: 13.9 G/DL (ref 11.7–15.7)
HGB UR QL STRIP: NEGATIVE
IMM GRANULOCYTES # BLD: 0 10E3/UL
IMM GRANULOCYTES NFR BLD: 0 %
KETONES UR STRIP-MCNC: NEGATIVE MG/DL
LACTATE SERPL-SCNC: 1.9 MMOL/L (ref 0.7–2)
LACTATE SERPL-SCNC: 2.1 MMOL/L (ref 0.7–2)
LEUKOCYTE ESTERASE UR QL STRIP: NEGATIVE
LIPASE SERPL-CCNC: 20 U/L (ref 13–60)
LYMPHOCYTES # BLD AUTO: 1.5 10E3/UL (ref 0.8–5.3)
LYMPHOCYTES NFR BLD AUTO: 16 %
MAGNESIUM SERPL-MCNC: 1.5 MG/DL (ref 1.7–2.3)
MCH RBC QN AUTO: 30 PG (ref 26.5–33)
MCHC RBC AUTO-ENTMCNC: 34.7 G/DL (ref 31.5–36.5)
MCV RBC AUTO: 86 FL (ref 78–100)
MONOCYTES # BLD AUTO: 0.5 10E3/UL (ref 0–1.3)
MONOCYTES NFR BLD AUTO: 5 %
NEUTROPHILS # BLD AUTO: 7.4 10E3/UL (ref 1.6–8.3)
NEUTROPHILS NFR BLD AUTO: 79 %
NITRATE UR QL: NEGATIVE
NRBC # BLD AUTO: 0 10E3/UL
NRBC BLD AUTO-RTO: 0 /100
NT-PROBNP SERPL-MCNC: 140 PG/ML (ref 0–900)
PH UR STRIP: 6 [PH] (ref 5–7)
PLATELET # BLD AUTO: 196 10E3/UL (ref 150–450)
POTASSIUM SERPL-SCNC: 3.6 MMOL/L (ref 3.4–5.3)
PROT SERPL-MCNC: 6.6 G/DL (ref 6.4–8.3)
RBC # BLD AUTO: 4.64 10E6/UL (ref 3.8–5.2)
RBC URINE: 1 /HPF
SODIUM SERPL-SCNC: 126 MMOL/L (ref 136–145)
SP GR UR STRIP: 1 (ref 1–1.03)
SQUAMOUS EPITHELIAL: 1 /HPF
TRANSITIONAL EPI: <1 /HPF
TROPONIN T SERPL HS-MCNC: 11 NG/L
TROPONIN T SERPL HS-MCNC: 9 NG/L
UROBILINOGEN UR STRIP-MCNC: <2 MG/DL
WBC # BLD AUTO: 9.5 10E3/UL (ref 4–11)
WBC URINE: <1 /HPF

## 2023-06-23 PROCEDURE — 85379 FIBRIN DEGRADATION QUANT: CPT | Performed by: EMERGENCY MEDICINE

## 2023-06-23 PROCEDURE — 250N000011 HC RX IP 250 OP 636: Mod: JZ | Performed by: EMERGENCY MEDICINE

## 2023-06-23 PROCEDURE — 83880 ASSAY OF NATRIURETIC PEPTIDE: CPT | Performed by: EMERGENCY MEDICINE

## 2023-06-23 PROCEDURE — 96361 HYDRATE IV INFUSION ADD-ON: CPT

## 2023-06-23 PROCEDURE — 81001 URINALYSIS AUTO W/SCOPE: CPT | Performed by: EMERGENCY MEDICINE

## 2023-06-23 PROCEDURE — 83735 ASSAY OF MAGNESIUM: CPT | Performed by: EMERGENCY MEDICINE

## 2023-06-23 PROCEDURE — 258N000003 HC RX IP 258 OP 636: Performed by: EMERGENCY MEDICINE

## 2023-06-23 PROCEDURE — 36415 COLL VENOUS BLD VENIPUNCTURE: CPT | Performed by: EMERGENCY MEDICINE

## 2023-06-23 PROCEDURE — 84484 ASSAY OF TROPONIN QUANT: CPT | Performed by: EMERGENCY MEDICINE

## 2023-06-23 PROCEDURE — 99285 EMERGENCY DEPT VISIT HI MDM: CPT | Mod: 25

## 2023-06-23 PROCEDURE — 96365 THER/PROPH/DIAG IV INF INIT: CPT

## 2023-06-23 PROCEDURE — 85025 COMPLETE CBC W/AUTO DIFF WBC: CPT | Performed by: EMERGENCY MEDICINE

## 2023-06-23 PROCEDURE — 71046 X-RAY EXAM CHEST 2 VIEWS: CPT

## 2023-06-23 PROCEDURE — 96366 THER/PROPH/DIAG IV INF ADDON: CPT

## 2023-06-23 PROCEDURE — 83605 ASSAY OF LACTIC ACID: CPT | Performed by: EMERGENCY MEDICINE

## 2023-06-23 PROCEDURE — 93005 ELECTROCARDIOGRAM TRACING: CPT | Performed by: EMERGENCY MEDICINE

## 2023-06-23 PROCEDURE — 82248 BILIRUBIN DIRECT: CPT | Performed by: EMERGENCY MEDICINE

## 2023-06-23 PROCEDURE — 83690 ASSAY OF LIPASE: CPT | Performed by: EMERGENCY MEDICINE

## 2023-06-23 RX ORDER — MAGNESIUM SULFATE HEPTAHYDRATE 40 MG/ML
2 INJECTION, SOLUTION INTRAVENOUS ONCE
Status: COMPLETED | OUTPATIENT
Start: 2023-06-23 | End: 2023-06-23

## 2023-06-23 RX ADMIN — SODIUM CHLORIDE 500 ML: 9 INJECTION, SOLUTION INTRAVENOUS at 14:57

## 2023-06-23 RX ADMIN — MAGNESIUM SULFATE HEPTAHYDRATE 2 G: 40 INJECTION, SOLUTION INTRAVENOUS at 16:33

## 2023-06-23 ASSESSMENT — ACTIVITIES OF DAILY LIVING (ADL)
ADLS_ACUITY_SCORE: 35

## 2023-06-23 NOTE — ED TRIAGE NOTES
Patient presents here today for evaluation of increased fatigue and weakness. No cough. She also took her blood pressure today and found it to be elevated, (systolic 140).  She also notes that her chest feels hot and experiencing chills today. She notes generalized abdominal pain and nausea.    Information gathered with [facility] interpretor, # [id number], from the Interpretive Services line.

## 2023-06-23 NOTE — ED PROVIDER NOTES
EMERGENCY DEPARTMENT ENCOUNTER      NAME: Kevin Thrasher  AGE: 58 year old female  YOB: 1965  MRN: [id number]  EVALUATION DATE & TIME: No admission date for patient encounter.    PCP: Ema Gentile    ED PROVIDER: Fabiana Hernández M.D.      Chief Complaint   Patient presents with     Fatigue     FINAL IMPRESSION:  1. Other fatigue    2. Hypomagnesemia    3. Dehydration      ED COURSE & MEDICAL DECISION MAKING:    Pertinent Labs & Imaging studies reviewed. (See chart for details)  ED Course as of 06/23/23 2223 Fri Jun 23, 2023   1406 I reviewed the patient's office visit from 6/6/2023.  Patient had presented with bruising that has been present for some time.  She is on Plavix and aspirin because she had had a history of a stroke.  They were going to review medical management and decide if 1 of those medications could be dropped.  Patient was concerned that it was her atorvastatin causing this and the primary doctor thought it was unlikely.  Lab work was obtained at that time and patient had a normal creatinine 1.78 and a normal potassium of 3.5.  Glucose was elevated at 200.  Hemoglobin was 13.4 with a platelet count of 193.   1420 Patient is a pleasant 58-year-old female who is mung speaking and presents with family for evaluation of fatigue as well as some chest tightness and some shortness of breath.  She noticed that her blood pressure was elevated into the 140s and became concerned and came here for further evaluation.  She said symptoms started around 5 AM.  She is never had symptoms like this before.  She denies any history of heart or lung problems.  She denies any cough or wheezing.  She says nothing seems to change her symptoms.  On arrival blood pressure was 159/77 with a normal heart rate and normal oxygen saturation and normal respirations.  I discussed with her that I did not think it was likely the blood pressure that was causing the symptoms and that we should do evaluation to try to figure  out what is going on with her.  She is on Plavix and aspirin for history of a stroke.  All of the history and physical was obtained [place] [facility]  Although she did not initially report some abdominal pain when I asked specifically she did report some nausea and some abdominal pain.  She had no significant tenderness on my exam.  We will check blood work and treat her with some nausea medication and some IV fluids and see what we can find.  I discussed all this with her and she is in agreement with the plan.   1521 Her lactic acid was slightly elevated at 2.1.  There was no reported infection source.  We will continue to see what her labs look like.  So far the white count looks okay.  Repeat lactate will be ordered and will be done after the fluids.   1546 D-dimer was negative.  BNP was unremarkable.  Initial troponin was 11.  We can get a repeat troponin at 5:00.   1821 I checked back in on the patient.  She is feeling a little bit better.  She still feels generally fatigued.  We will let the magnesium finish up and we will get a get a repeat lactate and troponin I think if those look okay she can discharge home.  She is in agreement with the plan.   1941 I called lab to find out the status of the troponin as its been over an hour now.   1941 Repeat troponin is 9.  I will get the patient discharged home.   1943 I discussed results and plan for discharge with the patient and family.  They are in agreement.  She will be dismissed shortly.     2:05 PM I met with the patient, obtained history, performed an initial exam, and discussed options and plan for diagnostics and treatment here in the ED.    Medical Decision Making    History:    Supplemental history from: Family    External Record(s) reviewed: Yes, see details above    Work Up:    Emergent/Severe conditions considered and evaluated for: Electrolyte over derangement, ACS, heart failure, pulmonary embolism, sepsis, anemia, liver failure,  dehydration    I independently reviewed and interpreted EKG and chest x-ray which showed no pneumothorax.  See full radiology report for all details    In additional to work up documented, I considered the following work up: Considered CT angiogram patient had negative D-dimer and was otherwise low risk    Medications given that require intensive monitoring for toxicity: IV magnesium    External consultation:    Discussion of management with another provider: None    Complicating factors:    Care impacted by chronic illness: hyperlipidemia, hypertension    Care affected by social determinants of health: None    Disposition considerations: Considered admission but patient felt better and repeat troponin and lactate came back improved.  Prescriptions considered/prescribed: None    At the conclusion of the encounter I discussed  the results of all of the tests and the disposition with patient.   All questions were answered.  The patient acknowledged understanding and was involved in the decision making regarding the overall care plan.      I discussed with patient the utility, limitations and findings of the exam/interventions/studies done during this visit as well as the list of differential diagnosis and symptoms to monitor/return to ER for.  Additional verbal discharge instructions were provided.     MEDICATIONS GIVEN IN THE EMERGENCY:  Medications   0.9% sodium chloride BOLUS (0 mLs Intravenous Stopped 6/23/23 1817)   magnesium sulfate 2 g in 50 mL sterile water intermittent infusion (0 g Intravenous Stopped 6/23/23 1916)       NEW PRESCRIPTIONS STARTED AT TODAY'S ER VISIT  Discharge Medication List as of 6/23/2023  7:55 PM             =================================================================    HPI    Triage Note:      Patient information was obtained from: patient    Use of :  Yes (- Phone) - Language Noble Thrasher is a 58 year old female with a pertinent medical history of acute  "ischemic VBA brainstem stroke (right), osteoarthritis of spine with radiculopathy, HTN. HLD, DM2, chronic opioid use, diabetic nephropathy, who presents to the emergency department via wheelchair for evaluation of weakness.    Patient reports fatigue, chest palpitations, difficulty breathing, and nausea with associated abdominal pain that started at 5:00 am today. She initially contributed her symptoms to her elevated blood pressure. She states that she checked her blood pressure and saw it was very high. Patient also felt \"hot to touch\". Denies fevers or cough. Nothing provokes or alleviates her symptoms. She is still taking Plavix and aspirin following her stroke. Patient denies wheezing, dysuria, hematuria, and any other symptoms. Denies any other urinary symptoms.    Denies a medical history of heart or lung issues.    PAST MEDICAL HISTORY:  Past Medical History:   Diagnosis Date     Angular cheilitis 9/19/2016     Depression      Excessive or frequent menstruation      Head injury, unspecified      Helicobacter pylori gastritis      HTN (hypertension) 3/26/2019     Hyperlipidemia     3/19: New diabetes, 10 year risk 6%. Start moderate intensity statin.      Infertility, female     eval at infertility clinic was normal.  declined evaluation. h/o ovarian hyperstimulation - did not result in pregnancy.      Obesity (BMI 30.0-34.9)      Pterygium      Shingles 2/9/2015     Type 2 diabetes mellitus without complication, without long-term current use of insulin (H) 2/26/2019    Dx 2019       PAST SURGICAL HISTORY:  Past Surgical History:   Procedure Laterality Date     DILATION AND CURETTAGE, OPERATIVE HYSTEROSCOPY, COMBINED  07/15/2011    Planned hysteroscopy/D&C for evaluation/treatment of menorrhagia 7/15/11.        CURRENT MEDICATIONS:    No current facility-administered medications for this encounter.    Current Outpatient Medications:      ACCU-CHEK GUIDE test strip, 1 strip by In Vitro route daily Use " to test once daily, Disp: 100 strip, Rfl: 3     acetaminophen (TYLENOL) 500 MG tablet, Take 2 tablets (1,000 mg) by mouth every 8 hours as needed for mild pain, Disp: 200 tablet, Rfl: 0     albuterol (PROAIR HFA/PROVENTIL HFA/VENTOLIN HFA) 108 (90 Base) MCG/ACT inhaler, Inhale 2 puffs into the lungs every 4 hours as needed for shortness of breath / dyspnea or wheezing, Disp: 18 g, Rfl: 0     alcohol swab prep pads, Use to swab area of injection/nina as directed., Disp: 100 each, Rfl: 3     ARTIFICIAL TEARS 1.4 % ophthalmic solution, Apply 1 drop to eye every 2 hours as needed for dry eyes, Disp: 15 mL, Rfl: 11     aspirin (ASPIRIN LOW DOSE) 81 MG chewable tablet, Take 1 tablet (81 mg) by mouth daily, Disp: 200 tablet, Rfl: 1     blood glucose (NO BRAND SPECIFIED) test strip, Use to test blood sugar 1 times daily or as directed. To accompany: Blood Glucose Monitor Brands: per insurance., Disp: 100 strip, Rfl: 6     blood glucose calibration (NO BRAND SPECIFIED) solution, To accompany: Blood Glucose Monitor Brands: per insurance., Disp: 1 each, Rfl: 11     blood glucose monitoring (ACCU-CHEK FASTCLIX) lancets, 1 each daily, Disp: 102 each, Rfl: 11     blood glucose monitoring (NO BRAND SPECIFIED) meter device kit, Use to test blood sugar 1 times daily or as directed. Preferred blood glucose meter OR supplies to accompany: Blood Glucose Monitor Brands: per insurance., Disp: 1 kit, Rfl: 0     clopidogrel (PLAVIX) 75 MG tablet, Take 1 tablet (75 mg) by mouth daily, Disp: 90 tablet, Rfl: 4     cyclobenzaprine (FLEXERIL) 5 MG tablet, Take 1 tablet (5 mg) by mouth 3 times daily as needed for muscle spasms, Disp: 20 tablet, Rfl: 0     fluticasone (FLOVENT HFA) 220 MCG/ACT inhaler, Inhale 2 puffs into the lungs 2 times daily, Disp: 12 g, Rfl: 4     Global Inject Ease Lancets 30G MISC, USE WITH LANCETING DEVICE. *100 DYAS*, Disp: , Rfl:      ketotifen (ZADITOR) 0.025 % ophthalmic solution, Place 1 drop into both eyes 2 times  daily as needed for itching, Disp: 10 mL, Rfl: 11     lisinopril (ZESTRIL) 20 MG tablet, Take 1 tablet (20 mg) by mouth daily, Disp: 90 tablet, Rfl: 4     loratadine (CLARITIN) 10 MG tablet, Take 1 tablet (10 mg) by mouth daily as needed for allergies, Disp: 90 tablet, Rfl: 3     Menthol, Topical Analgesic, 7.5 % (Roll) MISC, Apply to sore muscles every 6 hours as directed, Disp: 1 each, Rfl: 4     metFORMIN (GLUCOPHAGE XR) 500 MG 24 hr tablet, TAKE 1 PILL BY MOUTH ONCE DAILY WITH DINNER / NOJ 1 LUB TSHUAJ TXHUA HNUB NROG PLUAS HMO PAB NTSHAV QAB ZIB, Disp: 90 tablet, Rfl: 0     montelukast (SINGULAIR) 10 MG tablet, Take 1 tablet (10 mg) by mouth At Bedtime, Disp: 90 tablet, Rfl: 4     muscle rub (ARTHRITIS HOT) 10-15 % CREA, APPLY TO SORE MUSCLES EVERY 6 HOURS AS DIRECTED, Disp: , Rfl:      omeprazole (PRILOSEC) 20 MG DR capsule, Take 1 capsule (20 mg) by mouth daily before breakfast, Disp: 90 capsule, Rfl: 5     potassium chloride ER (K-TAB/KLOR-CON) 10 MEQ CR tablet, Take 1 tablet (10 mEq) by mouth daily, Disp: 90 tablet, Rfl: 4     REPATHA SURECLICK 140 MG/ML prefilled autoinjector, INJECT 1 ML (140 MG) SUBCUTANEOUS EVERY 14 DAYS, Disp: 2 mL, Rfl: 3     rosuvastatin (CRESTOR) 40 MG tablet, Take 0.5 tablets (20 mg) by mouth At Bedtime, Disp: 90 tablet, Rfl: 0     SYSTANE 0.4-0.3 % SOLN ophthalmic solution, INSTILL 1 DROP INTO THE AFFECTED EYE(S) EVERY 2 HOURS AS NEEDED FOR DRY EYES/ YOG QHOV MUAG QHUAV TXHUA 2 TEEV ALEX 1 NCOS, Disp: , Rfl:     ALLERGIES:  Allergies   Allergen Reactions     Other Drug Allergy (See Comments) Unknown     Tide detergent - hives     Shellfish Derived [Shellfish-Derived Products] Unknown       FAMILY HISTORY:  No family history on file.    SOCIAL HISTORY:   Social History     Socioeconomic History     Marital status:      Number of children: 1   Tobacco Use     Smoking status: Never     Passive exposure: Never     Smokeless tobacco: Never     Tobacco comments:     no second hand  "smoke exposure   Substance and Sexual Activity     Alcohol use: No     Drug use: No     Sexual activity: Yes     Partners: Male     Birth control/protection: None     Comment: Wants to get pregnant 2017   Social History Narrative    Has 3.5 hours PCA/day - 3/21.       PHYSICAL EXAM    VITAL SIGNS: /83   Pulse 73   Temp 98.8  F (37.1  C)   Resp 30   Ht 1.448 m (4' 9\")   Wt 63.5 kg (140 lb)   SpO2 97%   BMI 30.30 kg/m     GENERAL: Awake, alert, answering questions appropriately, appears uncomfortable  SPEECH:  Easy to understand speech, Normal volume and tung  PULMONARY: No respiratory distress, Lungs clear to auscultation bilaterally  CARDIOVASCULAR: Regular rate and rhythm, Distal pulses present and normal.  ABDOMINAL: Soft, Nondistended, Nontender, No rebound or guarding, No palpable masses  EXTREMITIES: No lower extremity edema.  PSYCH: Normal mood and affect     LAB:  All pertinent labs reviewed and interpreted.  Results for orders placed or performed during the hospital encounter of 06/23/23   Chest XR,  PA & LAT    Impression    IMPRESSION: Negative chest. No interval change.   D dimer quantitative   Result Value Ref Range    D-Dimer Quantitative <0.27 0.00 - 0.50 ug/mL FEU   Basic metabolic panel   Result Value Ref Range    Sodium 126 (L) 136 - 145 mmol/L    Potassium 3.6 3.4 - 5.3 mmol/L    Chloride 89 (L) 98 - 107 mmol/L    Carbon Dioxide (CO2) 26 22 - 29 mmol/L    Anion Gap 11 7 - 15 mmol/L    Urea Nitrogen 10.6 8.0 - 23.0 mg/dL    Creatinine 0.62 0.51 - 0.95 mg/dL    Calcium 8.7 (L) 8.8 - 10.2 mg/dL    Glucose 150 (H) 70 - 99 mg/dL    GFR Estimate >90 >60 mL/min/1.73m2   Hepatic function panel   Result Value Ref Range    Protein Total 6.6 6.4 - 8.3 g/dL    Albumin 3.5 3.5 - 5.2 g/dL    Bilirubin Total 0.7 <=1.2 mg/dL    Alkaline Phosphatase 77 35 - 104 U/L    AST 27 0 - 45 U/L    ALT 20 0 - 50 U/L    Bilirubin Direct <0.20 0.00 - 0.30 mg/dL   Result Value Ref Range    Lipase 20 13 - 60 U/L "   Lactic acid whole blood   Result Value Ref Range    Lactic Acid 2.1 (H) 0.7 - 2.0 mmol/L   Result Value Ref Range    Troponin T, High Sensitivity 11 <=14 ng/L   Result Value Ref Range    Magnesium 1.5 (L) 1.7 - 2.3 mg/dL   Nt probnp inpatient (BNP)   Result Value Ref Range    N terminal Pro BNP Inpatient 140 0 - 900 pg/mL   UA with Microscopic reflex to Culture    Specimen: Urine, Midstream   Result Value Ref Range    Color Urine Colorless Colorless, Straw, Light Yellow, Yellow    Appearance Urine Clear Clear    Glucose Urine Negative Negative mg/dL    Bilirubin Urine Negative Negative    Ketones Urine Negative Negative mg/dL    Specific Gravity Urine 1.005 1.001 - 1.030    Blood Urine Negative Negative    pH Urine 6.0 5.0 - 7.0    Protein Albumin Urine Negative Negative mg/dL    Urobilinogen Urine <2.0 <2.0 mg/dL    Nitrite Urine Negative Negative    Leukocyte Esterase Urine Negative Negative    Bacteria Urine Few (A) None Seen /HPF    RBC Urine 1 <=2 /HPF    WBC Urine <1 <=5 /HPF    Squamous Epithelials Urine 1 <=1 /HPF    Transitional Epithelials Urine <1 <=1 /HPF   CBC with platelets and differential   Result Value Ref Range    WBC Count 9.5 4.0 - 11.0 10e3/uL    RBC Count 4.64 3.80 - 5.20 10e6/uL    Hemoglobin 13.9 11.7 - 15.7 g/dL    Hematocrit 40.1 35.0 - 47.0 %    MCV 86 78 - 100 fL    MCH 30.0 26.5 - 33.0 pg    MCHC 34.7 31.5 - 36.5 g/dL    RDW 12.2 10.0 - 15.0 %    Platelet Count 196 150 - 450 10e3/uL    % Neutrophils 79 %    % Lymphocytes 16 %    % Monocytes 5 %    % Eosinophils 0 %    % Basophils 0 %    % Immature Granulocytes 0 %    NRBCs per 100 WBC 0 <1 /100    Absolute Neutrophils 7.4 1.6 - 8.3 10e3/uL    Absolute Lymphocytes 1.5 0.8 - 5.3 10e3/uL    Absolute Monocytes 0.5 0.0 - 1.3 10e3/uL    Absolute Eosinophils 0.0 0.0 - 0.7 10e3/uL    Absolute Basophils 0.0 0.0 - 0.2 10e3/uL    Absolute Immature Granulocytes 0.0 <=0.4 10e3/uL    Absolute NRBCs 0.0 10e3/uL   Lactic acid whole blood   Result  Value Ref Range    Lactic Acid 1.9 0.7 - 2.0 mmol/L   Troponin T, High Sensitivity (now)   Result Value Ref Range    Troponin T, High Sensitivity 9 <=14 ng/L       RADIOLOGY:      Chest XR,  PA & LAT   Final Result   IMPRESSION: Negative chest. No interval change.          EKG:    Date and time: June 23, 2023 at 1506  Rate: 66 bpm  Rhythm: Normal sinus rhythm  NM interval: 164 ms  QRS interval: 94 ms  QT/QTc: 422/442 ms  ST changes or T wave changes: No acute ST or T wave abnormalities  Change from prior ECG: No significant change from prior  I have independently reviewed and interpreted this EKG.       I, Mandy Avila, am serving as a scribe to document services personally performed by Dr. Hernández based on my observation and the provider's statements to me. I, Fabiana Hernández MD attest that Mandy Avila is acting in a scribe capacity, has observed my performance of the services and has documented them in accordance with my direction.    Fabiana Hernández M.D.  Emergency Medicine  [facility] EMERGENCY DEPARTMENT  [facility] [facility] [id number] [phone]  Dept: [phone]       Fabiana Hernández MD  06/23/23 5328

## 2023-06-24 LAB
ATRIAL RATE - MUSE: 66 BPM
DIASTOLIC BLOOD PRESSURE - MUSE: NORMAL MMHG
INTERPRETATION ECG - MUSE: NORMAL
P AXIS - MUSE: 45 DEGREES
PR INTERVAL - MUSE: 164 MS
QRS DURATION - MUSE: 94 MS
QT - MUSE: 422 MS
QTC - MUSE: 442 MS
R AXIS - MUSE: -9 DEGREES
SYSTOLIC BLOOD PRESSURE - MUSE: NORMAL MMHG
T AXIS - MUSE: 28 DEGREES
VENTRICULAR RATE- MUSE: 66 BPM

## 2023-06-24 NOTE — DISCHARGE INSTRUCTIONS
You were seen today for some fatigue and dehydration.  No cause of your symptoms was found.  You should make sure you are drinking plenty of fluids and follow-up with your primary care doctor as an outpatient.  Return if new or worsening symptoms.

## 2023-06-28 NOTE — PROGRESS NOTES
Medication Therapy Management (MTM) Encounter    ASSESSMENT:                            Medication Adherence/Access: Patient not using medications as prescribed because of adverse effects. Likely needs some dose reductions based on actual use. Also reviewed concerns about splitting ER tabs. See detailed plan below.       Stroke Hx: Last LDL at goal <70 even with some adherence concerns with statin. Does have significant bruising on her arms. Typically DAPT continued for 3 weeks, sometimes 90 days after stroke. However, as patient was noted to have significant atherosclerotic disease at time of initial stroke may benefit from re-evaluation now that she has had significant changes with other medications including statin and repatha use. Also, ongoing bruising is ultimately effecting adherence to other medications.          Diabetes Type 2: A1C at goal <7%. Reported home readings at goal  fasting. Having some lows - likely due to long fasting period between evening meal and morning meal. Reviewed concerns with splitting ER formulation of metformin. Recommended continuing with a full tablet but having a small protein based snack in the evening to prevent AM lows.         Hypertension: blood pressure today at goal <130/80. Pulse just below goal . Will decrease Lisinopril dose to match what patient has been using. Will plan to monitor K+ levels with updated Lisinopril dose.         Hypokalemia: Last levels WNL. Reviewed concerns with splitting ER tabs. Will have patient continue full tab, especially as we're decreasing ACEi dose.            Pain: Not addressed today due to time.          Asthma/Allergies: Last ACT at goal >19. Not using inhalers daily. Unclear which inhaler she is using, but use is so infrequent that she likely does not need a maintenance inhaler. Will discontinue flovent and resend albuterol to ensure she has correct inhaler for rescue use.     Likely does not need singulair either. Will  discontinue and continue with PRN loratadine.       GERD: Symptoms well controlled with PPI use.       PLAN:                            1. Consider neurology consult to review DAPT and consider discontinuing clopidogrel.   2. Take Lisinopril 20 mg 0.5 tabs daily.   3. Take 1 full tab of metformin as prescribed. Have a protein based snack in the evening to prevent low sugars.   4. Stop Flovent and singulair    5. Refill albuterol and loratadine.        Follow-up: Return in about 4 weeks (around 7/28/2023) for Medication Management Pharmacist, in person.    SUBJECTIVE/OBJECTIVE:                          Kevin Thrasher is a 58 year old female coming in for an initial visit. She was referred to me from Dominique Odonnell MD. PCP is Ema Gentile MD. [facility]  in person (ID# [id number]).      Last seen by MTM in June 2022.        Reason for visit: Review indication for DAPT.    Allergies/ADRs: Reviewed in chart  Past Medical History: Reviewed in chart  Tobacco: She reports that she has never smoked. She has never been exposed to tobacco smoke. She has never used smokeless tobacco.  Alcohol:  reports no history of alcohol use.       Medication Adherence/Access:        Brings bottles:   Metformin 3/30 #90 - ~30 remain   Aspirin 81 5/5 #30 - mostly full (outside dispense history has a 6/6 fill)   Rosuvastatin 40 mg 5/5 #45 - mostly full   Acetaminophen   Plavix 1/13 #90 = at least half full. *Bottle contains half tabs.     K+ 6/18 #90   Lisinopril - 5/5 #90 - mostly full    Repatha     Patient denies adherence concerns, but other than aspirin, above dates are the latest fills per outside history except for aspirin.     Children help her with setting a pill box.     Later states she's just taking half tab of all of her medicines because of the bruising.       Stroke Hx: Prescirbed Clopidogrel 75mg daily, aspirin 81mg daily, rosuvastatin 40mg 0.5 tab at bedtime, Repatha 140 mg injections every 2 weeks.  "    Having significant amount of bruising with DAPT.   Only taking a half tab of Plavix because she bruises all over.       Per discharge summary from Feb 2020:   \"Dual antiplatelet therapy with aspirin and Plavix indefinitely due to significant intracranial atherosclerotic disease.\"     Recent Labs   Lab Test 06/06/23  1601 09/28/22  1118   CHOL 126 94   HDL 59 59   LDL 46 17   TRIG 104 92         Diabetes Type 2: Metformin  mg with dinner. Patient denies side effects or any GI upset with current dose of metformin     Sometimes blood sugar is low, so only takes half tab daily      SMBG: one times daily.      Ranges: AM     Symptoms of low blood sugar? shaky, sweaty. Lows in the 74-84 range. Randomly throughout the day, but mostly in the morning.   Recent symptoms of high blood sugar? none.    Diet: Having 2 meals per day - typically morning and evening. Usually around 10 am and 4pm.       Hemoglobin A1C   Date Value Ref Range Status   03/30/2023 6.5 (H) 0.0 - 5.6 % Final     Comment:     Normal <5.7%   Prediabetes 5.7-6.4%    Diabetes 6.5% or higher     Note: Adopted from ADA consensus guidelines.   07/08/2022 6.5 (H) 0.0 - 5.6 % Final     Comment:     Normal <5.7%   Prediabetes 5.7-6.4%    Diabetes 6.5% or higher     Note: Adopted from ADA consensus guidelines.   01/24/2022 6.6 (H) 0.0 - 5.6 % Final     Comment:     Normal <5.7%   Prediabetes 5.7-6.4%    Diabetes 6.5% or higher     Note: Adopted from ADA consensus guidelines.   02/06/2020 6.1 (H) 0 - 5.6 % Final     Comment:     Normal <5.7% Prediabetes 5.7-6.4%  Diabetes 6.5% or higher - adopted from ADA   consensus guidelines.        Microalbumin Urine mg/dL   Date Value Ref Range Status   09/03/2021 <0.50 0.00 - 1.99 mg/dL Final      Creatinine Urine mg/dL   Date Value Ref Range Status   03/30/2023 27.8 mg/dL Final     Comment:     The reference ranges have not been established in urine creatinine. The results should be integrated into the clinical " context for interpretation.   09/03/2021 20 mg/dL Final     Creatinine Urine for Drug Screen   Date Value Ref Range Status   01/24/2022 93 mg/dL Final     Comment:     The reference range has not been established for creatinine in random urines. The results should be integrated into the clinical context for interpretation.     LDL Cholesterol Calculated   Date Value Ref Range Status   06/06/2023 46 <=100 mg/dL Final     LDL Cholesterol Direct   Date Value Ref Range Status   02/11/2016 153 (H) <=129 mg/dl Final     Creatinine   Date Value Ref Range Status   06/23/2023 0.62 0.51 - 0.95 mg/dL Final   02/14/2020 0.66 0.52 - 1.04 mg/dL Final             Hypertension: Lisinopril 20mg every day     Reports frequent dizziness with low blood pressure readings in the 80s systolic at home.       BP Readings from Last 3 Encounters:   06/30/23 105/66   06/23/23 139/83   06/06/23 132/66      Pulse Readings from Last 3 Encounters:   06/30/23 59   06/23/23 73   06/06/23 78     Wt Readings from Last 3 Encounters:   06/23/23 140 lb (63.5 kg)   06/06/23 140 lb (63.5 kg)   03/30/23 139 lb 12 oz (63.4 kg)             Hypokalemia: Potassium CL ER 10mEq twice daily     Potassium   Date Value Ref Range Status   06/23/2023 3.6 3.4 - 5.3 mmol/L Final   03/07/2022 3.9 3.5 - 5.0 mmol/L Final   02/14/2020 3.5 3.4 - 5.3 mmol/L Final             Pain: Prescribed Tylenol 500mg 1-2 tabs every 6 hours as needed,        Thought her plain Acetaminophen was Tylenol #3. Uses plain Acetaminophen at bedtime.     During the day, busy with the grandkids so pain is not bothersome.             Asthma/Allergies: Prescribed Albuterol HFA 2 puffs every 4 hours as needed, Flovent  mcg 2 puffs twice daily, Montelukast 10 mg daily, loratadine 10 mg daily.     Doesn't have any of these medicines at the visit today. Only has 1 inhaler at home. Unclear if it's Flovent or albuterol - showed pictures on line and she states she's gotten all of them before.  "Sometimes goes a full month without using any.     Uses allergy pill, inhaler, and eye drop as needed.     Tends to be worst during spring season.     Recognizes picture of Singulair tablet as \"allergy\" medicine. Takes just as needed.         9/28/2022    10:10 AM 3/30/2023    12:53 PM 3/30/2023    12:57 PM   ACT Total Scores   ACT TOTAL SCORE (Goal Greater than or Equal to 20) 25 18 20   In the past 12 months, how many times did you visit the emergency room for your asthma without being admitted to the hospital? 0 0 0   In the past 12 months, how many times were you hospitalized overnight because of your asthma? 0 0 0           GERD: omeprazole 20mg every day      Does not have PPI at visit. States she's using daily, but describes \"purple pill\" that helps with burning and pain.            Today's Vitals: /66   Pulse 59   ----------------      I spent 60 minutes with this patient today. All changes were made via collaborative practice agreement with Ema Gentile MD. A copy of the visit note was provided to the patient's provider(s).    A summary of these recommendations was given to the patient.    Kenton Jose, PharmD  Medication Therapy Management (MTM) Pharmacist  [facility]        Medication Therapy Recommendations  Hypertension goal BP (blood pressure) < 130/80    Current Medication: lisinopril (ZESTRIL) 20 MG tablet   Rationale: Dose too high - Dosage too high - Safety   Recommendation: Decrease Dose   Status: Accepted per CPA         Mild intermittent asthma without complication    Current Medication: fluticasone (FLOVENT HFA) 220 MCG/ACT inhaler (Discontinued)   Rationale: No medical indication at this time - Unnecessary medication therapy - Indication   Recommendation: Discontinue Medication   Status: Accepted per CPA          Current Medication: montelukast (SINGULAIR) 10 MG tablet (Discontinued)   Rationale: No medical indication at this time - Unnecessary medication therapy " - Indication   Recommendation: Discontinue Medication   Status: Accepted per CPA         Type 2 diabetes mellitus (H)    Current Medication: metFORMIN (GLUCOPHAGE XR) 500 MG 24 hr tablet   Rationale: Does not understand instructions - Adherence - Adherence   Recommendation: Provide Education   Status: Patient Agreed - Adherence/Education

## 2023-06-30 ENCOUNTER — OFFICE VISIT (OUTPATIENT)
Dept: PHARMACY | Facility: CLINIC | Age: 58
End: 2023-06-30
Attending: FAMILY MEDICINE
Payer: COMMERCIAL

## 2023-06-30 ENCOUNTER — APPOINTMENT (OUTPATIENT)
Dept: INTERPRETER SERVICES | Facility: CLINIC | Age: 58
End: 2023-06-30
Payer: COMMERCIAL

## 2023-06-30 VITALS — DIASTOLIC BLOOD PRESSURE: 66 MMHG | HEART RATE: 59 BPM | SYSTOLIC BLOOD PRESSURE: 105 MMHG

## 2023-06-30 DIAGNOSIS — J30.89 ALLERGIC RHINITIS DUE TO OTHER ALLERGIC TRIGGER, UNSPECIFIED SEASONALITY: ICD-10-CM

## 2023-06-30 DIAGNOSIS — I10 ESSENTIAL HYPERTENSION: ICD-10-CM

## 2023-06-30 DIAGNOSIS — E87.6 HYPOKALEMIA: ICD-10-CM

## 2023-06-30 DIAGNOSIS — I63.22 ACUTE ISCHEMIC VBA BRAINSTEM STROKE, RIGHT (H): Primary | ICD-10-CM

## 2023-06-30 DIAGNOSIS — I10 HYPERTENSION GOAL BP (BLOOD PRESSURE) < 130/80: ICD-10-CM

## 2023-06-30 DIAGNOSIS — E11.59 TYPE 2 DIABETES MELLITUS WITH OTHER CIRCULATORY COMPLICATION, WITHOUT LONG-TERM CURRENT USE OF INSULIN (H): ICD-10-CM

## 2023-06-30 DIAGNOSIS — E78.5 HYPERLIPIDEMIA LDL GOAL <70: ICD-10-CM

## 2023-06-30 DIAGNOSIS — I63.211 ACUTE ISCHEMIC VBA BRAINSTEM STROKE, RIGHT (H): Primary | ICD-10-CM

## 2023-06-30 PROCEDURE — 99607 MTMS BY PHARM ADDL 15 MIN: CPT | Performed by: PHARMACIST

## 2023-06-30 PROCEDURE — 99605 MTMS BY PHARM NP 15 MIN: CPT | Performed by: PHARMACIST

## 2023-06-30 RX ORDER — LISINOPRIL 20 MG/1
10 TABLET ORAL DAILY
Qty: 90 TABLET | Refills: 4
Start: 2023-06-30 | End: 2023-09-25

## 2023-06-30 RX ORDER — ALBUTEROL SULFATE 90 UG/1
2 AEROSOL, METERED RESPIRATORY (INHALATION) EVERY 4 HOURS PRN
Qty: 18 G | Refills: 0 | Status: SHIPPED | OUTPATIENT
Start: 2023-06-30

## 2023-06-30 RX ORDER — LORATADINE 10 MG/1
10 TABLET ORAL DAILY PRN
Qty: 90 TABLET | Refills: 3 | Status: SHIPPED | OUTPATIENT
Start: 2023-06-30 | End: 2024-09-10

## 2023-06-30 NOTE — PATIENT INSTRUCTIONS
"Recommendations from today's MTM visit:                                                      MTM (medication therapy management) is a service provided by a clinical pharmacist designed to help you get the most of out of your medicines.   Today we reviewed what your medicines are for, how to know if they are working, that your medicines are safe and how to make your medicine regimen as easy as possible.      1. Consider neurology consult to review DAPT and consider discontinuing clopidogrel.   2. Take Lisinopril 20 mg 0.5 tabs daily.   3. Take 1 full tab of metformin as prescribed. Have a protein based snack in the evening to prevent low sugars.   4. Stop Flovent and singulair    5. Refill albuterol and loratadine.        Follow-up: Return in about 4 weeks (around 7/28/2023) for Medication Management Pharmacist, in person.    It was great speaking with you today.  I value your experience and would be very thankful for your time in providing feedback in our clinic survey. In the next few days, you may receive an email or text message from [employer] with a link to a survey related to your  clinical pharmacist.\"     To schedule another MTM appointment, please call the clinic directly or you may call the MTM scheduling line at [phone] or toll-free at [phone].     My Clinical Pharmacist's contact information:                                                      Please feel free to contact me with any questions or concerns you have.      Kenton Jose, PharmD  Medication Therapy Management (MTM) Pharmacist  [facility]      "

## 2023-07-19 ENCOUNTER — TELEPHONE (OUTPATIENT)
Dept: FAMILY MEDICINE | Facility: CLINIC | Age: 58
End: 2023-07-19
Payer: COMMERCIAL

## 2023-07-19 DIAGNOSIS — N39.3 URINARY, INCONTINENCE, STRESS FEMALE: Primary | ICD-10-CM

## 2023-07-19 NOTE — TELEPHONE ENCOUNTER
Order/Referral Request    Who is requesting: Patient Daughter    Orders being requested: Depends size 10.5 long    Reason service is needed/diagnosis: continence problem    When are orders needed by: as soon as possible    Has this been discussed with Provider: Yes    Does patient have a preference on a Group/Provider/Facility? [place]     Does patient have an appointment scheduled?: No    Where to send orders: Fax    Okay to leave a detailed message?: Yes at Home number on file [phone] ([place])

## 2023-07-20 NOTE — TELEPHONE ENCOUNTER
Left VM for the pt's EC to find out where she would like orders sent to, the order is printed at my spot on the [address]

## 2023-07-24 NOTE — TELEPHONE ENCOUNTER
Per another CA who talked with the family, [facility] Faxed.

## 2023-07-26 NOTE — PROGRESS NOTES
Medication Therapy Management (MTM) Encounter    ASSESSMENT:                            Medication Adherence/Access: Patient not using medications as prescribed because of adverse effects. Likely needs some dose reductions based on actual use. Also reviewed concerns about splitting ER tabs. See detailed plan below.       Stroke Hx: Last LDL at goal <70 even with some adherence concerns with statin. Does have significant bruising on her arms. Typically DAPT continued for 3 weeks, sometimes 90 days after stroke. However, as patient was noted to have significant atherosclerotic disease at time of initial stroke may benefit from re-evaluation now that she has had significant changes with other medications including statin and repatha use. Also, ongoing bruising is ultimately effecting adherence to other medications. Does have neurology visit scheduled in December to reassess DAPT.          Diabetes Type 2: A1C at goal <7%. Reported home readings at goal  fasting. Continues to have lows. Will trial holding metformin for 1-2 months and reassessing control.       Hypertension: blood pressure at goal <130/80, even with lower dose Lisinopril. Pulse just below goal . Will plan to monitor K+ levels with updated Lisinopril dose.         Hypokalemia: Last levels WNL. As above, with lower dose of ACEi, will recheck levels today.       Pain: Muscle tightness well managed with low dose Acetaminophen .       Asthma/Allergies: Last ACT at goal >19. Allergies well managed with Loratadine use.       GERD: Symptoms well controlled with PPI use -infrequent use based on fill history and quantity on hand.       PLAN:                            1. Continue Lisinopril 20 mg 0.5 tab daily.   2. Stop Metformin   3. Repeat K+ level          Follow-up: Return in about 5 weeks (around 9/1/2023) for Medication Management Pharmacist, in person.    SUBJECTIVE/OBJECTIVE:                          Kevin Thrasher is a 58 year old female coming in  "for a follow-up visit. She was referred to me from Dominique Odonnell MD. PCP is Ema Gentile MD. [facility]  by phone (ID# [id number], [place]).  Today's visit is a follow-up MTM visit from 06/30/2023.      Reason for visit: Medication Management      Allergies/ADRs: Reviewed in chart  Past Medical History: Reviewed in chart  Tobacco: She reports that she has never smoked. She has never been exposed to tobacco smoke. She has never used smokeless tobacco.  Alcohol:  reports no history of alcohol use.       Medication Adherence/Access:        Brings Medications:   Ventolin inhaler   Loratadine 7/26#30   Aspirin 7/11 - #30   Plavix 7/11 #30   Metformin 3/30 #90 - ~30 remain (same as last visit)   Lisinopril - 5/5 #90 - mostly full    Rosuvastatin 40 mg 5/5 #45 - mostly full   K+ 6/18 #90 - still mostly full.     Repatha        Children help her with setting a pill box.            Stroke Hx: Prescirbed Clopidogrel 75mg daily, aspirin 81mg daily, rosuvastatin 40mg 0.5 tab at bedtime, Repatha 140 mg injections every 2 weeks.     Having significant amount of bruising with DAPT.   Only taking a half tab of Plavix because she bruises all over.     Per discharge summary from Feb 2020:   \"Dual antiplatelet therapy with aspirin and Plavix indefinitely due to significant intracranial atherosclerotic disease.\"       At last MTM visit, referred to neurology to reassess. Patient does have appt scheduled for December.        Recent Labs   Lab Test 06/06/23  1601 09/28/22  1118   CHOL 126 94   HDL 59 59   LDL 46 17   TRIG 104 92         Diabetes Type 2: Metformin  mg with dinner. Patient denies side effects or any GI upset with current dose of metformin     Since last MTM visit, has been taking 1 full tab daily as instructed.      SMBG: one times daily.      Ranges: 107-120     Notes that sometimes in the morning, even if she hasn't eaten, feels shaky and her blood glucose spike up to 200s.     Symptoms " of low blood sugar? Continues to have shaking and sweating. Blood glucose readings in the 70s. Sometimes has symptoms with readings in the 80s.     Recent symptoms of high blood sugar? none.    Diet: Having 2 meals per day - typically morning and evening. Usually around 10 am and 4pm.       Hemoglobin A1C   Date Value Ref Range Status   03/30/2023 6.5 (H) 0.0 - 5.6 % Final     Comment:     Normal <5.7%   Prediabetes 5.7-6.4%    Diabetes 6.5% or higher     Note: Adopted from ADA consensus guidelines.   07/08/2022 6.5 (H) 0.0 - 5.6 % Final     Comment:     Normal <5.7%   Prediabetes 5.7-6.4%    Diabetes 6.5% or higher     Note: Adopted from ADA consensus guidelines.   01/24/2022 6.6 (H) 0.0 - 5.6 % Final     Comment:     Normal <5.7%   Prediabetes 5.7-6.4%    Diabetes 6.5% or higher     Note: Adopted from ADA consensus guidelines.   02/06/2020 6.1 (H) 0 - 5.6 % Final     Comment:     Normal <5.7% Prediabetes 5.7-6.4%  Diabetes 6.5% or higher - adopted from ADA   consensus guidelines.        Microalbumin Urine mg/dL   Date Value Ref Range Status   09/03/2021 <0.50 0.00 - 1.99 mg/dL Final      Creatinine Urine mg/dL   Date Value Ref Range Status   03/30/2023 27.8 mg/dL Final     Comment:     The reference ranges have not been established in urine creatinine. The results should be integrated into the clinical context for interpretation.   09/03/2021 20 mg/dL Final     Creatinine Urine for Drug Screen   Date Value Ref Range Status   01/24/2022 93 mg/dL Final     Comment:     The reference range has not been established for creatinine in random urines. The results should be integrated into the clinical context for interpretation.     LDL Cholesterol Calculated   Date Value Ref Range Status   06/06/2023 46 <=100 mg/dL Final     LDL Cholesterol Direct   Date Value Ref Range Status   02/11/2016 153 (H) <=129 mg/dl Final     Creatinine   Date Value Ref Range Status   06/23/2023 0.62 0.51 - 0.95 mg/dL Final   02/14/2020 0.66  0.52 - 1.04 mg/dL Final             Hypertension: Lisinopril 20mg 0.5 tab daily (dose was reduced at last MTM visit)     Just taking half tablet daily. No longer feeling dizzy.         BP Readings from Last 3 Encounters:   07/28/23 112/76   06/30/23 105/66   06/23/23 139/83      Pulse Readings from Last 3 Encounters:   07/28/23 57   06/30/23 59   06/23/23 73     Wt Readings from Last 3 Encounters:   06/23/23 140 lb (63.5 kg)   06/06/23 140 lb (63.5 kg)   03/30/23 139 lb 12 oz (63.4 kg)             Hypokalemia: Potassium CL ER 10mEq twice daily     Potassium   Date Value Ref Range Status   06/23/2023 3.6 3.4 - 5.3 mmol/L Final   03/07/2022 3.9 3.5 - 5.0 mmol/L Final   02/14/2020 3.5 3.4 - 5.3 mmol/L Final             Pain: Prescribed Tylenol 500mg 1-2 tabs every 6 hours as needed,      Has pain all over the body and pain in the nerves. Tosses and turns at night, muscles feel tight. 1 tablet of Acetaminophen is enough to calm the pain and allow her to get back to sleep.             Asthma/Allergies: Prescribed Albuterol HFA 2 puffs every 4 hours as needed, loratadine 10 mg daily.       At last visit, wasn't using Flovent or Montelukast and symptoms were well controlled, so both of these were discontinued.     Tends to be worst during spring season.   Using Claritin just as needed - finds it really helpful.      Denies any episodes of shortness of breath since last MTM visit.             9/28/2022    10:10 AM 3/30/2023    12:53 PM 3/30/2023    12:57 PM   ACT Total Scores   ACT TOTAL SCORE (Goal Greater than or Equal to 20) 25 18 20   In the past 12 months, how many times did you visit the emergency room for your asthma without being admitted to the hospital? 0 0 0   In the past 12 months, how many times were you hospitalized overnight because of your asthma? 0 0 0           GERD: omeprazole 20mg every day    PPI filled in January #90 - bottle about half full. Using just as needed. Finds it really helpful.   Symptoms  usually related to dietary triggers.             Today's Vitals: /76   Pulse 57   ----------------      I spent 35 minutes with this patient today. All changes were made via collaborative practice agreement with Ema Gentile MD. A copy of the visit note was provided to the patient's provider(s).    A summary of these recommendations was given to the patient.    Kenton Jose PharmD  Medication Therapy Management (MTM) Pharmacist  [facility]        Medication Therapy Recommendations  Type 2 diabetes mellitus (H)    Current Medication: metFORMIN (GLUCOPHAGE XR) 500 MG 24 hr tablet (Discontinued)   Rationale: No medical indication at this time - Unnecessary medication therapy - Indication   Recommendation: Discontinue Medication   Status: Accepted per CPA

## 2023-07-28 ENCOUNTER — OFFICE VISIT (OUTPATIENT)
Dept: PHARMACY | Facility: CLINIC | Age: 58
End: 2023-07-28
Payer: COMMERCIAL

## 2023-07-28 ENCOUNTER — LAB (OUTPATIENT)
Dept: LAB | Facility: CLINIC | Age: 58
End: 2023-07-28
Payer: COMMERCIAL

## 2023-07-28 VITALS — HEART RATE: 57 BPM | SYSTOLIC BLOOD PRESSURE: 112 MMHG | DIASTOLIC BLOOD PRESSURE: 76 MMHG

## 2023-07-28 DIAGNOSIS — J30.89 ALLERGIC RHINITIS DUE TO OTHER ALLERGIC TRIGGER, UNSPECIFIED SEASONALITY: ICD-10-CM

## 2023-07-28 DIAGNOSIS — E87.6 HYPOKALEMIA: ICD-10-CM

## 2023-07-28 DIAGNOSIS — I10 ESSENTIAL HYPERTENSION: ICD-10-CM

## 2023-07-28 DIAGNOSIS — I63.211 ACUTE ISCHEMIC VBA BRAINSTEM STROKE, RIGHT (H): Primary | ICD-10-CM

## 2023-07-28 DIAGNOSIS — E11.59 TYPE 2 DIABETES MELLITUS WITH OTHER CIRCULATORY COMPLICATION, WITHOUT LONG-TERM CURRENT USE OF INSULIN (H): ICD-10-CM

## 2023-07-28 DIAGNOSIS — I63.22 ACUTE ISCHEMIC VBA BRAINSTEM STROKE, RIGHT (H): Primary | ICD-10-CM

## 2023-07-28 PROCEDURE — 36415 COLL VENOUS BLD VENIPUNCTURE: CPT

## 2023-07-28 PROCEDURE — 80048 BASIC METABOLIC PNL TOTAL CA: CPT

## 2023-07-28 PROCEDURE — 99607 MTMS BY PHARM ADDL 15 MIN: CPT | Performed by: PHARMACIST

## 2023-07-28 PROCEDURE — 99606 MTMS BY PHARM EST 15 MIN: CPT | Performed by: PHARMACIST

## 2023-07-28 NOTE — PATIENT INSTRUCTIONS
"Recommendations from today's MTM visit:                                                         Stop Metformin     Follow-up: Return in about 5 weeks (around 9/1/2023) for Medication Management Pharmacist, in person.    It was great speaking with you today.  I value your experience and would be very thankful for your time in providing feedback in our clinic survey. In the next few days, you may receive an email or text message from [employer] with a link to a survey related to your  clinical pharmacist.\"     To schedule another MTM appointment, please call the clinic directly or you may call the MTM scheduling line at [phone] or toll-free at [phone].     My Clinical Pharmacist's contact information:                                                      Please feel free to contact me with any questions or concerns you have.      Kenton Jose, PharmD  Medication Therapy Management (MTM) Pharmacist  [facility]      "

## 2023-07-29 LAB
ANION GAP SERPL CALCULATED.3IONS-SCNC: 10 MMOL/L (ref 7–15)
BUN SERPL-MCNC: 17.5 MG/DL (ref 8–23)
CALCIUM SERPL-MCNC: 9 MG/DL (ref 8.8–10.2)
CHLORIDE SERPL-SCNC: 102 MMOL/L (ref 98–107)
CREAT SERPL-MCNC: 0.92 MG/DL (ref 0.51–0.95)
DEPRECATED HCO3 PLAS-SCNC: 27 MMOL/L (ref 22–29)
GFR SERPL CREATININE-BSD FRML MDRD: 71 ML/MIN/1.73M2
GLUCOSE SERPL-MCNC: 118 MG/DL (ref 70–99)
POTASSIUM SERPL-SCNC: 4 MMOL/L (ref 3.4–5.3)
SODIUM SERPL-SCNC: 139 MMOL/L (ref 136–145)

## 2023-08-01 ENCOUNTER — TELEPHONE (OUTPATIENT)
Dept: PHARMACY | Facility: CLINIC | Age: 58
End: 2023-08-01
Payer: COMMERCIAL

## 2023-08-01 ENCOUNTER — APPOINTMENT (OUTPATIENT)
Dept: INTERPRETER SERVICES | Facility: CLINIC | Age: 58
End: 2023-08-01
Payer: COMMERCIAL

## 2023-08-01 NOTE — TELEPHONE ENCOUNTER
----- Message from Kenton Jose PharmD sent at 7/31/2023  8:57 AM CDT -----  Please call patient and inform:     Potassium levels are in range. Continue with medications as discussed at last week's MTM visit -  continue potassium supplement and half tab of Lisinopril.

## 2023-08-29 DIAGNOSIS — E78.5 HYPERLIPIDEMIA LDL GOAL <70: ICD-10-CM

## 2023-08-30 NOTE — PROGRESS NOTES
Medication Therapy Management (MTM) Encounter    ASSESSMENT:                            Medication Adherence/Access: Over the past few months has made significant improvements in adherence which has allowed for reduction in lisinopril dose, elimination of metformin.       Stroke Hx: Last LDL at goal <70. Continues to have significant bruising on her arms and legs. Even worse today than previous. Typically DAPT continued for 3 weeks, sometimes 90 days after stroke. Initially planned to have patient reassessed with Neurology. However, given extent of bruising today, discussed with PCP and will stop Plavix. Will still have patient follow-up with Neuro as scheduled. Neuro informed of plavix discontinue today.          Diabetes Type 2: A1C at goal <7%. Since stopping metformin, fasting sugars at goal . Post-prandials mostly at goal <180. Few episodes are related to increased fruit intake. Discussed portion control and  fruits from the rest of her meals. No longer having shakiness episodes.       Hypertension: blood pressure at goal <130/80, even with lower dose Lisinopril. Pulse just below goal .         Hypokalemia: Last levels WNL.        Pain: Muscle tightness well managed with low dose Acetaminophen .       Asthma/Allergies: Last ACT at goal >19. Allergies well managed with Loratadine use.       GERD: Symptoms well controlled with PPI use -infrequent use based on fill history and quantity on hand.       PLAN:                            1. Stop Clopidogrel.        Follow-up: 12/28 with Neurology   Schedule with PharmD as needed     SUBJECTIVE/OBJECTIVE:                          Kevin Thrasher is a 58 year old female coming in for a follow-up visit. She was referred to me from Dominique Odonnell MD. PCP is Ema Gentile MD. [facility]  in person (ID# [id number], Kenneth).  Today's visit is a follow-up MTM visit from 07/28/2023.      Reason for visit: Medication Management   "    Allergies/ADRs: Reviewed in chart  Past Medical History: Reviewed in chart  Tobacco: She reports that she has never smoked. She has never been exposed to tobacco smoke. She has never used smokeless tobacco.  Alcohol:  reports no history of alcohol use.       Medication Adherence/Access:        Brings Medications:   Ventolin inhaler   Loratadine 7/26#30   Aspirin 7/11 - #30   Plavix 7/11 #30   Metformin 3/30 #90 - ~30 remain (same as last visit)   Lisinopril - 5/5 #90 - mostly full    Rosuvastatin 40 mg 5/5 #45 - mostly full   K+ 6/18 #90 - still mostly full.     Repatha        Children help her with setting a pill box.            Stroke Hx: Prescirbed Clopidogrel 75mg daily, aspirin 81mg daily, rosuvastatin 40mg 0.5 tab at bedtime, Repatha 140 mg injections every 2 weeks.     Having significant amount of bruising with DAPT.   Only taking a half tab of Plavix because she bruises all over.                     Per discharge summary from Feb 2020:   \"Dual antiplatelet therapy with aspirin and Plavix indefinitely due to significant intracranial atherosclerotic disease.\"     Patient was referred to neurology to discuss discontinuing clopidogrel. Appt scheduled for December.      LDL at hospitalization in 2020 was 155. Was not on Repatha at this time.     Recent Labs   Lab Test 06/06/23  1601 09/28/22  1118   CHOL 126 94   HDL 59 59   LDL 46 17   TRIG 104 92         Diabetes Type 2: At last visit, held metformin due to concerns of overnight lows with rebound highs.      SMBG: one times daily.  From Glucometer.     Ranges:   AM: 107, 94, 104, 92, 91, 95, 129, 88, 85,   PM: 112, 193, 124, 269*, 180, 116  After eating banana and mike     Symptoms of low blood sugar? No longer having shaking/sweating.     Recent symptoms of high blood sugar? none.    Diet: Having 2 meals per day - typically morning and evening. Usually around 10 am and 4pm.       Hemoglobin A1C   Date Value Ref Range Status   03/30/2023 6.5 (H) 0.0 - 5.6 " % Final     Comment:     Normal <5.7%   Prediabetes 5.7-6.4%    Diabetes 6.5% or higher     Note: Adopted from ADA consensus guidelines.   07/08/2022 6.5 (H) 0.0 - 5.6 % Final     Comment:     Normal <5.7%   Prediabetes 5.7-6.4%    Diabetes 6.5% or higher     Note: Adopted from ADA consensus guidelines.   01/24/2022 6.6 (H) 0.0 - 5.6 % Final     Comment:     Normal <5.7%   Prediabetes 5.7-6.4%    Diabetes 6.5% or higher     Note: Adopted from ADA consensus guidelines.   02/06/2020 6.1 (H) 0 - 5.6 % Final     Comment:     Normal <5.7% Prediabetes 5.7-6.4%  Diabetes 6.5% or higher - adopted from ADA   consensus guidelines.        Microalbumin Urine mg/dL   Date Value Ref Range Status   09/03/2021 <0.50 0.00 - 1.99 mg/dL Final      Creatinine Urine mg/dL   Date Value Ref Range Status   03/30/2023 27.8 mg/dL Final     Comment:     The reference ranges have not been established in urine creatinine. The results should be integrated into the clinical context for interpretation.   09/03/2021 20 mg/dL Final     Creatinine Urine for Drug Screen   Date Value Ref Range Status   01/24/2022 93 mg/dL Final     Comment:     The reference range has not been established for creatinine in random urines. The results should be integrated into the clinical context for interpretation.     LDL Cholesterol Calculated   Date Value Ref Range Status   06/06/2023 46 <=100 mg/dL Final     LDL Cholesterol Direct   Date Value Ref Range Status   02/11/2016 153 (H) <=129 mg/dl Final     Creatinine   Date Value Ref Range Status   07/28/2023 0.92 0.51 - 0.95 mg/dL Final   02/14/2020 0.66 0.52 - 1.04 mg/dL Final             Hypertension: Lisinopril 20mg 0.5 tab daily.       Just taking half tablet daily. No longer feeling dizzy.         BP Readings from Last 3 Encounters:   07/28/23 112/76   06/30/23 105/66   06/23/23 139/83      Pulse Readings from Last 3 Encounters:   07/28/23 57   06/30/23 59   06/23/23 73     Wt Readings from Last 3 Encounters:    06/23/23 140 lb (63.5 kg)   06/06/23 140 lb (63.5 kg)   03/30/23 139 lb 12 oz (63.4 kg)             Hypokalemia: Potassium CL ER 10mEq twice daily     Potassium   Date Value Ref Range Status   07/28/2023 4.0 3.4 - 5.3 mmol/L Final   03/07/2022 3.9 3.5 - 5.0 mmol/L Final   02/14/2020 3.5 3.4 - 5.3 mmol/L Final             Pain: Prescribed Tylenol 500mg 1-2 tabs every 6 hours as needed,      Has pain all over the body and pain in the nerves. Tosses and turns at night, muscles feel tight. 1 tablet of Acetaminophen is enough to calm the pain and allow her to get back to sleep.         Asthma/Allergies: Prescribed Albuterol HFA 2 puffs every 4 hours as needed, loratadine 10 mg daily.     Tends to be worst during spring season.   Using Claritin just as needed - finds it really helpful.      Denies any episodes of shortness of breath since last MTM visit (now second visit with no concerns).             9/28/2022    10:10 AM 3/30/2023    12:53 PM 3/30/2023    12:57 PM   ACT Total Scores   ACT TOTAL SCORE (Goal Greater than or Equal to 20) 25 18 20   In the past 12 months, how many times did you visit the emergency room for your asthma without being admitted to the hospital? 0 0 0   In the past 12 months, how many times were you hospitalized overnight because of your asthma? 0 0 0           GERD: omeprazole 20mg every day    PPI filled in January #90 - bottle about half full. Using just as needed. Finds it really helpful.   Symptoms usually related to dietary triggers.             Today's Vitals: There were no vitals taken for this visit.  ----------------      I spent 35 minutes with this patient today. All changes were made via collaborative practice agreement with Ema Gentile MD. A copy of the visit note was provided to the patient's provider(s).    A summary of these recommendations was given to the patient.    Kenton Jose PharmD  Medication Therapy Management (MTM) Pharmacist  [facility]         Medication Therapy Recommendations  No medication therapy recommendations to display

## 2023-08-30 NOTE — TELEPHONE ENCOUNTER
"Routing refill request to provider for review/approval because:  A break in medication    Last Written Prescription Date:  7/18/22  Last Fill Quantity: 90,  # refills: 0   Last office visit provider:  6/6/23     Requested Prescriptions   Pending Prescriptions Disp Refills    rosuvastatin (CRESTOR) 40 MG tablet [Pharmacy Med Name: ROSUVASTATIN CALCIUM 40 MG 40 Tablet] 45 tablet 0     Sig: TAKE 0.5 TABLETS (20 MG) BY MOUTH AT BEDTIME       Statins Protocol Passed - 8/29/2023 12:59 PM        Passed - LDL on file in past 12 months     Recent Labs   Lab Test 06/06/23  1601   LDL 46             Passed - No abnormal creatine kinase in past 12 months     No lab results found.             Passed - Recent (12 mo) or future (30 days) visit within the authorizing provider's specialty     Patient has had an office visit with the authorizing provider or a provider within the authorizing providers department within the previous 12 mos or has a future within next 30 days. See \"Patient Info\" tab in inbasket, or \"Choose Columns\" in Meds & Orders section of the refill encounter.              Passed - Medication is active on med list        Passed - Patient is age 18 or older        Passed - No active pregnancy on record        Passed - No positive pregnancy test in past 12 months             Raine Gibson RN 08/29/23 10:43 PM  "

## 2023-08-31 RX ORDER — ROSUVASTATIN CALCIUM 40 MG/1
20 TABLET, COATED ORAL AT BEDTIME
Qty: 45 TABLET | Refills: 0 | Status: SHIPPED | OUTPATIENT
Start: 2023-08-31 | End: 2023-09-01

## 2023-09-01 ENCOUNTER — OFFICE VISIT (OUTPATIENT)
Dept: PHARMACY | Facility: CLINIC | Age: 58
End: 2023-09-01
Payer: COMMERCIAL

## 2023-09-01 ENCOUNTER — APPOINTMENT (OUTPATIENT)
Dept: INTERPRETER SERVICES | Facility: CLINIC | Age: 58
End: 2023-09-01
Payer: COMMERCIAL

## 2023-09-01 VITALS — HEART RATE: 62 BPM | DIASTOLIC BLOOD PRESSURE: 81 MMHG | SYSTOLIC BLOOD PRESSURE: 125 MMHG

## 2023-09-01 DIAGNOSIS — I63.211 ACUTE ISCHEMIC VBA BRAINSTEM STROKE, RIGHT (H): Primary | ICD-10-CM

## 2023-09-01 DIAGNOSIS — I10 ESSENTIAL HYPERTENSION: ICD-10-CM

## 2023-09-01 DIAGNOSIS — I10 HYPERTENSION GOAL BP (BLOOD PRESSURE) < 130/80: ICD-10-CM

## 2023-09-01 DIAGNOSIS — J30.89 ALLERGIC RHINITIS DUE TO OTHER ALLERGIC TRIGGER, UNSPECIFIED SEASONALITY: ICD-10-CM

## 2023-09-01 DIAGNOSIS — E11.59 TYPE 2 DIABETES MELLITUS WITH OTHER CIRCULATORY COMPLICATION, WITHOUT LONG-TERM CURRENT USE OF INSULIN (H): ICD-10-CM

## 2023-09-01 DIAGNOSIS — K59.00 CONSTIPATION, UNSPECIFIED CONSTIPATION TYPE: ICD-10-CM

## 2023-09-01 DIAGNOSIS — E87.6 HYPOKALEMIA: ICD-10-CM

## 2023-09-01 DIAGNOSIS — I63.22 ACUTE ISCHEMIC VBA BRAINSTEM STROKE, RIGHT (H): Primary | ICD-10-CM

## 2023-09-01 DIAGNOSIS — E78.5 HYPERLIPIDEMIA LDL GOAL <70: ICD-10-CM

## 2023-09-01 PROCEDURE — 99607 MTMS BY PHARM ADDL 15 MIN: CPT | Performed by: PHARMACIST

## 2023-09-01 PROCEDURE — 99606 MTMS BY PHARM EST 15 MIN: CPT | Performed by: PHARMACIST

## 2023-09-01 RX ORDER — POLYETHYLENE GLYCOL 3350 17 G/17G
17 POWDER, FOR SOLUTION ORAL DAILY
Qty: 510 G | Refills: 3 | Status: SHIPPED | OUTPATIENT
Start: 2023-09-01

## 2023-09-01 RX ORDER — ROSUVASTATIN CALCIUM 40 MG/1
20 TABLET, COATED ORAL AT BEDTIME
Qty: 45 TABLET | Refills: 2 | Status: SHIPPED | OUTPATIENT
Start: 2023-09-01 | End: 2024-01-22 | Stop reason: ALTCHOICE

## 2023-09-01 NOTE — PATIENT INSTRUCTIONS
"Recommendations from today's MTM visit:                                                         1. Stop Clopidogrel.        Follow-up: 12/28 with Neurology   Schedule with PharmD as needed     It was great speaking with you today.  I value your experience and would be very thankful for your time in providing feedback in our clinic survey. In the next few days, you may receive an email or text message from [employer] with a link to a survey related to your  clinical pharmacist.\"     To schedule another MTM appointment, please call the clinic directly or you may call the MTM scheduling line at [phone] or toll-free at [phone].     My Clinical Pharmacist's contact information:                                                      Please feel free to contact me with any questions or concerns you have.      Kenton Jose, PharmD  Medication Therapy Management (MTM) Pharmacist  [facility]      "

## 2023-09-01 NOTE — TELEPHONE ENCOUNTER
"Last Written Prescription Date:  8/31/23  Last Fill Quantity: 45,  # refills: 0   Last office visit provider:  6/6/23     Requested Prescriptions   Pending Prescriptions Disp Refills    rosuvastatin (CRESTOR) 40 MG tablet [Pharmacy Med Name: ROSUVASTATIN CALCIUM 40 MG 40 Tablet] 45 tablet 0     Sig: TAKE 0.5 TABLETS (20 MG) BY MOUTH AT BEDTIME       Statins Protocol Passed - 9/1/2023 10:05 AM        Passed - LDL on file in past 12 months     Recent Labs   Lab Test 06/06/23  1601   LDL 46             Passed - No abnormal creatine kinase in past 12 months     No lab results found.             Passed - Recent (12 mo) or future (30 days) visit within the authorizing provider's specialty     Patient has had an office visit with the authorizing provider or a provider within the authorizing providers department within the previous 12 mos or has a future within next 30 days. See \"Patient Info\" tab in inbasket, or \"Choose Columns\" in Meds & Orders section of the refill encounter.              Passed - Medication is active on med list        Passed - Patient is age 18 or older        Passed - No active pregnancy on record        Passed - No positive pregnancy test in past 12 months             Fiona Chery 09/01/23 12:57 PM  "

## 2023-09-01 NOTE — Clinical Note
Dr. Loco,   Patient is scheduled to see you in December. Referral was placed due to bleeding on DAPT. See images in my note from today. At time of original stroke, discharge notes indicated DAPT indefinitely. She's had signficant improvement in med adherence. Blood pressure well control. LDL decreased from 155 to 46 recently with statin and Repatha. Per discussion with PCP we ended up stopping the Plavix today. We told her to keep the follow-up with you in December. Let us know if you think we need to do anything differently in the mean time.   Thanks!  Kenton Jose, PharmD Medication Therapy Management (MTM) Pharmacist [facility]

## 2023-09-20 DIAGNOSIS — I63.211 ACUTE ISCHEMIC VBA BRAINSTEM STROKE, RIGHT (H): ICD-10-CM

## 2023-09-20 DIAGNOSIS — E78.5 HYPERLIPIDEMIA LDL GOAL <70: ICD-10-CM

## 2023-09-20 DIAGNOSIS — I63.22 ACUTE ISCHEMIC VBA BRAINSTEM STROKE, RIGHT (H): ICD-10-CM

## 2023-09-22 ENCOUNTER — TELEPHONE (OUTPATIENT)
Dept: FAMILY MEDICINE | Facility: CLINIC | Age: 58
End: 2023-09-22
Payer: COMMERCIAL

## 2023-09-22 RX ORDER — EVOLOCUMAB 140 MG/ML
140 INJECTION, SOLUTION SUBCUTANEOUS
Qty: 2 ML | Refills: 3 | Status: SHIPPED | OUTPATIENT
Start: 2023-09-22 | End: 2023-12-21

## 2023-09-22 NOTE — TELEPHONE ENCOUNTER
Pt called back and said it's her lisinopril that she also needs for a refill. Pt will be leaving out of town on Tuesday morning so she would like for that to be filled ASAP. Please fill RX and contact pt back once it has been sent to pharmacy. Thanks!

## 2023-09-22 NOTE — TELEPHONE ENCOUNTER
Medication Question or Refill    Contacts         Type Contact Phone/Fax    09/22/2023 09:58 AM CDT Phone (Incoming) Kevin Thrasher (Self) [phone] (M)            What medication are you calling about (include dose and sig)?: pt is unsure of the name of medication she would like refilled, please give pt a call with  to verify medication. Thank you.    Preferred Pharmacy:   [id number] - [place], [place] - [address]  [address] [place] [address]  Phone: [phone] Fax: [phone]    [facility] - [place], [place] - [address]  [address]  [address]  [place] [address]  Phone: [phone] Fax: [phone]      Controlled Substance Agreement on file:   CSA -- Patient Level:     [Media Unavailable] Controlled Substance Agreement - Opioid - Scan on 5/1/2022  4:53 PM   [Media Unavailable] Controlled Substance Agreement - Opioid - Scan on 1/26/2022 12:41 PM   [Media Unavailable] Controlled Substance Agreement - Opioid - Scan on 10/30/2021  2:32 PM       Who prescribed the medication?: Krupa    Do you need a refill? Yes    When did you use the medication last? 09/22/2023    Patient offered an appointment? Yes, pt needs refills asap as she is leaving out of town next week.    Do you have any questions or concerns?  No    Okay to leave a detailed message?: Yes at Home number on file [phone] (home)

## 2023-09-25 ENCOUNTER — TELEPHONE (OUTPATIENT)
Dept: FAMILY MEDICINE | Facility: CLINIC | Age: 58
End: 2023-09-25
Payer: COMMERCIAL

## 2023-09-25 DIAGNOSIS — I10 ESSENTIAL HYPERTENSION: ICD-10-CM

## 2023-09-25 RX ORDER — LISINOPRIL 20 MG/1
TABLET ORAL
Qty: 90 TABLET | Refills: 4 | Status: SHIPPED | OUTPATIENT
Start: 2023-09-25

## 2023-09-25 NOTE — TELEPHONE ENCOUNTER
Due for visit. Please schedule.    Future Appointments   Date Time Provider Department Center   12/28/2023  7:30 AM Jun Loco MD [facility]      Health Maintenance Due   Topic Date Due    MAMMO SCREENING  Never done    YEARLY PREVENTIVE VISIT  11/06/2021    DIABETIC FOOT EXAM  11/06/2021    ADVANCE CARE PLANNING  08/14/2022    COVID-19 Vaccine (4 - Moderna series) 09/02/2022    URINE DRUG SCREEN  01/24/2023    ASTHMA ACTION PLAN  01/24/2023    COLORECTAL CANCER SCREENING  02/28/2023    CONTROLLED SUBSTANCE AGREEMENT FOR CHRONIC PAIN MANAGEMENT  03/25/2023    EYE EXAM  07/27/2023    INFLUENZA VACCINE (1) 09/01/2023    PHQ-9  09/28/2023    A1C  09/30/2023    ASTHMA CONTROL TEST  09/30/2023     BP Readings from Last 3 Encounters:   09/01/23 125/81   07/28/23 112/76   06/30/23 105/66     Kevin was seen today for medication refill.    Diagnoses and all orders for this visit:    Hypertension

## 2023-11-09 DIAGNOSIS — Z12.11 COLON CANCER SCREENING: ICD-10-CM

## 2023-11-23 ENCOUNTER — LAB (OUTPATIENT)
Dept: FAMILY MEDICINE | Facility: CLINIC | Age: 58
End: 2023-11-23
Payer: COMMERCIAL

## 2023-11-23 DIAGNOSIS — Z12.11 COLON CANCER SCREENING: ICD-10-CM

## 2023-12-21 DIAGNOSIS — I63.211 ACUTE ISCHEMIC VBA BRAINSTEM STROKE, RIGHT (H): ICD-10-CM

## 2023-12-21 DIAGNOSIS — E78.5 HYPERLIPIDEMIA LDL GOAL <70: ICD-10-CM

## 2023-12-21 DIAGNOSIS — I63.22 ACUTE ISCHEMIC VBA BRAINSTEM STROKE, RIGHT (H): ICD-10-CM

## 2023-12-21 RX ORDER — EVOLOCUMAB 140 MG/ML
140 INJECTION, SOLUTION SUBCUTANEOUS
Qty: 2 ML | Refills: 3 | Status: SHIPPED | OUTPATIENT
Start: 2023-12-21 | End: 2024-07-11

## 2023-12-22 ENCOUNTER — TELEPHONE (OUTPATIENT)
Dept: FAMILY MEDICINE | Facility: CLINIC | Age: 58
End: 2023-12-22
Payer: COMMERCIAL

## 2023-12-22 NOTE — TELEPHONE ENCOUNTER
Patient Quality Outreach    Patient is due for the following:   Breast Cancer Screening - Mammogram    Next Steps:   Patient declined follow up at this time.    Type of outreach:    Phone, spoke to patient/parent. Spoke with patient, she declined to schedule.  Has an appointment at [facility] on 1/22/24.      Questions for provider review:    None           Andrea Behrend

## 2024-01-22 ENCOUNTER — OFFICE VISIT (OUTPATIENT)
Dept: FAMILY MEDICINE | Facility: CLINIC | Age: 59
End: 2024-01-22
Payer: COMMERCIAL

## 2024-01-22 ENCOUNTER — ANCILLARY PROCEDURE (OUTPATIENT)
Dept: MAMMOGRAPHY | Facility: CLINIC | Age: 59
End: 2024-01-22
Attending: FAMILY MEDICINE
Payer: COMMERCIAL

## 2024-01-22 VITALS
DIASTOLIC BLOOD PRESSURE: 69 MMHG | HEART RATE: 55 BPM | RESPIRATION RATE: 16 BRPM | OXYGEN SATURATION: 98 % | SYSTOLIC BLOOD PRESSURE: 111 MMHG | WEIGHT: 132 LBS | HEIGHT: 56 IN | TEMPERATURE: 96.8 F | BODY MASS INDEX: 29.69 KG/M2

## 2024-01-22 DIAGNOSIS — I63.211 ACUTE ISCHEMIC VBA BRAINSTEM STROKE, RIGHT (H): ICD-10-CM

## 2024-01-22 DIAGNOSIS — N95.2 VAGINAL ATROPHY: ICD-10-CM

## 2024-01-22 DIAGNOSIS — Z00.00 ROUTINE GENERAL MEDICAL EXAMINATION AT A HEALTH CARE FACILITY: Primary | ICD-10-CM

## 2024-01-22 DIAGNOSIS — K21.9 GASTRO-ESOPHAGEAL REFLUX DISEASE WITHOUT ESOPHAGITIS: ICD-10-CM

## 2024-01-22 DIAGNOSIS — E78.5 HYPERLIPIDEMIA LDL GOAL <70: ICD-10-CM

## 2024-01-22 DIAGNOSIS — E11.59 TYPE 2 DIABETES MELLITUS WITH OTHER CIRCULATORY COMPLICATION, WITHOUT LONG-TERM CURRENT USE OF INSULIN (H): ICD-10-CM

## 2024-01-22 DIAGNOSIS — I63.22 ACUTE ISCHEMIC VBA BRAINSTEM STROKE, RIGHT (H): ICD-10-CM

## 2024-01-22 DIAGNOSIS — H11.003 PTERYGIUM OF BOTH EYES: ICD-10-CM

## 2024-01-22 DIAGNOSIS — E11.21 DIABETIC NEPHROPATHY ASSOCIATED WITH TYPE 2 DIABETES MELLITUS (H): ICD-10-CM

## 2024-01-22 DIAGNOSIS — I10 HYPERTENSION GOAL BP (BLOOD PRESSURE) < 130/80: ICD-10-CM

## 2024-01-22 DIAGNOSIS — Z12.31 VISIT FOR SCREENING MAMMOGRAM: ICD-10-CM

## 2024-01-22 DIAGNOSIS — N39.3 STRESS INCONTINENCE OF URINE: ICD-10-CM

## 2024-01-22 DIAGNOSIS — E87.6 HYPOKALEMIA: ICD-10-CM

## 2024-01-22 DIAGNOSIS — Z13.9 ENCOUNTER FOR SCREENING INVOLVING SOCIAL DETERMINANTS OF HEALTH (SDOH): ICD-10-CM

## 2024-01-22 DIAGNOSIS — I67.9 CEREBROVASCULAR DISEASE: ICD-10-CM

## 2024-01-22 PROBLEM — M75.80 ROTATOR CUFF TENDINITIS, UNSPECIFIED LATERALITY: Status: RESOLVED | Noted: 2021-09-03 | Resolved: 2024-01-22

## 2024-01-22 PROBLEM — E11.42 DIABETIC POLYNEUROPATHY ASSOCIATED WITH TYPE 2 DIABETES MELLITUS (H): Status: RESOLVED | Noted: 2021-09-03 | Resolved: 2024-01-22

## 2024-01-22 LAB
ALBUMIN SERPL BCG-MCNC: 3.8 G/DL (ref 3.5–5.2)
ALP SERPL-CCNC: 82 U/L (ref 40–150)
ALT SERPL W P-5'-P-CCNC: 26 U/L (ref 0–50)
ANION GAP SERPL CALCULATED.3IONS-SCNC: 9 MMOL/L (ref 7–15)
AST SERPL W P-5'-P-CCNC: 29 U/L (ref 0–45)
BILIRUB SERPL-MCNC: 0.5 MG/DL
BUN SERPL-MCNC: 13.8 MG/DL (ref 8–23)
CALCIUM SERPL-MCNC: 9.1 MG/DL (ref 8.8–10.2)
CHLORIDE SERPL-SCNC: 101 MMOL/L (ref 98–107)
CHOLEST SERPL-MCNC: 112 MG/DL
CREAT SERPL-MCNC: 0.9 MG/DL (ref 0.51–0.95)
DEPRECATED HCO3 PLAS-SCNC: 31 MMOL/L (ref 22–29)
EGFRCR SERPLBLD CKD-EPI 2021: 73 ML/MIN/1.73M2
FASTING STATUS PATIENT QL REPORTED: YES
GLUCOSE SERPL-MCNC: 149 MG/DL (ref 70–99)
HBA1C MFR BLD: 6.7 % (ref 0–5.6)
HDLC SERPL-MCNC: 61 MG/DL
LDLC SERPL CALC-MCNC: 33 MG/DL
NONHDLC SERPL-MCNC: 51 MG/DL
POTASSIUM SERPL-SCNC: 3.8 MMOL/L (ref 3.4–5.3)
PROT SERPL-MCNC: 6.9 G/DL (ref 6.4–8.3)
SODIUM SERPL-SCNC: 141 MMOL/L (ref 135–145)
TRIGL SERPL-MCNC: 91 MG/DL

## 2024-01-22 PROCEDURE — 80053 COMPREHEN METABOLIC PANEL: CPT | Performed by: FAMILY MEDICINE

## 2024-01-22 PROCEDURE — 99396 PREV VISIT EST AGE 40-64: CPT | Performed by: FAMILY MEDICINE

## 2024-01-22 PROCEDURE — 77067 SCR MAMMO BI INCL CAD: CPT | Mod: TC | Performed by: RADIOLOGY

## 2024-01-22 PROCEDURE — 83036 HEMOGLOBIN GLYCOSYLATED A1C: CPT | Performed by: FAMILY MEDICINE

## 2024-01-22 PROCEDURE — 87086 URINE CULTURE/COLONY COUNT: CPT | Performed by: FAMILY MEDICINE

## 2024-01-22 PROCEDURE — 90480 ADMN SARSCOV2 VAC 1/ONLY CMP: CPT | Performed by: FAMILY MEDICINE

## 2024-01-22 PROCEDURE — 36415 COLL VENOUS BLD VENIPUNCTURE: CPT | Performed by: FAMILY MEDICINE

## 2024-01-22 PROCEDURE — 91320 SARSCV2 VAC 30MCG TRS-SUC IM: CPT | Performed by: FAMILY MEDICINE

## 2024-01-22 PROCEDURE — 80061 LIPID PANEL: CPT | Performed by: FAMILY MEDICINE

## 2024-01-22 PROCEDURE — 99214 OFFICE O/P EST MOD 30 MIN: CPT | Mod: 25 | Performed by: FAMILY MEDICINE

## 2024-01-22 RX ORDER — ROSUVASTATIN CALCIUM 20 MG/1
20 TABLET, COATED ORAL DAILY
COMMUNITY
Start: 2023-12-21 | End: 2024-01-22

## 2024-01-22 RX ORDER — BLOOD SUGAR DIAGNOSTIC
1 STRIP MISCELLANEOUS DAILY
Qty: 100 STRIP | Refills: 3 | Status: SHIPPED | OUTPATIENT
Start: 2024-01-22

## 2024-01-22 RX ORDER — ROSUVASTATIN CALCIUM 20 MG/1
20 TABLET, COATED ORAL DAILY
Qty: 90 TABLET | Refills: 4 | Status: SHIPPED | OUTPATIENT
Start: 2024-01-22

## 2024-01-22 RX ORDER — GLUCOSAMINE HCL/CHONDROITIN SU 500-400 MG
CAPSULE ORAL
Qty: 100 EACH | Refills: 3 | Status: SHIPPED | OUTPATIENT
Start: 2024-01-22

## 2024-01-22 RX ORDER — POTASSIUM CHLORIDE 750 MG/1
10 TABLET, EXTENDED RELEASE ORAL DAILY
Qty: 90 TABLET | Refills: 4 | Status: SHIPPED | OUTPATIENT
Start: 2024-01-22

## 2024-01-22 RX ORDER — ESTRADIOL 0.1 MG/G
2 CREAM VAGINAL
Qty: 42.5 G | Refills: 4 | Status: SHIPPED | OUTPATIENT
Start: 2024-01-22

## 2024-01-22 RX ORDER — BLOOD-GLUCOSE METER
EACH MISCELLANEOUS
COMMUNITY
Start: 2023-01-30

## 2024-01-22 RX ORDER — ASPIRIN 81 MG/1
81 TABLET, CHEWABLE ORAL DAILY
Qty: 200 TABLET | Refills: 1 | Status: SHIPPED | OUTPATIENT
Start: 2024-01-22

## 2024-01-22 RX ORDER — GLYCERIN/PROPYLENE GLYCOL/WATR
SOLUTION, NON-ORAL VAGINAL DAILY
Qty: 66.5 G | Refills: 11 | Status: SHIPPED | OUTPATIENT
Start: 2024-01-22

## 2024-01-22 ASSESSMENT — ENCOUNTER SYMPTOMS
HEADACHES: 0
MYALGIAS: 1
DYSURIA: 0
CONSTIPATION: 0
ABDOMINAL PAIN: 1
PALPITATIONS: 0
WEAKNESS: 1
CHILLS: 0
NAUSEA: 0
COUGH: 0
HEMATURIA: 0
FEVER: 0
BREAST MASS: 0
HEMATOCHEZIA: 0
EYE PAIN: 0
DIZZINESS: 0
HEARTBURN: 0
DIARRHEA: 0
PARESTHESIAS: 0
JOINT SWELLING: 0
FREQUENCY: 0
SHORTNESS OF BREATH: 0
ARTHRALGIAS: 1
NERVOUS/ANXIOUS: 1
SORE THROAT: 0

## 2024-01-22 ASSESSMENT — ASTHMA QUESTIONNAIRES
QUESTION_4 LAST FOUR WEEKS HOW OFTEN HAVE YOU USED YOUR RESCUE INHALER OR NEBULIZER MEDICATION (SUCH AS ALBUTEROL): NOT AT ALL
QUESTION_5 LAST FOUR WEEKS HOW WOULD YOU RATE YOUR ASTHMA CONTROL: WELL CONTROLLED
QUESTION_1 LAST FOUR WEEKS HOW MUCH OF THE TIME DID YOUR ASTHMA KEEP YOU FROM GETTING AS MUCH DONE AT WORK, SCHOOL OR AT HOME: NONE OF THE TIME
QUESTION_1 LAST FOUR WEEKS HOW MUCH OF THE TIME DID YOUR ASTHMA KEEP YOU FROM GETTING AS MUCH DONE AT WORK, SCHOOL OR AT HOME: NONE OF THE TIME
QUESTION_3 LAST FOUR WEEKS HOW OFTEN DID YOUR ASTHMA SYMPTOMS (WHEEZING, COUGHING, SHORTNESS OF BREATH, CHEST TIGHTNESS OR PAIN) WAKE YOU UP AT NIGHT OR EARLIER THAN USUAL IN THE MORNING: NOT AT ALL
QUESTION_2 LAST FOUR WEEKS HOW OFTEN HAVE YOU HAD SHORTNESS OF BREATH: NOT AT ALL
QUESTION_5 LAST FOUR WEEKS HOW WOULD YOU RATE YOUR ASTHMA CONTROL: WELL CONTROLLED
QUESTION_2 LAST FOUR WEEKS HOW OFTEN HAVE YOU HAD SHORTNESS OF BREATH: NOT AT ALL
QUESTION_4 LAST FOUR WEEKS HOW OFTEN HAVE YOU USED YOUR RESCUE INHALER OR NEBULIZER MEDICATION (SUCH AS ALBUTEROL): NOT AT ALL
ACT_TOTALSCORE: 24
QUESTION_3 LAST FOUR WEEKS HOW OFTEN DID YOUR ASTHMA SYMPTOMS (WHEEZING, COUGHING, SHORTNESS OF BREATH, CHEST TIGHTNESS OR PAIN) WAKE YOU UP AT NIGHT OR EARLIER THAN USUAL IN THE MORNING: NOT AT ALL
ACT_TOTALSCORE: 24
ACT_TOTALSCORE: 24

## 2024-01-22 ASSESSMENT — PAIN SCALES - GENERAL: PAINLEVEL: NO PAIN (0)

## 2024-01-22 ASSESSMENT — PATIENT HEALTH QUESTIONNAIRE - PHQ9
SUM OF ALL RESPONSES TO PHQ QUESTIONS 1-9: 2
SUM OF ALL RESPONSES TO PHQ QUESTIONS 1-9: 2
10. IF YOU CHECKED OFF ANY PROBLEMS, HOW DIFFICULT HAVE THESE PROBLEMS MADE IT FOR YOU TO DO YOUR WORK, TAKE CARE OF THINGS AT HOME, OR GET ALONG WITH OTHER PEOPLE: SOMEWHAT DIFFICULT

## 2024-01-22 NOTE — PROGRESS NOTES
Preventive Care Visit  [facility]  Ema Gentile MD, Family Medicine  Jan 22, 2024       SUBJECTIVE:   Kevin is a 58 year old, presenting for the following:  Annual Visit and Urinary Problem    Would like to know risk of stroke again.   Blood sugars are in 110s in the morning, fasting. 150 after eating. If eats more carbs 200.   Taking 1/2 of a cholesterol pill daily + Repatha  When sugar is high, she exercises. Not currently takign diabetes medicine.     Vaginal atrophy causing pain with intercourse.   Urinary stress incontinence - stress and overflow. Scared of surgery    Eye appt at 2 pm. W/ se wong. Blurry vision is both near and far. Not associated with high sugar - dry itchy eyes from allergies. Has ptyergium.     Got flu shot at pharmacist  OK with covid shot          1/22/2024     9:47 AM   Additional Questions   Roomed by meme     Healthy Habits:     Getting at least 3 servings of Calcium per day:  Yes    Bi-annual eye exam:  Yes    Dental care twice a year:  Yes    Sleep apnea or symptoms of sleep apnea:  None    Diet:  Regular (no restrictions)    Frequency of exercise:  6-7 days/week    Duration of exercise:  15-30 minutes    Taking medications regularly:  Yes    Medication side effects:  None    Additional concerns today:  Yes    Today's PHQ-9 Score:       1/22/2024     9:53 AM   PHQ-9 SCORE   PHQ-9 Total Score 2     Have you ever done Advance Care Planning? (For example, a Health Directive, POLST, or a discussion with a medical provider or your loved ones about your wishes): No, advance care planning information given to patient to review.  Patient plans to discuss their wishes with loved ones or provider.      Social History     Tobacco Use    Smoking status: Never     Passive exposure: Never    Smokeless tobacco: Never    Tobacco comments:     no second hand smoke exposure   Substance Use Topics    Alcohol use: No             1/22/2024     9:42 AM   Alcohol Use    Prescreen: >3 drinks/day or >7 drinks/week? No     Reviewed orders with patient.  Reviewed health maintenance and updated orders accordingly - Yes    Breast Cancer Screenin/22/2024     9:32 AM   Breast CA Risk Assessment (FHS-7)   Do you have a family history of breast, colon, or ovarian cancer? No / Unknown     History of abnormal Pap smear: NO - age 30-65 PAP every 5 years with negative HPV co-testing recommended      Latest Ref Rng & Units 9/3/2021    11:10 AM 2019     9:32 AM   PAP / HPV   PAP  Negative for Intraepithelial Lesion or Malignancy (NILM)  Negative for squamous intraepithelial lesion or malignancy  Electronically signed by Renu Ramirez CT (ASCP) on 3/4/2019 at  2:10 PM      HPV 16 DNA Negative Negative  Negative    HPV 18 DNA Negative Negative  Negative    Other HR HPV Negative Negative  Negative      Reviewed and updated as needed this visit by clinical staff   Tobacco  Allergies  Meds  Problems  Med Hx  Surg Hx  Fam Hx  Soc   Hx        Reviewed and updated as needed this visit by Provider   Tobacco  Allergies  Meds  Problems  Med Hx  Surg Hx  Fam Hx  Soc   Hx Sexual Activity          Review of Systems   Constitutional:  Negative for chills and fever.   HENT:  Negative for congestion, ear pain, hearing loss and sore throat.    Eyes:  Positive for visual disturbance. Negative for pain.   Respiratory:  Negative for cough and shortness of breath.    Cardiovascular:  Negative for chest pain and palpitations.   Gastrointestinal:  Positive for abdominal pain. Negative for constipation, diarrhea and nausea.   Genitourinary:  Negative for dysuria, frequency, genital sores, hematuria, pelvic pain, urgency, vaginal bleeding and vaginal discharge.   Musculoskeletal:  Positive for arthralgias and myalgias. Negative for joint swelling.   Skin:  Negative for rash.   Neurological:  Positive for weakness. Negative for dizziness and headaches.   Psychiatric/Behavioral:  The  "patient is nervous/anxious.        OBJECTIVE:   /69 (BP Location: Left arm, Patient Position: Sitting, Cuff Size: Adult Regular)   Pulse 55   Temp 96.8  F (36  C) (Temporal)   Resp 16   Ht 1.422 m (4' 8\")   Wt 59.9 kg (132 lb)   SpO2 98%   BMI 29.59 kg/m     Estimated body mass index is 29.59 kg/m  as calculated from the following:    Height as of this encounter: 1.422 m (4' 8\").    Weight as of this encounter: 59.9 kg (132 lb).Prior to immunization administration, verified patients identity using patient s name and date of birth. Please see Immunization Activity for additional information.     Screening Questionnaire for Adult Immunization    Are you sick today?   No   Do you have allergies to medications, food, a vaccine component or latex?   No   Have you ever had a serious reaction after receiving a vaccination?   Yes   Do you have a long-term health problem with heart, lung, kidney, or metabolic disease (e.g., diabetes), asthma, a blood disorder, no spleen, complement component deficiency, a cochlear implant, or a spinal fluid leak?  Are you on long-term aspirin therapy?   No   Do you have cancer, leukemia, HIV/AIDS, or any other immune system problem?   No   Do you have a parent, brother, or sister with an immune system problem?   No   In the past 3 months, have you taken medications that affect  your immune system, such as prednisone, other steroids, or anticancer drugs; drugs for the treatment of rheumatoid arthritis, Crohn s disease, or psoriasis; or have you had radiation treatments?   No   Have you had a seizure, or a brain or other nervous system problem?   No   During the past year, have you received a transfusion of blood or blood    products, or been given immune (gamma) globulin or antiviral drug?   No   For women: Are you pregnant or is there a chance you could become       pregnant during the next month?   No   Have you received any vaccinations in the past 4 weeks?   No "     Immunization questionnaire was positive for at least one answer.  Notified .  Screening performed by Jennifer Hampton MA on 1/22/2024 at 9:55 AM.    Physical Exam  GENERAL: alert and no distress  EYES: Eyes grossly normal to inspection, PERRL and conjunctivae and sclerae normal  HENT: ear canals and TM's normal, nose and mouth without ulcers or lesions  NECK: no adenopathy, no asymmetry, masses, or scars  RESP: lungs clear to auscultation - no rales, rhonchi or wheezes  CV: regular rate and rhythm, normal S1 S2, no S3 or S4, no murmur, click or rub, no peripheral edema  ABDOMEN: soft, nontender, no hepatosplenomegaly, no masses and bowel sounds normal  MS: no gross musculoskeletal defects noted, no edema  SKIN: no suspicious lesions or rashes  NEURO: Normal strength and tone, mentation intact and speech normal  PSYCH: mentation appears normal, affect normal/bright  LYMPH: no cervical, supraclavicular, axillary, or inguinal adenopathy    Diagnostic Test Results:  Labs reviewed in Epic    ASSESSMENT/PLAN:   Routine general medical examination at a health care facility    Visit for screening mammogram  - MA SCREENING DIGITAL BILAT - Future  (s+30)    History of acute ischemic VBA brainstem stroke, right.  Hypertension.  Goal less than 130/80.  At goal.  Type 2 diabetes.  Goal A1c less than 7.  At goal.  Hyperlipidemia.  Goal LDL less than 70.  Last LDL was 46 on 6/6/2023.  At goal.  Continue aspirin 81 mg daily.  - aspirin (ASPIRIN LOW DOSE) 81 MG chewable tablet  Dispense: 200 tablet; Refill: 1    Type 2 diabetes mellitus.  Diet controlled.  Complicated by ischemic stroke 2020, nephropathy and neuropathy. Hemoglobin A1c is less than 7.  ACE inhibitor: Lisinopril 20  Eye exam scheduled for later today with Dr. Se Avila (optometrist).  - ACCU-CHEK GUIDE test strip  Dispense: 100 strip; Refill: 3  - alcohol swab prep pads  Dispense: 100 each; Refill: 3  - HEMOGLOBIN A1C    Gastro-esophageal reflux disease without  "esophagitis  - omeprazole (PRILOSEC) 20 MG DR capsule  Dispense: 90 capsule; Refill: 4    Encounter for screening involving social determinants of health (SDoH)    Hyperlipidemia LDL goal <70  On rosuvastatin 20 mg daily and Repatha.  Recheck lipids today.  - rosuvastatin (CRESTOR) 20 MG tablet  Dispense: 90 tablet; Refill: 4  - Lipid panel reflex to direct LDL Fasting  - Comprehensive metabolic panel  - Lipid panel reflex to direct LDL Fasting  - Comprehensive metabolic panel    Hypertension goal BP (blood pressure) < 130/80  At goal.  Continue lisinopril 20 mg.  Check basic metabolic panel.    Hypokalemia  Check basic metabolic panel.  - potassium chloride ER (K-TAB/KLOR-CON) 10 MEQ CR tablet  Dispense: 90 tablet; Refill: 4    Vaginal atrophy  Stress incontinence of urine  Pain with intercourse and urinary stress incontinence.  Contemplating surgery, but would like to try vaginal estrogen first.  - estradiol (ESTRACE) 0.1 MG/GM vaginal cream  Dispense: 42.5 g; Refill: 4  - Lubricants (ASTROGLIDE) GEL  Dispense: 66.5 g; Refill: 11  - Urine Culture    Pterygium of both eyes  Seeing optometrist today.      Counseling  Reviewed preventive health counseling, as reflected in patient instructions      BMI  Estimated body mass index is 29.59 kg/m  as calculated from the following:    Height as of this encounter: 1.422 m (4' 8\").    Weight as of this encounter: 59.9 kg (132 lb).   Weight management plan: Discussed healthy diet and exercise guidelines      She reports that she has never smoked. She has never been exposed to tobacco smoke. She has never used smokeless tobacco.          Signed Electronically by: Ema Gentile MD  .undefined[[id number]^^  Answers submitted by the patient for this visit:  Patient Health Questionnaire (Submitted on 1/22/2024)  If you checked off any problems, how difficult have these problems made it for you to do your work, take care of things at home, or get along with other people?: " Somewhat difficult  PHQ9 TOTAL SCORE: 2  Annual Preventive Visit (Submitted on 1/22/2024)  Chief Complaint: Annual Exam:  Blood in stool: No  heartburn: No  peripheral edema: No  mood changes: No  Skin sensation changes: No  tenderness: No  breast mass: No  breast discharge: No

## 2024-01-22 NOTE — COMMUNITY RESOURCES LIST (ENGLISH)
01/22/2024   [facility]  N/A  For questions about this resource list or additional care needs, please contact your primary care clinic or care manager.  Phone: [phone]   Email: N/A   Address: [address] [place] [id number]   Hours: N/A        Financial Stability       Rent and mortgage payment assistance  1  [facility] - Rent payment assistance Distance: 5.2 miles      In-Person, Phone/Virtual   [address] [place] [address]  Language: English  Hours: Mon - Fri 8:00 AM - 4:30 PM  Fees: Free   Phone: (556) 743-6003 Email: Tello@[facility]. Website: https://www.[facility]./Facilities/Facility/Details/23     2  [employer] Distance: 7.75 miles      In-Person, Phone/Virtual   [address], [place] [address]  Language: English  Hours: Mon - Sun Appt. Only  Fees: Free   Phone: (819) 835-3940 Email: [employer] Website: http://www.[employer].org     Utility payment assistance  3  [facility] Distance: 5.2 miles      In-Person, Phone/Virtual   [address] [place] [address]  Language: English  Hours: Mon - Fri 8:00 AM - 4:30 PM  Fees: Free   Phone: (777) 803-8722 Email: Tello@[facility]. Website: https://www.[facility]./Facilities/Facility/Details/23     4  [employer] Distance: 7.75 miles      In-Person, Phone/Virtual   [address], [place] [address]  Language: English  Hours: Mon - Sun Appt. Only  Fees: Free   Phone: (207) 636-5695 Email: [employer] Website: http://www.[employer].org          Food and Nutrition       Food pantry  5  [facility] Food Shelf Distance: 2.65 miles      In-Person   [address] [place] [place] [address]  Language: English  Hours: Mon 4:00 PM - 6:00 PM , Tue 12:00 PM - 3:00 PM , Thu 4:00 PM - 6:00 PM  Fees: Free    Phone: (127) 778-5243 Email: info@[employer] Website: https://www.[employer].org/     6  [facility] - Dry Food Pantry Distance: 6.28 miles      Pickup   [address] [place] [address]  Language: English  Hours: Mon - Fri 9:00 AM - 3:00 PM  Fees: Free   Phone: (451) 555-1120 Email: office@[facility] Website: http://www.[facility]/     SNAP application assistance  7  [facility] Distance: 5.2 miles      In-Person, Phone/Virtual   [address] [place] [address]  Language: English  Hours: Mon - Fri 8:00 AM - 4:30 PM  Fees: Free   Phone: (494) 773-5818 Email: nereyda@[facility]. Website: https://www.[facility]/Facilities/Facility/Details/23     8  [facility] Economic Support [facility] Distance: 11.39 miles      In-Person, Phone/Virtual   [address] [place] [address]  Language: English  Hours: Mon - Fri 8:00 AM - 4:30 PM  Fees: Free   Phone: (430) 499-6255 Email: rolyMotiga@[facility] Website: https://www.[facility]./787/Economic-Support     Soup kitchen or free meals  9  [facility] - Homeless Outreach Services Team - Food Assistance Distance: 7.92 miles      Pickup   [address] [place] [address]  Language: English  Hours: Mon - Thu 9:30 AM - 3:30 PM  Fees: Free   Phone: (792) 184-5288 Email: office@[facility] Website: https://www.[facility].org/community-resource-center/     10  [facility] - Thursday Night Community Meal Distance: 8.03 miles      In-Person   [address] [place] [address]  Language: English, Ukrainian  Hours: Thu 6:00 PM - 7:00 PM  Fees: Free   Phone: (214) 684-4163 Email: center@[facility] Website: https://www.[facility].org/community-resource-center/          Transportation       Free or  low-cost transportation  11  [employer] - [employer] - Free or low-cost transportation Distance: 9.65 miles      In-Person   [address] [place] [address]  Language: English  Hours: Mon - Fri 9:00 AM - 5:00 PM  Fees: Free   Phone: (344) 227-7484 Email: info@[employer] Website: https://www.[employer]/     12  [facility] Services Office - [facility] - Gas vouchers and transportation assistance - Free or low-cost transportation Distance: 17.31 miles      In-Person, Phone/Virtual   [address] [place] [address]  Language: English  Hours: Mon - Fri 8:00 AM - 12:00 PM , Mon - Fri 1:00 PM - 4:00 PM  Fees: Free   Phone: (921) 640-1778 Email: chele@[facility] Website: https://[facility]/social-services-office-[place]/     Transportation to medical appointments  13  [employer] Ride Distance: 14.01 miles      In-Person   [address] [address]  Language: English  Hours: Mon - Thu 6:00 AM - 6:00 PM , Fri 6:00 AM - 5:00 PM  Fees: Insurance, Self Pay   Phone: (733) 372-3824 Email: office@[employer] Website: https://www.[employer]/     14  [facility] - Non-Emergency Medical Transportation Distance: 18.14 miles      In-Person   [address] [place] [address]  Language: English  Hours: Mon - Fri 8:00 AM - 4:00 PM Appt. Only  Fees: Self Pay   Phone: (505) 440-8427 Website: http://www.[facility].org/Medical-Services/Emergency-medical-services/Non-emergency-transportation/          Important Numbers & Websites       Emergency Services   911  City Services   311  Poison Control   (272) 266-1418  Suicide Prevention Lifeline   (768) 179-8331 (TALK)  Child Abuse Hotline   (527) 249-5129 (4-A-Child)  Sexual Assault Hotline   (652) 771-1218 (HOPE)  National Runaway Safeline   (606) 606-4398 (RUNAWAY)  All-Options Talkline   (453) 146-4885  Substance Abuse Referral   (355)  914-4834 (HELP)

## 2024-01-23 LAB — BACTERIA UR CULT: NORMAL

## 2024-02-19 ENCOUNTER — TELEPHONE (OUTPATIENT)
Dept: FAMILY MEDICINE | Facility: CLINIC | Age: 59
End: 2024-02-19
Payer: COMMERCIAL

## 2024-02-19 NOTE — TELEPHONE ENCOUNTER
----- Message from Ema Gentile MD sent at 2/17/2024  7:16 AM CST -----  Please let Mao know:  Her diabetes is WELL controlled  Her cholesterol numbers are excellent.  Her kidney and liver blood tests are normal.

## 2024-02-21 ENCOUNTER — OFFICE VISIT (OUTPATIENT)
Dept: NEUROLOGY | Facility: CLINIC | Age: 59
End: 2024-02-21
Attending: FAMILY MEDICINE
Payer: COMMERCIAL

## 2024-02-21 VITALS
DIASTOLIC BLOOD PRESSURE: 73 MMHG | WEIGHT: 135 LBS | SYSTOLIC BLOOD PRESSURE: 120 MMHG | BODY MASS INDEX: 30.27 KG/M2 | HEART RATE: 55 BPM

## 2024-02-21 DIAGNOSIS — R53.1 LEFT-SIDED WEAKNESS: Primary | ICD-10-CM

## 2024-02-21 DIAGNOSIS — I63.22 ACUTE ISCHEMIC VBA BRAINSTEM STROKE, RIGHT (H): ICD-10-CM

## 2024-02-21 DIAGNOSIS — I63.211 ACUTE ISCHEMIC VBA BRAINSTEM STROKE, RIGHT (H): ICD-10-CM

## 2024-02-21 DIAGNOSIS — E11.59 TYPE 2 DIABETES MELLITUS WITH OTHER CIRCULATORY COMPLICATION, WITHOUT LONG-TERM CURRENT USE OF INSULIN (H): ICD-10-CM

## 2024-02-21 DIAGNOSIS — I10 HYPERTENSION GOAL BP (BLOOD PRESSURE) < 130/80: ICD-10-CM

## 2024-02-21 DIAGNOSIS — E78.5 HYPERLIPIDEMIA LDL GOAL <70: ICD-10-CM

## 2024-02-21 PROCEDURE — 99205 OFFICE O/P NEW HI 60 MIN: CPT | Performed by: PSYCHIATRY & NEUROLOGY

## 2024-02-21 NOTE — PROGRESS NOTES
NEUROLOGY OUTPATIENT CONSULT NOTE  Feb 21, 2024     CHIEF COMPLAINT/REASON FOR VISIT/REASON FOR CONSULT  Patient presents with:  Stroke: Had a stroke back in 2020  L side strength is getting better; but still having difficulty with the L leg    REASON FOR CONSULTATION-address antiplatelet medication for stroke    REFERRAL SOURCE  Dr. Dominique Reyes*  CC Dr. Dominique Reyes*    HISTORY OF PRESENT ILLNESS  Kevin Thrasher is a 58 year old female seen today for evaluation of stroke.  She had a right pontine stroke in 2020 that resulted in left arm and leg weakness.  Her left arm has improved though she continues to have weakness in the left leg.  She was doing physical therapy at the time of the stroke though after COVID she could not do this and has been exercising on her own.  She does have a diagnosis of prediabetes and hypertension and has been on medication for that.  Prediabetes is evolved into diabetes at this point.  She was started on aspirin and Plavix in the hospital for intracranial stenosis though she had significant complications of generalized bruising and the Plavix has not been stopped.  No recurrence of stroke symptoms.  No other new symptoms since discharge from the hospital.  She has been on Repatha for her cholesterol.    Previous history is reviewed and this is unchanged.    PAST MEDICAL/SURGICAL HISTORY  Past Medical History:   Diagnosis Date    Acute ischemic VBA brainstem stroke, right (H)     2/20. Residual left sided weakness.    Angular cheilitis 09/19/2016    Diabetic nephropathy associated with type 2 diabetes mellitus (H)     Diabetic polyneuropathy associated with type 2 diabetes mellitus (H)     Excessive or frequent menstruation     Head injury, unspecified     Helicobacter pylori gastritis     Infertility, female     eval at infertility clinic was normal.  declined evaluation. h/o ovarian hyperstimulation - did not result in pregnancy.     Pterygium     Rotator cuff  tendinitis, unspecified laterality     Shingles 02/09/2015     Patient Active Problem List   Diagnosis    Vitamin D deficiency    Type 2 diabetes mellitus (H)    Overweight    Mild intermittent asthma without complication    Lumbosacral radiculopathy at S1    Hyperlipidemia LDL goal <70    Hypertension goal BP (blood pressure) < 130/80    Fatty liver    Anxiety    Urinary, incontinence, stress female    Cystocele with uterine prolapse    Osteoarthritis of spine with radiculopathy, cervical region    Facet arthritis of lumbar region    Hip pain, left    Spondylolisthesis of lumbosacral region    Vaginal atrophy    Hypokalemia    Cerebrovascular disease   Reviewed and noncontributory    FAMILY HISTORY  Family History   Problem Relation Age of Onset    No Known Problems Mother     No Known Problems Father     Breast Cancer No family hx of     Ovarian Cancer No family hx of     Colon Cancer No family hx of    No family history of stroke    SOCIAL HISTORY  Social History     Tobacco Use    Smoking status: Never     Passive exposure: Never    Smokeless tobacco: Never    Tobacco comments:     no second hand smoke exposure   Vaping Use    Vaping Use: Never used   Substance Use Topics    Alcohol use: No    Drug use: No       SYSTEMS REVIEW  Twelve-system ROS was done and other than the HPI this was negative.  Pertinent positives noted on the HPI    MEDICATIONS  ACCU-CHEK GUIDE test strip, 1 strip by In Vitro route daily Use to test once daily  acetaminophen (TYLENOL) 500 MG tablet, Take 2 tablets (1,000 mg) by mouth every 8 hours as needed for mild pain  alcohol swab prep pads, Use to swab area of injection/nina as directed.  ARTIFICIAL TEARS 1.4 % ophthalmic solution, Apply 1 drop to eye every 2 hours as needed for dry eyes  aspirin (ASPIRIN LOW DOSE) 81 MG chewable tablet, Take 1 tablet (81 mg) by mouth daily  blood glucose monitoring (ACCU-CHEK FASTCLIX) lancets, 1 each daily  Contour Next EZ (CONTOUR NEXT EZ W/DEVICE  KIT) w/Device KIT, USE TO TEST BLOOD SUGAR 1 TIMES DAILY OR AS DIRECTED. PREFERRED BLOOD GLUCOSE METER OR SUPPLIES TO ACCOMPANY: BLOOD GLUCOSE MONITOR BRANDS: PER INS<MORE>  estradiol (ESTRACE) 0.1 MG/GM vaginal cream, Place 2 g vaginally twice a week  Global Inject Ease Lancets 30G MISC, USE WITH LANCETING DEVICE. *100 DYAS*  ketotifen (ZADITOR) 0.025 % ophthalmic solution, Place 1 drop into both eyes 2 times daily as needed for itching  lisinopril (ZESTRIL) 20 MG tablet, TAKE 1 TABLET (20 MG) BY MOUTH DAILY/ TXHUA HNUB NOJ 1 LUB TSHUAJ PAB ZOO WAN NTSHAV SIAB  loratadine (CLARITIN) 10 MG tablet, Take 1 tablet (10 mg) by mouth daily as needed for allergies  Lubricants (ASTROGLIDE) GEL, Externally apply topically daily  omeprazole (PRILOSEC) 20 MG DR capsule, Take 1 capsule (20 mg) by mouth daily before breakfast  potassium chloride ER (K-TAB/KLOR-CON) 10 MEQ CR tablet, Take 1 tablet (10 mEq) by mouth daily  REPATHA SURECLICK 140 MG/ML prefilled autoinjector, INJECT 1 ML (140 MG) SUBCUTANEOUS EVERY 14 DAYS  rosuvastatin (CRESTOR) 20 MG tablet, Take 1 tablet (20 mg) by mouth daily  albuterol (PROAIR HFA/PROVENTIL HFA/VENTOLIN HFA) 108 (90 Base) MCG/ACT inhaler, Inhale 2 puffs into the lungs every 4 hours as needed for shortness of breath or wheezing (Patient not taking: Reported on 1/22/2024)  polyethylene glycol (MIRALAX) 17 GM/Dose powder, Take 17 g by mouth daily (Patient not taking: Reported on 1/22/2024)    No current facility-administered medications on file prior to visit.       PHYSICAL EXAMINATION  VITALS: /73   Pulse 55   Wt 61.2 kg (135 lb)   BMI 30.27 kg/m    GENERAL: Healthy appearing, alert, no acute distress, normal habitus.  CARDIOVASCULAR: Extremities warm and well perfused. Pulses present.   NEUROLOGICAL:  Patient is awake and oriented to self, place and time.  Attention span is normal.  Memory is grossly intact.  Language is fluent and follows commands appropriately.  Appropriate fund of  knowledge. Cranial nerves 2-12 are intact. There is no pronator drift.  Motor exam shows 5/5 strength in all extremities.  Questionable left leg weakness.  Tone is symmetric bilaterally in upper and lower extremities.  Reflexes are  and 2+ in upper extremities and lower extremities though slightly more pronounced on the left side. Sensory exam is grossly intact to light touch, pin prick and vibration.  Finger to nose and heel to shin is without dysmetria.  Romberg is negative.  Gait is normal and the patient is able to do tandem walk and walk on toes and heels with mild difficulty.        DIAGNOSTICS  MRI-images reviewed.  HEAD MRI:   1.  15 x 10 mm in cross-sectional diameter acute/early subacute infarction right lazara.  2.  No associated hemorrhage or significant mass effect.     HEAD MRA:   1.  Short segment marked narrowing one of the right P3 segments in its distal aspect.  2.  Mild areas of narrowings proximal M2 segments bilaterally.  3.  Intracranial circulation appears otherwise normal.      HEAD CT:  1.  No CT finding of a mass, hemorrhage or focal area suggestive of an acute infarct.     HEAD CTA:   1.  No discrete vessel occlusion, significant stenosis, aneurysm or high flow vascular malformation involving arteries of the arteries of the [place].     NECK CTA:  1.  Normal configuration of the great vessels off the aortic arch with no significant stenosis of their origins.  2.  No significant stenosis or irregularity involving the arteries of the neck by NASCET criteria.  3.  No radiographic evidence of dissection.    ECHO  Narrative & Impression    Left Ventricle: Normal left ventricular size and systolic function.The calculated left ventricular ejection fraction is 62%. This represents a normal ejection fraction. Mild concentric hypertrophy noted. E/e' 8 to 15, which is equivocal for estimating   LV filling pressures.Left ventricular diastolic function is indeterminate.    The left ventricular  wall motion is normal.    Right Ventricle: Normal right ventricular size and systolic function. TAPSE is normal, which is consistent with normal right ventricular systolic function.    Left Atrium: Left atrial volume is moderately increased. No shunts as documented by negative color flow doppler and saline contrast injection. No thrombus present. No mass present. No spontaneous echo contrast.    Tricuspid Valve: Normal tricuspid valve structure. There is no evidence of tricuspid stenosis. Trace tricuspid valve regurgitation. The estimated systolic pulmonary artery pressure is 25+ right atrial pressure mmhg.    Thoracic Aorta: The ascending aorta is mildly dilated.    EKG      Normal sinus rhythm     Component      Latest Ref Rng 1/22/2024  10:46 AM   Sodium      135 - 145 mmol/L 141    Potassium      3.4 - 5.3 mmol/L 3.8    Carbon Dioxide (CO2)      22 - 29 mmol/L 31 (H)    Anion Gap      7 - 15 mmol/L 9    Urea Nitrogen      8.0 - 23.0 mg/dL 13.8    Creatinine      0.51 - 0.95 mg/dL 0.90    GFR Estimate      >60 mL/min/1.73m2 73    Calcium      8.8 - 10.2 mg/dL 9.1    Chloride      98 - 107 mmol/L 101    Glucose      70 - 99 mg/dL 149 (H)    Alkaline Phosphatase      40 - 150 U/L 82    AST      0 - 45 U/L 29    ALT      0 - 50 U/L 26    Protein Total      6.4 - 8.3 g/dL 6.9    Albumin      3.5 - 5.2 g/dL 3.8    Bilirubin Total      <=1.2 mg/dL 0.5    Cholesterol      <200 mg/dL 112    Triglycerides      <150 mg/dL 91    HDL Cholesterol      >=50 mg/dL 61    LDL Cholesterol Calculated      <=100 mg/dL 33    Non HDL Cholesterol      <130 mg/dL 51    Patient Fasting? Yes    Hemoglobin A1C      0.0 - 5.6 % 6.7 (H)       Legend:  (H) High      OUTSIDE RECORDS  Outside referral notes and chart notes were reviewed and pertinent information has been summarized (in addition to the HPI):-      Pharmacy coordination- discussed personally        IMPRESSION/REPORT/PLAN  Left-sided weakness  History of stroke  Type 2 diabetes  mellitus with other circulatory complication, without long-term current use of insulin (H)  Hyperlipidemia LDL goal <70  Hypertension goal BP (blood pressure) < 130/80    This is a 58 year old female with left-sided weakness related to her right pontine stroke seen on MRI.  Echocardiogram negative for thrombus.  Cardiac monitoring in the hospital did not show any atrial fibrillation.  CT angiogram was negative for any significant large vessel occlusions and stenosis.  There was some intracranial atherosclerosis-asymptomatic for which she was started on aspirin and Plavix in the hospital.  She has been having significant bruising and I will think it would be reasonable to stop the Plavix and keep her on the aspirin 81 mg only.  LDL goal would be less than 70.  Continue Crestor/Repatha though could consider stopping 1 of these since the LDL is really low.  Recommend exercise and healthy lifestyle and good control of diabetes.  Further management of vascular risk factors per primary care.    For the ongoing left-sided weakness and gait difficulty started work with physical therapy.    I can see her back in 1 year.    -     Physical Therapy Referral; Future  - Okay to stop Plavix.  Continue aspirin  - Since patient is on Repatha could consider stopping the statin.  Defer to primary care.    Return in about 1 year (around 2/21/2025) for In-Clinic Visit (must).    Over 60 minutes were spent coordinating the care for the patient on the day of the encounter.  This includes previsit, during visit and post visit activities as documented above.  Counseling patient.  Reviewing chart.  Multiple problems reviewed/addressed.  Multiple test reviewed.  (Activities include but not inclusive of reviewing chart, reviewing outside records, reviewing labs and imaging study results as well as the images, patient visit time including getting history and exam,  use if applicable, review of test results with the patient and coming  up with a plan in a shared model, counseling patient and family, education and answering patient questions, EMR , EMR diagnosis entry and problem list management, medication reconciliation and prescription management if applicable, paperwork if applicable, printing documents and documentation of the visit activities.)        Jun Loco MD  Neurologist  [facility]  Tel:- [phone]    This note was dictated using voice recognition software.  Any grammatical or context distortions are unintentional and inherent to the software.

## 2024-02-21 NOTE — NURSING NOTE
"Kevin Thrasher is a 58 year old female who presents for:  Chief Complaint   Patient presents with    Stroke     Had a stroke back in 2020  L side strength is getting better; but still having difficulty with the L leg        Initial Vitals:  /73   Pulse 55   Wt 61.2 kg (135 lb)   BMI 30.27 kg/m   Estimated body mass index is 30.27 kg/m  as calculated from the following:    Height as of 1/22/24: 1.422 m (4' 8\").    Weight as of this encounter: 61.2 kg (135 lb).. Body surface area is 1.56 meters squared. BP completed using cuff size: wrist cuff    Ever Hernandez  "

## 2024-02-21 NOTE — LETTER
2/21/2024         RE: Kevin Thrasher  [address] [facility] [address]        Dear Colleague,    Thank you for referring your patient, Kevin Thrasher, to the [facility] [place]. Please see a copy of my visit note below.    NEUROLOGY OUTPATIENT CONSULT NOTE  Feb 21, 2024     CHIEF COMPLAINT/REASON FOR VISIT/REASON FOR CONSULT  Patient presents with:  Stroke: Had a stroke back in 2020  L side strength is getting better; but still having difficulty with the L leg    REASON FOR CONSULTATION-address antiplatelet medication for stroke    REFERRAL SOURCE  Dr. Dominique Reyes*  CC Dr. Dominique Reyes*    HISTORY OF PRESENT ILLNESS  Kevin Thrasher is a 58 year old female seen today for evaluation of stroke.  She had a right pontine stroke in 2020 that resulted in left arm and leg weakness.  Her left arm has improved though she continues to have weakness in the left leg.  She was doing physical therapy at the time of the stroke though after COVID she could not do this and has been exercising on her own.  She does have a diagnosis of prediabetes and hypertension and has been on medication for that.  Prediabetes is evolved into diabetes at this point.  She was started on aspirin and Plavix in the hospital for intracranial stenosis though she had significant complications of generalized bruising and the Plavix has not been stopped.  No recurrence of stroke symptoms.  No other new symptoms since discharge from the hospital.  She has been on Repatha for her cholesterol.    Previous history is reviewed and this is unchanged.    PAST MEDICAL/SURGICAL HISTORY  Past Medical History:   Diagnosis Date     Acute ischemic VBA brainstem stroke, right (H)     2/20. Residual left sided weakness.     Angular cheilitis 09/19/2016     Diabetic nephropathy associated with type 2 diabetes mellitus (H)      Diabetic polyneuropathy associated with type 2 diabetes mellitus (H)      Excessive or frequent menstruation      Head  injury, unspecified      Helicobacter pylori gastritis      Infertility, female     eval at infertility clinic was normal.  declined evaluation. h/o ovarian hyperstimulation - did not result in pregnancy.      Pterygium      Rotator cuff tendinitis, unspecified laterality      Shingles 02/09/2015     Patient Active Problem List   Diagnosis     Vitamin D deficiency     Type 2 diabetes mellitus (H)     Overweight     Mild intermittent asthma without complication     Lumbosacral radiculopathy at S1     Hyperlipidemia LDL goal <70     Hypertension goal BP (blood pressure) < 130/80     Fatty liver     Anxiety     Urinary, incontinence, stress female     Cystocele with uterine prolapse     Osteoarthritis of spine with radiculopathy, cervical region     Facet arthritis of lumbar region     Hip pain, left     Spondylolisthesis of lumbosacral region     Vaginal atrophy     Hypokalemia     Cerebrovascular disease   Reviewed and noncontributory    FAMILY HISTORY  Family History   Problem Relation Age of Onset     No Known Problems Mother      No Known Problems Father      Breast Cancer No family hx of      Ovarian Cancer No family hx of      Colon Cancer No family hx of    No family history of stroke    SOCIAL HISTORY  Social History     Tobacco Use     Smoking status: Never     Passive exposure: Never     Smokeless tobacco: Never     Tobacco comments:     no second hand smoke exposure   Vaping Use     Vaping Use: Never used   Substance Use Topics     Alcohol use: No     Drug use: No       SYSTEMS REVIEW  Twelve-system ROS was done and other than the HPI this was negative.  Pertinent positives noted on the HPI    MEDICATIONS  ACCU-CHEK GUIDE test strip, 1 strip by In Vitro route daily Use to test once daily  acetaminophen (TYLENOL) 500 MG tablet, Take 2 tablets (1,000 mg) by mouth every 8 hours as needed for mild pain  alcohol swab prep pads, Use to swab area of injection/nina as directed.  ARTIFICIAL TEARS 1.4 %  ophthalmic solution, Apply 1 drop to eye every 2 hours as needed for dry eyes  aspirin (ASPIRIN LOW DOSE) 81 MG chewable tablet, Take 1 tablet (81 mg) by mouth daily  blood glucose monitoring (ACCU-CHEK FASTCLIX) lancets, 1 each daily  Contour Next EZ (CONTOUR NEXT EZ W/DEVICE KIT) w/Device KIT, USE TO TEST BLOOD SUGAR 1 TIMES DAILY OR AS DIRECTED. PREFERRED BLOOD GLUCOSE METER OR SUPPLIES TO ACCOMPANY: BLOOD GLUCOSE MONITOR BRANDS: PER INS<MORE>  estradiol (ESTRACE) 0.1 MG/GM vaginal cream, Place 2 g vaginally twice a week  Global Inject Ease Lancets 30G MISC, USE WITH LANCETING DEVICE. *100 DYAS*  ketotifen (ZADITOR) 0.025 % ophthalmic solution, Place 1 drop into both eyes 2 times daily as needed for itching  lisinopril (ZESTRIL) 20 MG tablet, TAKE 1 TABLET (20 MG) BY MOUTH DAILY/ TXDEMETRIOA HNUB NOJ 1 LUB TSHUAJ PAB ZOO WAN NTSHAV SIAB  loratadine (CLARITIN) 10 MG tablet, Take 1 tablet (10 mg) by mouth daily as needed for allergies  Lubricants (ASTROGLIDE) GEL, Externally apply topically daily  omeprazole (PRILOSEC) 20 MG DR capsule, Take 1 capsule (20 mg) by mouth daily before breakfast  potassium chloride ER (K-TAB/KLOR-CON) 10 MEQ CR tablet, Take 1 tablet (10 mEq) by mouth daily  REPATHA SURECLICK 140 MG/ML prefilled autoinjector, INJECT 1 ML (140 MG) SUBCUTANEOUS EVERY 14 DAYS  rosuvastatin (CRESTOR) 20 MG tablet, Take 1 tablet (20 mg) by mouth daily  albuterol (PROAIR HFA/PROVENTIL HFA/VENTOLIN HFA) 108 (90 Base) MCG/ACT inhaler, Inhale 2 puffs into the lungs every 4 hours as needed for shortness of breath or wheezing (Patient not taking: Reported on 1/22/2024)  polyethylene glycol (MIRALAX) 17 GM/Dose powder, Take 17 g by mouth daily (Patient not taking: Reported on 1/22/2024)    No current facility-administered medications on file prior to visit.       PHYSICAL EXAMINATION  VITALS: /73   Pulse 55   Wt 61.2 kg (135 lb)   BMI 30.27 kg/m    GENERAL: Healthy appearing, alert, no acute distress, normal  habitus.  CARDIOVASCULAR: Extremities warm and well perfused. Pulses present.   NEUROLOGICAL:  Patient is awake and oriented to self, place and time.  Attention span is normal.  Memory is grossly intact.  Language is fluent and follows commands appropriately.  Appropriate fund of knowledge. Cranial nerves 2-12 are intact. There is no pronator drift.  Motor exam shows 5/5 strength in all extremities.  Questionable left leg weakness.  Tone is symmetric bilaterally in upper and lower extremities.  Reflexes are  and 2+ in upper extremities and lower extremities though slightly more pronounced on the left side. Sensory exam is grossly intact to light touch, pin prick and vibration.  Finger to nose and heel to shin is without dysmetria.  Romberg is negative.  Gait is normal and the patient is able to do tandem walk and walk on toes and heels with mild difficulty.        DIAGNOSTICS  MRI-images reviewed.  HEAD MRI:   1.  15 x 10 mm in cross-sectional diameter acute/early subacute infarction right lazara.  2.  No associated hemorrhage or significant mass effect.     HEAD MRA:   1.  Short segment marked narrowing one of the right P3 segments in its distal aspect.  2.  Mild areas of narrowings proximal M2 segments bilaterally.  3.  Intracranial circulation appears otherwise normal.      HEAD CT:  1.  No CT finding of a mass, hemorrhage or focal area suggestive of an acute infarct.     HEAD CTA:   1.  No discrete vessel occlusion, significant stenosis, aneurysm or high flow vascular malformation involving arteries of the arteries of the [place].     NECK CTA:  1.  Normal configuration of the great vessels off the aortic arch with no significant stenosis of their origins.  2.  No significant stenosis or irregularity involving the arteries of the neck by NASCET criteria.  3.  No radiographic evidence of dissection.    ECHO  Narrative & Impression    Left Ventricle: Normal left ventricular size and systolic function.The  calculated left ventricular ejection fraction is 62%. This represents a normal ejection fraction. Mild concentric hypertrophy noted. E/e' 8 to 15, which is equivocal for estimating   LV filling pressures.Left ventricular diastolic function is indeterminate.    The left ventricular wall motion is normal.    Right Ventricle: Normal right ventricular size and systolic function. TAPSE is normal, which is consistent with normal right ventricular systolic function.    Left Atrium: Left atrial volume is moderately increased. No shunts as documented by negative color flow doppler and saline contrast injection. No thrombus present. No mass present. No spontaneous echo contrast.    Tricuspid Valve: Normal tricuspid valve structure. There is no evidence of tricuspid stenosis. Trace tricuspid valve regurgitation. The estimated systolic pulmonary artery pressure is 25+ right atrial pressure mmhg.    Thoracic Aorta: The ascending aorta is mildly dilated.    EKG      Normal sinus rhythm     Component      Latest Ref Rn 1/22/2024  10:46 AM   Sodium      135 - 145 mmol/L 141    Potassium      3.4 - 5.3 mmol/L 3.8    Carbon Dioxide (CO2)      22 - 29 mmol/L 31 (H)    Anion Gap      7 - 15 mmol/L 9    Urea Nitrogen      8.0 - 23.0 mg/dL 13.8    Creatinine      0.51 - 0.95 mg/dL 0.90    GFR Estimate      >60 mL/min/1.73m2 73    Calcium      8.8 - 10.2 mg/dL 9.1    Chloride      98 - 107 mmol/L 101    Glucose      70 - 99 mg/dL 149 (H)    Alkaline Phosphatase      40 - 150 U/L 82    AST      0 - 45 U/L 29    ALT      0 - 50 U/L 26    Protein Total      6.4 - 8.3 g/dL 6.9    Albumin      3.5 - 5.2 g/dL 3.8    Bilirubin Total      <=1.2 mg/dL 0.5    Cholesterol      <200 mg/dL 112    Triglycerides      <150 mg/dL 91    HDL Cholesterol      >=50 mg/dL 61    LDL Cholesterol Calculated      <=100 mg/dL 33    Non HDL Cholesterol      <130 mg/dL 51    Patient Fasting? Yes    Hemoglobin A1C      0.0 - 5.6 % 6.7 (H)       Legend:  (H)  High      OUTSIDE RECORDS  Outside referral notes and chart notes were reviewed and pertinent information has been summarized (in addition to the HPI):-      Pharmacy coordination- discussed personally        IMPRESSION/REPORT/PLAN  Left-sided weakness  History of stroke  Type 2 diabetes mellitus with other circulatory complication, without long-term current use of insulin (H)  Hyperlipidemia LDL goal <70  Hypertension goal BP (blood pressure) < 130/80    This is a 58 year old female with left-sided weakness related to her right pontine stroke seen on MRI.  Echocardiogram negative for thrombus.  Cardiac monitoring in the hospital did not show any atrial fibrillation.  CT angiogram was negative for any significant large vessel occlusions and stenosis.  There was some intracranial atherosclerosis-asymptomatic for which she was started on aspirin and Plavix in the hospital.  She has been having significant bruising and I will think it would be reasonable to stop the Plavix and keep her on the aspirin 81 mg only.  LDL goal would be less than 70.  Continue Crestor/Repatha though could consider stopping 1 of these since the LDL is really low.  Recommend exercise and healthy lifestyle and good control of diabetes.  Further management of vascular risk factors per primary care.    For the ongoing left-sided weakness and gait difficulty started work with physical therapy.    I can see her back in 1 year.    -     Physical Therapy Referral; Future  - Okay to stop Plavix.  Continue aspirin  - Since patient is on Repatha could consider stopping the statin.  Defer to primary care.    Return in about 1 year (around 2/21/2025) for In-Clinic Visit (must).    Over 60 minutes were spent coordinating the care for the patient on the day of the encounter.  This includes previsit, during visit and post visit activities as documented above.  Counseling patient.  Reviewing chart.  Multiple problems reviewed/addressed.  Multiple test  reviewed.  (Activities include but not inclusive of reviewing chart, reviewing outside records, reviewing labs and imaging study results as well as the images, patient visit time including getting history and exam,  use if applicable, review of test results with the patient and coming up with a plan in a shared model, counseling patient and family, education and answering patient questions, EMR , EMR diagnosis entry and problem list management, medication reconciliation and prescription management if applicable, paperwork if applicable, printing documents and documentation of the visit activities.)        Jun Loco MD  Neurologist  [facility]  Tel:- [phone]    This note was dictated using voice recognition software.  Any grammatical or context distortions are unintentional and inherent to the software.      Again, thank you for allowing me to participate in the care of your patient.        Sincerely,        Jun Loco MD

## 2024-02-22 ENCOUNTER — APPOINTMENT (OUTPATIENT)
Dept: INTERPRETER SERVICES | Facility: CLINIC | Age: 59
End: 2024-02-22
Payer: COMMERCIAL

## 2024-07-11 ENCOUNTER — ANCILLARY PROCEDURE (OUTPATIENT)
Dept: GENERAL RADIOLOGY | Facility: CLINIC | Age: 59
End: 2024-07-11
Attending: FAMILY MEDICINE
Payer: COMMERCIAL

## 2024-07-11 ENCOUNTER — OFFICE VISIT (OUTPATIENT)
Dept: FAMILY MEDICINE | Facility: CLINIC | Age: 59
End: 2024-07-11
Payer: COMMERCIAL

## 2024-07-11 VITALS
RESPIRATION RATE: 20 BRPM | DIASTOLIC BLOOD PRESSURE: 70 MMHG | HEART RATE: 64 BPM | SYSTOLIC BLOOD PRESSURE: 130 MMHG | OXYGEN SATURATION: 99 % | HEIGHT: 56 IN | BODY MASS INDEX: 31.05 KG/M2 | TEMPERATURE: 98.6 F | WEIGHT: 138 LBS

## 2024-07-11 DIAGNOSIS — M75.21 BICEPS TENDONITIS, RIGHT: ICD-10-CM

## 2024-07-11 DIAGNOSIS — I63.22 ACUTE ISCHEMIC VBA BRAINSTEM STROKE, RIGHT (H): ICD-10-CM

## 2024-07-11 DIAGNOSIS — I63.211 ACUTE ISCHEMIC VBA BRAINSTEM STROKE, RIGHT (H): ICD-10-CM

## 2024-07-11 DIAGNOSIS — E11.59 TYPE 2 DIABETES MELLITUS WITH OTHER CIRCULATORY COMPLICATION, WITHOUT LONG-TERM CURRENT USE OF INSULIN (H): ICD-10-CM

## 2024-07-11 DIAGNOSIS — I10 HYPERTENSION GOAL BP (BLOOD PRESSURE) < 130/80: ICD-10-CM

## 2024-07-11 DIAGNOSIS — E78.5 HYPERLIPIDEMIA LDL GOAL <70: ICD-10-CM

## 2024-07-11 DIAGNOSIS — M25.552 HIP PAIN, LEFT: ICD-10-CM

## 2024-07-11 DIAGNOSIS — R93.7 ABNORMAL X-RAY OF PELVIS: ICD-10-CM

## 2024-07-11 DIAGNOSIS — M89.8X8 BONY PELVIC PAIN: Primary | ICD-10-CM

## 2024-07-11 LAB — HBA1C MFR BLD: 6.5 % (ref 0–5.6)

## 2024-07-11 PROCEDURE — 99214 OFFICE O/P EST MOD 30 MIN: CPT | Performed by: FAMILY MEDICINE

## 2024-07-11 PROCEDURE — 36415 COLL VENOUS BLD VENIPUNCTURE: CPT | Performed by: FAMILY MEDICINE

## 2024-07-11 PROCEDURE — 82043 UR ALBUMIN QUANTITATIVE: CPT | Performed by: FAMILY MEDICINE

## 2024-07-11 PROCEDURE — 82570 ASSAY OF URINE CREATININE: CPT | Performed by: FAMILY MEDICINE

## 2024-07-11 PROCEDURE — 73501 X-RAY EXAM HIP UNI 1 VIEW: CPT | Mod: TC | Performed by: RADIOLOGY

## 2024-07-11 PROCEDURE — T1013 SIGN LANG/ORAL INTERPRETER: HCPCS | Mod: U3 | Performed by: FAMILY MEDICINE

## 2024-07-11 PROCEDURE — 83036 HEMOGLOBIN GLYCOSYLATED A1C: CPT | Performed by: FAMILY MEDICINE

## 2024-07-11 RX ORDER — EVOLOCUMAB 140 MG/ML
140 INJECTION, SOLUTION SUBCUTANEOUS
Qty: 2 ML | Refills: 3 | Status: SHIPPED | OUTPATIENT
Start: 2024-07-11

## 2024-07-11 RX ORDER — BROMFENAC 1.03 MG/ML
1 SOLUTION/ DROPS OPHTHALMIC 4 TIMES DAILY
COMMUNITY
Start: 2024-02-13

## 2024-07-11 ASSESSMENT — ASTHMA QUESTIONNAIRES
QUESTION_1 LAST FOUR WEEKS HOW MUCH OF THE TIME DID YOUR ASTHMA KEEP YOU FROM GETTING AS MUCH DONE AT WORK, SCHOOL OR AT HOME: NONE OF THE TIME
ACT_TOTALSCORE: 24
ACT_TOTALSCORE: 25
QUESTION_4 LAST FOUR WEEKS HOW OFTEN HAVE YOU USED YOUR RESCUE INHALER OR NEBULIZER MEDICATION (SUCH AS ALBUTEROL): ONCE A WEEK OR LESS
QUESTION_3 LAST FOUR WEEKS HOW OFTEN DID YOUR ASTHMA SYMPTOMS (WHEEZING, COUGHING, SHORTNESS OF BREATH, CHEST TIGHTNESS OR PAIN) WAKE YOU UP AT NIGHT OR EARLIER THAN USUAL IN THE MORNING: NOT AT ALL
QUESTION_2 LAST FOUR WEEKS HOW OFTEN HAVE YOU HAD SHORTNESS OF BREATH: NOT AT ALL
QUESTION_5 LAST FOUR WEEKS HOW WOULD YOU RATE YOUR ASTHMA CONTROL: COMPLETELY CONTROLLED
QUESTION_1 LAST FOUR WEEKS HOW MUCH OF THE TIME DID YOUR ASTHMA KEEP YOU FROM GETTING AS MUCH DONE AT WORK, SCHOOL OR AT HOME: NONE OF THE TIME
QUESTION_5 LAST FOUR WEEKS HOW WOULD YOU RATE YOUR ASTHMA CONTROL: COMPLETELY CONTROLLED
ACT_TOTALSCORE: 25
QUESTION_2 LAST FOUR WEEKS HOW OFTEN HAVE YOU HAD SHORTNESS OF BREATH: NOT AT ALL
QUESTION_3 LAST FOUR WEEKS HOW OFTEN DID YOUR ASTHMA SYMPTOMS (WHEEZING, COUGHING, SHORTNESS OF BREATH, CHEST TIGHTNESS OR PAIN) WAKE YOU UP AT NIGHT OR EARLIER THAN USUAL IN THE MORNING: NOT AT ALL
QUESTION_4 LAST FOUR WEEKS HOW OFTEN HAVE YOU USED YOUR RESCUE INHALER OR NEBULIZER MEDICATION (SUCH AS ALBUTEROL): NOT AT ALL

## 2024-07-11 ASSESSMENT — PAIN SCALES - GENERAL: PAINLEVEL: EXTREME PAIN (8)

## 2024-07-11 NOTE — PROGRESS NOTES
Answers submitted by the patient for this visit:  General Questionnaire (Submitted on 7/11/2024)  Chief Complaint: Chronic problems general questions HPI Form  What is the reason for your visit today? : L hip pain  How many servings of fruits and vegetables do you eat daily?: 0-1  On average, how many sweetened beverages do you drink each day (Examples: soda, juice, sweet tea, etc.  Do NOT count diet or artificially sweetened beverages)?: 0  How many minutes a day do you exercise enough to make your heart beat faster?: 30 to 60  How many days a week do you exercise enough to make your heart beat faster?: 7  How many days per week do you miss taking your medication?: 0

## 2024-07-11 NOTE — PROGRESS NOTES
"Office Visit  [facility]  Date of Service: Jul 11, 2024      Subjective   Kevin Thrasher is a 59 year old female who presents for   Chief Complaint   Patient presents with    Pain     Left hip    Recheck Medication     Left Hip Pain  Started suddenly 2 weeks ago  Hurts worse with walking  Likes to massage it and likes to sleep on left side.  Wonders if that injured it.  Using menthol ointment.  No injury.  Left leg shorter    Left foot hurts when first walking. Then gets better quickly.  Has been cooler since her stroke.    Right biceps pain when she carries something heavy    Wants to see eye doctor - got drops, but unaffordable $500. Poor vision. Wants it removed. Wants to see them in January. Planning to see daughter in December and then in January, she'll be able to follow restrictions easier.     Answers submitted by the patient for this visit:  General Questionnaire (Submitted on 7/11/2024)  Chief Complaint: Chronic problems general questions HPI Form  What is the reason for your visit today? : L hip pain  How many servings of fruits and vegetables do you eat daily?: 0-1  On average, how many sweetened beverages do you drink each day (Examples: soda, juice, sweet tea, etc.  Do NOT count diet or artificially sweetened beverages)?: 0  How many minutes a day do you exercise enough to make your heart beat faster?: 30 to 60  How many days a week do you exercise enough to make your heart beat faster?: 7  How many days per week do you miss taking your medication?: 0    Objective   /70 (BP Location: Left arm, Patient Position: Sitting, Cuff Size: Adult Regular)   Pulse 64   Temp 98.6  F (37  C) (Temporal)   Resp 20   Ht 1.43 m (4' 8.3\")   Wt 62.6 kg (138 lb)   SpO2 99%   BMI 30.61 kg/m   She reports that she has never smoked. She has never been exposed to tobacco smoke. She has never used smokeless tobacco.    Gen: Alert, no apparent distress.  MSK: Tenderness just under " the rim of the left pelvis anterior superior iliac crest. No overlying skin changes or palpable mass/deformity; Hips: normal inspection, ROM.   Vascular: Left foot is cooler than right with delayed capillary refill. Trace pedal edema bilaterally. 2+ pulses      Results for orders placed or performed in visit on 07/11/24   XR Pelvis and Hip Left 1 View     Status: None    Narrative    EXAM: XR PELVIS AND HIP LEFT 1 VIEW  LOCATION: [facility]  DATE: 7/11/2024    INDICATION: Pelvis pain LEFT anterior superior iliac crest  COMPARISON: None.      Impression    IMPRESSION: Both hips are negative for fracture or dislocation. Pelvis negative for fracture. Mild degenerative change at the SI joints bilaterally. There is irregularity of the left anterior superior iliac crest. The chronicity of this finding is   uncertain. This could potentially represent partial avulsive injury to the muscular tendinous origin of the sartorius.   Results for orders placed or performed in visit on 07/11/24   Albumin Random Urine Quantitative with Creat Ratio     Status: None   Result Value Ref Range    Creatinine Urine mg/dL 99.8 mg/dL    Albumin Urine mg/L 18.1 mg/L    Albumin Urine mg/g Cr 18.14 0.00 - 25.00 mg/g Cr   HEMOGLOBIN A1C     Status: Abnormal   Result Value Ref Range    Hemoglobin A1C 6.5 (H) 0.0 - 5.6 %     Assessment & Plan     Left pelvis pain - anterior superior iliac crest. Tender on exam. X-ray shows irregularity, possible avulsive mechanism. Referral made for sports medicine. Plan CT to evaluate further  Cool left foot with slow capillary refill, longstanding, since stroke in 2020. 2+ pedal pulses. Not symptomatic otherwise, not painful. Pt doesn't want to evaluate right now.       Order Summary                                                      Hip pain, left  -     XR Pelvis and Hip Left 1 View; Future  -     Orthopedic  Referral; Future    Acute ischemic VBA brainstem stroke, right  (H)  -     evolocumab (REPATHA SURECLICK) 140 MG/ML prefilled autoinjector; Inject 1 mL (140 mg) subcutaneously every 14 days    Hyperlipidemia LDL goal <70  -     evolocumab (REPATHA SURECLICK) 140 MG/ML prefilled autoinjector; Inject 1 mL (140 mg) subcutaneously every 14 days    Type 2 diabetes mellitus with other circulatory complication, without long-term current use of insulin (H)  -     Albumin Random Urine Quantitative with Creat Ratio; Future  -     HEMOGLOBIN A1C; Future  -     Adult Eye  Referral; Future  -     Albumin Random Urine Quantitative with Creat Ratio  -     HEMOGLOBIN A1C    Biceps tendonitis, right  -     Wrist/Arm/Hand Bracking Supplies Order Other (comments) (ACE wrap)    Hypertension goal BP (blood pressure) < 130/80  -     Home Blood Pressure Monitor Order    Other orders  -     bromfenac (BROMDAY) 0.09 % ophthalmic solution; Place 1 drop into both eyes 4 times daily  -     REVIEW OF HEALTH MAINTENANCE PROTOCOL ORDERS            Future Appointments   Date Time Provider Department Center   2/21/2025  9:30 AM Jun Loco MD [facility]       Completed by: Ema Gentile M.D., [facility]. 7/11/2024 4:27 PM.  This transcription uses voice recognition software, which may contain typographical errors.  MDM: SDOH neighborhood: [facility] [place] [id number], language: Hmong, .    Prior to immunization administration, verified patients identity using patient s name and date of birth. Please see Immunization Activity for additional information.     Screening Questionnaire for Adult Immunization    Are you sick today?   No   Do you have allergies to medications, food, a vaccine component or latex?   Yes   Have you ever had a serious reaction after receiving a vaccination?   No   Do you have a long-term health problem with heart, lung, kidney, or metabolic disease (e.g., diabetes), asthma, a blood disorder, no spleen, complement component deficiency, a cochlear implant, or a  spinal fluid leak?  Are you on long-term aspirin therapy?   Yes   Do you have cancer, leukemia, HIV/AIDS, or any other immune system problem?   No   Do you have a parent, brother, or sister with an immune system problem?   No   In the past 3 months, have you taken medications that affect  your immune system, such as prednisone, other steroids, or anticancer drugs; drugs for the treatment of rheumatoid arthritis, Crohn s disease, or psoriasis; or have you had radiation treatments?   No   Have you had a seizure, or a brain or other nervous system problem?   No   During the past year, have you received a transfusion of blood or blood    products, or been given immune (gamma) globulin or antiviral drug?   No   For women: Are you pregnant or is there a chance you could become       pregnant during the next month?   No   Have you received any vaccinations in the past 4 weeks?   No     Immunization questionnaire was positive for at least one answer.  Notified .      Patient instructed to remain in clinic for 15 minutes afterwards, and to report any adverse reactions.     Screening performed by Jennifer Hampton MA on 7/11/2024 at 4:21 PM.         Signed Electronically by: Ema Gentile MD  DME (Durable Medical Equipment) Orders and Documentation  Orders Placed This Encounter   Procedures    Home Blood Pressure Monitor Order    Wrist/Arm/Hand Bracking Supplies Order Other (comments) (ACE wrap)        The patient was assessed and it was determined the patient is in need of the following listed DME Supplies/Equipment. Please complete supporting documentation below to demonstrate medical necessity.

## 2024-07-12 LAB
CREAT UR-MCNC: 99.8 MG/DL
MICROALBUMIN UR-MCNC: 18.1 MG/L
MICROALBUMIN/CREAT UR: 18.14 MG/G CR (ref 0–25)

## 2024-07-23 ENCOUNTER — TELEPHONE (OUTPATIENT)
Dept: FAMILY MEDICINE | Facility: CLINIC | Age: 59
End: 2024-07-23
Payer: COMMERCIAL

## 2024-07-23 NOTE — TELEPHONE ENCOUNTER
----- Message from Ema Gentile sent at 7/22/2024  7:05 PM CDT -----  Blood sugar is well-controlled.  Urine protein level is good.

## 2024-07-24 NOTE — TELEPHONE ENCOUNTER
Called patient. Relayed test results for patient. Patient verbalizes understanding of results.     Assisted patient in scheduling CT scan appt. Patient is scheduled with [facility] for 8/1.         Dominga Baird MSN, RN   [employer]

## 2024-08-01 ENCOUNTER — HOSPITAL ENCOUNTER (OUTPATIENT)
Dept: CT IMAGING | Facility: HOSPITAL | Age: 59
Discharge: HOME | End: 2024-08-01
Attending: FAMILY MEDICINE | Admitting: FAMILY MEDICINE
Payer: COMMERCIAL

## 2024-08-01 DIAGNOSIS — M89.8X8 BONY PELVIC PAIN: ICD-10-CM

## 2024-08-01 DIAGNOSIS — R93.7 ABNORMAL X-RAY OF PELVIS: ICD-10-CM

## 2024-08-01 PROCEDURE — 72192 CT PELVIS W/O DYE: CPT

## 2024-08-02 ENCOUNTER — TELEPHONE (OUTPATIENT)
Dept: FAMILY MEDICINE | Facility: CLINIC | Age: 59
End: 2024-08-02
Payer: COMMERCIAL

## 2024-08-02 DIAGNOSIS — M77.9 ENTHESITIS: Primary | ICD-10-CM

## 2024-08-02 DIAGNOSIS — M47.816 FACET ARTHRITIS OF LUMBAR REGION: ICD-10-CM

## 2024-08-02 NOTE — TELEPHONE ENCOUNTER
"Called patient with a [employer]  (ID [id number]). Relayed clinician's message below. Pt verbalized understanding and in agreement with plan.    \"Her CT shows \"Prosimal IT Band Enthesopathy\" in the area of her pelvis where she is having pain.     There is a band of tissue that goes from the pelvis bone to the knee, called the IT band. The CT shows that where it attaches to the bone of the pelvis there has been an injury, called enthesopathy. This fits with the symptoms you're having.     The good news is that this is a treatable condition.     I'd like you to do physical therapy specifically for this condition for 8 weeks then see a sports medicine doctor in two months to follow up on your symptoms. Please help schedule PT and sports med.\"    Kenneth TEJADA RN  [facility]     "

## 2024-08-02 NOTE — RESULT ENCOUNTER NOTE
"Please let Mao know:    Her CT shows \"Prosimal IT Band Enthesopathy\" in the area of her pelvis where she is having pain.    There is a band of tissue that goes from the pelvis bone to the knee, called the IT band. The CT shows that where it attaches to the bone of the pelvis there has been an injury, called enthesopathy.  This fits with the symptoms you're having.    The good news is that this is a treatable condition.     I'd like you to do physical therapy specifically for this condition for 8 weeks then see a sports medicine doctor in two months to follow up on your symptoms. Please help schedule PT and sports med.    "

## 2024-08-06 NOTE — PROGRESS NOTES
"   02/07/20 1322   Visit Information   Visit Made By Staff    Type of Visit Follow-up   Visited Patient;Family   Interventions   Plan of Care Review   With patient/family/proxy   Basic Spiritual Interventions   Assessment of spiritual needs/resources;Reflective conversation;Prayer;Life Review; introduction/orientation to Spiritual Health Services   Advanced Assessments/Interventions   Presenting Concerns/Issues Spiritual/Latter day/emotional support;Goals of care discernment;Grief and adjustment issues;Self-concept disturbance;Challenged coping;Stress/self-care   SPIRITUAL HEALTH SERVICES  SPIRITUAL  ASSESSMENT Progress Note  [facility]    PRIMARY FOCUS    Goals of Care    Emotional distress    Support for coping    ILLNESS CIRCUMSTANCES:   Reviewed documentation. Responded to referral based on hospital  request.  Reflective conversation shared with Kevin and her , Amee, which integrated elements of illness and family narratives.    Context of Serious Illness/Sympton(s)- Kevin said that she wasn't doing all that well just now; \"I want to return to how I was before.\" Her , Amee, was with her. They have two children, and Amee said that they had wanted two more.  They have eight grandchildren.  One of their daughters was present with her two year old son.    Resources for Support - Family. Amee explained that they live with family-something about a nephew.    DISTRESS:     Emotional, Spiritual, Existential Distress - Kevin said that this stroke was quite distressing for her.    Orthodoxy Distress - n/a    Social/Cultural/Economic- n/a    SPIRITUAL/Orthodoxy COPING):     Religious/Mirian - Hmong community and traditional Alevism, but Amee said expansively that they are open to all religions-anything that can help.    Spiritual Practice(s) - Kevin asked for a prayer which we had together, asking for strength, for God to remove her worries, and for the ancestors to provide " Psychiatric Follow Up Progress Note     Patient: Yonny Quinonez Date: 2024   : 1985    39 year old male      This visit was performed:  [] In person at [facility], [place], [place]  [x] This visit was performed via live two-way video with patient's verbal consent.   Clinician Location:  Clinic  Patient Location:  Home.  Yonny is in [place] and identity has been established.   He was informed that consent to treat includes permission to submit charges to the applicable insurance on file.  Yonny was advised regarding the potential risk inherent in video visits, as the assessment may be limited due to what can be seen on the screen which potentially results in an incomplete assessment; as well as either of us may discontinue the video visit if it is    Initial psychiatric evaluation:  2022  Previous psychiatric med trials:   Bupropion  Lisdexamfetamine  Adderall XR  Ritalin  methylphenidate     Significant Medical History:  MS, psoriatic arthritis - chronic debilitating pain,      Current Psychiatric Providers:  Ladan Galaviz LCSW    At last appointment on 2024: methylphenidate increased to 30mg twice daily    Chief complaint: \"severe pain in back - seeing pain management.\"  Reports children are amazing, sassy, funny and wonderful;      Interval History: Yonny is a 39 year old White  male with diagnoses of ADHD and MDD who presents today reporting symptoms of adhd are effectively controlled with increased methylphenidate dose     Patient is currently on   Methylphenidate 20mg three times daily - 8am, noon, 4-5pm    Current medication/s- Patient is taking as prescribed. Medication/s are effective. Side effects: none. Missed doses: none.    Psychiatric Review of Symptoms:  Current symptoms have been stable and controlled for the last three months   Current mood: \"complicated by pain.\"    Appetite: \"okay.\"    Energy: \"fair.\"  Concentration: \"good.\" Motivation is \"good.\"   Complains  "help.    Emotional, Relational,Existential Connections- Family. Her daughters made and gave her a prayer bracelet.     GOALS OF CARE:    Goals of Care - \"To be as I was before.\"    Meaning/Sense-Making- n/d    PLAN: Sandro return with bible as Amee requested-ARU.    Rev. Norman-O Habnicole-Joseph  Staff   [phone]  * [facility] remains available 24/7 for emergent requests/referrals, either by having the switchboard page the on-call  or by entering an ASAP/STAT consult in Epic (this will also page the on-call ).*        " of anhedonia.- complicated by pain    Sleep:  toss and turn all night  Denies suicidal ideation, plan, or intent.    Denies homicidal ideation, plan, or intent..    ROS: Endorses severe chronic pain in back.  Denies fever, malaise, rash, diarrhea/constipation/nausea, joint or muscle pain, headaches, chest pain or SOB.    EXAM:   Vitals: There were no vitals taken for this visit.               Mental Status Exam  Appearance:  Well-groomed, appears stated age, within camera view  Behavior:  Calm, pleasant, interactive.   Cooperativity:  Cooperative, forthcoming, appears reliable.    Speech:  Normal rate, tone and volume.   Language:  No abnormality noted.    Mood:  Noted in History of Present Illness.  Affect:  Mood-congruent, stable, normal range.  Thought Process:  Linear, logical, goal-directed.    Thought Content:  No overt delusions or abnormality noted.    Perception:  No hallucinations, not responding to internal stimuli.   Consciousness:  Awake and alert.   Orientation:  Oriented to person, place and time.   Musculoskeletal: No abnormal or involuntary muscle movements observed.  Memory:  No apparent impairments in short-term recall, no apparent impairments in long-term recall  Attention:  Good, no fluctuation or obvious deficit noted and able to follow the conversation.   Fund of Knowledge:  Consistent with education and experiences as evidenced by vocabulary.   Insight:  Good, based on appropriate recognition of impact of psychiatric symptoms and need for treatment.   Judgment:  Good, based on recent decisions.  Safety:  Noted in History of Present Illness.  Motivation to pursue treatment:  Good.    Suicide Risk Assessment:  Risk Factors: medical illness and severe chronic pain.  Protective Factors/Strengths: future-oriented and goal planning, absence of psychosis, responsibility to children or beloved pets, positive therapeutic relationship, good social supports, no plan or intent, hopeful for the future,  access to healthcare, able to make needs known and reasons for living.  Risk Level: low.  Safety Plan:  has crisis resource information and safe people to whom he/she can reach out      Medication consent signed for new medication: Not needed   Controlled substance contract signed: Signed 4/2023  Last urine drug screen:   Next urine drug screen due: Ordered    Medication labs (lipid panel, TSH, A1c/glucose):    AIMS: Due  Not needed   Tx plan (every 6 visits or 90 days which ever is longer) signed on: 10/3/2023, Visit # 5      Assessment: Yonny presents for management of diagnoses listed below.  He reports current dose of methylphenidate is effective in managing ADHD symptoms.  Taking 20 mg 3 times daily as 30 mg twice daily caused significant nausea.  His primary concern today is severe pain, working with Dr. Reilly and pain management.  Mood and sleep and activity are all colored by pain.  Hopeful that ongoing treatment will bring relief.  Will continue current treatment plan and follow-up in 6 months    Diagnoses:   Attention Deficit / Hyperactivity Disorder, Predominantly Inattentive Type (F90.0)   Major Depressive Disorder, Single Episode, Moderate (F32.1)     Medication Options:    No changes    Medication Plan B:    Medical Decision Making and Plan of Treatment:    Continue methylphenidate as ordered    Orders Placed:    Methylphenidate 20 mg twice daily, dispense 60 refill 0  Methylphenidate 10 mg twice daily, dispense 60 refill 0    There have not been any new medication(s) prescribed today.    Interventions:    Reviewed data collected during assessment process.    Discussed recommended medications and alternative options, risks, benefits and side effects.   Discussed adjunct /alternative treatments, including the role of psychotherapy in treating mental health illnesses.    Discussed collaborative treatment plan.     Praised patient's effort to seek help, extended hope for management and control of symptoms.       Follow-Up:  Patient will follow up with writer in 6 months.  Encouraged to call the office with any questions, comments, concerns or to reschedule an earlier appointment with writer.    Prep time: 10 min  Appointment time: 15 min  Documentation time: 10 min  Total time spent: 35 min    This information is confidential and disclosure without patient consent or statutory authorization is prohibited by law.    Jeannie Chisholm RN, MSN, APNP, PMHNP-BC

## 2024-08-09 ENCOUNTER — OFFICE VISIT (OUTPATIENT)
Dept: ORTHOPEDICS | Facility: CLINIC | Age: 59
End: 2024-08-09
Attending: FAMILY MEDICINE
Payer: COMMERCIAL

## 2024-08-09 VITALS — BODY MASS INDEX: 31.05 KG/M2 | WEIGHT: 138 LBS | HEIGHT: 56 IN

## 2024-08-09 DIAGNOSIS — M77.9 ENTHESITIS: Primary | ICD-10-CM

## 2024-08-09 DIAGNOSIS — S76.002A: ICD-10-CM

## 2024-08-09 PROCEDURE — 99214 OFFICE O/P EST MOD 30 MIN: CPT | Performed by: STUDENT IN AN ORGANIZED HEALTH CARE EDUCATION/TRAINING PROGRAM

## 2024-08-09 RX ORDER — NAPROXEN 500 MG/1
500 TABLET ORAL 2 TIMES DAILY WITH MEALS
Qty: 60 TABLET | Refills: 0 | Status: SHIPPED | OUTPATIENT
Start: 2024-08-09

## 2024-08-09 NOTE — LETTER
8/9/2024      Kevin Thrasher  [address] [place] [id number]      Dear Colleague,    Thank you for referring your patient, Kevin Thrasher, to the [facility]IC [facility]. Please see a copy of my visit note below.    ASSESSMENT & PLAN    Kevin was seen today for pain.    Diagnoses and all orders for this visit:    Proximal IT Band Enthesitis - Left  -     Orthopedic  Referral  -     naproxen (NAPROSYN) 500 MG tablet; Take 1 tablet (500 mg) by mouth 2 times daily (with meals)    Injury of muscle of left hip  -     naproxen (NAPROSYN) 500 MG tablet; Take 1 tablet (500 mg) by mouth 2 times daily (with meals)      This issue is acute and Unchanged. Kevin presents our clinic today to discuss her acute left hip pain.  She reports a fall while camping approximately 1 month ago that caused left hip pain on the lateral aspect of the hip.  She localizes this pain over the ilium and iliac crest.  She had radiographs taken that showed no acute fractures and a slight irregularity at the left, superior iliac crest.  A CT scan was then performed that again showed no acute fracture but did show some proximal IT band enthesophytosis.  On examination today, the patient has a slight limp but states that this is her baseline from her previous stroke.  She has some tenderness to palpation over the body of the ileum but this is overall mild, and has some pain with FADIR and ALLYSON testing that is over the soft tissues of this area and not in the anterior groin/hip joint region.  Overall, these findings are most specific for acute muscle strain and there is low concern for any underlying fracture of the ileum.  We discussed that we could obtain an MRI to formally rule out a nondisplaced fracture, however the patient does not wish to proceed with this at this time.  She already has physical therapy scheduled which I think is reasonable to help rehab from her acute injury.  We also discussed the role of  "anti-inflammatory medicines to help reduce her pain and better participate PT, the patient wished to proceed with this today as well.  We determine the following plan:  - Naprosyn 500 mg twice daily for 5 days, as needed afterwards sent to pharmacy  - Patient already has physical therapy scheduled  - She can otherwise use ice, heat as needed  - She can follow-up in our clinic in 4 to 6 weeks if not improving and at that time can proceed with an MRI of the hip.      Cr Connolly DO  [facility] SPORTS MEDICINE CLINIC [facility]    -----  Chief Complaint   Patient presents with     Left Hip - Pain       SUBJECTIVE  Kevin Thrasher is a/an 59 year old female who is seen in consultation at the request of  Ema Gentile M.D. for evaluation of left hip .     The patient is seen by themselves.    Onset: 1 month(s) ago. Patient describes injury as she went camping with kids and slipped and fell on her left side   Location of Pain: left hip lateral aspect   Worsened by: turning ans twisting causes pain   Better with: tylenol   Treatments tried: rest/activity avoidance and Tylenol  Associated symptoms: no distal numbness or tingling; denies swelling or warmth    Orthopedic/Surgical history: NO  Social History/Occupation: unemployed       REVIEW OF SYSTEMS:  Review of systems negative unless mentioned in HPI     OBJECTIVE:  Ht 1.43 m (4' 8.3\")   Wt 62.6 kg (138 lb)   BMI 30.61 kg/m     General: healthy, alert and in no distress  Skin: no suspicious lesions or rash.  CV: distal perfusion intact   Resp: normal respiratory effort without conversational dyspnea   Psych: normal mood and affect  Gait: limp-from stroke   Neuro: Normal light sensory exam of LL extremity     Hip Exam:    Musculoskeletal Exam   Gait Normal     Left Right   Inspection Grossly Normal  Grossly Normal    Palpation     Tenderness over  Left ASIS, lateral hip musculature  None   Range of Motion     Flexion - Supine  Full to about 90  Full to about " 90   ER at 90 of flexion 55 55   IR at 90 of flexion 40 40   Strength 5/5 Flexion  5/5 Abduction in Neutral  5/5 Abduction in Extension    Grossly Nml otherwise  5/5 Flexion  5/5 Abduction in Neutral  5/5 Abduction in Extension    Grossly Nml otherwise    Pain Provocation Tests     FADIR NEG NEG   ALLYSON NEG  NEG    Instability/log roll NEG  NEG    Trochanteric Pain Sign NEG NEG    Straight leg raise (passive) NEG  NEG    Posterior/Ischiofemoral Impingement Provocation NEG  NEG    Neurologic Intact sensation          RADIOLOGY:  Final results and radiologist's interpretation, available in the [facility] health record.  Images were reviewed with the patient in the office today.  My personal interpretation of the performed imaging: Graphs from 7/11/2024 reviewed and show no acute fractures or bony abnormalities of the hip.  There is a mild cortical irregularity at the left superior iliac crest.  CT scan 8/1/2024:  IMPRESSION:  1.  No definite acute fracture is identified. There is some linear mineralization along the left iliac crest in the region of the proximal iliotibial band attachment. This is favored to relate to chronic enthesopathy or remote injury.   2.  Given the patient's osseous demineralization, MRI would be more sensitive for nondisplaced fractures and can be considered if there is persistent clinical concern.  3.  Additional chronic-appearing findings, as described.           Again, thank you for allowing me to participate in the care of your patient.        Sincerely,        Cr Connolly, DO

## 2024-08-09 NOTE — PROGRESS NOTES
ASSESSMENT & PLAN    Kevin was seen today for pain.    Diagnoses and all orders for this visit:    Proximal IT Band Enthesitis - Left  -     Orthopedic  Referral  -     naproxen (NAPROSYN) 500 MG tablet; Take 1 tablet (500 mg) by mouth 2 times daily (with meals)    Injury of muscle of left hip  -     naproxen (NAPROSYN) 500 MG tablet; Take 1 tablet (500 mg) by mouth 2 times daily (with meals)      This issue is acute and Unchanged. Kevin presents our clinic today to discuss her acute left hip pain.  She reports a fall while camping approximately 1 month ago that caused left hip pain on the lateral aspect of the hip.  She localizes this pain over the ilium and iliac crest.  She had radiographs taken that showed no acute fractures and a slight irregularity at the left, superior iliac crest.  A CT scan was then performed that again showed no acute fracture but did show some proximal IT band enthesophytosis.  On examination today, the patient has a slight limp but states that this is her baseline from her previous stroke.  She has some tenderness to palpation over the body of the ileum but this is overall mild, and has some pain with FADIR and ALLYSON testing that is over the soft tissues of this area and not in the anterior groin/hip joint region.  Overall, these findings are most specific for acute muscle strain and there is low concern for any underlying fracture of the ileum.  We discussed that we could obtain an MRI to formally rule out a nondisplaced fracture, however the patient does not wish to proceed with this at this time.  She already has physical therapy scheduled which I think is reasonable to help rehab from her acute injury.  We also discussed the role of anti-inflammatory medicines to help reduce her pain and better participate PT, the patient wished to proceed with this today as well.  We determine the following plan:  - Naprosyn 500 mg twice daily for 5 days, as needed afterwards sent to  "pharmacy  - Patient already has physical therapy scheduled  - She can otherwise use ice, heat as needed  - She can follow-up in our clinic in 4 to 6 weeks if not improving and at that time can proceed with an MRI of the hip.      Cr Connolly DO  [facility] SPORTS MEDICINE CLINIC [facility]    -----  Chief Complaint   Patient presents with    Left Hip - Pain       SUBJECTIVE  Kevin Thrasher is a/an 59 year old female who is seen in consultation at the request of  Ema Gentile M.D. for evaluation of left hip .     The patient is seen by themselves.    Onset: 1 month(s) ago. Patient describes injury as she went camping with kids and slipped and fell on her left side   Location of Pain: left hip lateral aspect   Worsened by: turning ans twisting causes pain   Better with: tylenol   Treatments tried: rest/activity avoidance and Tylenol  Associated symptoms: no distal numbness or tingling; denies swelling or warmth    Orthopedic/Surgical history: NO  Social History/Occupation: unemployed       REVIEW OF SYSTEMS:  Review of systems negative unless mentioned in HPI     OBJECTIVE:  Ht 1.43 m (4' 8.3\")   Wt 62.6 kg (138 lb)   BMI 30.61 kg/m     General: healthy, alert and in no distress  Skin: no suspicious lesions or rash.  CV: distal perfusion intact   Resp: normal respiratory effort without conversational dyspnea   Psych: normal mood and affect  Gait: limp-from stroke   Neuro: Normal light sensory exam of LL extremity     Hip Exam:    Musculoskeletal Exam   Gait Normal     Left Right   Inspection Grossly Normal  Grossly Normal    Palpation     Tenderness over  Left ASIS, lateral hip musculature  None   Range of Motion     Flexion - Supine  Full to about 90  Full to about 90   ER at 90 of flexion 55 55   IR at 90 of flexion 40 40   Strength 5/5 Flexion  5/5 Abduction in Neutral  5/5 Abduction in Extension    Grossly Nml otherwise  5/5 Flexion  5/5 Abduction in Neutral  5/5 Abduction in Extension    Grossly " Nml otherwise    Pain Provocation Tests     FADIR NEG NEG   ALLYSON NEG  NEG    Instability/log roll NEG  NEG    Trochanteric Pain Sign NEG NEG    Straight leg raise (passive) NEG  NEG    Posterior/Ischiofemoral Impingement Provocation NEG  NEG    Neurologic Intact sensation          RADIOLOGY:  Final results and radiologist's interpretation, available in the [facility] health record.  Images were reviewed with the patient in the office today.  My personal interpretation of the performed imaging: Graphs from 7/11/2024 reviewed and show no acute fractures or bony abnormalities of the hip.  There is a mild cortical irregularity at the left superior iliac crest.  CT scan 8/1/2024:  IMPRESSION:  1.  No definite acute fracture is identified. There is some linear mineralization along the left iliac crest in the region of the proximal iliotibial band attachment. This is favored to relate to chronic enthesopathy or remote injury.   2.  Given the patient's osseous demineralization, MRI would be more sensitive for nondisplaced fractures and can be considered if there is persistent clinical concern.  3.  Additional chronic-appearing findings, as described.

## 2024-08-12 NOTE — PROGRESS NOTES
"PHYSICAL THERAPY EVALUATION  Type of Visit: Evaluation       Fall Risk Screen:  Fall screen completed by: PT  Have you fallen 2 or more times in the past year?: No  Have you fallen and had an injury in the past year?: No  Is patient a fall risk?: No    Subjective       Presenting condition or subjective complaint:    Date of onset: 07/13/24    Relevant medical history:     Dates & types of surgery:      Prior diagnostic imaging/testing results:       Prior therapy history for the same diagnosis, illness or injury:        Patient Active Problem List   Diagnosis    Vitamin D deficiency    Type 2 diabetes mellitus (H)    Overweight    Mild intermittent asthma without complication    Lumbosacral radiculopathy at S1    Hyperlipidemia LDL goal <70    Hypertension goal BP (blood pressure) < 130/80    Fatty liver    Anxiety    Urinary, incontinence, stress female    Cystocele with uterine prolapse    Osteoarthritis of spine with radiculopathy, cervical region    Facet arthritis of lumbar region    Spondylolisthesis of lumbosacral region    Vaginal atrophy    Hypokalemia    Cerebrovascular disease    Proximal IT Band Enthesitis - Left     History of a stroke affecting the L LE.     Prior Level of Function  Transfers:   Ambulation:   ADL:   IADL:     Living Environment  Social support:     Type of home:     Stairs to enter the home:         Ramp:     Stairs inside the home:         Help at home:    Equipment owned:       Employment:      Hobbies/Interests:      Patient goals for therapy:      Pain assessment: See objective evaluation for additional pain details    Per chart review: Pt had a fall onto her left hip about a month ago while camping.  \"CT scan was then performed that again showed no acute fracture but did show some proximal IT band enthesophytosis \"     Objective   LUMBAR SPINE EVALUATION  PAIN: Pain Level at Rest: 2/10  Pain Level with Use: 3/10  Pain Location: L ASIS, anterolateral hip  Pain Quality: Dull  Pain " is Exacerbated By: walk (>5-10 min), turning, rolling in bed, sleep,     Pain is Relieved By: NSAIDs  Pain Progression: Improved  INTEGUMENTARY (edema, incisions):   POSTURE:   GAIT:   Weightbearing Status: WBAT  Assistive Device(s): None  Gait Deviations: Antalgic  Stance time decreased on L LE, pt reports this is her normal walking pattern since her stroke in 2020  BALANCE/PROPRIOCEPTION:   WEIGHTBEARING ALIGNMENT:   NON-WEIGHTBEARING ALIGNMENT:    ROM:   (Degrees) Left AROM Left PROM  Right AROM Right PROM   Hip Flexion  Pain with overpressure WFL  WFL   Hip Extension       Hip Abduction       Hip Adduction       Hip Internal Rotation  WFL  WFL   Hip External Rotation  WFL  WFL   Knee Flexion       Knee Extension       Lumbar Side glide     Lumbar Flexion To ankles   Lumbar Extension moderate   Pain:   End feel:   PELVIC/SI SCREEN:   STRENGTH:     MYOTOMES:    Left Right   T12-L3 (Hip Flexion) 3+ 4+   L2-4 (Quads)  4 5   L4 (Ankle DF)     L5 (Great Toe Ext) 5 5   S1 (Toe Raise)     Hip ER: L 3+/5 R 4/5  Hip IR: L 3-/5 R 4+/5  Hip abd: L 3-/5  DTR S:   CORD SIGNS:   DERMATOMES: WNL  NEURAL TENSION:   FLEXIBILITY:   LUMBAR/HIP Special Tests:    Left Right   ALLYSON Negative  but tender Negative    FADIR/Labrum/JENNIFER Negative  but sore on lateral hip Negative    Scour Negative  Negative    Stephan's     Piriformis     Quadrant Testing     SLR Negative  Negative    Slump     Stork with Extension     Stephen             PELVIS/SI SPECIAL TESTS:   FUNCTIONAL TESTS:   PALPATION:   + Tenderness At Location Left Right   Quadratus Lumborum     Erector Spinae     Piriformis  -    PSIS -    ASIS +    Iliac Crest +    Glut Medius +    Greater Trochanter -    Ischial Tuberosity     Hamstrings     Hip Flexors -    Vertebral        SPINAL SEGMENTAL CONCLUSIONS:       Assessment & Plan   CLINICAL IMPRESSIONS  Medical Diagnosis: Enthesitis  Facet arthritis of lumbar region    Treatment Diagnosis: Proximal IT Band Enthesitis - Left, Facet  arthritis of lumbar region   Impression/Assessment: Patient is a 59 year old female with L hip pain complaints.  The following significant findings have been identified: Pain, Decreased strength, Impaired balance, Impaired gait, and Decreased activity tolerance. These impairments interfere with their ability to perform self care tasks, recreational activities, household mobility, and community mobility as compared to previous level of function.     Clinical Decision Making (Complexity):  Clinical Presentation: Stable/Uncomplicated  Clinical Presentation Rationale: based on medical and personal factors listed in PT evaluation  Clinical Decision Making (Complexity): Low complexity    PLAN OF CARE  Treatment Interventions:  Modalities: Cryotherapy, Hot Pack  Interventions: Gait Training, Manual Therapy, Neuromuscular Re-education, Therapeutic Activity, Therapeutic Exercise, Self-Care/Home Management    Long Term Goals     PT Goal 1  Goal Identifier: Walk  Goal Description: Pt will be able to walk for 30+ min without L hip pain to return to her PLOF  Target Date: 10/08/24  PT Goal 2  Goal Identifier: sleep  Goal Description: Pt will be able to roll in bed and sleep >6 hours without waking from pain to improve sleep quality and rest  Target Date: 10/08/24      Frequency of Treatment: 1x/week  Duration of Treatment: 8 weeks    Recommended Referrals to Other Professionals:   Education Assessment:   Learner/Method: Patient;Pictures/Video    Risks and benefits of evaluation/treatment have been explained.   Patient/Family/caregiver agrees with Plan of Care.     Evaluation Time:     PT Eval, Low Complexity Minutes (61102): 20       Signing Clinician: Renu Gar, PT        [facility]                                                                                   OUTPATIENT PHYSICAL THERAPY      PLAN OF TREATMENT FOR OUTPATIENT REHABILITATION   Patient's Last Name, First Name,  Kevin Newsome    YOB: 1965   Provider's Name   [facility]   Medical Record No.  [id number]     Onset Date: 07/13/24  Start of Care Date: 08/13/24     Medical Diagnosis:  Enthesitis  Facet arthritis of lumbar region      PT Treatment Diagnosis:  Proximal IT Band Enthesitis - Left, Facet arthritis of lumbar region Plan of Treatment  Frequency/Duration: 1x/week/ 8 weeks    Certification date from 08/13/24 to 10/08/24         See note for plan of treatment details and functional goals     Renu Gar, PT                         I CERTIFY THE NEED FOR THESE SERVICES FURNISHED UNDER        THIS PLAN OF TREATMENT AND WHILE UNDER MY CARE     (Physician attestation of this document indicates review and certification of the therapy plan).              Referring Provider:  Ema Gentile    Initial Assessment  See Epic Evaluation- Start of Care Date: 08/13/24

## 2024-08-13 ENCOUNTER — THERAPY VISIT (OUTPATIENT)
Dept: PHYSICAL THERAPY | Facility: REHABILITATION | Age: 59
End: 2024-08-13
Attending: FAMILY MEDICINE
Payer: COMMERCIAL

## 2024-08-13 DIAGNOSIS — M47.816 FACET ARTHRITIS OF LUMBAR REGION: ICD-10-CM

## 2024-08-13 DIAGNOSIS — M77.9 ENTHESITIS: ICD-10-CM

## 2024-08-13 PROCEDURE — 97161 PT EVAL LOW COMPLEX 20 MIN: CPT | Mod: GP | Performed by: PHYSICAL THERAPIST

## 2024-08-13 PROCEDURE — 97110 THERAPEUTIC EXERCISES: CPT | Mod: GP | Performed by: PHYSICAL THERAPIST

## 2024-08-19 ENCOUNTER — TELEPHONE (OUTPATIENT)
Dept: PHYSICAL THERAPY | Facility: REHABILITATION | Age: 59
End: 2024-08-19

## 2024-08-19 NOTE — TELEPHONE ENCOUNTER
Kevin Thrasher was contacted after their no-show (missed visit without notification) on 8/19/24.  They were asked to call the clinic to confirm their upcoming appointments or their remaining appointments would be removed from the schedule. We have not heard back and will be canceling their remaining scheduled appointments. They were encouraged to call to reschedule appointments when they are better able to prioritize PT.

## 2024-08-27 ENCOUNTER — THERAPY VISIT (OUTPATIENT)
Dept: PHYSICAL THERAPY | Facility: REHABILITATION | Age: 59
End: 2024-08-27
Attending: FAMILY MEDICINE
Payer: COMMERCIAL

## 2024-08-27 DIAGNOSIS — M47.816 FACET ARTHRITIS OF LUMBAR REGION: Primary | ICD-10-CM

## 2024-08-27 DIAGNOSIS — M77.9 ENTHESITIS: ICD-10-CM

## 2024-08-27 PROCEDURE — T1013 SIGN LANG/ORAL INTERPRETER: HCPCS | Mod: GT | Performed by: INTERPRETER

## 2024-08-27 PROCEDURE — 97110 THERAPEUTIC EXERCISES: CPT | Mod: GP | Performed by: PHYSICAL THERAPIST

## 2024-08-27 PROCEDURE — 97140 MANUAL THERAPY 1/> REGIONS: CPT | Mod: GP | Performed by: PHYSICAL THERAPIST

## 2024-09-03 ENCOUNTER — THERAPY VISIT (OUTPATIENT)
Dept: PHYSICAL THERAPY | Facility: REHABILITATION | Age: 59
End: 2024-09-03
Attending: FAMILY MEDICINE
Payer: COMMERCIAL

## 2024-09-03 DIAGNOSIS — M77.9 ENTHESITIS: ICD-10-CM

## 2024-09-03 DIAGNOSIS — M47.816 FACET ARTHRITIS OF LUMBAR REGION: Primary | ICD-10-CM

## 2024-09-03 PROCEDURE — 97530 THERAPEUTIC ACTIVITIES: CPT | Mod: GP | Performed by: PHYSICAL THERAPIST

## 2024-09-03 PROCEDURE — 97110 THERAPEUTIC EXERCISES: CPT | Mod: GP | Performed by: PHYSICAL THERAPIST

## 2024-09-03 PROCEDURE — T1013 SIGN LANG/ORAL INTERPRETER: HCPCS | Mod: U4 | Performed by: INTERPRETER

## 2024-09-03 PROCEDURE — 97140 MANUAL THERAPY 1/> REGIONS: CPT | Mod: GP | Performed by: PHYSICAL THERAPIST

## 2024-09-10 ENCOUNTER — THERAPY VISIT (OUTPATIENT)
Dept: PHYSICAL THERAPY | Facility: REHABILITATION | Age: 59
End: 2024-09-10
Attending: FAMILY MEDICINE
Payer: COMMERCIAL

## 2024-09-10 DIAGNOSIS — M47.816 FACET ARTHRITIS OF LUMBAR REGION: Primary | ICD-10-CM

## 2024-09-10 DIAGNOSIS — M77.9 ENTHESITIS: ICD-10-CM

## 2024-09-10 DIAGNOSIS — J30.89 ALLERGIC RHINITIS DUE TO OTHER ALLERGIC TRIGGER, UNSPECIFIED SEASONALITY: ICD-10-CM

## 2024-09-10 PROCEDURE — 97140 MANUAL THERAPY 1/> REGIONS: CPT | Mod: GP | Performed by: PHYSICAL THERAPIST

## 2024-09-10 PROCEDURE — 97110 THERAPEUTIC EXERCISES: CPT | Mod: GP | Performed by: PHYSICAL THERAPIST

## 2024-09-10 PROCEDURE — T1013 SIGN LANG/ORAL INTERPRETER: HCPCS | Mod: U4

## 2024-09-10 PROCEDURE — 97112 NEUROMUSCULAR REEDUCATION: CPT | Mod: GP | Performed by: PHYSICAL THERAPIST

## 2024-09-10 RX ORDER — LORATADINE 10 MG/1
TABLET ORAL
Qty: 30 TABLET | Refills: 5 | Status: SHIPPED | OUTPATIENT
Start: 2024-09-10

## 2024-09-11 DIAGNOSIS — E11.59 TYPE 2 DIABETES MELLITUS WITH OTHER CIRCULATORY COMPLICATION, WITHOUT LONG-TERM CURRENT USE OF INSULIN (H): ICD-10-CM

## 2024-09-12 RX ORDER — BLOOD SUGAR DIAGNOSTIC
STRIP MISCELLANEOUS
Qty: 50 STRIP | Refills: 3 | OUTPATIENT
Start: 2024-09-12

## 2024-09-17 ENCOUNTER — THERAPY VISIT (OUTPATIENT)
Dept: PHYSICAL THERAPY | Facility: REHABILITATION | Age: 59
End: 2024-09-17
Attending: FAMILY MEDICINE
Payer: COMMERCIAL

## 2024-09-17 DIAGNOSIS — M47.816 FACET ARTHRITIS OF LUMBAR REGION: Primary | ICD-10-CM

## 2024-09-17 DIAGNOSIS — M77.9 ENTHESITIS: ICD-10-CM

## 2024-09-17 PROCEDURE — 97110 THERAPEUTIC EXERCISES: CPT | Mod: GP | Performed by: PHYSICAL THERAPIST

## 2024-09-17 NOTE — PROGRESS NOTES
DISCHARGE  Reason for Discharge: Patient has met all goals.    Equipment Issued: theraband    Discharge Plan: Patient to continue home program.    Referring Provider:  Ema Gentile     09/17/24 0500   Appointment Info   Signing clinician's name / credentials Renu Gar, PT, DPT, CLBREANNE   Total/Authorized Visits (E&T)   Visits Used 5   Medical Diagnosis Enthesitis  Facet arthritis of lumbar region   PT Tx Diagnosis Proximal IT Band Enthesitis - Left, Facet arthritis of lumbar region   Progress Note/Certification   Start of Care Date 08/13/24   Onset of illness/injury or Date of Surgery 07/13/24   Therapy Frequency 1x/week   Predicted Duration 8 weeks   Certification date from 08/13/24   Certification date to 10/08/24   Progress Note Due Date 10/08/24   Progress Note Completed Date 08/13/24       Present Yes    Language Hmong    ID or First/Last Name via ipad, m [employer] [place]   GOALS   PT Goals 2;3   PT Goal 1   Goal Identifier Walk   Goal Description Pt will be able to walk for 30+ min without L hip pain to return to her PLOF   Goal Progress Met   Target Date 10/08/24   Date Met 09/17/24   PT Goal 2   Goal Identifier sleep   Goal Description Pt will be able to roll in bed and sleep >6 hours without waking from pain to improve sleep quality and rest   Goal Progress Met   Target Date 10/08/24   Date Met 09/17/24   Subjective Report   Subjective Report Pt reports she is doing well. Did not have any pain this past week.   Objective Measures   Objective Measures Objective Measure 1   Objective Measure 1   Objective Measure MMT   Details hip flexion: L 4-/5 R 5/5, knee ext: 5/5 fouzia   Treatment Interventions (PT)   Interventions Therapeutic Procedure/Exercise;Manual Therapy;Therapeutic Activity;Neuromuscular Re-education   Therapeutic Procedure/Exercise   Therapeutic Procedures: strength, endurance, ROM, flexibility minutes (04902) 26   Therapeutic Procedures Ther  Proc 2   Ther Proc 1 HEP   Ther Proc 1 - Details long term continue with HEP 3-4 days per week or daily if going well. Education on discharge   Ther Proc 2 NuStep   Ther Proc 2 - Details x 5 min, WL6 for LE strengthening and axial mobility   PTRx Ther Proc 1 Hip AROM Standing Abduction   PTRx Ther Proc 1 - Details x 10 reps B- using RTB at home   PTRx Ther Proc 2 Supine Lumbar Hip Roll   PTRx Ther Proc 2 - Details 3 x 10 sec hold B   PTRx Ther Proc 3 Hooklying Hip Abduction   PTRx Ther Proc 3 - Details 2 x 10 reps - one leg at a time - progressed with GTB   PTRx Ther Proc 4 Bridging #1   PTRx Ther Proc 4 - Details 2 x 10 reps    PTRx Ther Proc 5 Hip Flexion Straight Leg Raise   PTRx Ther Proc 5 - Details 2 x 10 reps- difficulty with TKE on L due to hx of stroke   PTRx Ther Proc 6 Supine Butterfly   PTRx Ther Proc 6 - Details 3 x 30 sec hold    PTRx Ther Proc 7  Hip Flexor Stretch Stephen Test Position   PTRx Ther Proc 7 - Details HEP   PTRx Ther Proc 8 Knee Bends   PTRx Ther Proc 8 - Details 2 x 10 reps with RTB progression   Skilled Intervention Progression of HEP for LE strengthening and mobility to reduce pain and improve function   Patient Response/Progress good tolerance, no pain and pt verbalized understanding   Therapeutic Activity   Therapeutic Activities: dynamic activities to improve functional performance minutes (78272) 3   PTRx Ther Act 1 Knee Bends   PTRx Ther Act 1 - Details x 10 reps - at home using RTB around knees   Neuromuscular Re-education   Neuromuscular re-ed of mvmt, balance, coord, kinesthetic sense, posture, proprioception minutes (68670) 3   PTRx Neuro Re-ed 1 Tandem Stance   PTRx Neuro Re-ed 1 - Details 3 x 30 sec hold B- improvement in foot position - requires use of UE support intermittently    Skilled Intervention static balance to improve function and reduce falls risk   Education   Learner/Method Patient;Pictures/Video   Plan   Home program PTRx   Plan for next session discharge to  self-management   Total Session Time   Timed Code Treatment Minutes 32   Total Treatment Time (sum of timed and untimed services) 32

## 2024-11-09 ENCOUNTER — HOSPITAL ENCOUNTER (OUTPATIENT)
Dept: GENERAL RADIOLOGY | Facility: HOSPITAL | Age: 59
Discharge: HOME | End: 2024-11-09
Attending: PHYSICIAN ASSISTANT
Payer: COMMERCIAL

## 2024-11-09 ENCOUNTER — OFFICE VISIT (OUTPATIENT)
Dept: URGENT CARE | Facility: URGENT CARE | Age: 59
End: 2024-11-09
Payer: COMMERCIAL

## 2024-11-09 VITALS
DIASTOLIC BLOOD PRESSURE: 79 MMHG | SYSTOLIC BLOOD PRESSURE: 131 MMHG | OXYGEN SATURATION: 98 % | TEMPERATURE: 97.9 F | HEART RATE: 55 BPM

## 2024-11-09 DIAGNOSIS — S99.921A FOOT INJURY, RIGHT, INITIAL ENCOUNTER: ICD-10-CM

## 2024-11-09 DIAGNOSIS — S92.354A CLOSED NONDISPLACED FRACTURE OF FIFTH METATARSAL BONE OF RIGHT FOOT, INITIAL ENCOUNTER: Primary | ICD-10-CM

## 2024-11-09 DIAGNOSIS — M79.662 PAIN OF LEFT LOWER LEG: ICD-10-CM

## 2024-11-09 PROCEDURE — 73630 X-RAY EXAM OF FOOT: CPT | Mod: RT

## 2024-11-09 PROCEDURE — 73590 X-RAY EXAM OF LOWER LEG: CPT | Mod: LT

## 2024-11-09 PROCEDURE — 99213 OFFICE O/P EST LOW 20 MIN: CPT | Performed by: PHYSICIAN ASSISTANT

## 2024-11-09 NOTE — PROGRESS NOTES
Patient presents with:  Urgent Care: - Fall  - 2 Weeks ago  - Foot and leg  - Bilateral  - Pierce sound upon falling  - Has lot of pain  - Would like xray to rule out fx      Clinical Decision Making:  Right foot proximal metatarsal fracture noted. Immobilized with walking boot. Left tib fib neg. No signs of cellulitis. Patient will take Tylenol/Ibuprofen/Voltaren gel for pain management.       ICD-10-CM    1. Closed nondisplaced fracture of fifth metatarsal bone of right foot, initial encounter  S92.354A Ankle/Foot Bracing Supplies Order Walking Boot; Right; Pneumatic; Short     Orthopedic  Referral     diclofenac (VOLTAREN) 1 % topical gel     DISCONTINUED: diclofenac (VOLTAREN) 1 % topical gel      2. Foot injury, right, initial encounter  S99.921A XR Foot Right G/E 3 Views      3. Pain of left lower leg  M79.662 XR Tibia and Fibula Left 2 Views          Patient Instructions   I would like to have you follow up with Ortho.   Wear the boot day and night. You may take it off to shower.   You may apply Voltaren gel for pain relief.   You may take Tylenol and/or Ibuprofen for pain relief.     HPI:  Kevin Thrasher is a 59 year old female who presents today with concerns of right foot injury and left shin injury that occurred about 2 weeks ago. Patient was walking on uneven ground. She did not fall or twist her ankle, but she felt and heard a crack sensation on her right foot. A separate time a piece of wood fell on her left anterior shin. She had worse swelling on both injuries, but she has been using icy hot and she feels like it has taken the swelling down. She still has pain in both locations.     History obtained from the patient with her daughter interpreting for her.    Problem List:  2024-08: Proximal IT Band Enthesitis - Left  2024-01: Vaginal atrophy  2024-01: Hypokalemia  2024-01: Cerebrovascular disease  2022-09: Facet arthritis of lumbar region  2022-09: Spondylolisthesis of lumbosacral region  2022-03:  Osteoarthritis of spine with radiculopathy, cervical region  2021-09: Overweight  2021-09: Mild intermittent asthma without complication  2021-09: Hyperlipidemia LDL goal <70  2021-09: Fatty liver  2021-09: Abnormal Pap smear of cervix  2021-09: Urinary, incontinence, stress female  2021-09: Cystocele with uterine prolapse  2021-09: Diabetic nephropathy associated with type 2 diabetes   mellitus (H)  2021-09: Diabetic polyneuropathy associated with type 2 diabetes   mellitus (H)  2021-09: Rotator cuff tendinitis, unspecified laterality  2020-02: Acute ischemic VBA brainstem stroke, right (H)  2019-03: Hypertension goal BP (blood pressure) < 130/80  2019-02: Type 2 diabetes mellitus (H)  2017-08: Vitamin D deficiency  2016-09: Lumbosacral radiculopathy at S1  2016-01: Anxiety      Past Medical History:   Diagnosis Date    Acute ischemic VBA brainstem stroke, right (H)     2/20. Residual left sided weakness.    Angular cheilitis 09/19/2016    Diabetic nephropathy associated with type 2 diabetes mellitus (H)     Diabetic polyneuropathy associated with type 2 diabetes mellitus (H)     Excessive or frequent menstruation     Head injury, unspecified     Helicobacter pylori gastritis     Infertility, female     eval at infertility clinic was normal.  declined evaluation. h/o ovarian hyperstimulation - did not result in pregnancy.     Pterygium     Rotator cuff tendinitis, unspecified laterality     Shingles 02/09/2015       Social History     Tobacco Use    Smoking status: Never     Passive exposure: Never    Smokeless tobacco: Never    Tobacco comments:     no second hand smoke exposure   Substance Use Topics    Alcohol use: No         Review of Systems    Vitals:    11/09/24 0905   BP: 131/79   Pulse: 55   Temp: 97.9  F (36.6  C)   TempSrc: Tympanic   SpO2: 98%       Physical Exam  Vitals and nursing note reviewed.   Constitutional:       General: She is not in acute distress.     Appearance: She is not  toxic-appearing or diaphoretic.   HENT:      Head: Normocephalic and atraumatic.      Right Ear: External ear normal.      Left Ear: External ear normal.   Eyes:      Conjunctiva/sclera: Conjunctivae normal.   Pulmonary:      Effort: Pulmonary effort is normal. No respiratory distress.   Musculoskeletal:        Legs:         Feet:    Neurological:      Mental Status: She is alert.   Psychiatric:         Mood and Affect: Mood normal.         Behavior: Behavior normal.         Thought Content: Thought content normal.         Judgment: Judgment normal.         Results:  Results for orders placed or performed during the hospital encounter of 11/09/24   XR Tibia and Fibula Left 2 Views     Status: None    Narrative    EXAM: XR TIBIA AND FIBULA LEFT 2 VIEWS  LOCATION: [facility]  DATE: 11/9/2024    INDICATION: Peice of wood fell on left anterior lower shin 2 weeks ago. No deformity. Pain persists.  COMPARISON: None.      Impression    IMPRESSION: Within normal limits. No acute fracture. No radiopaque foreign body.    Results for orders placed or performed during the hospital encounter of 11/09/24   XR Foot Right G/E 3 Views     Status: None    Narrative    EXAM: XR FOOT RIGHT G/E 3 VIEWS  LOCATION: [facility]  DATE: 11/9/2024    INDICATION: Left lateral foot injury 2 weeks ago. Pain over the 5th metatarsal.  COMPARISON: None.      Impression    IMPRESSION: Nondisplaced fracture of the proximal fifth metatarsal. Normal joint spaces and alignment.    NOTE: ABNORMAL REPORT    THE DICTATION ABOVE DESCRIBES AN ABNORMALITY FOR WHICH FOLLOW-UP IS NEEDED.           At the end of the encounter, I discussed results, diagnosis, medications. Discussed red flags for immediate return to clinic/ER, as well as indications for follow up if no improvement. Patient understood and agreed to plan. Patient was stable for discharge.    Seen in conjunction with Vijay Quach, DNP student.

## 2024-11-09 NOTE — PATIENT INSTRUCTIONS
I would like to have you follow up with Ortho.   Wear the boot day and night. You may take it off to shower.   You may apply Voltaren gel for pain relief.   You may take Tylenol and/or Ibuprofen for pain relief.

## 2024-11-14 NOTE — PROGRESS NOTES
ASSESSMENT & PLAN  There are no Patient Instructions on file for this visit.  -----    SUBJECTIVE  Kevin Thrasher is a/an 59 year old female who is seen in consultation at the request of  Claudia Richards PA-C for evaluation of right foot pain. The patient is seen with an .    Onset: 10/28/24 ~ 2 weeks ago. Patient describes injury as she was walking on uneven ground when she felt a crack in her foot.  Location of Pain: right lateral foot  Rating of Pain at worst: 10/10  Rating of Pain Currently: 1/10  Worsened by: walking on outside of foot  Better with: medication, boot, rest  Treatments tried: rest/activity avoidance, other medications: Voltaren Gel, previous imaging (xray 11/9/24), cane, and CAM boot  Associated symptoms: initially had swelling - has improved  Orthopedic history: NO  Relevant surgical history: NO  Social history: social history: retired    Past Medical History:   Diagnosis Date    Acute ischemic VBA brainstem stroke, right (H)     2/20. Residual left sided weakness.    Angular cheilitis 09/19/2016    Diabetic nephropathy associated with type 2 diabetes mellitus (H)     Diabetic polyneuropathy associated with type 2 diabetes mellitus (H)     Excessive or frequent menstruation     Head injury, unspecified     Helicobacter pylori gastritis     Infertility, female     eval at infertility clinic was normal.  declined evaluation. h/o ovarian hyperstimulation - did not result in pregnancy.     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     Social Drivers of Health     Financial Resource Strain: High Risk (1/22/2024)    Financial Resource Strain     Within the past 12 months, have you or your family members you live with been unable to get utilities (heat, electricity) when it was really needed?: Yes   Food Insecurity: High Risk (1/22/2024)    Food Insecurity     Within the past 12 months, did you worry that your food would run out before you got money to buy more?: No     Within the past 12 months, did the food you bought just not last and you didn t have money to get more?: Yes   Transportation Needs: High Risk (1/22/2024)    Transportation Needs     Within the past 12 months, has lack of transportation kept you from medical appointments, getting your medicines, non-medical meetings or appointments, work, or from getting things that you need?: Yes   Interpersonal Safety: Low Risk  (1/22/2024)    Interpersonal Safety     Do you feel physically and emotionally safe where you currently live?: Yes     Within the past 12 months, have you been hit, slapped, kicked or otherwise physically hurt by someone?: No     Within the past 12 months, have you been humiliated or emotionally abused in other ways by your partner or ex-partner?: No   Housing Stability: High Risk (1/22/2024)    Housing Stability     Do you have housing? : Yes     Are you worried about losing your housing?: Yes         Patient's past medical, surgical, social, and family histories were reviewed today and no changes are noted.    REVIEW OF SYSTEMS:  10 point ROS is negative other than symptoms noted above in HPI, Past Medical History or as stated below  Constitutional: NEGATIVE for fever, chills, change in weight  Skin: NEGATIVE for worrisome rashes, moles or lesions  GI/: NEGATIVE for bowel or bladder changes  Neuro: NEGATIVE for weakness, dizziness or paresthesias    OBJECTIVE:  There were  "no vitals taken for this visit.   General: healthy, alert and in no distress  HEENT: no scleral icterus or conjunctival erythema  Skin: no suspicious lesions or rash. No jaundice.  CV:  no pedal edema  Resp: normal respiratory effort without conversational dyspnea   Psych: normal mood and affect  Gait: normal steady gait with appropriate coordination and balance  Neuro: Normal light sensory exam of lower extremity  MSK:  {LEFT/RIGHT CAPS:517997} FOOT  Inspection:    no swelling or ecchymosis  Palpation:    Tender about the {FSOC FOOT PALPATION:137893:a}. Otherwise remainder of bony/ligamentous landmarks are non-tender.  Range of Motion:     Grossly intact and non-painful  Strength:    Grossly intact in all planes  Special Tests:    Positive: {FSOC FOOT TESTS:467745}    Negative: {FSOC FOOT TESTS:677742}    {LEFT/RIGHT CAPS:987678} ANKLE  Inspection:    No swelling or ecchymosis is observed  Palpation:    Tender about the {ANKLE TENDER:639699:a}. Remainder of bony and ligamentous landmarks are nontender.  Range of Motion:     Plantarflexion {Pain limited:931395} / dorsiflexion {Pain limited:973459} / inversion {Pain limited:105178} / eversion {Pain limited:611169}  Strength:    {Pain limited:630390}  Special Tests:    {FSOC ANKLE TESTS:427051}, {NE::\"negative\"} squeeze test. {ABLE:846967::\"Able\"} to perform heel raise and {ABLE:769890::\"Able\"} to hop.    Independent visualization of the below image:  EXAM: XR FOOT RIGHT G/E 3 VIEWS  LOCATION: [facility]  DATE: 2024     INDICATION: Left lateral foot injury 2 weeks ago. Pain over the 5th metatarsal.  COMPARISON: None.                                                                    IMPRESSION: Nondisplaced fracture of the proximal fifth metatarsal. Normal joint spaces and alignment.        Patient's conditions were thoroughly discussed during today's visit with total time spent face-to-face with the patient and documentation " being *** minutes.    Albert Yeo MD CAM  [facility]

## 2024-11-15 ENCOUNTER — OFFICE VISIT (OUTPATIENT)
Dept: ORTHOPEDICS | Facility: CLINIC | Age: 59
End: 2024-11-15
Payer: COMMERCIAL

## 2024-11-15 VITALS — HEIGHT: 56 IN | SYSTOLIC BLOOD PRESSURE: 144 MMHG | DIASTOLIC BLOOD PRESSURE: 80 MMHG | BODY MASS INDEX: 30.61 KG/M2

## 2024-11-15 DIAGNOSIS — S92.354A CLOSED NONDISPLACED FRACTURE OF FIFTH METATARSAL BONE OF RIGHT FOOT, INITIAL ENCOUNTER: Primary | ICD-10-CM

## 2024-11-15 NOTE — LETTER
11/15/2024      Kevin Thrasher  [address] [facility] [address]      Dear Colleague,    Thank you for referring your patient, Kevin Thrasher, to the [facility] [place]. Please see a copy of my visit note below.    ASSESSMENT & PLAN  Patient Instructions     1. Closed nondisplaced fracture of fifth metatarsal bone of right foot, initial encounter      -Patient has acute right foot pain due to 1/5 metatarsal fracture  -Personal review of x-rays of the right foot shows a nondisplaced proximal fifth metatarsal fracture at the metadiaphyseal region  -The location of this fracture often does not heal well with weightbearing as tolerated in fracture boot.  -Patient was placed in a short leg cast.  Patient was given crutches to be nonweightbearing.  -Patient has a history of a stroke making crutches extremely difficult.  Patient will partial weight-bear as tolerated in the house with crutches taking as much weight off as she can  -An order was placed for a knee scooter to be used for the next 3 weeks  -Patient was shown cast covers online to use while in the shower.  Patient will also use chair or purchase a plastic chair to sit down on in the shower  -Patient will follow-up in 3 weeks for repeat x-rays, potentially cut her the cast and transition back into her fracture boot  -Call direct clinic number [[phone]] at any time with questions or concerns.    Albert Yeo MD [facility]  [facility]         Caring for Your Cast     A cast is used to protect an injured body part and allow it to heal by limiting the amount of motion occurring around the injury. Pain and swelling of the injured area is normal for 48 hours after your cast is put on. If you have swelling, wiggle your toes or fingers to ease it. Doing so encourages blood flow to your arm or leg.     It is important that you keep your cast dry, unless your doctor tells you differently. If the  padding of the cast gets wet, your skin may be damaged and become infected. When showering or taking a bath, put the cast in a heavy plastic bag that can be held in place with a rubber band. If your cast gets wet and does not dry out in four to five hours, call your doctor s office.   To keep the cast clean, use wash clothes or baby wipes around it.   You may experience some itching inside the cast. This is normal. Avoid putting anything in the cast, even your finger, as you can injure your skin and cause infection. Try shaking some talcum powder or blowing cool air from a hair dryer into the cast to ease itching.   If these signs or symptoms develop, call your doctor immediately.       Pain gets worse     Swelling that cuts off blood flow that does not go away, even when you lift the body part above the level of your heart     Fever after itching. It may be related to an infection.     Fluid draining from your skin under the cast     Your cast may become loose as swelling goes down. If the cast feels too loose or if it is so loose you can take it off, call your doctor s office.     Your doctor or  will give you recommendations for activity based on your injury. Some sports allow casts if properly padded by a doctor or .     For complete healing, your cast should only be removed at the direction of your doctor or clinic staff. A special saw ensures its safe removal and protects the skin and other tissue under the cast.     -----    SUBJECTIVE  Kevin Thrasher is a/an 59 year old female who is seen in consultation at the request of  Claudia Richards PA-C for evaluation of right foot pain. The patient is seen with an .    Onset: 10/28/24 ~ 2 weeks ago. Patient describes injury as she was walking on uneven ground when she felt a crack in her foot.  Location of Pain: right lateral foot  Rating of Pain at worst: 10/10  Rating of Pain Currently: 1/10  Worsened by: walking on outside of  foot  Better with: medication, boot, rest  Treatments tried: rest/activity avoidance, other medications: Voltaren Gel, previous imaging (xray 11/9/24), cane, and CAM boot  Associated symptoms: initially had swelling - has improved  Orthopedic history: NO  Relevant surgical history: NO  Social history: social history: retired    Past Medical History:   Diagnosis Date     Acute ischemic VBA brainstem stroke, right (H)     2/20. Residual left sided weakness.     Angular cheilitis 09/19/2016     Diabetic nephropathy associated with type 2 diabetes mellitus (H)      Diabetic polyneuropathy associated with type 2 diabetes mellitus (H)      Excessive or frequent menstruation      Head injury, unspecified      Helicobacter pylori gastritis      Infertility, female     eval at infertility clinic was normal.  declined evaluation. h/o ovarian hyperstimulation - did not result in pregnancy.      Pterygium      Rotator cuff tendinitis, unspecified laterality      Shingles 02/09/2015     Social History     Socioeconomic History     Marital status:      Number of children: 2   Tobacco Use     Smoking status: Never     Passive exposure: Never     Smokeless tobacco: Never     Tobacco comments:     no second hand smoke exposure   Vaping Use     Vaping status: Never Used   Substance and Sexual Activity     Alcohol use: No     Drug use: No     Sexual activity: Yes     Partners: Male     Birth control/protection: Post-menopausal   Social History Narrative    Has 3.5 hours PCA/day - 3/21.     Social Drivers of Health     Financial Resource Strain: High Risk (1/22/2024)    Financial Resource Strain      Within the past 12 months, have you or your family members you live with been unable to get utilities (heat, electricity) when it was really needed?: Yes   Food Insecurity: High Risk (1/22/2024)    Food Insecurity      Within the past 12 months, did you worry that your food would run out before you got money to buy more?: No  "     Within the past 12 months, did the food you bought just not last and you didn t have money to get more?: Yes   Transportation Needs: High Risk (1/22/2024)    Transportation Needs      Within the past 12 months, has lack of transportation kept you from medical appointments, getting your medicines, non-medical meetings or appointments, work, or from getting things that you need?: Yes   Interpersonal Safety: Low Risk  (1/22/2024)    Interpersonal Safety      Do you feel physically and emotionally safe where you currently live?: Yes      Within the past 12 months, have you been hit, slapped, kicked or otherwise physically hurt by someone?: No      Within the past 12 months, have you been humiliated or emotionally abused in other ways by your partner or ex-partner?: No   Housing Stability: High Risk (1/22/2024)    Housing Stability      Do you have housing? : Yes      Are you worried about losing your housing?: Yes         Patient's past medical, surgical, social, and family histories were reviewed today and no changes are noted.    REVIEW OF SYSTEMS:  10 point ROS is negative other than symptoms noted above in HPI, Past Medical History or as stated below  Constitutional: NEGATIVE for fever, chills, change in weight  Skin: NEGATIVE for worrisome rashes, moles or lesions  GI/: NEGATIVE for bowel or bladder changes  Neuro: NEGATIVE for weakness, dizziness or paresthesias    OBJECTIVE:  BP (!) 144/80   Ht 1.43 m (4' 8.3\")   BMI 30.61 kg/m     General: healthy, alert and in no distress  HEENT: no scleral icterus or conjunctival erythema  Skin: no suspicious lesions or rash. No jaundice.  CV:  no pedal edema  Resp: normal respiratory effort without conversational dyspnea   Psych: normal mood and affect  Gait: normal steady gait with appropriate coordination and balance  Neuro: Normal light sensory exam of lower extremity  MSK:  RIGHT FOOT  Inspection:    no swelling or ecchymosis  Palpation:    Tender about the " proximal 5th metatarsal. Otherwise remainder of bony/ligamentous landmarks are non-tender.  Range of Motion:     Grossly intact and non-painful  Strength:    Grossly intact in all planes  Special Tests:    Positive: none      Independent visualization of the below image:    Cast/splint application    Date/Time: 11/15/2024 2:03 PM    Performed by: Yeo, Albert, MD  Authorized by: Yeo, Albert, MD    Consent:     Consent obtained:  Verbal    Consent given by:  Patient    Risks discussed:  Discoloration, numbness, pain and swelling  Pre-procedure details:     Sensation:  Normal  Procedure details:     Laterality:  Right    Location:  Foot    Foot:  R foot    Strapping: no      Cast type:  Short leg    Supplies:  Fiberglass  Post-procedure details:     Pain:  Improved    Sensation:  Normal    Patient tolerance of procedure:  Tolerated well, no immediate complications    Patient provided with cast or splint care instructions: Yes        Personal review of right foot x-ray performed on 11/9/2024 shows a nondisplaced fracture of the fifth metadiaphyseal region.        Albert Yeo MD [facility]      Again, thank you for allowing me to participate in the care of your patient.        Sincerely,        Albert Yeo, MD

## 2024-11-15 NOTE — PROGRESS NOTES
Cast/splint application    Date/Time: 11/15/2024 10:05 AM    Performed by: Yeo, Albert, MD  Authorized by: Yeo, Albert, MD    Consent:     Consent obtained:  Verbal    Consent given by:  Patient    Alternatives discussed:  No treatment  Pre-procedure details:     Sensation:  Normal  Procedure details:     Laterality:  Right    Location:  Foot    Foot:  R foot    Cast type:  Short leg    Supplies:  Fiberglass  Post-procedure details:     Pain:  Unchanged    Sensation:  Normal    Patient tolerance of procedure:  Tolerated well, no immediate complications    Patient provided with cast or splint care instructions: Yes

## 2024-11-15 NOTE — PROGRESS NOTES
ASSESSMENT & PLAN  Patient Instructions     1. Closed nondisplaced fracture of fifth metatarsal bone of right foot, initial encounter      -Patient has acute right foot pain due to 1/5 metatarsal fracture  -Personal review of x-rays of the right foot shows a nondisplaced proximal fifth metatarsal fracture at the metadiaphyseal region  -The location of this fracture often does not heal well with weightbearing as tolerated in fracture boot.  -Patient was placed in a short leg cast.  Patient was given crutches to be nonweightbearing.  -Patient has a history of a stroke making crutches extremely difficult.  Patient will partial weight-bear as tolerated in the house with crutches taking as much weight off as she can  -An order was placed for a knee scooter to be used for the next 3 weeks  -Patient was shown cast covers online to use while in the shower.  Patient will also use chair or purchase a plastic chair to sit down on in the shower  -Patient will follow-up in 3 weeks for repeat x-rays, potentially cut her the cast and transition back into her fracture boot  -Call direct clinic number [[phone]] at any time with questions or concerns.    Albert Yeo MD [facility] Medicine  [facility]         Caring for Your Cast     A cast is used to protect an injured body part and allow it to heal by limiting the amount of motion occurring around the injury. Pain and swelling of the injured area is normal for 48 hours after your cast is put on. If you have swelling, wiggle your toes or fingers to ease it. Doing so encourages blood flow to your arm or leg.     It is important that you keep your cast dry, unless your doctor tells you differently. If the padding of the cast gets wet, your skin may be damaged and become infected. When showering or taking a bath, put the cast in a heavy plastic bag that can be held in place with a rubber band. If your cast gets wet and does not dry out  in four to five hours, call your doctor s office.   To keep the cast clean, use wash clothes or baby wipes around it.   You may experience some itching inside the cast. This is normal. Avoid putting anything in the cast, even your finger, as you can injure your skin and cause infection. Try shaking some talcum powder or blowing cool air from a hair dryer into the cast to ease itching.   If these signs or symptoms develop, call your doctor immediately.      Pain gets worse    Swelling that cuts off blood flow that does not go away, even when you lift the body part above the level of your heart    Fever after itching. It may be related to an infection.    Fluid draining from your skin under the cast     Your cast may become loose as swelling goes down. If the cast feels too loose or if it is so loose you can take it off, call your doctor s office.     Your doctor or  will give you recommendations for activity based on your injury. Some sports allow casts if properly padded by a doctor or .     For complete healing, your cast should only be removed at the direction of your doctor or clinic staff. A special saw ensures its safe removal and protects the skin and other tissue under the cast.     -----    SUBJECTIVE  Kevin Thrasher is a/an 59 year old female who is seen in consultation at the request of  Claudia Richards PA-C for evaluation of right foot pain. The patient is seen with an .    Onset: 10/28/24 ~ 2 weeks ago. Patient describes injury as she was walking on uneven ground when she felt a crack in her foot.  Location of Pain: right lateral foot  Rating of Pain at worst: 10/10  Rating of Pain Currently: 1/10  Worsened by: walking on outside of foot  Better with: medication, boot, rest  Treatments tried: rest/activity avoidance, other medications: Voltaren Gel, previous imaging (xray 11/9/24), cane, and CAM boot  Associated symptoms: initially had swelling - has  improved  Orthopedic history: NO  Relevant surgical history: NO  Social history: social history: retired    Past Medical History:   Diagnosis Date    Acute ischemic VBA brainstem stroke, right (H)     2/20. Residual left sided weakness.    Angular cheilitis 09/19/2016    Diabetic nephropathy associated with type 2 diabetes mellitus (H)     Diabetic polyneuropathy associated with type 2 diabetes mellitus (H)     Excessive or frequent menstruation     Head injury, unspecified     Helicobacter pylori gastritis     Infertility, female     eval at infertility clinic was normal.  declined evaluation. h/o ovarian hyperstimulation - did not result in pregnancy.     Pterygium     Rotator cuff tendinitis, unspecified laterality     Shingles 02/09/2015     Social History     Socioeconomic History    Marital status:     Number of children: 2   Tobacco Use    Smoking status: Never     Passive exposure: Never    Smokeless tobacco: Never    Tobacco comments:     no second hand smoke exposure   Vaping Use    Vaping status: Never Used   Substance and Sexual Activity    Alcohol use: No    Drug use: No    Sexual activity: Yes     Partners: Male     Birth control/protection: Post-menopausal   Social History Narrative    Has 3.5 hours PCA/day - 3/21.     Social Drivers of Health     Financial Resource Strain: High Risk (1/22/2024)    Financial Resource Strain     Within the past 12 months, have you or your family members you live with been unable to get utilities (heat, electricity) when it was really needed?: Yes   Food Insecurity: High Risk (1/22/2024)    Food Insecurity     Within the past 12 months, did you worry that your food would run out before you got money to buy more?: No     Within the past 12 months, did the food you bought just not last and you didn t have money to get more?: Yes   Transportation Needs: High Risk (1/22/2024)    Transportation Needs     Within the past 12 months, has lack of transportation  "kept you from medical appointments, getting your medicines, non-medical meetings or appointments, work, or from getting things that you need?: Yes   Interpersonal Safety: Low Risk  (1/22/2024)    Interpersonal Safety     Do you feel physically and emotionally safe where you currently live?: Yes     Within the past 12 months, have you been hit, slapped, kicked or otherwise physically hurt by someone?: No     Within the past 12 months, have you been humiliated or emotionally abused in other ways by your partner or ex-partner?: No   Housing Stability: High Risk (1/22/2024)    Housing Stability     Do you have housing? : Yes     Are you worried about losing your housing?: Yes         Patient's past medical, surgical, social, and family histories were reviewed today and no changes are noted.    REVIEW OF SYSTEMS:  10 point ROS is negative other than symptoms noted above in HPI, Past Medical History or as stated below  Constitutional: NEGATIVE for fever, chills, change in weight  Skin: NEGATIVE for worrisome rashes, moles or lesions  GI/: NEGATIVE for bowel or bladder changes  Neuro: NEGATIVE for weakness, dizziness or paresthesias    OBJECTIVE:  BP (!) 144/80   Ht 1.43 m (4' 8.3\")   BMI 30.61 kg/m     General: healthy, alert and in no distress  HEENT: no scleral icterus or conjunctival erythema  Skin: no suspicious lesions or rash. No jaundice.  CV:  no pedal edema  Resp: normal respiratory effort without conversational dyspnea   Psych: normal mood and affect  Gait: normal steady gait with appropriate coordination and balance  Neuro: Normal light sensory exam of lower extremity  MSK:  RIGHT FOOT  Inspection:    no swelling or ecchymosis  Palpation:    Tender about the proximal 5th metatarsal. Otherwise remainder of bony/ligamentous landmarks are non-tender.  Range of Motion:     Grossly intact and non-painful  Strength:    Grossly intact in all planes  Special Tests:    Positive: none      Independent visualization " of the below image:    Cast/splint application    Date/Time: 11/15/2024 2:03 PM    Performed by: Yeo, Albert, MD  Authorized by: Yeo, Albert, MD    Consent:     Consent obtained:  Verbal    Consent given by:  Patient    Risks discussed:  Discoloration, numbness, pain and swelling  Pre-procedure details:     Sensation:  Normal  Procedure details:     Laterality:  Right    Location:  Foot    Foot:  R foot    Strapping: no      Cast type:  Short leg    Supplies:  Fiberglass  Post-procedure details:     Pain:  Improved    Sensation:  Normal    Patient tolerance of procedure:  Tolerated well, no immediate complications    Patient provided with cast or splint care instructions: Yes        Personal review of right foot x-ray performed on 11/9/2024 shows a nondisplaced fracture of the fifth metadiaphyseal region.        Albert Yeo MD [facility]

## 2024-11-15 NOTE — PATIENT INSTRUCTIONS
1. Closed nondisplaced fracture of fifth metatarsal bone of right foot, initial encounter      -Patient has acute right foot pain due to 1/5 metatarsal fracture  -Personal review of x-rays of the right foot shows a nondisplaced proximal fifth metatarsal fracture at the metadiaphyseal region also known as a Royal fracture.  -The location of this fracture often does not heal well with weightbearing as tolerated in fracture boot.  -Patient was placed in a short leg cast.  Patient was given crutches to be nonweightbearing.  -Patient has a history of a stroke making crutches extremely difficult.  Patient will partial weight-bear as tolerated in the house with crutches taking as much weight off as she can  -An order was placed for a knee scooter to be used for the next 3 weeks  -Patient was shown cast covers online to use while in the shower.  Patient will also use chair or purchase a plastic chair to sit down on in the shower  -Patient will follow-up in 3 weeks for repeat x-rays, potentially cut her the cast and transition back into her fracture boot  -Call direct clinic number [[phone]] at any time with questions or concerns.    Albert Yeo MD [facility] and Sports Medicine  [facility]         Caring for Your Cast     A cast is used to protect an injured body part and allow it to heal by limiting the amount of motion occurring around the injury. Pain and swelling of the injured area is normal for 48 hours after your cast is put on. If you have swelling, wiggle your toes or fingers to ease it. Doing so encourages blood flow to your arm or leg.     It is important that you keep your cast dry, unless your doctor tells you differently. If the padding of the cast gets wet, your skin may be damaged and become infected. When showering or taking a bath, put the cast in a heavy plastic bag that can be held in place with a rubber band. If your cast gets wet and does not dry out in four to  five hours, call your doctor s office.   To keep the cast clean, use wash clothes or baby wipes around it.   You may experience some itching inside the cast. This is normal. Avoid putting anything in the cast, even your finger, as you can injure your skin and cause infection. Try shaking some talcum powder or blowing cool air from a hair dryer into the cast to ease itching.   If these signs or symptoms develop, call your doctor immediately.      Pain gets worse    Swelling that cuts off blood flow that does not go away, even when you lift the body part above the level of your heart    Fever after itching. It may be related to an infection.    Fluid draining from your skin under the cast     Your cast may become loose as swelling goes down. If the cast feels too loose or if it is so loose you can take it off, call your doctor s office.     Your doctor or  will give you recommendations for activity based on your injury. Some sports allow casts if properly padded by a doctor or .     For complete healing, your cast should only be removed at the direction of your doctor or clinic staff. A special saw ensures its safe removal and protects the skin and other tissue under the cast.

## 2024-12-03 DIAGNOSIS — I10 ESSENTIAL HYPERTENSION: ICD-10-CM

## 2024-12-05 RX ORDER — LISINOPRIL 20 MG/1
TABLET ORAL
Qty: 90 TABLET | Refills: 4 | Status: SHIPPED | OUTPATIENT
Start: 2024-12-05

## 2024-12-05 NOTE — TELEPHONE ENCOUNTER
Has appt tomorrow - will plan BP recheck then.     Future Appointments   Date Time Provider Department Center   12/6/2024  4:20 PM Yeo, Albert, MD [facility]   2/21/2025  9:30 AM Jun Loco MD [facility]      Health Maintenance Due   Topic Date Due    YEARLY PREVENTIVE VISIT  11/06/2021    DIABETIC FOOT EXAM  11/06/2021    ASTHMA ACTION PLAN  01/24/2023    COLORECTAL CANCER SCREENING  02/28/2023    RSV VACCINE (1 - Risk 60-74 years 1-dose series) Never done    EYE EXAM  07/27/2023    INFLUENZA VACCINE (1) 09/01/2024    COVID-19 Vaccine (5 - 2024-25 season) 09/01/2024     BP Readings from Last 3 Encounters:   11/15/24 (!) 144/80   11/09/24 131/79   07/11/24 130/70     Mao was seen today for medication refill.    Diagnoses and all orders for this visit:    Hypertension  -     lisinopril (ZESTRIL) 20 MG tablet; TAKE 1 TABLET (20 MG) BY MOUTH DAILY/ QUIQUE HNUB NOJ 1 LUB TSHUAJ PAB JOSEO WAN NTSHAV SIAB

## 2024-12-06 ENCOUNTER — ANCILLARY PROCEDURE (OUTPATIENT)
Dept: GENERAL RADIOLOGY | Facility: CLINIC | Age: 59
End: 2024-12-06
Attending: FAMILY MEDICINE
Payer: COMMERCIAL

## 2024-12-06 DIAGNOSIS — S92.354A CLOSED NONDISPLACED FRACTURE OF FIFTH METATARSAL BONE OF RIGHT FOOT, INITIAL ENCOUNTER: ICD-10-CM

## 2024-12-06 PROCEDURE — 73630 X-RAY EXAM OF FOOT: CPT | Mod: RT | Performed by: FAMILY MEDICINE

## 2024-12-09 ENCOUNTER — TELEPHONE (OUTPATIENT)
Dept: OTHER | Age: 59
End: 2024-12-09

## 2024-12-09 ENCOUNTER — APPOINTMENT (OUTPATIENT)
Dept: INTERPRETER SERVICES | Facility: CLINIC | Age: 59
End: 2024-12-09
Payer: COMMERCIAL

## 2024-12-09 NOTE — TELEPHONE ENCOUNTER
Spoke with patient via . Scheduled for tomorrow in [place] with Dr Shrestha.    Alejandra Zuñiga MSN, RN   Specialty Clinic, 12/9/2024 3:19 PM

## 2024-12-10 NOTE — TELEPHONE ENCOUNTER
Spoke with Yeni, Dr Shrestha does not do these surgeries any more. Pt will need to be rescheduled. Called pt with  and discussed rescheduling. Appt will be canceled. We will work on finding provider who can see this.    MORELIA Alexander RN 12/10/2024 1:18 PM

## 2024-12-11 ENCOUNTER — TELEPHONE (OUTPATIENT)
Dept: ORTHOPEDICS | Facility: CLINIC | Age: 59
End: 2024-12-11
Payer: COMMERCIAL

## 2024-12-11 NOTE — TELEPHONE ENCOUNTER
Upon chart review, call came from patient's daughter's number. Consent to communicate on file for patient's daughter Lana.     Called and spoke with patient's daughter. She states patient is complaining of pain, numbness and tingling in her foot. Patient is saying cast is too tight and wants it removed. Offered appt in [facility]. Patient lives in [place] and would be unable to arrive to the [facility] before closing.    Huddled with Dr. Yeo. Ok to wait until tomorrow. Will check with [facility] staff as that location is closer for patient. Huddled with [facility] staff and confirmed an available Nurse Only appt slot.    Offered patient's daughter appt tomorrow in [place]. She was agreeable and requested an afternoon appt. Scheduled at 1:20pm for cast change. Clinic address and phone number provided and confirmed.    She had no further questions and was appreciative of call back.    Estefania Danielson ATC

## 2024-12-11 NOTE — TELEPHONE ENCOUNTER
Unidentified caller left message regarding patient. Kevin has a cast on her foot that is causing pain and numbness/tingling of toes. She is looking to have it removed.    Tel: [phone]   no

## 2025-01-23 ENCOUNTER — TELEPHONE (OUTPATIENT)
Dept: VASCULAR SURGERY | Facility: CLINIC | Age: 60
End: 2025-01-23

## 2025-01-23 ENCOUNTER — OFFICE VISIT (OUTPATIENT)
Dept: FAMILY MEDICINE | Facility: CLINIC | Age: 60
End: 2025-01-23
Payer: COMMERCIAL

## 2025-01-23 VITALS
SYSTOLIC BLOOD PRESSURE: 138 MMHG | BODY MASS INDEX: 30.2 KG/M2 | HEART RATE: 66 BPM | DIASTOLIC BLOOD PRESSURE: 83 MMHG | WEIGHT: 140 LBS | HEIGHT: 57 IN | OXYGEN SATURATION: 98 % | TEMPERATURE: 98.7 F | RESPIRATION RATE: 20 BRPM

## 2025-01-23 DIAGNOSIS — Z86.73 HISTORY OF STROKE: ICD-10-CM

## 2025-01-23 DIAGNOSIS — H04.123 DRY EYES: ICD-10-CM

## 2025-01-23 DIAGNOSIS — Z12.11 SCREEN FOR COLON CANCER: ICD-10-CM

## 2025-01-23 DIAGNOSIS — H10.13 ALLERGIC CONJUNCTIVITIS, BILATERAL: ICD-10-CM

## 2025-01-23 DIAGNOSIS — I10 HYPERTENSION GOAL BP (BLOOD PRESSURE) < 130/80: ICD-10-CM

## 2025-01-23 DIAGNOSIS — E55.9 VITAMIN D DEFICIENCY: ICD-10-CM

## 2025-01-23 DIAGNOSIS — I69.354 HEMIPLEGIA AND HEMIPARESIS FOLLOWING CEREBRAL INFARCTION AFFECTING LEFT NON-DOMINANT SIDE (H): ICD-10-CM

## 2025-01-23 DIAGNOSIS — K21.9 GASTRO-ESOPHAGEAL REFLUX DISEASE WITHOUT ESOPHAGITIS: ICD-10-CM

## 2025-01-23 DIAGNOSIS — E78.5 HYPERLIPIDEMIA LDL GOAL <70: ICD-10-CM

## 2025-01-23 DIAGNOSIS — E11.59 TYPE 2 DIABETES MELLITUS WITH OTHER CIRCULATORY COMPLICATION, WITHOUT LONG-TERM CURRENT USE OF INSULIN (H): Primary | ICD-10-CM

## 2025-01-23 DIAGNOSIS — L97.922 LEG ULCER, LEFT, WITH FAT LAYER EXPOSED (H): ICD-10-CM

## 2025-01-23 PROBLEM — E66.3 OVERWEIGHT: Status: RESOLVED | Noted: 2021-09-03 | Resolved: 2025-01-23

## 2025-01-23 LAB
ANION GAP SERPL CALCULATED.3IONS-SCNC: 11 MMOL/L (ref 7–15)
BUN SERPL-MCNC: 11.3 MG/DL (ref 8–23)
CALCIUM SERPL-MCNC: 9.8 MG/DL (ref 8.8–10.4)
CHLORIDE SERPL-SCNC: 100 MMOL/L (ref 98–107)
CHOLEST SERPL-MCNC: 203 MG/DL
CREAT SERPL-MCNC: 0.72 MG/DL (ref 0.51–0.95)
EGFRCR SERPLBLD CKD-EPI 2021: >90 ML/MIN/1.73M2
EST. AVERAGE GLUCOSE BLD GHB EST-MCNC: 157 MG/DL
FASTING STATUS PATIENT QL REPORTED: ABNORMAL
FASTING STATUS PATIENT QL REPORTED: NORMAL
GLUCOSE SERPL-MCNC: 98 MG/DL (ref 70–99)
HBA1C MFR BLD: 7.1 % (ref 0–5.6)
HCO3 SERPL-SCNC: 29 MMOL/L (ref 22–29)
HDLC SERPL-MCNC: 57 MG/DL
LDLC SERPL CALC-MCNC: 116 MG/DL
NONHDLC SERPL-MCNC: 146 MG/DL
POTASSIUM SERPL-SCNC: 3.9 MMOL/L (ref 3.4–5.3)
SODIUM SERPL-SCNC: 140 MMOL/L (ref 135–145)
TRIGL SERPL-MCNC: 148 MG/DL
VIT D+METAB SERPL-MCNC: 27 NG/ML (ref 20–50)

## 2025-01-23 RX ORDER — EVOLOCUMAB 140 MG/ML
140 INJECTION, SOLUTION SUBCUTANEOUS
Qty: 6 ML | Refills: 3 | Status: SHIPPED | OUTPATIENT
Start: 2025-01-23

## 2025-01-23 RX ORDER — METFORMIN HYDROCHLORIDE 500 MG/1
500 TABLET, EXTENDED RELEASE ORAL
Qty: 90 TABLET | Refills: 3 | Status: SHIPPED | OUTPATIENT
Start: 2025-01-23

## 2025-01-23 RX ORDER — KETOTIFEN FUMARATE 0.35 MG/ML
1 SOLUTION/ DROPS OPHTHALMIC 2 TIMES DAILY PRN
Qty: 10 ML | Refills: 2 | Status: SHIPPED | OUTPATIENT
Start: 2025-01-23

## 2025-01-23 ASSESSMENT — ASTHMA QUESTIONNAIRES
ACT_TOTALSCORE: 24
QUESTION_2 LAST FOUR WEEKS HOW OFTEN HAVE YOU HAD SHORTNESS OF BREATH: NOT AT ALL
QUESTION_3 LAST FOUR WEEKS HOW OFTEN DID YOUR ASTHMA SYMPTOMS (WHEEZING, COUGHING, SHORTNESS OF BREATH, CHEST TIGHTNESS OR PAIN) WAKE YOU UP AT NIGHT OR EARLIER THAN USUAL IN THE MORNING: NOT AT ALL
QUESTION_4 LAST FOUR WEEKS HOW OFTEN HAVE YOU USED YOUR RESCUE INHALER OR NEBULIZER MEDICATION (SUCH AS ALBUTEROL): NOT AT ALL
ACT_TOTALSCORE: 24
QUESTION_1 LAST FOUR WEEKS HOW MUCH OF THE TIME DID YOUR ASTHMA KEEP YOU FROM GETTING AS MUCH DONE AT WORK, SCHOOL OR AT HOME: A LITTLE OF THE TIME
QUESTION_5 LAST FOUR WEEKS HOW WOULD YOU RATE YOUR ASTHMA CONTROL: COMPLETELY CONTROLLED

## 2025-01-23 ASSESSMENT — ENCOUNTER SYMPTOMS: EYE PAIN: 1

## 2025-01-23 NOTE — TELEPHONE ENCOUNTER
Vascular Referral Intake    Appointment note (to be pasted into appt note. Also add where additional info is located ie: outside images pushed to PACS, in Epic, sent to HIM, etc): CONSULT Maeve Chow MD referring. Non-healing left shin ulcer.     Referred by Maeve Chow MD  for L97.922 (ICD-10-CM) - Leg ulcer, left, with fat layer exposed (H)     Specialty: Wound Clinic    Specific Provider if Necessary:  SAMY Cordova, MD Zander Nguyen, or SAMY Ojeda    Visit Type: New    Time Frame: Next Available    Testing/Imaging Needed Before Consult: None    *Schedulers: Please send welcome letter to patient after appointment(s) have been scheduled*

## 2025-01-23 NOTE — ASSESSMENT & PLAN NOTE
Itchy, watery eyes bilaterally.  Chronic problem for patient.  Has used antihistamine eyedrops in the past with some relief.  Wanting to see ophthalmology, referral placed.

## 2025-01-23 NOTE — ASSESSMENT & PLAN NOTE
Per chart review, was treated for H. pylori in 2014 and in 2016.  Has been on 20 mg of omeprazole daily since that time, states that it is no longer helping her abdominal pain.  No vomiting, no blood in her stool.  Had a normal Cologuard few years ago.  Would consider retesting for H. pylori.  For now, will increase her dose of PPI and see if this is helpful.  She will return in 1 month for follow-up.

## 2025-01-23 NOTE — ASSESSMENT & PLAN NOTE
Stroke in 2020.  Has been on rosuvastatin 20 mg and Repatha injections-patient states pharmacy/insurance stopped covering the Repatha and so it was last filled in March 2024.  Refill sent of this today to see if we can get it covered.  Lipid panel today.

## 2025-01-23 NOTE — ASSESSMENT & PLAN NOTE
A1c of 7.1 today, has been in the mid sixes for the past few years, was taken off of metformin due to some low blood sugars.  Originally diagnosed in 2019, then had a stroke in 2020.  Has diabetic nephropathy and neuropathy.  Patient checking her blood sugars and does have some highs after meals.  Will have her restart metformin.

## 2025-01-23 NOTE — ASSESSMENT & PLAN NOTE
New problem for patient, also developed about a month ago after hitting her shin on a board.  She has applied Vaseline as well as other topical agents including menthol and it still will not heal.  No signs of cellulitis on exam, see photos under exam portion of note.  Likely I feel this is related to her poor blood flow in the left leg which is an ongoing chronic problem per chart review.  Will place vascular/wound referral for ongoing management.

## 2025-01-23 NOTE — PROGRESS NOTES
Assessment & Plan     Problem List Items Addressed This Visit          Nervous and Auditory    Hemiplegia and hemiparesis following cerebral infarction affecting left non-dominant side (H)     Chronic right-sided weakness ever since stroke in 2020.  Stable.  Functioning well at home.            Digestive    Vitamin D deficiency     Noted on chart review with past vitamin D level of 19.  Will recheck today.         Relevant Orders    Vitamin D Deficiency    Gastro-esophageal reflux disease without esophagitis     Per chart review, was treated for H. pylori in 2014 and in 2016.  Has been on 20 mg of omeprazole daily since that time, states that it is no longer helping her abdominal pain.  No vomiting, no blood in her stool.  Had a normal Cologuard few years ago.  Would consider retesting for H. pylori.  For now, will increase her dose of PPI and see if this is helpful.  She will return in 1 month for follow-up.         Relevant Medications    omeprazole (PRILOSEC) 20 MG DR capsule       Endocrine    Type 2 diabetes mellitus with other circulatory complication, without long-term current use of insulin (H) - Primary     A1c of 7.1 today, has been in the mid sixes for the past few years, was taken off of metformin due to some low blood sugars.  Originally diagnosed in 2019, then had a stroke in 2020.  Has diabetic nephropathy and neuropathy.  Patient checking her blood sugars and does have some highs after meals.  Will have her restart metformin.         Relevant Medications    metFORMIN (GLUCOPHAGE XR) 500 MG 24 hr tablet    Other Relevant Orders    HEMOGLOBIN A1C (Completed)    Lipid panel reflex to direct LDL Non-fasting    Adult Eye  Referral    FOOT EXAM (Completed)    Hyperlipidemia LDL goal <70     Stroke in 2020.  Has been on rosuvastatin 20 mg and Repatha injections-patient states pharmacy/insurance stopped covering the Repatha and so it was last filled in March 2024.  Refill sent of this today to see  if we can get it covered.  Lipid panel today.         Relevant Medications    evolocumab (REPATHA SURECLICK) 140 MG/ML prefilled autoinjector       Circulatory    Hypertension goal BP (blood pressure) < 130/80     Blood pressure well-controlled on 20 mg of lisinopril.  BMP today.         Relevant Orders    BASIC METABOLIC PANEL       Musculoskeletal and Integumentary    Leg ulcer, left, with fat layer exposed (H)     New problem for patient, also developed about a month ago after hitting her shin on a board.  She has applied Vaseline as well as other topical agents including menthol and it still will not heal.  No signs of cellulitis on exam, see photos under exam portion of note.  Likely I feel this is related to her poor blood flow in the left leg which is an ongoing chronic problem per chart review.  Will place vascular/wound referral for ongoing management.         Relevant Orders    Vascular Medicine Referral       Immune    Allergic conjunctivitis, bilateral     Itchy, watery eyes bilaterally.  Chronic problem for patient.  Has used antihistamine eyedrops in the past with some relief.  Wanting to see ophthalmology, referral placed.         Relevant Medications    ketotifen fumarate 0.035% 0.035 % SOLN ophthalmic solution    Other Relevant Orders    Adult Eye  Referral       Other    Dry eyes     Artificial tears prescribed to be used along with antihistamine drops.         Relevant Medications    dextran 70-hypromellose (TEARS NATURALE FREE PF) 0.1-0.3 % ophthalmic solution    Other Relevant Orders    Adult Eye  Referral    RESOLVED: History of stroke    Relevant Medications    evolocumab (REPATHA SURECLICK) 140 MG/ML prefilled autoinjector     Other Visit Diagnoses       Screen for colon cancer        Relevant Orders    Fecal colorectal cancer screen FIT - Future (S+30)           48 minutes spent by me on 01/23/25 reviewing tests and other records, examining and counseling the patient,  "ordering medications/tests/procedures, coordinating care, and documenting this visit.    Flu and RSV vaccines given today.  She declined COVID-vaccine, states she will get it next time.    The longitudinal plan of care for the diagnosis(es)/condition(s) as documented were addressed during this visit. Due to the added complexity in care, I will continue to support Kevin in the subsequent management and with ongoing continuity of care.    Maeve Chow MD  [facility]            BMI  Estimated body mass index is 30.2 kg/m  as calculated from the following:    Height as of this encounter: 1.45 m (4' 9.09\").    Weight as of this encounter: 63.5 kg (140 lb).   Weight management plan: Discussed healthy diet and exercise guidelines        Subjective   Kevin is a 59 year old, presenting for the following health issues:  Eye Problem (blurry), Knee Pain, and Diabetes (medication)        1/23/2025     8:11 AM   Additional Questions   Roomed by kee umana   Accompanied by self     Eyes have been blurry and watery  Sometimes eyes feel swollen and a lot of pressure  Used some eye drops in the past, helped a little bit  Was sent somewhere in [place] but couldn't afford it - insurance wouldn't cover  Last saw eye doctor maybe a year ago?  Bigger bottle - itchy and dry  Smaller bottle too   No prior eye surgeries   Did a course of eye drops that she was told might help with her eyes, but did not help    Injured left shin - has been about a month - wondering any medicines   Hit her leg on a board, was initially swollen  Sometimes it is painful when it hits something  Tried a menthol cream, another cream she got from a doctor that was for pain? And vaseline - feels like that helped  Sometimes a clear fluid will come out of it after bathing  Sometimes will scab over, but just not healing  Doesn't bleed    Right foot fracture - feeling better now    Diabetes - was taken off of medications about a year/1.5 years " "ago  Was previously on metformin, was having low blood sugars - in summer of 2023 - so stopped by [place]  She states it was stopped due to easy bruising  Repatha not filled since 3/2024 - pharmacy said that insurance wouldn't pay for it    Morning sugars , after meals can go up to the 200s  Wanting medicine in case she overeats    Wondering if she has a stomach ulcer  Takes omeprazole every morning, sometimes has some burping afterward but doesn't help with the pain  No vomiting, no blood in the stool  Pain is near her sternum  History of H. pylori treated in 2014 and again in 2016        Objective    /83 (BP Location: Left arm, Patient Position: Sitting, Cuff Size: Adult Regular)   Pulse 66   Temp 98.7  F (37.1  C) (Oral)   Resp 20   Ht 1.45 m (4' 9.09\")   Wt 63.5 kg (140 lb)   SpO2 98%   BMI 30.20 kg/m    Body mass index is 30.2 kg/m .  Physical Exam   GEN: Patient sitting comfortably in no acute distress.  HEEN: Head is atraumatic, normocephalic, eyes anicteric, mucous membranes moist.  CV: Regular rate and rhythm without obvious murmurs.  PULM: Clear to auscultation bilaterally without wheezing or rales.  ABD: Soft, nontender, bowel sounds present.  EXT: The left lower extremity is slightly cooler to the touch with slower capillary refill as compared to the right, chronic per chart review.  The toenails are thickened and yellowed.  On the left shin, there is a 3 cm ulcerated lesion without warmth, fluctuance, erythema.  NEURO: Alert and oriented x3.  No focal motor abnormalities.  Face symmetric.  Sensation is intact to monofilament testing in bilateral feet.  PSYCH: Appropriate affect.  SKIN: See below. Otherwise, no rashes.                    Signed Electronically by: Maeve Chow MD Prior to immunization administration, verified patients identity using patient s name and date of birth. Please see Immunization Activity for additional information.     Screening Questionnaire for Adult " Immunization    Are you sick today?   No   Do you have allergies to medications, food, a vaccine component or latex?   No   Have you ever had a serious reaction after receiving a vaccination?   No   Do you have a long-term health problem with heart, lung, kidney, or metabolic disease (e.g., diabetes), asthma, a blood disorder, no spleen, complement component deficiency, a cochlear implant, or a spinal fluid leak?  Are you on long-term aspirin therapy?   No   Do you have cancer, leukemia, HIV/AIDS, or any other immune system problem?   No   Do you have a parent, brother, or sister with an immune system problem?   No   In the past 3 months, have you taken medications that affect  your immune system, such as prednisone, other steroids, or anticancer drugs; drugs for the treatment of rheumatoid arthritis, Crohn s disease, or psoriasis; or have you had radiation treatments?   No   Have you had a seizure, or a brain or other nervous system problem?   No   During the past year, have you received a transfusion of blood or blood    products, or been given immune (gamma) globulin or antiviral drug?   No   For women: Are you pregnant or is there a chance you could become       pregnant during the next month?   No   Have you received any vaccinations in the past 4 weeks?   No     Immunization questionnaire answers were all negative.      Patient instructed to remain in clinic for 15 minutes afterwards, and to report any adverse reactions.     Screening performed by Trisha Avila MA on 1/23/2025 at 8:23 AM.

## 2025-01-23 NOTE — PATIENT INSTRUCTIONS
Increase omeprazole to 2 pills daily to see if this helps with your stomach pain.  Start the eye drops.   We'll see if your pharmacy can get you the Repatha injection.            Thank you for coming to see me today! Here are a couple of pieces of information about my schedule and communication practices.    I am not in the clinic on Mondays or Tuesdays. Non-urgent calls and messages received on Mondays and Tuesdays will be addressed as soon as I am able when I am back in the office on the next business day. Urgent calls will be addressed by a covering clinician.      If lab work was done today as part of your evaluation, you will generally be contacted via [employer], mail, or phone with the results within 7-10 days.  If there is an alarming/concerning result we will contact you by phone. Lab results come back at varying times, I generally wait until all labs are resulted before making comments on results. Please note, labs are automatically released to [employer] once available, but it may take a couple of days for my comments on these to appear.    I try very hard to respond to medical messages with 2 business days of receiving them. Occasionally it takes me longer if I am trying to figure out the best way to respond and need to seek guidance, do some research or dig deeper into your medical history to come up with a helpful response.    If you need refills, please contact your pharmacist. They will send a refill request to me to review. Please allow 3-5 business days for us to respond to all refill requests.    Please call or send a medical message with any questions or concerns. Thank you for trusting me to be part of your healthcare team!    Dr. Maeve Chow

## 2025-01-24 ENCOUNTER — TELEPHONE (OUTPATIENT)
Dept: FAMILY MEDICINE | Facility: CLINIC | Age: 60
End: 2025-01-24
Payer: COMMERCIAL

## 2025-01-24 NOTE — TELEPHONE ENCOUNTER
Maeve Chow MD  P [facility]  Please call with results.    Mao,    A1C, the marker for diabetes control, was a little bit worse on our most recent check.  It was 7.1 when before it had been 6.5.  I do think we should have you restart taking the metformin just once per day.  I have sent this to your pharmacy.  Your cholesterol was much higher than it has been in the past.  Will work on getting insurance to cover the injectable medicine for your cholesterol.    If you have any questions or concerns, please let me know.

## 2025-01-29 LAB — HEMOCCULT STL QL IA: NEGATIVE

## 2025-01-29 NOTE — TELEPHONE ENCOUNTER
Relayed clinician message to patient via phone.  Patient verbalizes understanding and agreement with plan.  She has already picked up metformin from pharmacy and takes it daily, tolerating it well.    [facility] [place] [id number]    Peace Cedeno RN  [facility]

## 2025-03-03 ENCOUNTER — OFFICE VISIT (OUTPATIENT)
Dept: VASCULAR SURGERY | Facility: CLINIC | Age: 60
End: 2025-03-03
Payer: COMMERCIAL

## 2025-03-03 VITALS
HEART RATE: 68 BPM | SYSTOLIC BLOOD PRESSURE: 104 MMHG | DIASTOLIC BLOOD PRESSURE: 66 MMHG | OXYGEN SATURATION: 96 % | RESPIRATION RATE: 18 BRPM

## 2025-03-03 DIAGNOSIS — L97.922 LEG ULCER, LEFT, WITH FAT LAYER EXPOSED (H): ICD-10-CM

## 2025-03-03 PROCEDURE — 3078F DIAST BP <80 MM HG: CPT | Performed by: FAMILY MEDICINE

## 2025-03-03 PROCEDURE — G0463 HOSPITAL OUTPT CLINIC VISIT: HCPCS | Performed by: FAMILY MEDICINE

## 2025-03-03 PROCEDURE — 1125F AMNT PAIN NOTED PAIN PRSNT: CPT | Performed by: FAMILY MEDICINE

## 2025-03-03 PROCEDURE — 3074F SYST BP LT 130 MM HG: CPT | Performed by: FAMILY MEDICINE

## 2025-03-03 PROCEDURE — 99204 OFFICE O/P NEW MOD 45 MIN: CPT | Performed by: FAMILY MEDICINE

## 2025-03-03 ASSESSMENT — PAIN SCALES - GENERAL: PAINLEVEL_OUTOF10: MODERATE PAIN (5)

## 2025-03-03 NOTE — LETTER
[facility]  [address] [place] [address] [address]  [place], [place] [address]  [phone]      Fax [phone]    [facility]           FAX: [phone]            Customer Service: [phone]        Account #: [id number]    Wound Dressing Rx and Order Form  Order Status: New  Verbal: Maite  Date: March 3, 2025     Mao Anna Marie  Gender: female  : 1965  [address]  [facility] [address]  [phone] (home)     Medical Record: [id number]  Primary Care Provider: Ema Gentile      ICD-10-CM    1. Leg ulcer, left, with fat layer exposed (H)  L97.922 Vascular Medicine Referral     Wound care            Insurance Info:  INSURER: Payor[facility] / Product Type: HMO /   Policy ID#:  [id number]  SECONDARY INSURANCE:    Secondary Policy ID#:  N/A        Physician Info:   Name:     NERIS KERR  VIRTUAL      Dept Address/Phones:   [address] [place] [address], [address] [address] [phone]  Fax: [phone]    Lymphedema circumferential measurements (in cm):       No data to display                  Wound info:  VASC Wound left shin (Active)   Pre Size Length 1 25 0952   Pre Size Width 1 25 0952   Pre Size Depth 0.1 25 0952   Pre Total Sq cm 1 25 0952   Number of days:         Drainage: moderate  Thickness:  full  Duration of Need: 30 DAYS  Days Supply: 30 DAYS  Start Date: 3/3/2025  Starter Kit, Ancillary Kit (saline, gloves, gauze): Yes   Qualifying wound/Debridement: Yes     DISPENSE AS WRITTEN.   Call [phone]. Please call patient for out-of-pocket costs and options.      Dressing Type Brand Size Frequency of change  Quantity   Primary    {Dressing change frequency:790338}        {Dressing change frequency:550599}        {Dressing change frequency:341770}            Secondary            {Dressing change frequency:001051}        {Dressing change frequency:607945}        {Dressing change frequency:249062}            Tape             {Dressing change frequency:368786}        {Dressing change frequency:103177}      DISPENSE AS WRITTEN. Call [phone] with questions.       OK to forward to covered supplier.    Electronically Signed Physician:     NERIS KERR  VIRTUAL              Date: March 3, 2025

## 2025-03-03 NOTE — LETTER
[facility]  [address] [place] [address] [address]  [place], [place] [address]  [phone]      Fax [phone]    [facility]           FAX: [phone]            Customer Service: [phone]        Account #: [id number]    Wound Dressing Rx and Order Form  Order Status: New  Verbal: Maite  Date: March 3, 2025     Mao Anna Marie  Gender: female  : 1965  [address]  [facility] [address]  [phone] (home)     Medical Record: [id number]  Primary Care Provider: Ema Gentile      ICD-10-CM    1. Leg ulcer, left, with fat layer exposed (H)  L97.922 Vascular Medicine Referral     Wound care            Insurance Info:  INSURER: [facility]or: [facility] / Product Type: HMO /   Policy ID#:  [id number]  SECONDARY INSURANCE:    Secondary Policy ID#:  N/A        Physician Info:   Name:     NERIS KERR  VIRTUAL      Dept Address/Phones:   [address] [place] [address], [address] [address] [phone]  Fax: [phone]    Lymphedema circumferential measurements (in cm):       No data to display                  Wound info:  VASC Wound left shin (Active)   Pre Size Length 1 25 0952   Pre Size Width 1 25 0952   Pre Size Depth 0.1 25 0952   Pre Total Sq cm 1 25 0952   Number of days:         Drainage: moderate  Thickness:  full  Duration of Need: 30 DAYS  Days Supply: 30 DAYS  Start Date: 3/3/2025  Starter Kit, Ancillary Kit (saline, gloves, gauze): Yes   Qualifying wound/Debridement: Yes     DISPENSE AS WRITTEN.   Call [phone]. Please call patient for out-of-pocket costs and options.      Dressing Type Brand Size Frequency of change  Quantity   Primary Zetuvit plus silicone border  3x3 in EVERY OTHER DAY and as needed Send 15     DISPENSE AS WRITTEN. Call [phone] with questions.       OK to forward to covered supplier.    Electronically Signed Physician:     NERIS KERR  VIRTUAL              Date: March 3,  2025

## 2025-03-03 NOTE — PROGRESS NOTES
Wound Clinic Note         Visit date: 03/03/2025       Cheif Complaint:     Kevin Thrasher is a 59 year old  female had concerns including Consult (Left leg).  The patient has lower extremity edema and a left leg ulcer         HISTORY OF PRESENT ILLNESS:    Kevin Thrasher reports the wound has been present since October 2024.  The wound began due to the area being bumped.   Since then the wound has scabbed over many times, but then opened back up again.  For the most part she has just been applying Vaseline to the area twice a day.  Prior to this she has never had other difficult to heal leg wounds.        The pateint denies fevers or chills.  They report the pain from the wound has been 0/10 and has remained about the same recently.      The patient reports they currently do not have any routine for elevating their legs.     Today the patient reports maintaining a regular diet without special attention to protein.        The patient denies a history of smoking or chronic steroid use.  The patient confirms having diabetes and reports the blood sugars are well controlled.         The patient has not had any symptoms of infection relating to the wound recently and is not currently on antibiotics.       Problem List:   Past Medical History:   Diagnosis Date    Acute ischemic VBA brainstem stroke, right (H)     2/20. Residual left sided weakness.    Angular cheilitis 09/19/2016    Diabetic nephropathy associated with type 2 diabetes mellitus (H)     Diabetic polyneuropathy associated with type 2 diabetes mellitus (H)     Excessive or frequent menstruation     Head injury, unspecified     Helicobacter pylori gastritis     Infertility, female     eval at infertility clinic was normal.  declined evaluation. h/o ovarian hyperstimulation - did not result in pregnancy.     Pterygium     Rotator cuff tendinitis, unspecified laterality     Shingles 02/09/2015             Family Hx: family history includes No Known Problems in  her father and mother.       Surgical Hx:   Past Surgical History:   Procedure Laterality Date    DILATION AND CURETTAGE, OPERATIVE HYSTEROSCOPY, COMBINED  07/15/2011    Planned hysteroscopy/D&C for evaluation/treatment of menorrhagia 7/15/11.           Allergies:    Allergies   Allergen Reactions    Other Drug Allergy (See Comments) Unknown     Tide detergent - hives    Shellfish Derived [Shellfish-Derived Products] Unknown              Medication History:    Current Outpatient Medications   Medication Sig Dispense Refill    ACCU-CHEK GUIDE test strip 1 strip by In Vitro route daily Use to test once daily 100 strip 3    acetaminophen (TYLENOL) 500 MG tablet Take 2 tablets (1,000 mg) by mouth every 8 hours as needed for mild pain 200 tablet 0    albuterol (PROAIR HFA/PROVENTIL HFA/VENTOLIN HFA) 108 (90 Base) MCG/ACT inhaler Inhale 2 puffs into the lungs every 4 hours as needed for shortness of breath or wheezing 18 g 0    alcohol swab prep pads Use to swab area of injection/nina as directed. 100 each 3    aspirin (ASPIRIN LOW DOSE) 81 MG chewable tablet Take 1 tablet (81 mg) by mouth daily 200 tablet 1    blood glucose monitoring (ACCU-CHEK FASTCLIX) lancets 1 each daily 102 each 11    Contour Next EZ (CONTOUR NEXT EZ W/DEVICE KIT) w/Device KIT USE TO TEST BLOOD SUGAR 1 TIMES DAILY OR AS DIRECTED. PREFERRED BLOOD GLUCOSE METER OR SUPPLIES TO ACCOMPANY: BLOOD GLUCOSE MONITOR BRANDS: PER INS<MORE>      dextran 70-hypromellose (TEARS NATURALE FREE PF) 0.1-0.3 % ophthalmic solution Place 1 drop into both eyes every hour as needed (dry eyes). 36 each 2    diclofenac (VOLTAREN) 1 % topical gel Apply 4 g topically 4 times daily. May apply to either the left shin or right foot 50 g 0    estradiol (ESTRACE) 0.1 MG/GM vaginal cream Place 2 g vaginally twice a week 42.5 g 4    evolocumab (REPATHA SURECLICK) 140 MG/ML prefilled autoinjector Inject 1 mL (140 mg) subcutaneously every 14 days. 6 mL 3    Global Inject Ease Lancets  30G MISC USE WITH LANCETING DEVICE. *100 DYAS*      ketotifen fumarate 0.035% 0.035 % SOLN ophthalmic solution Place 1 drop into both eyes 2 times daily as needed for itching. 10 mL 2    lisinopril (ZESTRIL) 20 MG tablet TAKE 1 TABLET (20 MG) BY MOUTH DAILY/ TXHUA HNUB NOJ 1 LUB TSHUAJ PAB ZOO WAN NTSHAV SIAB 90 tablet 4    loratadine (ALLERGY RELIEF) 10 MG tablet TAKE 1 TABLET (10 MG) BY MOUTH DAILY AS NEEDED FOR ALLERGIES / YOG MUAJ ALLERGIES NOJ 1 LUB IB HNUB 30 tablet 5    Lubricants (ASTROGLIDE) GEL Externally apply topically daily 66.5 g 11    metFORMIN (GLUCOPHAGE XR) 500 MG 24 hr tablet Take 1 tablet (500 mg) by mouth daily (with dinner). 90 tablet 3    naproxen (NAPROSYN) 500 MG tablet Take 1 tablet (500 mg) by mouth 2 times daily (with meals) 60 tablet 0    omeprazole (PRILOSEC) 20 MG DR capsule Take 2 capsules (40 mg) by mouth daily. 90 capsule 4    polyethylene glycol (MIRALAX) 17 GM/Dose powder Take 17 g by mouth daily 510 g 3    potassium chloride ER (K-TAB/KLOR-CON) 10 MEQ CR tablet Take 1 tablet (10 mEq) by mouth daily 90 tablet 4    rosuvastatin (CRESTOR) 20 MG tablet Take 1 tablet (20 mg) by mouth daily 90 tablet 4     No current facility-administered medications for this visit.         Tobacco History:  reports that she has never smoked. She has never been exposed to tobacco smoke. She has never used smokeless tobacco.       REVIEW OF SYMPTOMS:   The review of systems was negative except as noted in the HPI.           PHYSICAL EXAMINATION:     /66   Pulse 68   Resp 18   SpO2 96%            GENERAL: The patient overall appears well and is no acute distress.   HEAD: normocephalic   EYES: Sclera and conjunctiva clear   NECK: no obvious masses   LUNGS: breathing is unlabored.   EXTREMITIES: No clubbing, cyanosis or edema   SKIN: No rashes or other abnormalities except as noted under the Wound section below.   NEUROLOGICAL: normal motor and sensory function   EDEMA: Moderate       WOUND: The  wound appears healthy with no sign of infection.   Wound bed: necrotic material  Periwound: healthy intact skin  Fairly small superficial wound on the left anterior shin, there is some loose necrotic material in the wound bed which I was able to clear away with a piece of gauze, no sharp debridement was required.      Also see below for wound details:       Circumferential volume measures:             No data to display                Ulceration(s)/Wound(s):   Please see the media tab under the chart review for pictures of the wounds.  Nursing staff removed dressings and cleansed wound.    VASC Wound left shin (Active)   Pre Size Length 1 03/03/25 0952   Pre Size Width 1 03/03/25 0952   Pre Size Depth 0.1 03/03/25 0952   Pre Total Sq cm 1 03/03/25 0952             Recent Labs   Lab Test 01/23/25  0924 07/11/24  1735 01/22/24  1046   A1C 7.1* 6.5* 6.7*          Recent Labs   Lab Test 01/22/24  1046 06/23/23  1456 06/06/23  1601   ALBUMIN 3.8 3.5 3.8         WBC   Date Value Ref Range Status   02/14/2020 9.1 4.0 - 11.0 10e9/L Final     WBC Count   Date Value Ref Range Status   06/23/2023 9.5 4.0 - 11.0 10e3/uL Final     Albumin   Date Value Ref Range Status   01/22/2024 3.8 3.5 - 5.2 g/dL Final   03/07/2022 3.3 (L) 3.5 - 5.0 g/dL Final           No sharp debridement performed today.                  ASSESSMENT:   This is a 59 year old  female with a left leg ulcer, the patient also has lower extremity edema which was also managed during today's clinic visit.          PLAN:   She will bandage the area with Hydrofera Blue, silicone adhesive bandage and a spandagrip stocking change once a day or every other day depending on her bathing schedule.    Separate from the wound care instructions we then discussed management strategies for lower extremity edema.  I explained the keys for managing lower extremity edema are compression and elevation.  I explained to the patient today that controlling the edema is probably the  most important thing we can do to help heal the wound.  I have specifically recommended that they lay down with their legs above the level of the heart for 30 minutes at least twice a day.     I have explained to the patient the importance of protein intake to wound healing.  I have explained that increasing protein intake will speed wound healing.  We discussed several types of food that are high in protein and the wound care nurse gave the patient a handout that summarizes this information.  In addition to further speed wound healing I have encouraged the patient to take a protein supplement.   The patient will return to the wound clinic in 3 to 4 weeks to see me again.        45 minutes spent on the date of the encounter doing chart review, history and exam, documentation and further activities per the note, this time excludes any procedure time      Zander Nguyen MD  03/03/2025   11:09 AM   [facility]  [phone]    This note was electronically signed by Zander Nguyen MD

## 2025-03-03 NOTE — PATIENT INSTRUCTIONS
"Wound care supplies were ordered today through [employer] and if you are not receiving your supplies or have a question on your bill please contact Stephen Aguilarmikaela [phone]. Please allow 2-5 business days for delivery of supplies. You may get a call from a 1-800 # if there are additional information Jose needs. It is important to  or return their call. PLEASE NOTE: If you need to return your supplies, you MUST call customer service within 15 days of delivery date.         Wound Care Instructions    EVERY OTHER DAY and as needed, Cleanse your left shin wound(s) with Normal saline.    Pat Dry with non-sterile gauze    Apply Lotion to the intact skin surrounding your wound and other dry skin locations. If using an adhesive dressing avoid using lotion near the adhesive.   Some good lotions include: Remedy Skin Repair Cream, Sarna, Vanicream or Cetaphil    Primary Dressing: Apply Hydrofera blue ready transfer into/onto the wounds (If sticking to the wound when you are removing use saline to loosen)    Secondary dressing: Cover with Zetuvit    Secure with non-sterile roll gauze (4\" x 75\" roll) and tape (1\" roll tape) as needed; avoid adhesive directly on the skin    Compression: Tubular compression from the base of the toes to the bend of the knee    It is ok to get your wound wet in the bath or shower    SEEK MEDICAL CARE IF:  You have an increase in swelling, pain, or redness around the wound.  You have an increase in the amount of pus coming from the wound.  There is a bad smell coming from the wound.  The wound appears to be worsening/enlarging  You have a fever greater than 101.5 F      It is ok to continue current wound care treatment/products for the next 2-3 days until new wound care supplies are ordered and arrive. If longer than this please contact our office at [phone].    If for some reason you are not able to get your dressing(s) changed as outlined above (due to illness, lack of supplies, lack of " help) please do the following: remove old, soiled dressings; wash the wounds with saline; pat dry; apply ABD pad or other absorbant pad and secure with rolled gauze; avoid tape directly on your skin; Call the clinic as soon as possible to let us know what the current issues are in receiving wound care [phone].    If you have a 2 layer or 4 layer compression wrap on these are safe to have on for ONLY 7 days. If for some reason you are not able to get the wrap(s) changed (due to illness; lack of supplies, lack of help, lack of transportation) please do the following: unwrap the old 2 or 4 layer compression wrap; avoid using scissors as you could cut your skin and cause wounds; use tubular compression when available. Call to reschedule your home care or clinic visit appointment as soon as possible.    It is recommended that you elevate your legs throughout the day: approx 2-3 times each day  Elevate them above the level of your heart for 30 min.   Ways to do this:   Lay on the couch or your bed and prop your legs up on pillows   Recline back as far as you can go in your recliner and prop your legs on pillows.     Doing these things will help reduce the edema in your legs.      High Protein Foods  When you have an open ulcer, your bodies protein needs are much higher, so it is recommended eat good sources of protein or take a protein supplement!    Protein Supplements  -Premier Protein  -Ensure  -Boost  -Glucerna, if diabetic    Chicken  -Chicken breast, 3.5oz.-30 grams protein  -Chicken meat, cooked, 4 oz.    Beef  -Hamburger sonya, 4 oz-28 grams protein  -Steak, 6 oz-42 grams  -Most cuts of beef- 7 grams of protein per ounce    Fish  -Most fish fillets or steaks are about 22 grams of protein for 3 1/2 oz(100 grams) of cooked fish, or 6 grams per ounce  -Tuna, 6 oz can-40 grams of protein    Pork  -Pork chop, average-22 grams protein  -Pork loin or tenderloin, 4 oz.-29 grams  -Ham, 3oz serving- 19 grams  -Romero, 1  slice-3 grams    Eggs and Dairy  -Egg, large-7 grams  -Milk, 1 cup-8 grams  -Cottage cheese, 1/2 cup-15 grams  -Greek yogurt, 1 cup-usually 8-12 grams, check label    Beans  -Soy milk, 1 cup-6-10 grams  -Most beans(black, jose, lentils, etc.) about 7-10 grams protein per half cup of cooked beans    Nuts and Seeds  -Peanut butter, 2 Tablespoons- 8 grams protein  -Almonds, 1/4 cup- 8 grams  -Peanuts, 1/4 cup-9 grams  -Sunflower seeds, 1/4 cup- 6 grams      Please NOTE: if you are 15 minutes late to your clinic appointment you will have to be rescheduled. Please call our clinic as soon as possible if you know you will not be able to get to your appointment at [phone].  If you fail to show up to 3 scheduled clinic appointments you will be dismissed from our clinic      We want to hear from you!  In the next few weeks, you should receive a call or email to complete a survey about your visit at [facility]. Please help us improve your appointment experience by letting us know how we did today. We strive to make your experience good and value any ways in which we could do better.      We value your input and suggestions.    Thank you for choosing the [facility]!

## 2025-03-04 DIAGNOSIS — I63.22 ACUTE ISCHEMIC VBA BRAINSTEM STROKE, RIGHT (H): ICD-10-CM

## 2025-03-04 DIAGNOSIS — E11.59 TYPE 2 DIABETES MELLITUS WITH OTHER CIRCULATORY COMPLICATION, WITHOUT LONG-TERM CURRENT USE OF INSULIN (H): Primary | ICD-10-CM

## 2025-03-04 DIAGNOSIS — I63.211 ACUTE ISCHEMIC VBA BRAINSTEM STROKE, RIGHT (H): ICD-10-CM

## 2025-03-04 RX ORDER — ASPIRIN 81 MG/1
TABLET, CHEWABLE ORAL
Qty: 100 TABLET | Refills: 4 | Status: SHIPPED | OUTPATIENT
Start: 2025-03-04

## 2025-03-04 NOTE — TELEPHONE ENCOUNTER
Per neurology patient to continue aspirin.    Future Appointments   Date Time Provider Department Center   3/6/2025  1:00 PM [facility]   3/31/2025  1:20 PM [facility]   5/1/2025 12:30 PM [facility]      Health Maintenance Due   Topic Date Due    YEARLY PREVENTIVE VISIT  11/06/2021    ASTHMA ACTION PLAN  01/24/2023    COVID-19 Vaccine (5 - 2024-25 season) 09/01/2024    EYE EXAM  09/06/2024     BP Readings from Last 3 Encounters:   03/03/25 104/66   01/23/25 138/83   12/06/24 134/86     Mao was seen today for medication refill.    Diagnoses and all orders for this visit:    Acute ischemic VBA brainstem stroke, right (H)

## 2025-03-04 NOTE — TELEPHONE ENCOUNTER
Medication passed protocol, however, refill RN could not approve because needing provider review. Should therapy be completed at this time or continued? Provider, please approve or deny the prescription.

## 2025-03-05 DIAGNOSIS — E87.6 HYPOKALEMIA: ICD-10-CM

## 2025-03-06 ENCOUNTER — DOCUMENTATION ONLY (OUTPATIENT)
Dept: FAMILY MEDICINE | Facility: CLINIC | Age: 60
End: 2025-03-06

## 2025-03-06 ENCOUNTER — OFFICE VISIT (OUTPATIENT)
Dept: FAMILY MEDICINE | Facility: CLINIC | Age: 60
End: 2025-03-06
Payer: COMMERCIAL

## 2025-03-06 VITALS
WEIGHT: 140 LBS | BODY MASS INDEX: 30.2 KG/M2 | RESPIRATION RATE: 16 BRPM | TEMPERATURE: 98.2 F | DIASTOLIC BLOOD PRESSURE: 60 MMHG | OXYGEN SATURATION: 99 % | SYSTOLIC BLOOD PRESSURE: 110 MMHG | HEART RATE: 77 BPM

## 2025-03-06 DIAGNOSIS — I63.211 ACUTE ISCHEMIC VBA BRAINSTEM STROKE, RIGHT (H): ICD-10-CM

## 2025-03-06 DIAGNOSIS — I63.22 ACUTE ISCHEMIC VBA BRAINSTEM STROKE, RIGHT (H): ICD-10-CM

## 2025-03-06 DIAGNOSIS — N39.3 FEMALE STRESS INCONTINENCE: Primary | ICD-10-CM

## 2025-03-06 DIAGNOSIS — E87.6 HYPOKALEMIA: ICD-10-CM

## 2025-03-06 DIAGNOSIS — E78.5 HYPERLIPIDEMIA LDL GOAL <70: ICD-10-CM

## 2025-03-06 DIAGNOSIS — E11.59 TYPE 2 DIABETES MELLITUS WITH OTHER CIRCULATORY COMPLICATION, WITHOUT LONG-TERM CURRENT USE OF INSULIN (H): ICD-10-CM

## 2025-03-06 DIAGNOSIS — K21.9 GASTRO-ESOPHAGEAL REFLUX DISEASE WITHOUT ESOPHAGITIS: Primary | ICD-10-CM

## 2025-03-06 RX ORDER — POTASSIUM CHLORIDE 750 MG/1
TABLET, EXTENDED RELEASE ORAL
Qty: 90 TABLET | Refills: 4 | OUTPATIENT
Start: 2025-03-06

## 2025-03-06 RX ORDER — FAMOTIDINE 40 MG/1
40 TABLET, FILM COATED ORAL 2 TIMES DAILY
Qty: 28 TABLET | Refills: 0 | Status: SHIPPED | OUTPATIENT
Start: 2025-03-06 | End: 2025-03-06

## 2025-03-06 RX ORDER — FAMOTIDINE 40 MG/1
40 TABLET, FILM COATED ORAL 2 TIMES DAILY
Qty: 28 TABLET | Refills: 3 | Status: SHIPPED | OUTPATIENT
Start: 2025-03-06

## 2025-03-06 RX ORDER — POTASSIUM CHLORIDE 750 MG/1
10 TABLET, EXTENDED RELEASE ORAL DAILY
Qty: 90 TABLET | Refills: 4 | Status: SHIPPED | OUTPATIENT
Start: 2025-03-06

## 2025-03-06 NOTE — PATIENT INSTRUCTIONS
STOP the omeprazole - replace it with the Pepcid/famotidine which you can take twice per day  After 2 weeks, collect the stool sample and bring it back to clinic  Once you have collected the stool sample, you can restart the omeprazole and stop the pepcid/famotidine    We will call you with results of the H. Pylori test - if it is positive, you will need antibiotics                Thank you for coming to see me today! Here are a couple of pieces of information about my schedule and communication practices.    I am not in the clinic on Mondays or Tuesdays. Non-urgent calls and messages received on Mondays and Tuesdays will be addressed as soon as I am able when I am back in the office on the next business day. Urgent calls will be addressed by a covering clinician.      If lab work was done today as part of your evaluation, you will generally be contacted via [employer], mail, or phone with the results within 7-10 days.  If there is an alarming/concerning result we will contact you by phone. Lab results come back at varying times, I generally wait until all labs are resulted before making comments on results. Please note, labs are automatically released to [employer] once available, but it may take a couple of days for my comments on these to appear.    I try very hard to respond to medical messages with 2 business days of receiving them. Occasionally it takes me longer if I am trying to figure out the best way to respond and need to seek guidance, do some research or dig deeper into your medical history to come up with a helpful response.    If you need refills, please contact your pharmacist. They will send a refill request to me to review. Please allow 3-5 business days for us to respond to all refill requests.    Please call or send a medical message with any questions or concerns. Thank you for trusting me to be part of your healthcare team!    Dr. Maeve Chow

## 2025-03-06 NOTE — PROGRESS NOTES
Assessment & Plan     Gastro-esophageal reflux disease without esophagitis  Worsening symptoms in the last few months, saw some improvement with increasing omeprazole from 20 to 40 mg, but not much.  She has a history of H. pylori that was treated in 2014 and 2016.  Will have her discontinue PPI for 2 weeks in order to retest for H. pylori, she can use Pepcid in the meantime for her symptoms.  She had negative fit test 1 month ago.  - Helicobacter pylori Antigen Stool  - famotidine (PEPCID) 40 MG tablet  Dispense: 28 tablet; Refill: 3    Hypokalemia  Chronic problem for patient, stable on this dose of potassium replacement over the last few years.  BMP showed potassium of 3.9 about 1 month ago she was on this current dose.  Refills provided.  - potassium chloride ER (K-TAB/KLOR-CON) 10 MEQ CR tablet  Dispense: 90 tablet; Refill: 4    Type 2 diabetes mellitus with other circulatory complication, without long-term current use of insulin (H)  Refill of test strips provided, too soon to retest A1c.  - blood glucose (NO BRAND SPECIFIED) test strip  Dispense: 100 strip; Refill: 3    Hyperlipidemia LDL goal <70  She was able to obtain the Repatha injections after last visit about a month ago.  History of a stroke in 2020, on statin as well as Repatha.    The longitudinal plan of care for the diagnosis(es)/condition(s) as documented were addressed during this visit. Due to the added complexity in care, I will continue to support Kevin in the subsequent management and with ongoing continuity of care.      Maeve Chow MD  [facility]      Subjective   Kevin is a 59 year old, presenting for the following health issues:  Abdominal Pain (Has been having stomach pain for a while)        3/6/2025    12:37 PM   Additional Questions   Roomed by Jaaj     History of Present Illness       Reason for visit:  Stomach pain  Symptom onset:  More than a month  Symptoms include:  Burining sensation when eats  spicy food  Symptom intensity:  Moderate  Symptom progression:  Worsening  Had these symptoms before:  No  What makes it worse:  Spicy food, sour food   She is taking medications regularly.    When eating spicy or sour foods, doesn't bother all the time but most of the time  A burning sensation right in the middle  1 month ago, increased omeprazole from 20 mg --> 40 mg  Did have some relief from the dose increase      Potassium chloride - was told she needed a visit to get a refill  Has been taking for years, hasn't run out yet  Checked BMP 1 month ago - K 3.9        Objective    /60   Pulse 77   Temp 98.2  F (36.8  C) (Oral)   Resp 16   Wt 63.5 kg (140 lb)   SpO2 99%   BMI 30.20 kg/m    Body mass index is 30.2 kg/m .  Physical Exam   GEN: Patient sitting comfortably in no acute distress.  HEEN: Head is atraumatic, normocephalic, eyes anicteric, mucous membranes moist.  CV: Extremities well-perfused. No obvious lower extremity edema.  PULM: Breathing comfortably on room air. Speaking in full sentences.  NEURO: Alert and oriented x3.  No focal motor abnormalities.  Face symmetric.  PSYCH: Appropriate affect.  SKIN: No rashes, bruising, or other lesions visible on exposed skin.          Signed Electronically by: Maeve Chow MD

## 2025-03-09 ENCOUNTER — MYC MEDICAL ADVICE (OUTPATIENT)
Dept: FAMILY MEDICINE | Facility: CLINIC | Age: 60
End: 2025-03-09
Payer: COMMERCIAL

## 2025-03-09 DIAGNOSIS — N39.3 URINARY, INCONTINENCE, STRESS FEMALE: Primary | ICD-10-CM

## 2025-03-10 ENCOUNTER — TELEPHONE (OUTPATIENT)
Dept: FAMILY MEDICINE | Facility: CLINIC | Age: 60
End: 2025-03-10
Payer: COMMERCIAL

## 2025-03-10 NOTE — TELEPHONE ENCOUNTER
Clinic RN: Please contact patient because  I  do not know what this is. I do not think it is a medication, and if it is not a medication then it should be sent to provider because we will not have a protocol for it.

## 2025-03-10 NOTE — TELEPHONE ENCOUNTER
----- Message from Ema Gentile sent at 3/10/2025 12:18 PM CDT -----  Please let Mao know:  You don't have this infection.

## 2025-03-10 NOTE — TELEPHONE ENCOUNTER
Orders placed. Please send to [address] [phone].     DME (Durable Medical Equipment) Orders and Documentation  Orders Placed This Encounter   Procedures    Incontinence Supplies Order        The patient was assessed and it was determined the patient is in need of the following listed DME Supplies/Equipment. Please complete supporting documentation below to demonstrate medical necessity.      Incontinence Supplies Documentation  Incontinence supplies are needed due to urinary stress incontinence.

## 2025-03-10 NOTE — TELEPHONE ENCOUNTER
This is for incontinence supplies. Order was pended by [facility] staff in separate encounter and sent to PCP.

## 2025-03-11 NOTE — TELEPHONE ENCOUNTER
DME order faxed to [facility] at [phone]. Notified patient through [employer]. Completing task.

## 2025-03-22 DIAGNOSIS — E78.5 HYPERLIPIDEMIA LDL GOAL <70: ICD-10-CM

## 2025-03-22 RX ORDER — ROSUVASTATIN CALCIUM 20 MG/1
TABLET, COATED ORAL
Qty: 90 TABLET | Refills: 2 | Status: SHIPPED | OUTPATIENT
Start: 2025-03-22

## 2025-03-29 ENCOUNTER — HEALTH MAINTENANCE LETTER (OUTPATIENT)
Age: 60
End: 2025-03-29

## 2025-04-25 ENCOUNTER — TRANSFERRED RECORDS (OUTPATIENT)
Dept: MULTI SPECIALTY CLINIC | Facility: CLINIC | Age: 60
End: 2025-04-25
Payer: COMMERCIAL

## 2025-04-25 LAB — RETINOPATHY: NORMAL

## 2025-05-01 ENCOUNTER — OFFICE VISIT (OUTPATIENT)
Dept: NEUROLOGY | Facility: CLINIC | Age: 60
End: 2025-05-01
Payer: COMMERCIAL

## 2025-05-01 VITALS
SYSTOLIC BLOOD PRESSURE: 126 MMHG | HEIGHT: 57 IN | WEIGHT: 141 LBS | HEART RATE: 61 BPM | DIASTOLIC BLOOD PRESSURE: 70 MMHG | BODY MASS INDEX: 30.42 KG/M2

## 2025-05-01 DIAGNOSIS — E78.5 HYPERLIPIDEMIA LDL GOAL <70: ICD-10-CM

## 2025-05-01 DIAGNOSIS — M62.81 MUSCLE WEAKNESS (GENERALIZED): ICD-10-CM

## 2025-05-01 DIAGNOSIS — Z86.73 HISTORY OF STROKE: Primary | ICD-10-CM

## 2025-05-01 DIAGNOSIS — K76.0 FATTY LIVER: ICD-10-CM

## 2025-05-01 DIAGNOSIS — E11.59 TYPE 2 DIABETES MELLITUS WITH OTHER CIRCULATORY COMPLICATION, WITHOUT LONG-TERM CURRENT USE OF INSULIN (H): Primary | ICD-10-CM

## 2025-05-01 RX ORDER — ASPIRIN 81 MG/1
81 TABLET, CHEWABLE ORAL DAILY
Qty: 90 TABLET | Refills: 3 | Status: SHIPPED | OUTPATIENT
Start: 2025-05-01

## 2025-05-01 NOTE — PROGRESS NOTES
NEUROLOGY OUTPATIENT PROGRESS NOTE  May 1, 2025     CHIEF COMPLAINT/REASON FOR VISIT/REASON FOR CONSULT  Patient presents with:  Follow Up: Follow up for CVA 2020. Left sided weakness and numbness in arm and leg is still present. She also states right side feels weak too. She does not know what medications she is taking, and did not bring a list. She also would like a BP wrist cuff ordered for her.    REASON FOR CONSULTATION-address antiplatelet medication for stroke    REFERRAL SOURCE  Dr. Dominique Reyes*  CC Dr. Dominique Reyes*    HISTORY OF PRESENT ILLNESS  Kevin Thrasher is a 59 year old female seen today for evaluation of stroke.  She had a right pontine stroke in 2020 that resulted in left arm and leg weakness.  Her left arm has improved though she continues to have weakness in the left leg.  She was doing physical therapy at the time of the stroke though after COVID she could not do this and has been exercising on her own.  She does have a diagnosis of prediabetes and hypertension and has been on medication for that.  Prediabetes is evolved into diabetes at this point.  She was started on aspirin and Plavix in the hospital for intracranial stenosis though she had significant complications of generalized bruising and the Plavix has not been stopped.  No recurrence of stroke symptoms.  No other new symptoms since discharge from the hospital.  She has been on Repatha for her cholesterol.    5/1/25  Patient returns today  1.  She has been extremely motivated and trying to do exercises on her own.  [facility].  Continues to have some left arm and leg weakness.  Continues to have some balance issues.  Has graduated from physical therapy  2.  Tolerating her cholesterol medication as well as the aspirin.  There was a period where she was not taking her insulin which has caused her LDL to significantly rise again.  Remains on both the Repatha and the Crestor.  No concerns.  No other new  issues.    Previous history is reviewed and this is unchanged.    PAST MEDICAL/SURGICAL HISTORY  Past Medical History:   Diagnosis Date    Acute ischemic VBA brainstem stroke, right (H)     2/20. Residual left sided weakness.    Angular cheilitis 09/19/2016    Diabetic nephropathy associated with type 2 diabetes mellitus (H)     Diabetic polyneuropathy associated with type 2 diabetes mellitus (H)     Excessive or frequent menstruation     Head injury, unspecified     Helicobacter pylori gastritis     Infertility, female     eval at infertility clinic was normal.  declined evaluation. h/o ovarian hyperstimulation - did not result in pregnancy.     Pterygium     Rotator cuff tendinitis, unspecified laterality     Shingles 02/09/2015     Patient Active Problem List   Diagnosis    Vitamin D deficiency    Type 2 diabetes mellitus with other circulatory complication, without long-term current use of insulin (H)    Mild intermittent asthma without complication    Lumbosacral radiculopathy at S1    Hyperlipidemia LDL goal <70    Hypertension goal BP (blood pressure) < 130/80    Fatty liver    Urinary, incontinence, stress female    Cystocele with uterine prolapse    Osteoarthritis of spine with radiculopathy, cervical region    Facet arthritis of lumbar region    Spondylolisthesis of lumbosacral region    Vaginal atrophy    Hypokalemia    Cerebrovascular disease    Proximal IT Band Enthesitis - Left    Hemiplegia and hemiparesis following cerebral infarction affecting left non-dominant side (H)    Gastro-esophageal reflux disease without esophagitis    Leg ulcer, left, with fat layer exposed (H)    Allergic conjunctivitis, bilateral    Dry eyes   Reviewed and noncontributory    FAMILY HISTORY  Family History   Problem Relation Age of Onset    No Known Problems Mother     No Known Problems Father     Breast Cancer No family hx of     Ovarian Cancer No family hx of     Colon Cancer No family hx of    No family history  of stroke    SOCIAL HISTORY  Social History     Tobacco Use    Smoking status: Never     Passive exposure: Never    Smokeless tobacco: Never    Tobacco comments:     no second hand smoke exposure   Vaping Use    Vaping status: Never Used   Substance Use Topics    Alcohol use: No    Drug use: No       SYSTEMS REVIEW  Twelve-system ROS was done and other than the HPI this was negative.  Pertinent positives noted on the HPI    MEDICATIONS  Current Outpatient Medications   Medication Sig Dispense Refill    aspirin (ASA) 81 MG chewable tablet Take 1 tablet (81 mg) by mouth daily. 90 tablet 3    ACCU-CHEK GUIDE test strip 1 strip by In Vitro route daily Use to test once daily 100 strip 3    acetaminophen (TYLENOL) 500 MG tablet Take 2 tablets (1,000 mg) by mouth every 8 hours as needed for mild pain 200 tablet 0    albuterol (PROAIR HFA/PROVENTIL HFA/VENTOLIN HFA) 108 (90 Base) MCG/ACT inhaler Inhale 2 puffs into the lungs every 4 hours as needed for shortness of breath or wheezing 18 g 0    alcohol swab prep pads Use to swab area of injection/nina as directed. 100 each 3    blood glucose (NO BRAND SPECIFIED) test strip Use to test blood sugar 1-2 times daily or as directed. 100 strip 3    blood glucose monitoring (ACCU-CHEK FASTCLIX) lancets 1 each daily 102 each 11    Contour Next EZ (CONTOUR NEXT EZ W/DEVICE KIT) w/Device KIT USE TO TEST BLOOD SUGAR 1 TIMES DAILY OR AS DIRECTED. PREFERRED BLOOD GLUCOSE METER OR SUPPLIES TO ACCOMPANY: BLOOD GLUCOSE MONITOR BRANDS: PER INS<MORE>      dextran 70-hypromellose (TEARS NATURALE FREE PF) 0.1-0.3 % ophthalmic solution Place 1 drop into both eyes every hour as needed (dry eyes). 36 each 2    diclofenac (VOLTAREN) 1 % topical gel Apply 4 g topically 4 times daily. May apply to either the left shin or right foot 50 g 0    estradiol (ESTRACE) 0.1 MG/GM vaginal cream Place 2 g vaginally twice a week 42.5 g 4    evolocumab (REPATHA SURECLICK) 140 MG/ML prefilled autoinjector Inject  "1 mL (140 mg) subcutaneously every 14 days. 6 mL 3    famotidine (PEPCID) 40 MG tablet Take 1 tablet (40 mg) by mouth 2 times daily. Take INSTEAD of the omeprazole 28 tablet 3    Global Inject Ease Lancets 30G MISC USE WITH LANCETING DEVICE. *100 DYAS*      ketotifen fumarate 0.035% 0.035 % SOLN ophthalmic solution Place 1 drop into both eyes 2 times daily as needed for itching. 10 mL 2    lisinopril (ZESTRIL) 20 MG tablet TAKE 1 TABLET (20 MG) BY MOUTH DAILY/ TX[facility]NUB NOJ [facility] 90 tablet 4    loratadine (ALLERGY RELIEF) 10 MG tablet TAKE 1 TABLET (10 MG) BY MOUTH DAILY AS NEEDED FOR ALLERGIES / YOG MUAJ ALLERGIES NOJ 1 LUB IB HNUB 30 tablet 5    Lubricants (ASTROGLIDE) GEL Externally apply topically daily 66.5 g 11    metFORMIN (GLUCOPHAGE XR) 500 MG 24 hr tablet Take 1 tablet (500 mg) by mouth daily (with dinner). 90 tablet 3    naproxen (NAPROSYN) 500 MG tablet Take 1 tablet (500 mg) by mouth 2 times daily (with meals) 60 tablet 0    omeprazole (PRILOSEC) 20 MG DR capsule Take 2 capsules (40 mg) by mouth daily. 90 capsule 4    polyethylene glycol (MIRALAX) 17 GM/Dose powder Take 17 g by mouth daily 510 g 3    potassium chloride ER (K-TAB/KLOR-CON) 10 MEQ CR tablet Take 1 tablet (10 mEq) by mouth daily. 90 tablet 4    rosuvastatin (CRESTOR) 20 MG tablet TAKE 1 PILL BY MOUTH EVERY DAY FOR CHOLESTEROL / TXHUA HNUB NOJ 1 LUB [facility] MUAJ ROJ 90 tablet 2     No current facility-administered medications for this visit.        PHYSICAL EXAMINATION  VITALS: /70 (BP Location: Left arm, Patient Position: Sitting)   Pulse 61   Ht 1.448 m (4' 9\")   Wt 64 kg (141 lb)   BMI 30.51 kg/m    GENERAL: Healthy appearing, alert, no acute distress, normal habitus.  CARDIOVASCULAR: Extremities warm and well perfused. Pulses present.   NEUROLOGICAL:  Patient is awake and oriented to self, place and time.  Attention span is normal.  Memory is grossly intact.  Language is fluent and " follows commands appropriately.  Appropriate fund of knowledge. Cranial nerves 2-12 are intact. There is no pronator drift.  Motor exam shows 5/5 strength in all extremities.  Questionable left leg weakness.  Tone is symmetric bilaterally in upper and lower extremities.  Reflexes are  and 2+ in upper extremities and lower extremities though slightly more pronounced on the left side. Sensory exam is grossly intact to light touch, pin prick and vibration.  Finger to nose and heel to shin is without dysmetria.  Romberg is negative.  Gait is normal and the patient is able to do tandem walk and walk on toes and heels with mild difficulty.  Exam stable.  No spasticity.  Mild circumduction of the left leg.  More proximal weakness in both the arm and leg.      DIAGNOSTICS  MRI-images reviewed.  HEAD MRI:   1.  15 x 10 mm in cross-sectional diameter acute/early subacute infarction right lazara.  2.  No associated hemorrhage or significant mass effect.     HEAD MRA:   1.  Short segment marked narrowing one of the right P3 segments in its distal aspect.  2.  Mild areas of narrowings proximal M2 segments bilaterally.  3.  Intracranial circulation appears otherwise normal.      HEAD CT:  1.  No CT finding of a mass, hemorrhage or focal area suggestive of an acute infarct.     HEAD CTA:   1.  No discrete vessel occlusion, significant stenosis, aneurysm or high flow vascular malformation involving arteries of the arteries of the [facility] [place].     NECK CTA:  1.  Normal configuration of the great vessels off the aortic arch with no significant stenosis of their origins.  2.  No significant stenosis or irregularity involving the arteries of the neck by NASCET criteria.  3.  No radiographic evidence of dissection.    ECHO  Narrative & Impression    Left Ventricle: Normal left ventricular size and systolic function.The calculated left ventricular ejection fraction is 62%. This represents a normal ejection fraction. Mild concentric  hypertrophy noted. E/e' 8 to 15, which is equivocal for estimating   LV filling pressures.Left ventricular diastolic function is indeterminate.    The left ventricular wall motion is normal.    Right Ventricle: Normal right ventricular size and systolic function. TAPSE is normal, which is consistent with normal right ventricular systolic function.    Left Atrium: Left atrial volume is moderately increased. No shunts as documented by negative color flow doppler and saline contrast injection. No thrombus present. No mass present. No spontaneous echo contrast.    Tricuspid Valve: Normal tricuspid valve structure. There is no evidence of tricuspid stenosis. Trace tricuspid valve regurgitation. The estimated systolic pulmonary artery pressure is 25+ right atrial pressure mmhg.    Thoracic Aorta: The ascending aorta is mildly dilated.    EKG      Normal sinus rhythm     Component      Latest Ref Rng 1/22/2024  10:46 AM   Sodium      135 - 145 mmol/L 141    Potassium      3.4 - 5.3 mmol/L 3.8    Carbon Dioxide (CO2)      22 - 29 mmol/L 31 (H)    Anion Gap      7 - 15 mmol/L 9    Urea Nitrogen      8.0 - 23.0 mg/dL 13.8    Creatinine      0.51 - 0.95 mg/dL 0.90    GFR Estimate      >60 mL/min/1.73m2 73    Calcium      8.8 - 10.2 mg/dL 9.1    Chloride      98 - 107 mmol/L 101    Glucose      70 - 99 mg/dL 149 (H)    Alkaline Phosphatase      40 - 150 U/L 82    AST      0 - 45 U/L 29    ALT      0 - 50 U/L 26    Protein Total      6.4 - 8.3 g/dL 6.9    Albumin      3.5 - 5.2 g/dL 3.8    Bilirubin Total      <=1.2 mg/dL 0.5    Cholesterol      <200 mg/dL 112    Triglycerides      <150 mg/dL 91    HDL Cholesterol      >=50 mg/dL 61    LDL Cholesterol Calculated      <=100 mg/dL 33    Non HDL Cholesterol      <130 mg/dL 51    Patient Fasting? Yes    Hemoglobin A1C      0.0 - 5.6 % 6.7 (H)       Legend:  (H) High      OUTSIDE RECORDS  Outside referral notes and chart notes were reviewed and pertinent information has been  summarized (in addition to the HPI):-      Pharmacy coordination- discussed personally      Component      Latest Ref Rng 1/23/2025  9:24 AM   Cholesterol      <200 mg/dL 203 (H)    Triglycerides      <150 mg/dL 148    HDL Cholesterol      >=50 mg/dL 57    LDL Cholesterol Calculated      <100 mg/dL 116 (H)    Non HDL Cholesterol      <130 mg/dL 146 (H)    Patient Fasting? Unknown       Legend:  (H) High  Component      Latest Ref Rng 1/23/2025  9:24 AM   Estimated Average Glucose      <117 mg/dL 157 (H)    Hemoglobin A1C      0.0 - 5.6 % 7.1 (H)       Legend:  (H) High    IMPRESSION/REPORT/PLAN  Left-sided weakness  History of stroke  Type 2 diabetes mellitus with other circulatory complication, without long-term current use of insulin (H)  Hyperlipidemia LDL goal <70  Hypertension goal BP (blood pressure) < 130/80    This is a 59 year old female with left-sided weakness related to her right pontine stroke seen on MRI.  Echocardiogram negative for thrombus.  Cardiac monitoring in the hospital did not show any atrial fibrillation.  CT angiogram was negative for any significant large vessel occlusions and stenosis.  There was some intracranial atherosclerosis-asymptomatic for which she was started on aspirin and Plavix in the hospital.  She has been having significant bruising and I will think it would be reasonable to stop the Plavix and keep her on the aspirin 81 mg only.  LDL goal would be less than 70.  Continue Crestor/Repatha though could consider stopping 1 of these since the LDL is really low.  Repeat LDL was elevated and will monitor and decide for next year.  Recommend exercise and healthy lifestyle and good control of diabetes.  Further management of vascular risk factors per primary care.      For the ongoing left-sided weakness and gait difficulty -she is graduated from physical therapy and doing exercise on her own.  Encouraged her to continue this.  Discussed long-term prognosis from the stroke.     I  can see her back in 1 year.    - Okay to stop Plavix.  Continue aspirin  - Since patient is on Repatha could consider stopping the statin.  Defer to primary care.  Depending on LDL levels.  - Check lipid panel next year    Return in about 1 year (around 5/1/2026) for In-Clinic Visit (must).    Over 30 minutes were spent coordinating the care for the patient on the day of the encounter.  This includes previsit, during visit and post visit activities as documented above.  Counseling patient.  Multiple problems/labs reviewed.  Use of  requires extra time.  (Activities include but not inclusive of reviewing chart, reviewing outside records, reviewing labs and imaging study results as well as the images, patient visit time including getting history and exam,  use if applicable, review of test results with the patient and coming up with a plan in a shared model, counseling patient and family, education and answering patient questions, EMR , EMR diagnosis entry and problem list management, medication reconciliation and prescription management if applicable, paperwork if applicable, printing documents and documentation of the visit activities.)        Jun Lcoo MD  Neurologist  [facility]  Tel:- [phone]    This note was dictated using voice recognition software.  Any grammatical or context distortions are unintentional and inherent to the software.    The longitudinal plan of care for the diagnosis(es)/condition(s) as documented were addressed during this visit. Due to the added complexity in care, I will continue to support Mao in the subsequent management and with ongoing continuity of care.

## 2025-05-01 NOTE — PROGRESS NOTES
Due for fasting labs now.  Schedule a physical.    Future Appointments   Date Time Provider Department Center   5/1/2026  9:10 AM Jun Loco MD [facility]      Health Maintenance Due   Topic Date Due    YEARLY PREVENTIVE VISIT  11/06/2021    ASTHMA ACTION PLAN  01/24/2023    COVID-19 Vaccine (5 - 2024-25 season) 09/01/2024    EYE EXAM  09/06/2024     BP Readings from Last 3 Encounters:   05/01/25 126/70   03/06/25 110/60   03/03/25 104/66     There are no diagnoses linked to this encounter.

## 2025-05-01 NOTE — PROGRESS NOTES
Future Appointments 5/1/2025 - 10/28/2025        Date Visit Type Length Department Provider     5/2/2025  8:30 AM LAB 15 min [facility]    Location Instructions:     We are located at [address] [place] [address]. We offer free parking in the lot on [address] across the street from the clinic. Please check in at the  through the main entrance.              5/16/2025 10:00 AM ED/HOSP FOLLOW UP 40 min [facility] Ema Gentile MD    Location Instructions:     [facility] is located at [address] [place] [address] in [place], at the intersection of [facility]. This is one block south of the [facility]. Free parking is available in the lot directly north of the clinic across [facility]. The clinic is near stops along bus [address].

## 2025-05-01 NOTE — NURSING NOTE
Chief Complaint   Patient presents with    Follow Up     Follow up for CVA 2020. Left sided weakness and numbness in arm and leg is still present. She also states right side feels weak too. She does not know what medications she is taking, and did not bring a list. She also would like a BP wrist cuff ordered for her.       Angelic Brito LPN on 5/1/2025 at 12:38 PM

## 2025-05-01 NOTE — LETTER
5/1/2025      Kevin Thrasher  [address] [employer] [address]      Dear Colleague,    Thank you for referring your patient, Kevin Thrasher, to the [facility] [place]. Please see a copy of my visit note below.    NEUROLOGY OUTPATIENT PROGRESS NOTE  May 1, 2025     CHIEF COMPLAINT/REASON FOR VISIT/REASON FOR CONSULT  Patient presents with:  Follow Up: Follow up for CVA 2020. Left sided weakness and numbness in arm and leg is still present. She also states right side feels weak too. She does not know what medications she is taking, and did not bring a list. She also would like a BP wrist cuff ordered for her.    REASON FOR CONSULTATION-address antiplatelet medication for stroke    REFERRAL SOURCE  Dr. Dominique Reyes*  CC Dr. Dominique Reyes*    HISTORY OF PRESENT ILLNESS  Kevin Thrasher is a 59 year old female seen today for evaluation of stroke.  She had a right pontine stroke in 2020 that resulted in left arm and leg weakness.  Her left arm has improved though she continues to have weakness in the left leg.  She was doing physical therapy at the time of the stroke though after COVID she could not do this and has been exercising on her own.  She does have a diagnosis of prediabetes and hypertension and has been on medication for that.  Prediabetes is evolved into diabetes at this point.  She was started on aspirin and Plavix in the hospital for intracranial stenosis though she had significant complications of generalized bruising and the Plavix has not been stopped.  No recurrence of stroke symptoms.  No other new symptoms since discharge from the hospital.  She has been on Repatha for her cholesterol.    5/1/25  Patient returns today  1.  She has been extremely motivated and trying to do exercises on her own.  [facility].  Continues to have some left arm and leg weakness.  Continues to have some balance issues.  Has graduated from physical therapy  2.  Tolerating her  cholesterol medication as well as the aspirin.  There was a period where she was not taking her insulin which has caused her LDL to significantly rise again.  Remains on both the Repatha and the Crestor.  No concerns.  No other new issues.    Previous history is reviewed and this is unchanged.    PAST MEDICAL/SURGICAL HISTORY  Past Medical History:   Diagnosis Date     Acute ischemic VBA brainstem stroke, right (H)     2/20. Residual left sided weakness.     Angular cheilitis 09/19/2016     Diabetic nephropathy associated with type 2 diabetes mellitus (H)      Diabetic polyneuropathy associated with type 2 diabetes mellitus (H)      Excessive or frequent menstruation      Head injury, unspecified      Helicobacter pylori gastritis      Infertility, female     eval at infertility clinic was normal.  declined evaluation. h/o ovarian hyperstimulation - did not result in pregnancy.      Pterygium      Rotator cuff tendinitis, unspecified laterality      Shingles 02/09/2015     Patient Active Problem List   Diagnosis     Vitamin D deficiency     Type 2 diabetes mellitus with other circulatory complication, without long-term current use of insulin (H)     Mild intermittent asthma without complication     Lumbosacral radiculopathy at S1     Hyperlipidemia LDL goal <70     Hypertension goal BP (blood pressure) < 130/80     Fatty liver     Urinary, incontinence, stress female     Cystocele with uterine prolapse     Osteoarthritis of spine with radiculopathy, cervical region     Facet arthritis of lumbar region     Spondylolisthesis of lumbosacral region     Vaginal atrophy     Hypokalemia     Cerebrovascular disease     Proximal IT Band Enthesitis - Left     Hemiplegia and hemiparesis following cerebral infarction affecting left non-dominant side (H)     Gastro-esophageal reflux disease without esophagitis     Leg ulcer, left, with fat layer exposed (H)     Allergic conjunctivitis, bilateral     Dry eyes   Reviewed  and noncontributory    FAMILY HISTORY  Family History   Problem Relation Age of Onset     No Known Problems Mother      No Known Problems Father      Breast Cancer No family hx of      Ovarian Cancer No family hx of      Colon Cancer No family hx of    No family history of stroke    SOCIAL HISTORY  Social History     Tobacco Use     Smoking status: Never     Passive exposure: Never     Smokeless tobacco: Never     Tobacco comments:     no second hand smoke exposure   Vaping Use     Vaping status: Never Used   Substance Use Topics     Alcohol use: No     Drug use: No       SYSTEMS REVIEW  Twelve-system ROS was done and other than the HPI this was negative.  Pertinent positives noted on the HPI    MEDICATIONS  Current Outpatient Medications   Medication Sig Dispense Refill     aspirin (ASA) 81 MG chewable tablet Take 1 tablet (81 mg) by mouth daily. 90 tablet 3     ACCU-CHEK GUIDE test strip 1 strip by In Vitro route daily Use to test once daily 100 strip 3     acetaminophen (TYLENOL) 500 MG tablet Take 2 tablets (1,000 mg) by mouth every 8 hours as needed for mild pain 200 tablet 0     albuterol (PROAIR HFA/PROVENTIL HFA/VENTOLIN HFA) 108 (90 Base) MCG/ACT inhaler Inhale 2 puffs into the lungs every 4 hours as needed for shortness of breath or wheezing 18 g 0     alcohol swab prep pads Use to swab area of injection/nina as directed. 100 each 3     blood glucose (NO BRAND SPECIFIED) test strip Use to test blood sugar 1-2 times daily or as directed. 100 strip 3     blood glucose monitoring (ACCU-CHEK FASTCLIX) lancets 1 each daily 102 each 11     Contour Next EZ (CONTOUR NEXT EZ W/DEVICE KIT) w/Device KIT USE TO TEST BLOOD SUGAR 1 TIMES DAILY OR AS DIRECTED. PREFERRED BLOOD GLUCOSE METER OR SUPPLIES TO ACCOMPANY: BLOOD GLUCOSE MONITOR BRANDS: PER INS<MORE>       dextran 70-hypromellose (TEARS NATURALE FREE PF) 0.1-0.3 % ophthalmic solution Place 1 drop into both eyes every hour as needed (dry eyes). 36 each 2      "diclofenac (VOLTAREN) 1 % topical gel Apply 4 g topically 4 times daily. May apply to either the left shin or right foot 50 g 0     estradiol (ESTRACE) 0.1 MG/GM vaginal cream Place 2 g vaginally twice a week 42.5 g 4     evolocumab (REPATHA SURECLICK) 140 MG/ML prefilled autoinjector Inject 1 mL (140 mg) subcutaneously every 14 days. 6 mL 3     famotidine (PEPCID) 40 MG tablet Take 1 tablet (40 mg) by mouth 2 times daily. Take INSTEAD of the omeprazole 28 tablet 3     Global Inject Ease Lancets 30G MISC USE WITH LANCETING DEVICE. *100 DYAS*       ketotifen fumarate 0.035% 0.035 % SOLN ophthalmic solution Place 1 drop into both eyes 2 times daily as needed for itching. 10 mL 2     lisinopril (ZESTRIL) 20 MG tablet TAKE 1 TABLET (20 MG) BY MOUTH DAILY/ TXHUA HNUB NOJ 1 LUB [facility] PAB ZOO WAN NTSHAV SIAB 90 tablet 4     loratadine (ALLERGY RELIEF) 10 MG tablet TAKE 1 TABLET (10 MG) BY MOUTH DAILY AS NEEDED FOR ALLERGIES / YOG MUAJ ALLERGIES NOJ 1 LUB IB HNUB 30 tablet 5     Lubricants (ASTROGLIDE) GEL Externally apply topically daily 66.5 g 11     metFORMIN (GLUCOPHAGE XR) 500 MG 24 hr tablet Take 1 tablet (500 mg) by mouth daily (with dinner). 90 tablet 3     naproxen (NAPROSYN) 500 MG tablet Take 1 tablet (500 mg) by mouth 2 times daily (with meals) 60 tablet 0     omeprazole (PRILOSEC) 20 MG DR capsule Take 2 capsules (40 mg) by mouth daily. 90 capsule 4     polyethylene glycol (MIRALAX) 17 GM/Dose powder Take 17 g by mouth daily 510 g 3     potassium chloride ER (K-TAB/KLOR-CON) 10 MEQ CR tablet Take 1 tablet (10 mEq) by mouth daily. 90 tablet 4     rosuvastatin (CRESTOR) 20 MG tablet TAKE 1 PILL BY MOUTH EVERY DAY FOR CHOLESTEROL / TXHUA HNUB NOJ 1 LUB TSHUA PAB WAN NTSHAV MUAJ ROJ 90 tablet 2     No current facility-administered medications for this visit.        PHYSICAL EXAMINATION  VITALS: /70 (BP Location: Left arm, Patient Position: Sitting)   Pulse 61   Ht 1.448 m (4' 9\")   Wt 64 kg (141 lb)   " BMI 30.51 kg/m    GENERAL: Healthy appearing, alert, no acute distress, normal habitus.  CARDIOVASCULAR: Extremities warm and well perfused. Pulses present.   NEUROLOGICAL:  Patient is awake and oriented to self, place and time.  Attention span is normal.  Memory is grossly intact.  Language is fluent and follows commands appropriately.  Appropriate fund of knowledge. Cranial nerves 2-12 are intact. There is no pronator drift.  Motor exam shows 5/5 strength in all extremities.  Questionable left leg weakness.  Tone is symmetric bilaterally in upper and lower extremities.  Reflexes are  and 2+ in upper extremities and lower extremities though slightly more pronounced on the left side. Sensory exam is grossly intact to light touch, pin prick and vibration.  Finger to nose and heel to shin is without dysmetria.  Romberg is negative.  Gait is normal and the patient is able to do tandem walk and walk on toes and heels with mild difficulty.  Exam stable.  No spasticity.  Mild circumduction of the left leg.  More proximal weakness in both the arm and leg.      DIAGNOSTICS  MRI-images reviewed.  HEAD MRI:   1.  15 x 10 mm in cross-sectional diameter acute/early subacute infarction right lazara.  2.  No associated hemorrhage or significant mass effect.     HEAD MRA:   1.  Short segment marked narrowing one of the right P3 segments in its distal aspect.  2.  Mild areas of narrowings proximal M2 segments bilaterally.  3.  Intracranial circulation appears otherwise normal.      HEAD CT:  1.  No CT finding of a mass, hemorrhage or focal area suggestive of an acute infarct.     HEAD CTA:   1.  No discrete vessel occlusion, significant stenosis, aneurysm or high flow vascular malformation involving arteries of the arteries of the [place].     NECK CTA:  1.  Normal configuration of the great vessels off the aortic arch with no significant stenosis of their origins.  2.  No significant stenosis or irregularity involving the  arteries of the neck by NASCET criteria.  3.  No radiographic evidence of dissection.    ECHO  Narrative & Impression    Left Ventricle: Normal left ventricular size and systolic function.The calculated left ventricular ejection fraction is 62%. This represents a normal ejection fraction. Mild concentric hypertrophy noted. E/e' 8 to 15, which is equivocal for estimating   LV filling pressures.Left ventricular diastolic function is indeterminate.    The left ventricular wall motion is normal.    Right Ventricle: Normal right ventricular size and systolic function. TAPSE is normal, which is consistent with normal right ventricular systolic function.    Left Atrium: Left atrial volume is moderately increased. No shunts as documented by negative color flow doppler and saline contrast injection. No thrombus present. No mass present. No spontaneous echo contrast.    Tricuspid Valve: Normal tricuspid valve structure. There is no evidence of tricuspid stenosis. Trace tricuspid valve regurgitation. The estimated systolic pulmonary artery pressure is 25+ right atrial pressure mmhg.    Thoracic Aorta: The ascending aorta is mildly dilated.    EKG      Normal sinus rhythm     Component      Latest Ref Rng 1/22/2024  10:46 AM   Sodium      135 - 145 mmol/L 141    Potassium      3.4 - 5.3 mmol/L 3.8    Carbon Dioxide (CO2)      22 - 29 mmol/L 31 (H)    Anion Gap      7 - 15 mmol/L 9    Urea Nitrogen      8.0 - 23.0 mg/dL 13.8    Creatinine      0.51 - 0.95 mg/dL 0.90    GFR Estimate      >60 mL/min/1.73m2 73    Calcium      8.8 - 10.2 mg/dL 9.1    Chloride      98 - 107 mmol/L 101    Glucose      70 - 99 mg/dL 149 (H)    Alkaline Phosphatase      40 - 150 U/L 82    AST      0 - 45 U/L 29    ALT      0 - 50 U/L 26    Protein Total      6.4 - 8.3 g/dL 6.9    Albumin      3.5 - 5.2 g/dL 3.8    Bilirubin Total      <=1.2 mg/dL 0.5    Cholesterol      <200 mg/dL 112    Triglycerides      <150 mg/dL 91    HDL Cholesterol      >=50  mg/dL 61    LDL Cholesterol Calculated      <=100 mg/dL 33    Non HDL Cholesterol      <130 mg/dL 51    Patient Fasting? Yes    Hemoglobin A1C      0.0 - 5.6 % 6.7 (H)       Legend:  (H) High      OUTSIDE RECORDS  Outside referral notes and chart notes were reviewed and pertinent information has been summarized (in addition to the HPI):-      Pharmacy coordination- discussed personally      Component      Latest Ref Rng 1/23/2025  9:24 AM   Cholesterol      <200 mg/dL 203 (H)    Triglycerides      <150 mg/dL 148    HDL Cholesterol      >=50 mg/dL 57    LDL Cholesterol Calculated      <100 mg/dL 116 (H)    Non HDL Cholesterol      <130 mg/dL 146 (H)    Patient Fasting? Unknown       Legend:  (H) High  Component      Latest Ref Rng 1/23/2025  9:24 AM   Estimated Average Glucose      <117 mg/dL 157 (H)    Hemoglobin A1C      0.0 - 5.6 % 7.1 (H)       Legend:  (H) High    IMPRESSION/REPORT/PLAN  Left-sided weakness  History of stroke  Type 2 diabetes mellitus with other circulatory complication, without long-term current use of insulin (H)  Hyperlipidemia LDL goal <70  Hypertension goal BP (blood pressure) < 130/80    This is a 59 year old female with left-sided weakness related to her right pontine stroke seen on MRI.  Echocardiogram negative for thrombus.  Cardiac monitoring in the hospital did not show any atrial fibrillation.  CT angiogram was negative for any significant large vessel occlusions and stenosis.  There was some intracranial atherosclerosis-asymptomatic for which she was started on aspirin and Plavix in the hospital.  She has been having significant bruising and I will think it would be reasonable to stop the Plavix and keep her on the aspirin 81 mg only.  LDL goal would be less than 70.  Continue Crestor/Repatha though could consider stopping 1 of these since the LDL is really low.  Repeat LDL was elevated and will monitor and decide for next year.  Recommend exercise and healthy lifestyle and good  control of diabetes.  Further management of vascular risk factors per primary care.      For the ongoing left-sided weakness and gait difficulty -she is graduated from physical therapy and doing exercise on her own.  Encouraged her to continue this.  Discussed long-term prognosis from the stroke.     I can see her back in 1 year.    - Okay to stop Plavix.  Continue aspirin  - Since patient is on Repatha could consider stopping the statin.  Defer to primary care.  Depending on LDL levels.  - Check lipid panel next year    Return in about 1 year (around 5/1/2026) for In-Clinic Visit (must).    Over 30 minutes were spent coordinating the care for the patient on the day of the encounter.  This includes previsit, during visit and post visit activities as documented above.  Counseling patient.  Multiple problems/labs reviewed.  Use of  requires extra time.  (Activities include but not inclusive of reviewing chart, reviewing outside records, reviewing labs and imaging study results as well as the images, patient visit time including getting history and exam,  use if applicable, review of test results with the patient and coming up with a plan in a shared model, counseling patient and family, education and answering patient questions, EMR , EMR diagnosis entry and problem list management, medication reconciliation and prescription management if applicable, paperwork if applicable, printing documents and documentation of the visit activities.)        Jun Loco MD  Neurologist  [facility]  Tel:- [phone]    This note was dictated using voice recognition software.  Any grammatical or context distortions are unintentional and inherent to the software.    The longitudinal plan of care for the diagnosis(es)/condition(s) as documented were addressed during this visit. Due to the added complexity in care, I will continue to support Mao in the subsequent  management and with ongoing continuity of care.      Again, thank you for allowing me to participate in the care of your patient.        Sincerely,        Jun Loco MD    Electronically signed

## 2025-05-12 NOTE — TELEPHONE ENCOUNTER
Appointment     Reason for Call: Patient is requesting to be scheduled sooner than next available    Reason for visit: pt has a fracture and a new order for surgical podiatrist.  Pt would like cast removed before  when her fathers  is.  Was told to keep cast on for a week.     Is this a new or return visit: New    Have you been treated for this in the past? Yes, went to  for sports med to have cast replaced.      Additional comments: Please call the pt to offer an appt in about a week.  She is not wanting [place].  Doesn't know where [place] is.      Could we send this information to you in [place] or would you prefer to receive a phone call?:   Patient would prefer a phone call   Okay to leave a detailed message?: No at Cell number on file:    Telephone Information:   Mobile [phone]  - use         [facility] placed new order for CPAP; order faxed.   Also refaxed the Patient assistance forms for ozempic

## 2025-05-13 DIAGNOSIS — E11.59 TYPE 2 DIABETES MELLITUS WITH OTHER CIRCULATORY COMPLICATION, WITHOUT LONG-TERM CURRENT USE OF INSULIN (H): Primary | ICD-10-CM

## 2025-05-13 RX ORDER — LANCETS 30 GAUGE
1 EACH MISCELLANEOUS DAILY
Qty: 100 EACH | Refills: 6 | Status: SHIPPED | OUTPATIENT
Start: 2025-05-13

## 2025-05-13 NOTE — TELEPHONE ENCOUNTER
Future Appointments   Date Time Provider Department Center   5/16/2025 10:00 AM Ema Gentile MD [facility]   5/1/2026  9:10 AM Jun Loco MD [facility]      Health Maintenance Due   Topic Date Due    YEARLY PREVENTIVE VISIT  11/06/2021    ASTHMA ACTION PLAN  01/24/2023    COVID-19 Vaccine (5 - 2024-25 season) 09/01/2024    EYE EXAM  09/06/2024     BP Readings from Last 3 Encounters:   05/01/25 126/70   03/06/25 110/60   03/03/25 104/66     Mao was seen today for medication refill.    Diagnoses and all orders for this visit:    Type 2 diabetes mellitus with other circulatory complication, without long-term current use of insulin (H)  -     Global Inject Ease Lancets 30G MISC; 1 each by Other route daily. To test blood sugar daily

## 2025-05-20 ENCOUNTER — OFFICE VISIT (OUTPATIENT)
Dept: FAMILY MEDICINE | Facility: CLINIC | Age: 60
End: 2025-05-20
Attending: PSYCHIATRY & NEUROLOGY
Payer: COMMERCIAL

## 2025-05-20 VITALS
HEART RATE: 60 BPM | TEMPERATURE: 97.8 F | SYSTOLIC BLOOD PRESSURE: 108 MMHG | WEIGHT: 140 LBS | RESPIRATION RATE: 20 BRPM | DIASTOLIC BLOOD PRESSURE: 59 MMHG | BODY MASS INDEX: 31.49 KG/M2 | OXYGEN SATURATION: 98 % | HEIGHT: 56 IN

## 2025-05-20 DIAGNOSIS — E11.59 TYPE 2 DIABETES MELLITUS WITH OTHER CIRCULATORY COMPLICATION, WITHOUT LONG-TERM CURRENT USE OF INSULIN (H): ICD-10-CM

## 2025-05-20 DIAGNOSIS — S46.911A STRAIN OF RIGHT SHOULDER, INITIAL ENCOUNTER: ICD-10-CM

## 2025-05-20 DIAGNOSIS — M62.81 MUSCLE WEAKNESS (GENERALIZED): ICD-10-CM

## 2025-05-20 DIAGNOSIS — M62.838 MUSCLE SPASM: Primary | ICD-10-CM

## 2025-05-20 DIAGNOSIS — M15.0 PRIMARY OSTEOARTHRITIS INVOLVING MULTIPLE JOINTS: ICD-10-CM

## 2025-05-20 DIAGNOSIS — S92.354A CLOSED NONDISPLACED FRACTURE OF FIFTH METATARSAL BONE OF RIGHT FOOT, INITIAL ENCOUNTER: ICD-10-CM

## 2025-05-20 PROCEDURE — 3074F SYST BP LT 130 MM HG: CPT | Performed by: FAMILY MEDICINE

## 2025-05-20 PROCEDURE — 3078F DIAST BP <80 MM HG: CPT | Performed by: FAMILY MEDICINE

## 2025-05-20 PROCEDURE — 1125F AMNT PAIN NOTED PAIN PRSNT: CPT | Performed by: FAMILY MEDICINE

## 2025-05-20 PROCEDURE — 99214 OFFICE O/P EST MOD 30 MIN: CPT | Performed by: FAMILY MEDICINE

## 2025-05-20 PROCEDURE — T1013 SIGN LANG/ORAL INTERPRETER: HCPCS

## 2025-05-20 RX ORDER — ACETAMINOPHEN 500 MG
1000 TABLET ORAL 2 TIMES DAILY
Qty: 200 TABLET | Refills: 11 | Status: SHIPPED | OUTPATIENT
Start: 2025-05-20

## 2025-05-20 RX ORDER — CYCLOBENZAPRINE HCL 5 MG
5 TABLET ORAL 3 TIMES DAILY PRN
Qty: 100 TABLET | Refills: 4 | Status: SHIPPED | OUTPATIENT
Start: 2025-05-20

## 2025-05-20 ASSESSMENT — ASTHMA QUESTIONNAIRES: ACT_TOTALSCORE: 25

## 2025-05-20 ASSESSMENT — PAIN SCALES - GENERAL: PAINLEVEL_OUTOF10: SEVERE PAIN (8)

## 2025-05-20 NOTE — PROGRESS NOTES
Office Visit  [facility]  Ema Gentile M.D. Family Medicine  Date of Service: May 20, 2025      Subjective   Kevin Thrasher is a 59 year old female who presents for   Chief Complaint   Patient presents with    Pain     Lower back and knees     Recheck Medication     Not sure of the medications      Lower Back and Knee pain  Radiates into right leg  Stiff at night  Leaks urine with coughing since stroke  Has been exercising and gardening    Wants refill of diclofenac  Wants to try muscle relaxer  Tylenol helps with pain    Diabetes  Blood sugar 99 this morning.   A1c 7.5  Add Januvia          Current Outpatient Medications   Medication Instructions    ACCU-CHEK GUIDE test strip 1 strip, In Vitro, DAILY, Use to test once daily    acetaminophen (TYLENOL) 1,000 mg, Oral, EVERY 8 HOURS PRN    albuterol (PROAIR HFA/PROVENTIL HFA/VENTOLIN HFA) 108 (90 Base) MCG/ACT inhaler 2 puffs, Inhalation, EVERY 4 HOURS PRN    alcohol swab prep pads Use to swab area of injection/nina as directed.    aspirin (ASA) 81 mg, Oral, DAILY    blood glucose (NO BRAND SPECIFIED) test strip Use to test blood sugar 1-2 times daily or as directed.    blood glucose monitoring (ACCU-CHEK FASTCLIX) lancets 1 each, Does not apply, DAILY    Contour Next EZ (CONTOUR NEXT EZ W/DEVICE KIT) w/Device KIT USE TO TEST BLOOD SUGAR 1 TIMES DAILY OR AS DIRECTED. PREFERRED BLOOD GLUCOSE METER OR SUPPLIES TO ACCOMPANY: BLOOD GLUCOSE MONITOR BRANDS: PER INS<MORE>    dextran 70-hypromellose (TEARS NATURALE FREE PF) 0.1-0.3 % ophthalmic solution 1 drop, Both Eyes, EVERY 1 HOUR PRN    diclofenac (VOLTAREN) 4 g, Topical, 4 TIMES DAILY, May apply to either the left shin or right foot    estradiol (ESTRACE) 2 g, Vaginal, TWICE WEEKLY    famotidine (PEPCID) 40 mg, Oral, 2 TIMES DAILY, Take INSTEAD of the omeprazole    Global Inject Ease Lancets 30G MISC 1 each, Other, DAILY, To test blood sugar daily    ketotifen fumarate 0.035% 0.035 % SOLN ophthalmic  "solution 1 drop, Both Eyes, 2 TIMES DAILY PRN    lisinopril (ZESTRIL) 20 MG tablet TAKE 1 TABLET (20 MG) BY MOUTH DAILY/ TX[facility] HNUB NOJ [facility]    loratadine (ALLERGY RELIEF) 10 MG tablet TAKE 1 TABLET (10 MG) BY MOUTH DAILY AS NEEDED FOR ALLERGIES / YOG MUAJ ALLERGIES NOJ 1 LUB IB HNUB    Lubricants (ASTROGLIDE) GEL Apply externally, DAILY    metFORMIN (GLUCOPHAGE XR) 500 mg, Oral, DAILY WITH SUPPER    naproxen (NAPROSYN) 500 mg, Oral, 2 TIMES DAILY WITH MEALS    omeprazole (PRILOSEC) 40 mg, Oral, DAILY    polyethylene glycol (MIRALAX) 17 g, Oral, DAILY    potassium chloride ER (K-TAB/KLOR-CON) 10 MEQ CR tablet 10 mEq, Oral, DAILY    Repatha SureClick 140 mg, Subcutaneous, EVERY 14 DAYS    rosuvastatin (CRESTOR) 20 MG tablet TAKE 1 PILL BY MOUTH EVERY DAY FOR CHOLESTEROL / TXHUA HNUB NOJ [facility]      Objective   /59 (BP Location: Left arm, Patient Position: Sitting, Cuff Size: Adult Regular)   Pulse 60   Temp 97.8  F (36.6  C) (Oral)   Resp 20   Ht 1.422 m (4' 8\")   Wt 63.5 kg (140 lb)   SpO2 98%   BMI 31.39 kg/m   She reports that she has never smoked. She has never been exposed to tobacco smoke. She has never used smokeless tobacco.        No results found for any visits on 05/20/25.  Assessment & Plan     Lower back pain with radicular pain to the right leg.  Acetaminophen 1000 mg twice daily  Flexeril 5 mg as needed    Type 2 diabetes with hyperglycemia  Start Januvia 50 mg daily recheck in 3 months          Order Summary                                                      Muscle spasm  -     cyclobenzaprine (FLEXERIL) 5 MG tablet; Take 1 tablet (5 mg) by mouth 3 times daily as needed for muscle spasms.    Muscle weakness (generalized)  -     Primary Care Referral    Closed nondisplaced fracture of fifth metatarsal bone of right foot, initial encounter  -     diclofenac (VOLTAREN) 1 % topical gel; Apply 4 g topically 4 times daily as needed " for moderate pain. May apply to legs and lower back.    Strain of right shoulder, initial encounter    Primary osteoarthritis involving multiple joints  -     acetaminophen (TYLENOL) 500 MG tablet; Take 2 tablets (1,000 mg) by mouth 2 times daily.    Type 2 diabetes mellitus with other circulatory complication, without long-term current use of insulin (H)  -     sitagliptin (JANUVIA) 50 MG tablet; Take 1 tablet (50 mg) by mouth daily.            Future Appointments   Date Time Provider Department Center   10/30/2025  8:00 AM Ema Gentile MD [facility]   5/1/2026  9:10 AM Jun Loco MD [facility]       Completed by: Ema Gentile M.D., [facility]. 5/20/2025 10:29 AM. The longitudinal plan of care for the diagnosis(es)/condition(s) as documented were addressed during this visit. Due to the added complexity in care, I will continue to support Mao in the subsequent management and with ongoing continuity of care.   This transcription uses voice recognition software, which may contain typographical errors.  MDM: SDOH neighborhood: [place] [id number], language: [facility].    Prior to immunization administration, verified patients identity using patient s name and date of birth. Please see Immunization Activity for additional information.     Screening Questionnaire for Adult Immunization    Are you sick today?   No   Do you have allergies to medications, food, a vaccine component or latex?   No   Have you ever had a serious reaction after receiving a vaccination?   No   Do you have a long-term health problem with heart, lung, kidney, or metabolic disease (e.g., diabetes), asthma, a blood disorder, no spleen, complement component deficiency, a cochlear implant, or a spinal fluid leak?  Are you on long-term aspirin therapy?   Yes   Do you have cancer, leukemia, HIV/AIDS, or any other immune system problem?   No   Do you have a parent, brother, or sister with an immune system problem?   No    In the past 3 months, have you taken medications that affect  your immune system, such as prednisone, other steroids, or anticancer drugs; drugs for the treatment of rheumatoid arthritis, Crohn s disease, or psoriasis; or have you had radiation treatments?   No   Have you had a seizure, or a brain or other nervous system problem?   No   During the past year, have you received a transfusion of blood or blood    products, or been given immune (gamma) globulin or antiviral drug?   No   For women: Are you pregnant or is there a chance you could become       pregnant during the next month?   No   Have you received any vaccinations in the past 4 weeks?   No     Immunization questionnaire answers were all negative.      Patient instructed to remain in clinic for 15 minutes afterwards, and to report any adverse reactions.     Screening performed by Jennifer Hampton MA on 5/20/2025 at 10:23 AM.

## 2025-07-10 ENCOUNTER — OFFICE VISIT (OUTPATIENT)
Dept: URGENT CARE | Facility: URGENT CARE | Age: 60
End: 2025-07-10
Payer: COMMERCIAL

## 2025-07-10 VITALS
DIASTOLIC BLOOD PRESSURE: 76 MMHG | OXYGEN SATURATION: 98 % | RESPIRATION RATE: 19 BRPM | HEART RATE: 57 BPM | BODY MASS INDEX: 31.49 KG/M2 | TEMPERATURE: 98.2 F | SYSTOLIC BLOOD PRESSURE: 131 MMHG | HEIGHT: 56 IN | WEIGHT: 140 LBS

## 2025-07-10 DIAGNOSIS — Z79.2 PROPHYLACTIC ANTIBIOTIC: ICD-10-CM

## 2025-07-10 DIAGNOSIS — S41.112A LACERATION OF MULTIPLE SITES OF LEFT UPPER EXTREMITY, INITIAL ENCOUNTER: Primary | ICD-10-CM

## 2025-07-10 DIAGNOSIS — E11.69 TYPE 2 DIABETES MELLITUS WITH OTHER SPECIFIED COMPLICATION, UNSPECIFIED WHETHER LONG TERM INSULIN USE (H): ICD-10-CM

## 2025-07-10 RX ORDER — CEPHALEXIN 500 MG/1
500 CAPSULE ORAL 2 TIMES DAILY
Qty: 10 CAPSULE | Refills: 0 | Status: SHIPPED | OUTPATIENT
Start: 2025-07-10 | End: 2025-07-15

## 2025-07-10 NOTE — PATIENT INSTRUCTIONS
Diagnosis: laceration of left arm      Plan:   Will start antibiotics to prevent infection   Tylenol for pain and swelling   Wound care   Cleanse the area with mild soap and water then gently pat dry with a clean towel daily   Change the gauze daily keep the ace wrap   After a week should have healed enough, can leave open to air     Monitor for:   If bleeding starts - apply pressure to the wound to stop the bleeding.  Monitor for signs of infection:   redness, swelling, purulent drainage, warmth at the site, pus   Fevers   Increased pain, worsening pain   Changes in sensation to the site  Inability to move the extremity where injury occurred   Wound area becoming hot or warm to touch.  Pus or fluid leaking out of the wound.  Red streaks that run towards the heart from the wound.

## 2025-07-10 NOTE — PROGRESS NOTES
URGENT CARE  Assessment & Plan   Assessment:   Kevin Thrasher is a 60 year old female who's clinical presentation today is consistent with:   1. Laceration of multiple sites of left upper extremity  2. Type 2 Diabetes  3. Prophylactic antibiotic  - cephALEXin (KEFLEX) 500 MG capsule;   Plan:  Patient's skin tear (x2) were cleaned and dressed today, home wound care taught, no primary closure needed or recommended for skin tear.    Given patient's hx of type 2 diabetes will cover patient at this time with antibiotics for skin infection coverage, due to risk of poor wound healing and risk of infection; Discussed with patient to observe for signs of bleeding, apply gentle pressure as needed. Discussed with patient she should change the dressing daily and keep clean, monitor for infection    Additionally we discussed if symptoms do not improve after starting today's treatment  to follow up in 3-5 days, sooner if symptoms worsen, return precautions given  Tetanus  booster not needed today, has had w/in 5 years.   Patient denies any forearm pain, numbness or tingling, no suspicion for forearm MSK injury, no xrays ordered today, patient did have noted significant bruising but endorses she has a history of this and this is not unusual for her.  No alarm signs or symptoms present   Differential Diagnoses for this patient's chief complaint that I considered include:  fracture, dislocation, foreign body, infected /contaminated wound, Ligamentous vs tendon pathology,     Nicky Avila, [facility] [place]      ______________________________________________________________________      Subjective     HPI: Kevin Thrasher  is a 60 year old  female who presents today for evaluation the following concerns:   Kevin reports yesterday she tripped in the yard at home, resulting in a cut on the left arm, thinks cut it on tree. Uncertain exactly about what caused the cut, but the yard has a lot of weeds and shrubs.  No pain  "reported underneath the arm, no numbness or tingling reported, except for a little discomfort in one area of the cut   Reports taking aspirin and medication for cholesterol, which leads to easy bruising history, says these bruises were already there. Experiences bruising when touching something heavier than usual.  She reports she has a daughter and granddaughter who can assist with changing dressings. Uncertain about the date of the last tetanus shot     Review of Systems:  Pertinent review of systems as reflected in HPI, otherwise negative.     Objective    Physical Exam:  Vitals:    07/10/25 1531   BP: 131/76   BP Location: Right arm   Patient Position: Sitting   Cuff Size: Adult Regular   Pulse: 57   Resp: 19   Temp: 98.2  F (36.8  C)   TempSrc: Oral   SpO2: 98%   Weight: 63.5 kg (140 lb)   Height: 1.422 m (4' 7.98\")      General:  alert and oriented, no acute distress, non ill-appearing   Vital signs reviewed: afebrile and normotensive    SKIN:   Left lower arm:   superficial skin tear (x2, see images below)  lesions, which measures 7cm (lac 1) and 2x1cm  (lac 2)   No erythremia present, no inflammation,   Moderate ecchymosis noted - old   No puss, colored discharge, or red streaks present.  No current signs of infection observed.  Very thin skin   Mild Increased tenderness/pain with palpation surrounding wound. No decreased range of motion with flexion, extension, inversion, and eversion} at arm /wrist site    Temperature equal to body temperature. Neurovascularity intact with pulse +2    Laceration 1      Laceration 2            ______________________________________________________________________    I explained my diagnostic considerations and recommendations to the patient  All questions were answered.   Please see AVS for any patient instructions & handouts given.   "

## 2025-08-29 ENCOUNTER — TELEPHONE (OUTPATIENT)
Dept: FAMILY MEDICINE | Facility: CLINIC | Age: 60
End: 2025-08-29
Payer: COMMERCIAL